# Patient Record
Sex: MALE | Race: WHITE | NOT HISPANIC OR LATINO | Employment: OTHER | URBAN - METROPOLITAN AREA
[De-identification: names, ages, dates, MRNs, and addresses within clinical notes are randomized per-mention and may not be internally consistent; named-entity substitution may affect disease eponyms.]

---

## 2021-02-20 ENCOUNTER — APPOINTMENT (OUTPATIENT)
Dept: CT IMAGING | Age: 86
DRG: 375 | End: 2021-02-20
Attending: STUDENT IN AN ORGANIZED HEALTH CARE EDUCATION/TRAINING PROGRAM

## 2021-02-20 ENCOUNTER — HOSPITAL ENCOUNTER (INPATIENT)
Age: 86
LOS: 5 days | Discharge: NURSING, REHAB OR LONG-TERM CARE | DRG: 375 | End: 2021-02-25
Attending: EMERGENCY MEDICINE | Admitting: INTERNAL MEDICINE

## 2021-02-20 DIAGNOSIS — K62.5 RECTAL BLEEDING: Primary | ICD-10-CM

## 2021-02-20 DIAGNOSIS — K62.89 RECTAL MASS: ICD-10-CM

## 2021-02-20 DIAGNOSIS — I48.0 PAROXYSMAL ATRIAL FIBRILLATION (CMD): ICD-10-CM

## 2021-02-20 DIAGNOSIS — I10 ESSENTIAL HYPERTENSION: ICD-10-CM

## 2021-02-20 DIAGNOSIS — J43.8 OTHER EMPHYSEMA (CMD): ICD-10-CM

## 2021-02-20 LAB
ABO + RH BLD: NORMAL
ALBUMIN SERPL-MCNC: 3.7 G/DL (ref 3.6–5.1)
ALBUMIN/GLOB SERPL: 1 {RATIO} (ref 1–2.4)
ALP SERPL-CCNC: 95 UNITS/L (ref 45–117)
ALT SERPL-CCNC: 21 UNITS/L
ANION GAP SERPL CALC-SCNC: 13 MMOL/L (ref 10–20)
APTT PPP: 26 SEC (ref 22–30)
AST SERPL-CCNC: 33 UNITS/L
BASOPHILS # BLD: 0.1 K/MCL (ref 0–0.3)
BASOPHILS NFR BLD: 1 %
BILIRUB SERPL-MCNC: 0.4 MG/DL (ref 0.2–1)
BLD GP AB SCN SERPL QL GEL: NEGATIVE
BUN SERPL-MCNC: 25 MG/DL (ref 6–20)
BUN/CREAT SERPL: 23 (ref 7–25)
CALCIUM SERPL-MCNC: 9.2 MG/DL (ref 8.4–10.2)
CHLORIDE SERPL-SCNC: 103 MMOL/L (ref 98–107)
CO2 SERPL-SCNC: 28 MMOL/L (ref 21–32)
CREAT SERPL-MCNC: 1.08 MG/DL (ref 0.67–1.17)
DEPRECATED RDW RBC: 45.4 FL (ref 39–50)
EOSINOPHIL # BLD: 0.3 K/MCL (ref 0–0.5)
EOSINOPHIL NFR BLD: 3 %
ERYTHROCYTE [DISTWIDTH] IN BLOOD: 12.9 % (ref 11–15)
FASTING DURATION TIME PATIENT: ABNORMAL H
GFR SERPLBLD BASED ON 1.73 SQ M-ARVRAT: 61 ML/MIN/1.73M2
GLOBULIN SER-MCNC: 3.6 G/DL (ref 2–4)
GLUCOSE SERPL-MCNC: 129 MG/DL (ref 65–99)
HCT VFR BLD CALC: 37.1 % (ref 39–51)
HCT VFR BLD CALC: 42.8 % (ref 39–51)
HGB BLD-MCNC: 12.4 G/DL (ref 13–17)
HGB BLD-MCNC: 14 G/DL (ref 13–17)
IMM GRANULOCYTES # BLD AUTO: 0 K/MCL (ref 0–0.2)
IMM GRANULOCYTES # BLD: 0 %
INR PPP: 1
LACTATE BLDV-SCNC: 0.8 MMOL/L (ref 0–2)
LIPASE SERPL-CCNC: 212 UNITS/L (ref 73–393)
LYMPHOCYTES # BLD: 1.1 K/MCL (ref 1–4)
LYMPHOCYTES NFR BLD: 12 %
MCH RBC QN AUTO: 31.4 PG (ref 26–34)
MCHC RBC AUTO-ENTMCNC: 32.7 G/DL (ref 32–36.5)
MCV RBC AUTO: 96 FL (ref 78–100)
MONOCYTES # BLD: 0.9 K/MCL (ref 0.3–0.9)
MONOCYTES NFR BLD: 10 %
NEUTROPHILS # BLD: 7 K/MCL (ref 1.8–7.7)
NEUTROPHILS NFR BLD: 74 %
NRBC BLD MANUAL-RTO: 0 /100 WBC
PLATELET # BLD AUTO: 250 K/MCL (ref 140–450)
POTASSIUM SERPL-SCNC: 5 MMOL/L (ref 3.4–5.1)
PROT SERPL-MCNC: 7.3 G/DL (ref 6.4–8.2)
PROTHROMBIN TIME: 10.5 SEC (ref 9.7–11.8)
RBC # BLD: 4.46 MIL/MCL (ref 4.5–5.9)
SARS-COV-2 RNA RESP QL NAA+PROBE: NOT DETECTED
SERVICE CMNT-IMP: NORMAL
SERVICE CMNT-IMP: NORMAL
SODIUM SERPL-SCNC: 139 MMOL/L (ref 135–145)
TROPONIN I SERPL HS-MCNC: <0.02 NG/ML
TYPE AND SCREEN EXPIRATION DATE: NORMAL
WBC # BLD: 9.4 K/MCL (ref 4.2–11)

## 2021-02-20 PROCEDURE — 10004651 HB RX, NO CHARGE ITEM: Performed by: STUDENT IN AN ORGANIZED HEALTH CARE EDUCATION/TRAINING PROGRAM

## 2021-02-20 PROCEDURE — 85014 HEMATOCRIT: CPT | Performed by: STUDENT IN AN ORGANIZED HEALTH CARE EDUCATION/TRAINING PROGRAM

## 2021-02-20 PROCEDURE — 85730 THROMBOPLASTIN TIME PARTIAL: CPT | Performed by: STUDENT IN AN ORGANIZED HEALTH CARE EDUCATION/TRAINING PROGRAM

## 2021-02-20 PROCEDURE — 10002805 HB CONTRAST AGENT: Performed by: STUDENT IN AN ORGANIZED HEALTH CARE EDUCATION/TRAINING PROGRAM

## 2021-02-20 PROCEDURE — 99285 EMERGENCY DEPT VISIT HI MDM: CPT | Performed by: EMERGENCY MEDICINE

## 2021-02-20 PROCEDURE — 93010 ELECTROCARDIOGRAM REPORT: CPT | Performed by: INTERNAL MEDICINE

## 2021-02-20 PROCEDURE — 85610 PROTHROMBIN TIME: CPT | Performed by: STUDENT IN AN ORGANIZED HEALTH CARE EDUCATION/TRAINING PROGRAM

## 2021-02-20 PROCEDURE — 74177 CT ABD & PELVIS W/CONTRAST: CPT

## 2021-02-20 PROCEDURE — U0003 INFECTIOUS AGENT DETECTION BY NUCLEIC ACID (DNA OR RNA); SEVERE ACUTE RESPIRATORY SYNDROME CORONAVIRUS 2 (SARS-COV-2) (CORONAVIRUS DISEASE [COVID-19]), AMPLIFIED PROBE TECHNIQUE, MAKING USE OF HIGH THROUGHPUT TECHNOLOGIES AS DESCRIBED BY CMS-2020-01-R: HCPCS | Performed by: STUDENT IN AN ORGANIZED HEALTH CARE EDUCATION/TRAINING PROGRAM

## 2021-02-20 PROCEDURE — 80053 COMPREHEN METABOLIC PANEL: CPT | Performed by: STUDENT IN AN ORGANIZED HEALTH CARE EDUCATION/TRAINING PROGRAM

## 2021-02-20 PROCEDURE — 99285 EMERGENCY DEPT VISIT HI MDM: CPT

## 2021-02-20 PROCEDURE — 10006031 HB ROOM CHARGE TELEMETRY

## 2021-02-20 PROCEDURE — C9803 HOPD COVID-19 SPEC COLLECT: HCPCS

## 2021-02-20 PROCEDURE — 99222 1ST HOSP IP/OBS MODERATE 55: CPT | Performed by: STUDENT IN AN ORGANIZED HEALTH CARE EDUCATION/TRAINING PROGRAM

## 2021-02-20 PROCEDURE — 82378 CARCINOEMBRYONIC ANTIGEN: CPT | Performed by: STUDENT IN AN ORGANIZED HEALTH CARE EDUCATION/TRAINING PROGRAM

## 2021-02-20 PROCEDURE — 93005 ELECTROCARDIOGRAM TRACING: CPT | Performed by: STUDENT IN AN ORGANIZED HEALTH CARE EDUCATION/TRAINING PROGRAM

## 2021-02-20 PROCEDURE — 85025 COMPLETE CBC W/AUTO DIFF WBC: CPT | Performed by: STUDENT IN AN ORGANIZED HEALTH CARE EDUCATION/TRAINING PROGRAM

## 2021-02-20 PROCEDURE — X1094 NO CHARGE VISIT: HCPCS | Performed by: EMERGENCY MEDICINE

## 2021-02-20 PROCEDURE — 83605 ASSAY OF LACTIC ACID: CPT | Performed by: STUDENT IN AN ORGANIZED HEALTH CARE EDUCATION/TRAINING PROGRAM

## 2021-02-20 PROCEDURE — 36415 COLL VENOUS BLD VENIPUNCTURE: CPT | Performed by: STUDENT IN AN ORGANIZED HEALTH CARE EDUCATION/TRAINING PROGRAM

## 2021-02-20 PROCEDURE — 83690 ASSAY OF LIPASE: CPT | Performed by: STUDENT IN AN ORGANIZED HEALTH CARE EDUCATION/TRAINING PROGRAM

## 2021-02-20 PROCEDURE — 86901 BLOOD TYPING SEROLOGIC RH(D): CPT | Performed by: STUDENT IN AN ORGANIZED HEALTH CARE EDUCATION/TRAINING PROGRAM

## 2021-02-20 PROCEDURE — 84484 ASSAY OF TROPONIN QUANT: CPT | Performed by: STUDENT IN AN ORGANIZED HEALTH CARE EDUCATION/TRAINING PROGRAM

## 2021-02-20 RX ORDER — ENOXAPARIN SODIUM 100 MG/ML
40 INJECTION SUBCUTANEOUS DAILY
Status: DISCONTINUED | OUTPATIENT
Start: 2021-02-20 | End: 2021-02-20

## 2021-02-20 RX ORDER — FLUTICASONE PROPIONATE 50 MCG
2 SPRAY, SUSPENSION (ML) NASAL DAILY
COMMUNITY

## 2021-02-20 RX ORDER — POTASSIUM CHLORIDE 1.5 G/1.58G
40 POWDER, FOR SOLUTION ORAL DAILY
COMMUNITY
End: 2023-04-11

## 2021-02-20 RX ORDER — SOTALOL HYDROCHLORIDE 80 MG/1
1 TABLET ORAL 2 TIMES DAILY
Status: ON HOLD | COMMUNITY
End: 2023-04-13 | Stop reason: HOSPADM

## 2021-02-20 RX ORDER — B-COMPLEX WITH VITAMIN C
1 TABLET ORAL EVERY OTHER DAY
COMMUNITY

## 2021-02-20 RX ORDER — 0.9 % SODIUM CHLORIDE 0.9 %
2 VIAL (ML) INJECTION EVERY 12 HOURS SCHEDULED
Status: DISCONTINUED | OUTPATIENT
Start: 2021-02-20 | End: 2021-02-25 | Stop reason: HOSPADM

## 2021-02-20 RX ORDER — DOCUSATE SODIUM 100 MG/1
100 CAPSULE, LIQUID FILLED ORAL DAILY
Status: ON HOLD | COMMUNITY
End: 2023-05-24 | Stop reason: HOSPADM

## 2021-02-20 RX ORDER — ACETAMINOPHEN 325 MG/1
650 TABLET ORAL EVERY 6 HOURS PRN
Status: ON HOLD | COMMUNITY
End: 2023-05-24 | Stop reason: HOSPADM

## 2021-02-20 RX ORDER — NIFEDIPINE 60 MG/1
60 TABLET, FILM COATED, EXTENDED RELEASE ORAL DAILY
COMMUNITY
End: 2021-04-14 | Stop reason: CLARIF

## 2021-02-20 RX ORDER — MAGNESIUM OXIDE 400 MG/1
400 TABLET ORAL DAILY
COMMUNITY

## 2021-02-20 RX ORDER — LOSARTAN POTASSIUM 100 MG/1
100 TABLET ORAL DAILY
Status: ON HOLD | COMMUNITY
End: 2023-05-24 | Stop reason: HOSPADM

## 2021-02-20 RX ORDER — LANOLIN ALCOHOL/MO/W.PET/CERES
3 CREAM (GRAM) TOPICAL AT BEDTIME
COMMUNITY

## 2021-02-20 RX ORDER — ACETAMINOPHEN 325 MG/1
325 TABLET ORAL EVERY 4 HOURS PRN
Status: DISCONTINUED | OUTPATIENT
Start: 2021-02-20 | End: 2021-02-25 | Stop reason: HOSPADM

## 2021-02-20 RX ADMIN — SODIUM CHLORIDE, PRESERVATIVE FREE 2 ML: 5 INJECTION INTRAVENOUS at 20:17

## 2021-02-20 RX ADMIN — IOHEXOL 80 ML: 350 INJECTION, SOLUTION INTRAVENOUS at 17:07

## 2021-02-20 ASSESSMENT — ACTIVITIES OF DAILY LIVING (ADL)
CHRONIC_PAIN_PRESENT: NO
DRESSING YOURSELF: INDEPENDENT
ADL_SCORE: 8
TOILETING: NEEDS ASSISTANCE
RECENT_DECLINE_ADL: NO
TRANSFERRING: NEEDS ASSISTANCE
ADL_SHORT_OF_BREATH: NO
MOBILITY_ASSIST_DEVICES: STANDARD WALKER
BATHING: NEEDS ASSISTANCE
CONTINENCE: NEEDS ASSISTANCE
ADL_BEFORE_ADMISSION: NEEDS/REQUIRES ASSISTANCE
FEEDING YOURSELF: INDEPENDENT

## 2021-02-20 ASSESSMENT — COGNITIVE AND FUNCTIONAL STATUS - GENERAL
DO YOU HAVE SERIOUS DIFFICULTY WALKING OR CLIMBING STAIRS: YES
ARE YOU DEAF OR DO YOU HAVE SERIOUS DIFFICULTY  HEARING: NO
DO YOU HAVE DIFFICULTY DRESSING OR BATHING: YES
ARE YOU BLIND OR DO YOU HAVE SERIOUS DIFFICULTY SEEING, EVEN WHEN WEARING GLASSES: NO
BECAUSE OF A PHYSICAL, MENTAL, OR EMOTIONAL CONDITION, DO YOU HAVE DIFFICULTY DOING ERRANDS ALONE: NO
BECAUSE OF A PHYSICAL, MENTAL, OR EMOTIONAL CONDITION, DO YOU HAVE SERIOUS DIFFICULTY CONCENTRATING, REMEMBERING OR MAKING DECISIONS: NO

## 2021-02-20 ASSESSMENT — ENCOUNTER SYMPTOMS
UNEXPECTED WEIGHT CHANGE: 0
EYE PAIN: 0
RHINORRHEA: 0
FEVER: 0
WOUND: 0
EYE DISCHARGE: 0
CHILLS: 0
BACK PAIN: 0
SORE THROAT: 0
DIARRHEA: 0
CHEST TIGHTNESS: 0
COLOR CHANGE: 0
NUMBNESS: 0
VOMITING: 0
SHORTNESS OF BREATH: 0
NAUSEA: 0
DIZZINESS: 0
HEADACHES: 0
ABDOMINAL PAIN: 0
BLOOD IN STOOL: 1
COUGH: 0

## 2021-02-20 ASSESSMENT — LIFESTYLE VARIABLES
HOW OFTEN DO YOU HAVE A DRINK CONTAINING ALCOHOL: NEVER
AUDIT-C TOTAL SCORE: 0
HOW OFTEN DO YOU HAVE 6 OR MORE DRINKS ON ONE OCCASION: NEVER
ALCOHOL_USE_STATUS: NO OR LOW RISK WITH VALIDATED TOOL
HOW MANY STANDARD DRINKS CONTAINING ALCOHOL DO YOU HAVE ON A TYPICAL DAY: 0,1 OR 2

## 2021-02-20 ASSESSMENT — PAIN SCALES - GENERAL
PAINLEVEL_OUTOF10: 0

## 2021-02-21 LAB
ANION GAP SERPL CALC-SCNC: 12 MMOL/L (ref 10–20)
BASOPHILS # BLD: 0.1 K/MCL (ref 0–0.3)
BASOPHILS NFR BLD: 1 %
BUN SERPL-MCNC: 21 MG/DL (ref 6–20)
BUN/CREAT SERPL: 26 (ref 7–25)
CALCIUM SERPL-MCNC: 8.4 MG/DL (ref 8.4–10.2)
CHLORIDE SERPL-SCNC: 106 MMOL/L (ref 98–107)
CO2 SERPL-SCNC: 25 MMOL/L (ref 21–32)
CREAT SERPL-MCNC: 0.81 MG/DL (ref 0.67–1.17)
DEPRECATED RDW RBC: 44.4 FL (ref 39–50)
EOSINOPHIL # BLD: 0.3 K/MCL (ref 0–0.5)
EOSINOPHIL NFR BLD: 3 %
ERYTHROCYTE [DISTWIDTH] IN BLOOD: 12.8 % (ref 11–15)
FASTING DURATION TIME PATIENT: ABNORMAL H
GFR SERPLBLD BASED ON 1.73 SQ M-ARVRAT: 80 ML/MIN/1.73M2
GLUCOSE SERPL-MCNC: 87 MG/DL (ref 65–99)
HCT VFR BLD CALC: 33.8 % (ref 39–51)
HGB BLD-MCNC: 11.2 G/DL (ref 13–17)
IMM GRANULOCYTES # BLD AUTO: 0 K/MCL (ref 0–0.2)
IMM GRANULOCYTES # BLD: 0 %
LYMPHOCYTES # BLD: 1.1 K/MCL (ref 1–4)
LYMPHOCYTES NFR BLD: 12 %
MCH RBC QN AUTO: 31.3 PG (ref 26–34)
MCHC RBC AUTO-ENTMCNC: 33.1 G/DL (ref 32–36.5)
MCV RBC AUTO: 94.4 FL (ref 78–100)
MONOCYTES # BLD: 0.8 K/MCL (ref 0.3–0.9)
MONOCYTES NFR BLD: 8 %
NEUTROPHILS # BLD: 7.6 K/MCL (ref 1.8–7.7)
NEUTROPHILS NFR BLD: 76 %
NRBC BLD MANUAL-RTO: 0 /100 WBC
PLATELET # BLD AUTO: 190 K/MCL (ref 140–450)
POTASSIUM SERPL-SCNC: 4.2 MMOL/L (ref 3.4–5.1)
RAINBOW EXTRA TUBES HOLD SPECIMEN: NORMAL
RAINBOW EXTRA TUBES HOLD SPECIMEN: NORMAL
RBC # BLD: 3.58 MIL/MCL (ref 4.5–5.9)
SODIUM SERPL-SCNC: 139 MMOL/L (ref 135–145)
WBC # BLD: 9.8 K/MCL (ref 4.2–11)

## 2021-02-21 PROCEDURE — 36415 COLL VENOUS BLD VENIPUNCTURE: CPT | Performed by: STUDENT IN AN ORGANIZED HEALTH CARE EDUCATION/TRAINING PROGRAM

## 2021-02-21 PROCEDURE — 97165 OT EVAL LOW COMPLEX 30 MIN: CPT

## 2021-02-21 PROCEDURE — 97161 PT EVAL LOW COMPLEX 20 MIN: CPT

## 2021-02-21 PROCEDURE — 80048 BASIC METABOLIC PNL TOTAL CA: CPT | Performed by: STUDENT IN AN ORGANIZED HEALTH CARE EDUCATION/TRAINING PROGRAM

## 2021-02-21 PROCEDURE — 99232 SBSQ HOSP IP/OBS MODERATE 35: CPT | Performed by: STUDENT IN AN ORGANIZED HEALTH CARE EDUCATION/TRAINING PROGRAM

## 2021-02-21 PROCEDURE — 10006031 HB ROOM CHARGE TELEMETRY

## 2021-02-21 PROCEDURE — 99222 1ST HOSP IP/OBS MODERATE 55: CPT | Performed by: INTERNAL MEDICINE

## 2021-02-21 PROCEDURE — 10002803 HB RX 637: Performed by: STUDENT IN AN ORGANIZED HEALTH CARE EDUCATION/TRAINING PROGRAM

## 2021-02-21 PROCEDURE — 10004651 HB RX, NO CHARGE ITEM: Performed by: STUDENT IN AN ORGANIZED HEALTH CARE EDUCATION/TRAINING PROGRAM

## 2021-02-21 PROCEDURE — 85025 COMPLETE CBC W/AUTO DIFF WBC: CPT | Performed by: STUDENT IN AN ORGANIZED HEALTH CARE EDUCATION/TRAINING PROGRAM

## 2021-02-21 RX ORDER — IPRATROPIUM BROMIDE AND ALBUTEROL SULFATE 2.5; .5 MG/3ML; MG/3ML
3 SOLUTION RESPIRATORY (INHALATION)
Status: DISCONTINUED | OUTPATIENT
Start: 2021-02-21 | End: 2021-02-25 | Stop reason: HOSPADM

## 2021-02-21 RX ADMIN — SODIUM CHLORIDE, PRESERVATIVE FREE 2 ML: 5 INJECTION INTRAVENOUS at 20:46

## 2021-02-21 RX ADMIN — POLYETHYLENE GLYCOL-3350 AND ELECTROLYTES 4000 ML: 236; 6.74; 5.86; 2.97; 22.74 POWDER, FOR SOLUTION ORAL at 18:07

## 2021-02-21 ASSESSMENT — COGNITIVE AND FUNCTIONAL STATUS - GENERAL
BASIC_MOBILITY_CONVERTED_SCORE: 50.88
DAILY_ACTIVITY_CONVERTED_SCORE: 57.54
BASIC_MOBILITY_RAW_SCORE: 23
DAILY_ACTIVITY_RAW_SCORE: 24

## 2021-02-21 ASSESSMENT — PAIN SCALES - GENERAL
PAINLEVEL_OUTOF10: 0

## 2021-02-21 ASSESSMENT — ACTIVITIES OF DAILY LIVING (ADL): PRIOR_ADL: MODIFIED INDEPENDENT

## 2021-02-21 ASSESSMENT — ENCOUNTER SYMPTOMS
CONSTITUTIONAL NEGATIVE: 1
RESPIRATORY NEGATIVE: 1
HEMATOLOGIC/LYMPHATIC NEGATIVE: 1
BLOOD IN STOOL: 1
NEUROLOGICAL NEGATIVE: 1
ALLERGIC/IMMUNOLOGIC NEGATIVE: 1
EYES NEGATIVE: 1
ENDOCRINE NEGATIVE: 1
PAIN SEVERITY NOW: 0

## 2021-02-22 ENCOUNTER — APPOINTMENT (OUTPATIENT)
Dept: GASTROENTEROLOGY | Age: 86
DRG: 375 | End: 2021-02-22
Attending: INTERNAL MEDICINE

## 2021-02-22 ENCOUNTER — APPOINTMENT (OUTPATIENT)
Dept: CT IMAGING | Age: 86
DRG: 375 | End: 2021-02-22
Attending: STUDENT IN AN ORGANIZED HEALTH CARE EDUCATION/TRAINING PROGRAM

## 2021-02-22 ENCOUNTER — ANESTHESIA (OUTPATIENT)
Dept: GASTROENTEROLOGY | Age: 86
DRG: 375 | End: 2021-02-22

## 2021-02-22 ENCOUNTER — ANESTHESIA EVENT (OUTPATIENT)
Dept: GASTROENTEROLOGY | Age: 86
DRG: 375 | End: 2021-02-22

## 2021-02-22 PROBLEM — J43.8 OTHER EMPHYSEMA (CMD): Status: ACTIVE | Noted: 2020-02-12

## 2021-02-22 PROBLEM — I10 ESSENTIAL HYPERTENSION: Status: ACTIVE | Noted: 2019-07-04

## 2021-02-22 PROBLEM — I48.0 PAROXYSMAL ATRIAL FIBRILLATION (CMD): Status: ACTIVE | Noted: 2019-07-04

## 2021-02-22 PROBLEM — I61.3 PONTINE HEMORRHAGE (CMD): Status: ACTIVE | Noted: 2020-07-10

## 2021-02-22 PROBLEM — K27.9 PEPTIC ULCER DISEASE: Status: ACTIVE | Noted: 2019-07-04

## 2021-02-22 LAB
ALBUMIN SERPL-MCNC: 3.1 G/DL (ref 3.6–5.1)
ALBUMIN/GLOB SERPL: 1.1 {RATIO} (ref 1–2.4)
ALP SERPL-CCNC: 62 UNITS/L (ref 45–117)
ALT SERPL-CCNC: 16 UNITS/L
ANION GAP SERPL CALC-SCNC: 11 MMOL/L (ref 10–20)
AST SERPL-CCNC: 27 UNITS/L
ATRIAL RATE (BPM): 65
BASOPHILS # BLD: 0.1 K/MCL (ref 0–0.3)
BASOPHILS NFR BLD: 1 %
BILIRUB SERPL-MCNC: 0.6 MG/DL (ref 0.2–1)
BUN SERPL-MCNC: 14 MG/DL (ref 6–20)
BUN/CREAT SERPL: 15 (ref 7–25)
CALCIUM SERPL-MCNC: 8.5 MG/DL (ref 8.4–10.2)
CEA SERPL-MCNC: 4.5 NG/ML (ref 0–5)
CEA SERPL-MCNC: 5.8 NG/ML (ref 0–5)
CHLORIDE SERPL-SCNC: 102 MMOL/L (ref 98–107)
CO2 SERPL-SCNC: 29 MMOL/L (ref 21–32)
CREAT SERPL-MCNC: 0.92 MG/DL (ref 0.67–1.17)
DEPRECATED RDW RBC: 44.1 FL (ref 39–50)
EOSINOPHIL # BLD: 0.3 K/MCL (ref 0–0.5)
EOSINOPHIL NFR BLD: 3 %
ERYTHROCYTE [DISTWIDTH] IN BLOOD: 12.8 % (ref 11–15)
FASTING DURATION TIME PATIENT: ABNORMAL H
GFR SERPLBLD BASED ON 1.73 SQ M-ARVRAT: 75 ML/MIN/1.73M2
GLOBULIN SER-MCNC: 2.8 G/DL (ref 2–4)
GLUCOSE SERPL-MCNC: 85 MG/DL (ref 65–99)
HCT VFR BLD CALC: 33.7 % (ref 39–51)
HGB BLD-MCNC: 11.2 G/DL (ref 13–17)
IMM GRANULOCYTES # BLD AUTO: 0 K/MCL (ref 0–0.2)
IMM GRANULOCYTES # BLD: 0 %
INR PPP: 1
LYMPHOCYTES # BLD: 1.4 K/MCL (ref 1–4)
LYMPHOCYTES NFR BLD: 17 %
MCH RBC QN AUTO: 31.3 PG (ref 26–34)
MCHC RBC AUTO-ENTMCNC: 33.2 G/DL (ref 32–36.5)
MCV RBC AUTO: 94.1 FL (ref 78–100)
MONOCYTES # BLD: 0.9 K/MCL (ref 0.3–0.9)
MONOCYTES NFR BLD: 11 %
NEUTROPHILS # BLD: 5.6 K/MCL (ref 1.8–7.7)
NEUTROPHILS NFR BLD: 68 %
NRBC BLD MANUAL-RTO: 0 /100 WBC
P AXIS (DEGREES): 73
PLATELET # BLD AUTO: 208 K/MCL (ref 140–450)
POTASSIUM SERPL-SCNC: 3.6 MMOL/L (ref 3.4–5.1)
PR-INTERVAL (MSEC): 242
PROT SERPL-MCNC: 5.9 G/DL (ref 6.4–8.2)
PROTHROMBIN TIME: 10.9 SEC (ref 9.7–11.8)
QRS-INTERVAL (MSEC): 110
QT-INTERVAL (MSEC): 454
QTC: 472
R AXIS (DEGREES): 46
RBC # BLD: 3.58 MIL/MCL (ref 4.5–5.9)
REPORT TEXT: NORMAL
SODIUM SERPL-SCNC: 138 MMOL/L (ref 135–145)
T AXIS (DEGREES): 67
VENTRICULAR RATE EKG/MIN (BPM): 65
WBC # BLD: 8.3 K/MCL (ref 4.2–11)

## 2021-02-22 PROCEDURE — 10002807 HB RX 258: Performed by: NURSE ANESTHETIST, CERTIFIED REGISTERED

## 2021-02-22 PROCEDURE — 36415 COLL VENOUS BLD VENIPUNCTURE: CPT | Performed by: STUDENT IN AN ORGANIZED HEALTH CARE EDUCATION/TRAINING PROGRAM

## 2021-02-22 PROCEDURE — 88341 IMHCHEM/IMCYTCHM EA ADD ANTB: CPT | Performed by: INTERNAL MEDICINE

## 2021-02-22 PROCEDURE — 10002805 HB CONTRAST AGENT: Performed by: STUDENT IN AN ORGANIZED HEALTH CARE EDUCATION/TRAINING PROGRAM

## 2021-02-22 PROCEDURE — 0DBH8ZZ EXCISION OF CECUM, VIA NATURAL OR ARTIFICIAL OPENING ENDOSCOPIC: ICD-10-PCS | Performed by: INTERNAL MEDICINE

## 2021-02-22 PROCEDURE — 82378 CARCINOEMBRYONIC ANTIGEN: CPT | Performed by: STUDENT IN AN ORGANIZED HEALTH CARE EDUCATION/TRAINING PROGRAM

## 2021-02-22 PROCEDURE — 85025 COMPLETE CBC W/AUTO DIFF WBC: CPT | Performed by: STUDENT IN AN ORGANIZED HEALTH CARE EDUCATION/TRAINING PROGRAM

## 2021-02-22 PROCEDURE — 13000025 HB GI COMPLEX CASE EACH ADD MINUTE

## 2021-02-22 PROCEDURE — 10004651 HB RX, NO CHARGE ITEM: Performed by: STUDENT IN AN ORGANIZED HEALTH CARE EDUCATION/TRAINING PROGRAM

## 2021-02-22 PROCEDURE — 70470 CT HEAD/BRAIN W/O & W/DYE: CPT

## 2021-02-22 PROCEDURE — 13000001 HB PHASE II RECOVERY EA 30 MINUTES

## 2021-02-22 PROCEDURE — 85610 PROTHROMBIN TIME: CPT | Performed by: STUDENT IN AN ORGANIZED HEALTH CARE EDUCATION/TRAINING PROGRAM

## 2021-02-22 PROCEDURE — 94640 AIRWAY INHALATION TREATMENT: CPT

## 2021-02-22 PROCEDURE — 99233 SBSQ HOSP IP/OBS HIGH 50: CPT | Performed by: INTERNAL MEDICINE

## 2021-02-22 PROCEDURE — 71260 CT THORAX DX C+: CPT

## 2021-02-22 PROCEDURE — 99223 1ST HOSP IP/OBS HIGH 75: CPT | Performed by: SURGERY

## 2021-02-22 PROCEDURE — 10000002 HB ROOM CHARGE MED SURG

## 2021-02-22 PROCEDURE — 10002800 HB RX 250 W HCPCS: Performed by: NURSE ANESTHETIST, CERTIFIED REGISTERED

## 2021-02-22 PROCEDURE — 10002801 HB RX 250 W/O HCPCS: Performed by: NURSE ANESTHETIST, CERTIFIED REGISTERED

## 2021-02-22 PROCEDURE — 80053 COMPREHEN METABOLIC PANEL: CPT | Performed by: STUDENT IN AN ORGANIZED HEALTH CARE EDUCATION/TRAINING PROGRAM

## 2021-02-22 PROCEDURE — 13000008 HB ANESTHESIA MAC OUTSIDE OR

## 2021-02-22 PROCEDURE — 10002803 HB RX 637: Performed by: STUDENT IN AN ORGANIZED HEALTH CARE EDUCATION/TRAINING PROGRAM

## 2021-02-22 PROCEDURE — 10002801 HB RX 250 W/O HCPCS: Performed by: STUDENT IN AN ORGANIZED HEALTH CARE EDUCATION/TRAINING PROGRAM

## 2021-02-22 PROCEDURE — 0DBP8ZX EXCISION OF RECTUM, VIA NATURAL OR ARTIFICIAL OPENING ENDOSCOPIC, DIAGNOSTIC: ICD-10-PCS | Performed by: INTERNAL MEDICINE

## 2021-02-22 PROCEDURE — 0DBN8ZX EXCISION OF SIGMOID COLON, VIA NATURAL OR ARTIFICIAL OPENING ENDOSCOPIC, DIAGNOSTIC: ICD-10-PCS | Performed by: INTERNAL MEDICINE

## 2021-02-22 PROCEDURE — 94664 DEMO&/EVAL PT USE INHALER: CPT

## 2021-02-22 PROCEDURE — 13000024 HB GI COMPLEX CASE S/U + 1ST 15 MIN

## 2021-02-22 RX ORDER — SODIUM CHLORIDE 9 MG/ML
INJECTION, SOLUTION INTRAVENOUS CONTINUOUS PRN
Status: DISCONTINUED | OUTPATIENT
Start: 2021-02-22 | End: 2021-02-22

## 2021-02-22 RX ORDER — PHENYLEPHRINE HYDROCHLORIDE 10 MG/ML
INJECTION, SOLUTION INTRAMUSCULAR; INTRAVENOUS; SUBCUTANEOUS PRN
Status: DISCONTINUED | OUTPATIENT
Start: 2021-02-22 | End: 2021-02-22

## 2021-02-22 RX ORDER — NIFEDIPINE 60 MG/1
60 TABLET, EXTENDED RELEASE ORAL DAILY
Status: DISCONTINUED | OUTPATIENT
Start: 2021-02-22 | End: 2021-02-25 | Stop reason: HOSPADM

## 2021-02-22 RX ORDER — LIDOCAINE HYDROCHLORIDE 20 MG/ML
INJECTION, SOLUTION INFILTRATION; PERINEURAL PRN
Status: DISCONTINUED | OUTPATIENT
Start: 2021-02-22 | End: 2021-02-22

## 2021-02-22 RX ORDER — PROPOFOL 10 MG/ML
INJECTION, EMULSION INTRAVENOUS PRN
Status: DISCONTINUED | OUTPATIENT
Start: 2021-02-22 | End: 2021-02-22

## 2021-02-22 RX ORDER — LOSARTAN POTASSIUM 25 MG/1
100 TABLET ORAL DAILY
Status: DISCONTINUED | OUTPATIENT
Start: 2021-02-22 | End: 2021-02-25 | Stop reason: HOSPADM

## 2021-02-22 RX ADMIN — SODIUM CHLORIDE, PRESERVATIVE FREE 2 ML: 5 INJECTION INTRAVENOUS at 20:16

## 2021-02-22 RX ADMIN — IPRATROPIUM BROMIDE AND ALBUTEROL SULFATE 3 ML: 2.5; .5 SOLUTION RESPIRATORY (INHALATION) at 07:52

## 2021-02-22 RX ADMIN — NIFEDIPINE 60 MG: 60 TABLET, FILM COATED, EXTENDED RELEASE ORAL at 11:17

## 2021-02-22 RX ADMIN — PHENYLEPHRINE HYDROCHLORIDE 100 MCG: 10 INJECTION INTRAVENOUS at 14:48

## 2021-02-22 RX ADMIN — FLUTICASONE FUROATE, UMECLIDINIUM BROMIDE AND VILANTEROL TRIFENATATE 1 PUFF: 100; 62.5; 25 POWDER RESPIRATORY (INHALATION) at 07:52

## 2021-02-22 RX ADMIN — LOSARTAN POTASSIUM 100 MG: 50 TABLET, FILM COATED ORAL at 08:06

## 2021-02-22 RX ADMIN — SODIUM CHLORIDE: 9 INJECTION, SOLUTION INTRAVENOUS at 14:22

## 2021-02-22 RX ADMIN — PHENYLEPHRINE HYDROCHLORIDE 100 MCG: 10 INJECTION INTRAVENOUS at 14:35

## 2021-02-22 RX ADMIN — PROPOFOL 50 MG: 10 INJECTION, EMULSION INTRAVENOUS at 14:31

## 2021-02-22 RX ADMIN — IPRATROPIUM BROMIDE AND ALBUTEROL SULFATE 3 ML: 2.5; .5 SOLUTION RESPIRATORY (INHALATION) at 20:37

## 2021-02-22 RX ADMIN — SODIUM CHLORIDE, PRESERVATIVE FREE 2 ML: 5 INJECTION INTRAVENOUS at 08:07

## 2021-02-22 RX ADMIN — IPRATROPIUM BROMIDE AND ALBUTEROL SULFATE 3 ML: 2.5; .5 SOLUTION RESPIRATORY (INHALATION) at 11:04

## 2021-02-22 RX ADMIN — PROPOFOL 100 MCG/KG/MIN: 10 INJECTION, EMULSION INTRAVENOUS at 14:31

## 2021-02-22 RX ADMIN — IOHEXOL 80 ML: 350 INJECTION, SOLUTION INTRAVENOUS at 10:15

## 2021-02-22 RX ADMIN — LIDOCAINE HYDROCHLORIDE 3 ML: 20 INJECTION, SOLUTION INFILTRATION; PERINEURAL at 14:31

## 2021-02-22 RX ADMIN — PHENYLEPHRINE HYDROCHLORIDE 200 MCG: 10 INJECTION INTRAVENOUS at 14:38

## 2021-02-22 ASSESSMENT — PAIN SCALES - GENERAL
PAINLEVEL_OUTOF10: 0
PAINLEVEL_OUTOF10: 0
PAINLEVEL_OUTOF10: 1
PAINLEVEL_OUTOF10: 0

## 2021-02-22 ASSESSMENT — ENCOUNTER SYMPTOMS
EXERCISE TOLERANCE: POOR (<4 METS)
FEVER: 0
ABDOMINAL PAIN: 0
SHORTNESS OF BREATH: 0
NAUSEA: 0
CONSTIPATION: 0
RECTAL PAIN: 0
CHEST TIGHTNESS: 0
ANAL BLEEDING: 0
BLOOD IN STOOL: 1
CHILLS: 0
WHEEZING: 0
STRIDOR: 0
DIARRHEA: 0
APPETITE CHANGE: 0

## 2021-02-22 ASSESSMENT — COPD QUESTIONNAIRES: COPD: 1

## 2021-02-23 ENCOUNTER — APPOINTMENT (OUTPATIENT)
Dept: MRI IMAGING | Age: 86
DRG: 375 | End: 2021-02-23
Attending: STUDENT IN AN ORGANIZED HEALTH CARE EDUCATION/TRAINING PROGRAM

## 2021-02-23 LAB
ALBUMIN SERPL-MCNC: 3.3 G/DL (ref 3.6–5.1)
ALBUMIN/GLOB SERPL: 1.1 {RATIO} (ref 1–2.4)
ALP SERPL-CCNC: 64 UNITS/L (ref 45–117)
ALT SERPL-CCNC: 17 UNITS/L
ANION GAP SERPL CALC-SCNC: 14 MMOL/L (ref 10–20)
AST SERPL-CCNC: 30 UNITS/L
BASOPHILS # BLD: 0.1 K/MCL (ref 0–0.3)
BASOPHILS NFR BLD: 1 %
BILIRUB SERPL-MCNC: 0.9 MG/DL (ref 0.2–1)
BUN SERPL-MCNC: 11 MG/DL (ref 6–20)
BUN/CREAT SERPL: 13 (ref 7–25)
CALCIUM SERPL-MCNC: 8.9 MG/DL (ref 8.4–10.2)
CHLORIDE SERPL-SCNC: 102 MMOL/L (ref 98–107)
CO2 SERPL-SCNC: 27 MMOL/L (ref 21–32)
CREAT SERPL-MCNC: 0.82 MG/DL (ref 0.67–1.17)
DEPRECATED RDW RBC: 43 FL (ref 39–50)
EOSINOPHIL # BLD: 0.2 K/MCL (ref 0–0.5)
EOSINOPHIL NFR BLD: 2 %
ERYTHROCYTE [DISTWIDTH] IN BLOOD: 12.6 % (ref 11–15)
FASTING DURATION TIME PATIENT: ABNORMAL H
GFR SERPLBLD BASED ON 1.73 SQ M-ARVRAT: 80 ML/MIN/1.73M2
GLOBULIN SER-MCNC: 2.9 G/DL (ref 2–4)
GLUCOSE SERPL-MCNC: 84 MG/DL (ref 65–99)
HCT VFR BLD CALC: 35.9 % (ref 39–51)
HGB BLD-MCNC: 12 G/DL (ref 13–17)
IMM GRANULOCYTES # BLD AUTO: 0 K/MCL (ref 0–0.2)
IMM GRANULOCYTES # BLD: 0 %
LYMPHOCYTES # BLD: 1.4 K/MCL (ref 1–4)
LYMPHOCYTES NFR BLD: 14 %
MAGNESIUM SERPL-MCNC: 1.8 MG/DL (ref 1.7–2.4)
MCH RBC QN AUTO: 31.1 PG (ref 26–34)
MCHC RBC AUTO-ENTMCNC: 33.4 G/DL (ref 32–36.5)
MCV RBC AUTO: 93 FL (ref 78–100)
MONOCYTES # BLD: 1 K/MCL (ref 0.3–0.9)
MONOCYTES NFR BLD: 10 %
NEUTROPHILS # BLD: 7.2 K/MCL (ref 1.8–7.7)
NEUTROPHILS NFR BLD: 73 %
NRBC BLD MANUAL-RTO: 0 /100 WBC
PHOSPHATE SERPL-MCNC: 3.3 MG/DL (ref 2.4–4.7)
PLATELET # BLD AUTO: 229 K/MCL (ref 140–450)
POTASSIUM SERPL-SCNC: 3.2 MMOL/L (ref 3.4–5.1)
PROT SERPL-MCNC: 6.2 G/DL (ref 6.4–8.2)
RBC # BLD: 3.86 MIL/MCL (ref 4.5–5.9)
SODIUM SERPL-SCNC: 140 MMOL/L (ref 135–145)
WBC # BLD: 9.9 K/MCL (ref 4.2–11)

## 2021-02-23 PROCEDURE — 10002805 HB CONTRAST AGENT: Performed by: STUDENT IN AN ORGANIZED HEALTH CARE EDUCATION/TRAINING PROGRAM

## 2021-02-23 PROCEDURE — 83735 ASSAY OF MAGNESIUM: CPT | Performed by: PHYSICIAN ASSISTANT

## 2021-02-23 PROCEDURE — A9585 GADOBUTROL INJECTION: HCPCS | Performed by: STUDENT IN AN ORGANIZED HEALTH CARE EDUCATION/TRAINING PROGRAM

## 2021-02-23 PROCEDURE — 10004651 HB RX, NO CHARGE ITEM: Performed by: STUDENT IN AN ORGANIZED HEALTH CARE EDUCATION/TRAINING PROGRAM

## 2021-02-23 PROCEDURE — 99222 1ST HOSP IP/OBS MODERATE 55: CPT | Performed by: INTERNAL MEDICINE

## 2021-02-23 PROCEDURE — 10002803 HB RX 637: Performed by: STUDENT IN AN ORGANIZED HEALTH CARE EDUCATION/TRAINING PROGRAM

## 2021-02-23 PROCEDURE — 99232 SBSQ HOSP IP/OBS MODERATE 35: CPT | Performed by: INTERNAL MEDICINE

## 2021-02-23 PROCEDURE — 80053 COMPREHEN METABOLIC PANEL: CPT | Performed by: STUDENT IN AN ORGANIZED HEALTH CARE EDUCATION/TRAINING PROGRAM

## 2021-02-23 PROCEDURE — 72197 MRI PELVIS W/O & W/DYE: CPT

## 2021-02-23 PROCEDURE — 85025 COMPLETE CBC W/AUTO DIFF WBC: CPT | Performed by: STUDENT IN AN ORGANIZED HEALTH CARE EDUCATION/TRAINING PROGRAM

## 2021-02-23 PROCEDURE — 10000002 HB ROOM CHARGE MED SURG

## 2021-02-23 PROCEDURE — 99232 SBSQ HOSP IP/OBS MODERATE 35: CPT | Performed by: SURGERY

## 2021-02-23 PROCEDURE — 36415 COLL VENOUS BLD VENIPUNCTURE: CPT | Performed by: STUDENT IN AN ORGANIZED HEALTH CARE EDUCATION/TRAINING PROGRAM

## 2021-02-23 PROCEDURE — 10002803 HB RX 637: Performed by: PHYSICIAN ASSISTANT

## 2021-02-23 PROCEDURE — 94640 AIRWAY INHALATION TREATMENT: CPT

## 2021-02-23 PROCEDURE — 84100 ASSAY OF PHOSPHORUS: CPT | Performed by: PHYSICIAN ASSISTANT

## 2021-02-23 RX ORDER — LANOLIN ALCOHOL/MO/W.PET/CERES
400 CREAM (GRAM) TOPICAL ONCE
Status: COMPLETED | OUTPATIENT
Start: 2021-02-23 | End: 2021-02-23

## 2021-02-23 RX ORDER — GADOBUTROL 604.72 MG/ML
7.5 INJECTION INTRAVENOUS ONCE
Status: COMPLETED | OUTPATIENT
Start: 2021-02-23 | End: 2021-02-23

## 2021-02-23 RX ORDER — POTASSIUM CHLORIDE 10 MEQ
40 TABLET, EXT RELEASE, PARTICLES/CRYSTALS ORAL
Status: DISCONTINUED | OUTPATIENT
Start: 2021-02-23 | End: 2021-02-25 | Stop reason: HOSPADM

## 2021-02-23 RX ORDER — ENOXAPARIN SODIUM 100 MG/ML
40 INJECTION SUBCUTANEOUS DAILY
Status: DISCONTINUED | OUTPATIENT
Start: 2021-02-23 | End: 2021-02-25 | Stop reason: HOSPADM

## 2021-02-23 RX ORDER — POTASSIUM CHLORIDE 14.9 MG/ML
40 INJECTION INTRAVENOUS ONCE
Status: DISCONTINUED | OUTPATIENT
Start: 2021-02-23 | End: 2021-02-23

## 2021-02-23 RX ADMIN — Medication 400 MG: at 09:29

## 2021-02-23 RX ADMIN — FLUTICASONE FUROATE, UMECLIDINIUM BROMIDE AND VILANTEROL TRIFENATATE 1 PUFF: 100; 62.5; 25 POWDER RESPIRATORY (INHALATION) at 07:40

## 2021-02-23 RX ADMIN — GADOBUTROL 5 ML: 604.72 INJECTION INTRAVENOUS at 20:50

## 2021-02-23 RX ADMIN — SODIUM CHLORIDE, PRESERVATIVE FREE 2 ML: 5 INJECTION INTRAVENOUS at 21:26

## 2021-02-23 RX ADMIN — POTASSIUM CHLORIDE 40 MEQ: 750 TABLET, FILM COATED, EXTENDED RELEASE ORAL at 08:04

## 2021-02-23 RX ADMIN — NIFEDIPINE 60 MG: 60 TABLET, FILM COATED, EXTENDED RELEASE ORAL at 08:04

## 2021-02-23 RX ADMIN — SODIUM CHLORIDE, PRESERVATIVE FREE 2 ML: 5 INJECTION INTRAVENOUS at 08:05

## 2021-02-23 ASSESSMENT — ENCOUNTER SYMPTOMS
SHORTNESS OF BREATH: 0
SORE THROAT: 0
DIARRHEA: 0
CONSTIPATION: 0
DIZZINESS: 0
FATIGUE: 0
ABDOMINAL PAIN: 0
VOMITING: 0
NAUSEA: 0
COUGH: 0
NUMBNESS: 0
WEAKNESS: 0
WOUND: 0
CHILLS: 0
BLOOD IN STOOL: 1
HEADACHES: 0
FEVER: 0

## 2021-02-23 ASSESSMENT — PAIN SCALES - GENERAL
PAINLEVEL_OUTOF10: 0

## 2021-02-24 LAB
ALBUMIN SERPL-MCNC: 3.1 G/DL (ref 3.6–5.1)
ALBUMIN/GLOB SERPL: 1 {RATIO} (ref 1–2.4)
ALP SERPL-CCNC: 60 UNITS/L (ref 45–117)
ALT SERPL-CCNC: 18 UNITS/L
ANION GAP SERPL CALC-SCNC: 15 MMOL/L (ref 10–20)
AST SERPL-CCNC: 27 UNITS/L
BASOPHILS # BLD: 0.1 K/MCL (ref 0–0.3)
BASOPHILS NFR BLD: 1 %
BILIRUB SERPL-MCNC: 0.6 MG/DL (ref 0.2–1)
BUN SERPL-MCNC: 14 MG/DL (ref 6–20)
BUN/CREAT SERPL: 14 (ref 7–25)
CALCIUM SERPL-MCNC: 8.3 MG/DL (ref 8.4–10.2)
CHLORIDE SERPL-SCNC: 105 MMOL/L (ref 98–107)
CO2 SERPL-SCNC: 26 MMOL/L (ref 21–32)
CREAT SERPL-MCNC: 1 MG/DL (ref 0.67–1.17)
DEPRECATED RDW RBC: 43.8 FL (ref 39–50)
EOSINOPHIL # BLD: 0.3 K/MCL (ref 0–0.5)
EOSINOPHIL NFR BLD: 3 %
ERYTHROCYTE [DISTWIDTH] IN BLOOD: 12.8 % (ref 11–15)
FASTING DURATION TIME PATIENT: ABNORMAL H
GFR SERPLBLD BASED ON 1.73 SQ M-ARVRAT: 67 ML/MIN/1.73M2
GLOBULIN SER-MCNC: 3 G/DL (ref 2–4)
GLUCOSE SERPL-MCNC: 95 MG/DL (ref 65–99)
HCT VFR BLD CALC: 35.1 % (ref 39–51)
HGB BLD-MCNC: 11.8 G/DL (ref 13–17)
IMM GRANULOCYTES # BLD AUTO: 0 K/MCL (ref 0–0.2)
IMM GRANULOCYTES # BLD: 0 %
LYMPHOCYTES # BLD: 1.6 K/MCL (ref 1–4)
LYMPHOCYTES NFR BLD: 15 %
MCH RBC QN AUTO: 31.6 PG (ref 26–34)
MCHC RBC AUTO-ENTMCNC: 33.6 G/DL (ref 32–36.5)
MCV RBC AUTO: 94.1 FL (ref 78–100)
MONOCYTES # BLD: 1.1 K/MCL (ref 0.3–0.9)
MONOCYTES NFR BLD: 10 %
NEUTROPHILS # BLD: 7.8 K/MCL (ref 1.8–7.7)
NEUTROPHILS NFR BLD: 71 %
NRBC BLD MANUAL-RTO: 0 /100 WBC
PLATELET # BLD AUTO: 247 K/MCL (ref 140–450)
POTASSIUM SERPL-SCNC: 3.9 MMOL/L (ref 3.4–5.1)
PROT SERPL-MCNC: 6.1 G/DL (ref 6.4–8.2)
RBC # BLD: 3.73 MIL/MCL (ref 4.5–5.9)
SODIUM SERPL-SCNC: 142 MMOL/L (ref 135–145)
WBC # BLD: 10.9 K/MCL (ref 4.2–11)

## 2021-02-24 PROCEDURE — 10002803 HB RX 637: Performed by: STUDENT IN AN ORGANIZED HEALTH CARE EDUCATION/TRAINING PROGRAM

## 2021-02-24 PROCEDURE — 10002800 HB RX 250 W HCPCS: Performed by: STUDENT IN AN ORGANIZED HEALTH CARE EDUCATION/TRAINING PROGRAM

## 2021-02-24 PROCEDURE — 99232 SBSQ HOSP IP/OBS MODERATE 35: CPT | Performed by: INTERNAL MEDICINE

## 2021-02-24 PROCEDURE — 10004651 HB RX, NO CHARGE ITEM: Performed by: STUDENT IN AN ORGANIZED HEALTH CARE EDUCATION/TRAINING PROGRAM

## 2021-02-24 PROCEDURE — 80053 COMPREHEN METABOLIC PANEL: CPT | Performed by: STUDENT IN AN ORGANIZED HEALTH CARE EDUCATION/TRAINING PROGRAM

## 2021-02-24 PROCEDURE — 36415 COLL VENOUS BLD VENIPUNCTURE: CPT | Performed by: STUDENT IN AN ORGANIZED HEALTH CARE EDUCATION/TRAINING PROGRAM

## 2021-02-24 PROCEDURE — 10000002 HB ROOM CHARGE MED SURG

## 2021-02-24 PROCEDURE — 85025 COMPLETE CBC W/AUTO DIFF WBC: CPT | Performed by: STUDENT IN AN ORGANIZED HEALTH CARE EDUCATION/TRAINING PROGRAM

## 2021-02-24 PROCEDURE — 99233 SBSQ HOSP IP/OBS HIGH 50: CPT | Performed by: SURGERY

## 2021-02-24 RX ORDER — DOCUSATE SODIUM 100 MG/1
100 CAPSULE, LIQUID FILLED ORAL 2 TIMES DAILY
Qty: 60 CAPSULE | Refills: 1 | Status: SHIPPED | OUTPATIENT
Start: 2021-02-24 | End: 2021-04-14 | Stop reason: CLARIF

## 2021-02-24 RX ADMIN — SODIUM CHLORIDE, PRESERVATIVE FREE 2 ML: 5 INJECTION INTRAVENOUS at 09:13

## 2021-02-24 RX ADMIN — POTASSIUM CHLORIDE 40 MEQ: 750 TABLET, FILM COATED, EXTENDED RELEASE ORAL at 09:11

## 2021-02-24 RX ADMIN — DOCUSATE SODIUM 100 MG: 50 LIQUID ORAL at 14:31

## 2021-02-24 RX ADMIN — SODIUM CHLORIDE, PRESERVATIVE FREE 2 ML: 5 INJECTION INTRAVENOUS at 20:19

## 2021-02-24 RX ADMIN — LOSARTAN POTASSIUM 100 MG: 50 TABLET, FILM COATED ORAL at 09:11

## 2021-02-24 RX ADMIN — NIFEDIPINE 60 MG: 60 TABLET, FILM COATED, EXTENDED RELEASE ORAL at 09:10

## 2021-02-24 ASSESSMENT — PAIN SCALES - GENERAL
PAINLEVEL_OUTOF10: 0
PAINLEVEL_OUTOF10: 0

## 2021-02-25 VITALS
SYSTOLIC BLOOD PRESSURE: 123 MMHG | DIASTOLIC BLOOD PRESSURE: 74 MMHG | RESPIRATION RATE: 17 BRPM | OXYGEN SATURATION: 95 % | HEART RATE: 94 BPM | HEIGHT: 65 IN | WEIGHT: 113.54 LBS | TEMPERATURE: 97.9 F | BODY MASS INDEX: 18.92 KG/M2

## 2021-02-25 PROCEDURE — 99239 HOSP IP/OBS DSCHRG MGMT >30: CPT | Performed by: INTERNAL MEDICINE

## 2021-02-25 PROCEDURE — 94640 AIRWAY INHALATION TREATMENT: CPT

## 2021-02-25 PROCEDURE — 10002803 HB RX 637: Performed by: STUDENT IN AN ORGANIZED HEALTH CARE EDUCATION/TRAINING PROGRAM

## 2021-02-25 PROCEDURE — 10004651 HB RX, NO CHARGE ITEM: Performed by: STUDENT IN AN ORGANIZED HEALTH CARE EDUCATION/TRAINING PROGRAM

## 2021-02-25 PROCEDURE — 10002801 HB RX 250 W/O HCPCS: Performed by: STUDENT IN AN ORGANIZED HEALTH CARE EDUCATION/TRAINING PROGRAM

## 2021-02-25 RX ORDER — DOCUSATE SODIUM 100 MG/1
100 CAPSULE, LIQUID FILLED ORAL DAILY
Status: DISCONTINUED | OUTPATIENT
Start: 2021-02-25 | End: 2021-02-25 | Stop reason: HOSPADM

## 2021-02-25 RX ADMIN — IPRATROPIUM BROMIDE AND ALBUTEROL SULFATE 3 ML: 2.5; .5 SOLUTION RESPIRATORY (INHALATION) at 12:15

## 2021-02-25 RX ADMIN — DOCUSATE SODIUM 100 MG: 100 CAPSULE ORAL at 09:38

## 2021-02-25 RX ADMIN — POTASSIUM CHLORIDE 40 MEQ: 750 TABLET, FILM COATED, EXTENDED RELEASE ORAL at 09:38

## 2021-02-25 RX ADMIN — FLUTICASONE FUROATE, UMECLIDINIUM BROMIDE AND VILANTEROL TRIFENATATE 1 PUFF: 100; 62.5; 25 POWDER RESPIRATORY (INHALATION) at 09:08

## 2021-02-25 RX ADMIN — NIFEDIPINE 60 MG: 60 TABLET, FILM COATED, EXTENDED RELEASE ORAL at 09:38

## 2021-02-25 RX ADMIN — IPRATROPIUM BROMIDE AND ALBUTEROL SULFATE 3 ML: 2.5; .5 SOLUTION RESPIRATORY (INHALATION) at 07:50

## 2021-02-25 RX ADMIN — LOSARTAN POTASSIUM 100 MG: 50 TABLET, FILM COATED ORAL at 09:38

## 2021-02-25 RX ADMIN — SODIUM CHLORIDE, PRESERVATIVE FREE 2 ML: 5 INJECTION INTRAVENOUS at 09:40

## 2021-02-25 ASSESSMENT — PAIN SCALES - GENERAL: PAINLEVEL_OUTOF10: 0

## 2021-02-28 ENCOUNTER — ADVANCED DIRECTIVES (OUTPATIENT)
Dept: HEALTH INFORMATION MANAGEMENT | Age: 86
End: 2021-02-28

## 2021-03-02 ENCOUNTER — OFFICE VISIT (OUTPATIENT)
Dept: SURGERY | Age: 86
End: 2021-03-02
Attending: SURGERY

## 2021-03-02 ENCOUNTER — TELEPHONE (OUTPATIENT)
Dept: SURGERY | Age: 86
End: 2021-03-02

## 2021-03-02 VITALS
HEART RATE: 63 BPM | RESPIRATION RATE: 17 BRPM | DIASTOLIC BLOOD PRESSURE: 76 MMHG | BODY MASS INDEX: 21.66 KG/M2 | WEIGHT: 130 LBS | OXYGEN SATURATION: 96 % | SYSTOLIC BLOOD PRESSURE: 136 MMHG | HEIGHT: 65 IN | TEMPERATURE: 98.2 F

## 2021-03-02 DIAGNOSIS — C18.7 CANCER OF SIGMOID COLON (CMD): Primary | ICD-10-CM

## 2021-03-02 LAB
ASR DISCLAIMER: NORMAL
CASE RPRT: NORMAL
CLINICAL INFO: NORMAL
PATH REPORT.ADDENDUM SPEC: NORMAL
PATH REPORT.FINAL DX SPEC: NORMAL
PATH REPORT.FINAL DX SPEC: NORMAL
PATH REPORT.GROSS SPEC: NORMAL

## 2021-03-02 PROCEDURE — 99215 OFFICE O/P EST HI 40 MIN: CPT | Performed by: SURGERY

## 2021-03-02 PROCEDURE — 99211 OFF/OP EST MAY X REQ PHY/QHP: CPT | Performed by: SURGERY

## 2021-03-03 ENCOUNTER — TELEPHONE (OUTPATIENT)
Dept: HEMATOLOGY/ONCOLOGY | Age: 86
End: 2021-03-03

## 2021-03-08 ENCOUNTER — TELEPHONE (OUTPATIENT)
Dept: INTERNAL MEDICINE | Age: 86
End: 2021-03-08

## 2021-03-10 ENCOUNTER — TELEPHONE (OUTPATIENT)
Dept: HEMATOLOGY/ONCOLOGY | Age: 86
End: 2021-03-10

## 2021-03-15 ENCOUNTER — HOSPITAL ENCOUNTER (OUTPATIENT)
Dept: PET IMAGING | Age: 86
Discharge: HOME | End: 2021-03-15
Attending: INTERNAL MEDICINE

## 2021-03-15 DIAGNOSIS — K62.89 RECTAL MASS: ICD-10-CM

## 2021-03-15 LAB — GLUCOSE BLDC GLUCOMTR-MCNC: 109 MG/DL (ref 70–99)

## 2021-03-15 PROCEDURE — 10006150 HB RX 343: Performed by: STUDENT IN AN ORGANIZED HEALTH CARE EDUCATION/TRAINING PROGRAM

## 2021-03-15 PROCEDURE — 78815 PET IMAGE W/CT SKULL-THIGH: CPT

## 2021-03-15 PROCEDURE — 82962 GLUCOSE BLOOD TEST: CPT

## 2021-03-15 PROCEDURE — A9552 F18 FDG: HCPCS | Performed by: STUDENT IN AN ORGANIZED HEALTH CARE EDUCATION/TRAINING PROGRAM

## 2021-03-15 RX ORDER — FLUDEOXYGLUCOSE F-18 300 MCI/ML
9.4 INJECTION INTRAVENOUS ONCE
Status: COMPLETED | OUTPATIENT
Start: 2021-03-15 | End: 2021-03-15

## 2021-03-15 RX ADMIN — FLUDEOXYGLUCOSE F-18 9.4 MILLICURIE: 300 INJECTION INTRAVENOUS at 08:30

## 2021-03-17 ENCOUNTER — OFFICE VISIT (OUTPATIENT)
Dept: HEMATOLOGY/ONCOLOGY | Age: 86
End: 2021-03-17
Attending: INTERNAL MEDICINE

## 2021-03-17 DIAGNOSIS — C18.7 MALIGNANT NEOPLASM OF SIGMOID COLON (CMD): Primary | ICD-10-CM

## 2021-03-17 PROCEDURE — 99202 OFFICE O/P NEW SF 15 MIN: CPT

## 2021-03-17 PROCEDURE — 99215 OFFICE O/P EST HI 40 MIN: CPT | Performed by: INTERNAL MEDICINE

## 2021-03-17 PROCEDURE — 99211 OFF/OP EST MAY X REQ PHY/QHP: CPT

## 2021-03-17 RX ORDER — NIFEDIPINE 60 MG/1
60 TABLET, EXTENDED RELEASE ORAL DAILY
COMMUNITY
Start: 2021-02-25 | End: 2023-04-11

## 2021-03-17 ASSESSMENT — ENCOUNTER SYMPTOMS
EYES NEGATIVE: 1
RESPIRATORY NEGATIVE: 1
PSYCHIATRIC NEGATIVE: 1
BLOOD IN STOOL: 1
HEMATOLOGIC/LYMPHATIC NEGATIVE: 1
ENDOCRINE NEGATIVE: 1
CONSTITUTIONAL NEGATIVE: 1
CONSTIPATION: 1
NEUROLOGICAL NEGATIVE: 1

## 2021-03-17 ASSESSMENT — PAIN SCALES - GENERAL: PAINLEVEL: 0

## 2021-03-19 ENCOUNTER — OFFICE VISIT (OUTPATIENT)
Dept: SURGERY | Age: 86
End: 2021-03-19
Attending: SURGERY

## 2021-03-19 VITALS
OXYGEN SATURATION: 97 % | HEIGHT: 62 IN | DIASTOLIC BLOOD PRESSURE: 85 MMHG | BODY MASS INDEX: 21.16 KG/M2 | SYSTOLIC BLOOD PRESSURE: 174 MMHG | RESPIRATION RATE: 17 BRPM | WEIGHT: 115 LBS | TEMPERATURE: 98.1 F | HEART RATE: 66 BPM

## 2021-03-19 DIAGNOSIS — C18.7 CANCER OF SIGMOID COLON (CMD): Primary | ICD-10-CM

## 2021-03-19 PROCEDURE — 99211 OFF/OP EST MAY X REQ PHY/QHP: CPT | Performed by: SURGERY

## 2021-03-19 PROCEDURE — 99215 OFFICE O/P EST HI 40 MIN: CPT | Performed by: SURGERY

## 2021-03-23 ENCOUNTER — TELEPHONE (OUTPATIENT)
Dept: SURGERY | Age: 86
End: 2021-03-23

## 2021-03-24 ENCOUNTER — HOSPITAL ENCOUNTER (OUTPATIENT)
Dept: GASTROENTEROLOGY | Age: 86
Discharge: HOME | End: 2021-03-24
Attending: SURGERY

## 2021-03-24 DIAGNOSIS — C18.7 CANCER OF SIGMOID COLON (CMD): ICD-10-CM

## 2021-04-12 ENCOUNTER — TELEPHONE (OUTPATIENT)
Dept: SURGERY | Age: 86
End: 2021-04-12

## 2021-04-13 ENCOUNTER — TELEPHONE (OUTPATIENT)
Dept: SURGERY | Age: 86
End: 2021-04-13

## 2021-04-14 ENCOUNTER — ANESTHESIA EVENT (OUTPATIENT)
Dept: SURGERY | Age: 86
DRG: 330 | End: 2021-04-14

## 2021-04-14 ASSESSMENT — ACTIVITIES OF DAILY LIVING (ADL)
MOBILITY_ASSIST_DEVICES: STANDARD WALKER
SENSORY_SUPPORT_DEVICES: EYEGLASSES
ADL_BEFORE_ADMISSION: INDEPENDENT
HISTORY OF FALLING IN THE LAST YEAR (PRIOR TO ADMISSION): NO
ADL_SCORE: 12

## 2021-04-15 ENCOUNTER — ANESTHESIA (OUTPATIENT)
Dept: SURGERY | Age: 86
DRG: 330 | End: 2021-04-15

## 2021-04-15 ENCOUNTER — HOSPITAL ENCOUNTER (INPATIENT)
Age: 86
LOS: 4 days | Discharge: NURSING, REHAB OR LONG-TERM CARE | DRG: 330 | End: 2021-04-19
Attending: SURGERY | Admitting: SURGERY

## 2021-04-15 DIAGNOSIS — C18.7 CANCER OF SIGMOID COLON (CMD): Primary | ICD-10-CM

## 2021-04-15 DIAGNOSIS — R33.9 URINARY RETENTION: ICD-10-CM

## 2021-04-15 LAB
ABO + RH BLD: NORMAL
BASE EXCESS BLDA CALC-SCNC: -2 MMOL/L (ref -2–3)
BASE EXCESS BLDA CALC-SCNC: -3 MMOL/L (ref -2–3)
BLD GP AB SCN SERPL QL GEL: NEGATIVE
CA-I BLD-SCNC: 1.14 MMOL/L (ref 1.15–1.29)
CA-I BLD-SCNC: 1.16 MMOL/L (ref 1.15–1.29)
GLUCOSE BLD-MCNC: 79 MG/DL (ref 70–99)
GLUCOSE BLD-MCNC: 99 MG/DL (ref 70–99)
GLUCOSE BLDC GLUCOMTR-MCNC: 88 MG/DL (ref 70–99)
GLUCOSE BLDC GLUCOMTR-MCNC: 90 MG/DL (ref 70–99)
HCO3 BLDA-SCNC: 23 MMOL/L (ref 22–28)
HCO3 BLDA-SCNC: 23 MMOL/L (ref 22–28)
HCT VFR BLD CALC: 34 % (ref 39–51)
HCT VFR BLD CALC: 37 % (ref 39–51)
HGB BLD CALC-MCNC: 11.2 G/DL (ref 13–17)
HGB BLD CALC-MCNC: 12.3 G/DL (ref 13–17)
LACTATE BLDA-SCNC: 1 MMOL/L
LACTATE BLDA-SCNC: 1.6 MMOL/L
METHGB MFR BLD: 1.1 %
METHGB MFR BLD: 1.1 %
PCO2 BLDA: 39 MM HG (ref 35–48)
PCO2 BLDA: 44 MM HG (ref 35–48)
PH BLDA: 7.32 UNITS (ref 7.35–7.45)
PH BLDA: 7.38 UNITS (ref 7.35–7.45)
PO2 BLDA: 213 MM HG (ref 83–108)
PO2 BLDA: 322 MM HG (ref 83–108)
POTASSIUM BLD-SCNC: 3.4 MMOL/L (ref 3.4–5.1)
POTASSIUM BLD-SCNC: 3.5 MMOL/L (ref 3.4–5.1)
SAO2 % BLDA: 100 % (ref 95–99)
SAO2 % BLDA: 100 % (ref 95–99)
SARS-COV-2 RNA RESP QL NAA+PROBE: NOT DETECTED
SERVICE CMNT-IMP: NORMAL
SERVICE CMNT-IMP: NORMAL
SODIUM BLD-SCNC: 136 MMOL/L (ref 135–145)
SODIUM BLD-SCNC: 139 MMOL/L (ref 135–145)
TYPE AND SCREEN EXPIRATION DATE: NORMAL

## 2021-04-15 PROCEDURE — 10004452 HB PACU ADDL 30 MINUTES: Performed by: SURGERY

## 2021-04-15 PROCEDURE — 82330 ASSAY OF CALCIUM: CPT

## 2021-04-15 PROCEDURE — 10000002 HB ROOM CHARGE MED SURG

## 2021-04-15 PROCEDURE — 0DTP4ZZ RESECTION OF RECTUM, PERCUTANEOUS ENDOSCOPIC APPROACH: ICD-10-PCS | Performed by: SURGERY

## 2021-04-15 PROCEDURE — 10004651 HB RX, NO CHARGE ITEM: Performed by: STUDENT IN AN ORGANIZED HEALTH CARE EDUCATION/TRAINING PROGRAM

## 2021-04-15 PROCEDURE — 8E0W4CZ ROBOTIC ASSISTED PROCEDURE OF TRUNK REGION, PERCUTANEOUS ENDOSCOPIC APPROACH: ICD-10-PCS | Performed by: SURGERY

## 2021-04-15 PROCEDURE — 10002803 HB RX 637: Performed by: SURGERY

## 2021-04-15 PROCEDURE — 13000002 HB ANESTHESIA  GENERAL  S/U + 1ST 15 MIN: Performed by: SURGERY

## 2021-04-15 PROCEDURE — 10002801 HB RX 250 W/O HCPCS: Performed by: STUDENT IN AN ORGANIZED HEALTH CARE EDUCATION/TRAINING PROGRAM

## 2021-04-15 PROCEDURE — 10002800 HB RX 250 W HCPCS: Performed by: STUDENT IN AN ORGANIZED HEALTH CARE EDUCATION/TRAINING PROGRAM

## 2021-04-15 PROCEDURE — 13000099 HB GENERAL ROBOTIC CASE EA ADD MINUTE: Performed by: SURGERY

## 2021-04-15 PROCEDURE — 44207 L COLECTOMY/COLOPROCTOSTOMY: CPT | Performed by: SURGERY

## 2021-04-15 PROCEDURE — 10002800 HB RX 250 W HCPCS: Performed by: SURGERY

## 2021-04-15 PROCEDURE — 88309 TISSUE EXAM BY PATHOLOGIST: CPT | Performed by: SURGERY

## 2021-04-15 PROCEDURE — 86901 BLOOD TYPING SEROLOGIC RH(D): CPT | Performed by: SURGERY

## 2021-04-15 PROCEDURE — 10002807 HB RX 258: Performed by: SURGERY

## 2021-04-15 PROCEDURE — 10004451 HB PACU RECOVERY 1ST 30 MINUTES: Performed by: SURGERY

## 2021-04-15 PROCEDURE — 13000003 HB ANESTHESIA  GENERAL EA ADD MINUTE: Performed by: SURGERY

## 2021-04-15 PROCEDURE — 10002807 HB RX 258: Performed by: STUDENT IN AN ORGANIZED HEALTH CARE EDUCATION/TRAINING PROGRAM

## 2021-04-15 PROCEDURE — 13000098 HB GENERAL ROBOTIC CASE S/U + 1ST 15 MIN: Performed by: SURGERY

## 2021-04-15 PROCEDURE — 15860 IV NJX TST VASC FLO FLAP/GRF: CPT | Performed by: SURGERY

## 2021-04-15 PROCEDURE — 10006027 HB SUPPLY 278: Performed by: SURGERY

## 2021-04-15 PROCEDURE — 10006023 HB SUPPLY 272: Performed by: SURGERY

## 2021-04-15 PROCEDURE — C9290 INJ, BUPIVACAINE LIPOSOME: HCPCS | Performed by: STUDENT IN AN ORGANIZED HEALTH CARE EDUCATION/TRAINING PROGRAM

## 2021-04-15 PROCEDURE — 0DTN4ZZ RESECTION OF SIGMOID COLON, PERCUTANEOUS ENDOSCOPIC APPROACH: ICD-10-PCS | Performed by: SURGERY

## 2021-04-15 PROCEDURE — 10004651 HB RX, NO CHARGE ITEM: Performed by: SURGERY

## 2021-04-15 PROCEDURE — 82962 GLUCOSE BLOOD TEST: CPT

## 2021-04-15 PROCEDURE — U0005 INFEC AGEN DETEC AMPLI PROBE: HCPCS | Performed by: SURGERY

## 2021-04-15 PROCEDURE — 10002803 HB RX 637: Performed by: STUDENT IN AN ORGANIZED HEALTH CARE EDUCATION/TRAINING PROGRAM

## 2021-04-15 PROCEDURE — 85018 HEMOGLOBIN: CPT

## 2021-04-15 DEVICE — STAPLER 60 RELOAD BLUE
Type: IMPLANTABLE DEVICE | Site: ABDOMEN | Status: FUNCTIONAL
Brand: SUREFORM

## 2021-04-15 RX ORDER — GABAPENTIN 300 MG/1
600 CAPSULE ORAL
Status: COMPLETED | OUTPATIENT
Start: 2021-04-15 | End: 2021-04-15

## 2021-04-15 RX ORDER — NIFEDIPINE 30 MG/1
30 TABLET, EXTENDED RELEASE ORAL DAILY
Status: DISCONTINUED | OUTPATIENT
Start: 2021-04-16 | End: 2021-04-19 | Stop reason: HOSPADM

## 2021-04-15 RX ORDER — ENOXAPARIN SODIUM 100 MG/ML
40 INJECTION SUBCUTANEOUS DAILY
Status: DISCONTINUED | OUTPATIENT
Start: 2021-04-15 | End: 2021-04-15

## 2021-04-15 RX ORDER — INDOCYANINE GREEN AND WATER 25 MG
KIT INJECTION PRN
Status: DISCONTINUED | OUTPATIENT
Start: 2021-04-15 | End: 2021-04-15

## 2021-04-15 RX ORDER — PROPOFOL 10 MG/ML
INJECTION, EMULSION INTRAVENOUS PRN
Status: DISCONTINUED | OUTPATIENT
Start: 2021-04-15 | End: 2021-04-15

## 2021-04-15 RX ORDER — SODIUM CHLORIDE, SODIUM LACTATE, POTASSIUM CHLORIDE, CALCIUM CHLORIDE 600; 310; 30; 20 MG/100ML; MG/100ML; MG/100ML; MG/100ML
INJECTION, SOLUTION INTRAVENOUS CONTINUOUS
Status: DISCONTINUED | OUTPATIENT
Start: 2021-04-15 | End: 2021-04-16

## 2021-04-15 RX ORDER — POTASSIUM CHLORIDE 1.5 G/1.58G
40 POWDER, FOR SOLUTION ORAL DAILY
Status: DISCONTINUED | OUTPATIENT
Start: 2021-04-15 | End: 2021-04-19 | Stop reason: HOSPADM

## 2021-04-15 RX ORDER — HYDRALAZINE HYDROCHLORIDE 20 MG/ML
5 INJECTION INTRAMUSCULAR; INTRAVENOUS EVERY 6 HOURS PRN
Status: DISCONTINUED | OUTPATIENT
Start: 2021-04-15 | End: 2021-04-19 | Stop reason: HOSPADM

## 2021-04-15 RX ORDER — 0.9 % SODIUM CHLORIDE 0.9 %
2 VIAL (ML) INJECTION EVERY 12 HOURS SCHEDULED
Status: DISCONTINUED | OUTPATIENT
Start: 2021-04-15 | End: 2021-04-19 | Stop reason: HOSPADM

## 2021-04-15 RX ORDER — ALVIMOPAN 12 MG/1
12 CAPSULE ORAL EVERY 12 HOURS SCHEDULED
Status: DISCONTINUED | OUTPATIENT
Start: 2021-04-16 | End: 2021-04-16

## 2021-04-15 RX ORDER — LOSARTAN POTASSIUM 50 MG/1
100 TABLET ORAL DAILY
Status: DISCONTINUED | OUTPATIENT
Start: 2021-04-15 | End: 2021-04-19 | Stop reason: HOSPADM

## 2021-04-15 RX ORDER — TRAMADOL HYDROCHLORIDE 50 MG/1
25 TABLET ORAL EVERY 4 HOURS PRN
Status: DISCONTINUED | OUTPATIENT
Start: 2021-04-15 | End: 2021-04-19 | Stop reason: HOSPADM

## 2021-04-15 RX ORDER — CELECOXIB 100 MG/1
100 CAPSULE ORAL EVERY 12 HOURS SCHEDULED
Status: DISCONTINUED | OUTPATIENT
Start: 2021-04-16 | End: 2021-04-16

## 2021-04-15 RX ORDER — SODIUM CHLORIDE, SODIUM LACTATE, POTASSIUM CHLORIDE, CALCIUM CHLORIDE 600; 310; 30; 20 MG/100ML; MG/100ML; MG/100ML; MG/100ML
INJECTION, SOLUTION INTRAVENOUS CONTINUOUS PRN
Status: DISCONTINUED | OUTPATIENT
Start: 2021-04-15 | End: 2021-04-15

## 2021-04-15 RX ORDER — DEXAMETHASONE SODIUM PHOSPHATE 4 MG/ML
INJECTION, SOLUTION INTRA-ARTICULAR; INTRALESIONAL; INTRAMUSCULAR; INTRAVENOUS; SOFT TISSUE PRN
Status: DISCONTINUED | OUTPATIENT
Start: 2021-04-15 | End: 2021-04-15

## 2021-04-15 RX ORDER — ROCURONIUM BROMIDE 10 MG/ML
INJECTION, SOLUTION INTRAVENOUS PRN
Status: DISCONTINUED | OUTPATIENT
Start: 2021-04-15 | End: 2021-04-15

## 2021-04-15 RX ORDER — PROCHLORPERAZINE EDISYLATE 5 MG/ML
5 INJECTION INTRAMUSCULAR; INTRAVENOUS EVERY 4 HOURS PRN
Status: DISCONTINUED | OUTPATIENT
Start: 2021-04-15 | End: 2021-04-15 | Stop reason: HOSPADM

## 2021-04-15 RX ORDER — GLYCOPYRROLATE 0.2 MG/ML
INJECTION, SOLUTION INTRAMUSCULAR; INTRAVENOUS PRN
Status: DISCONTINUED | OUTPATIENT
Start: 2021-04-15 | End: 2021-04-15

## 2021-04-15 RX ORDER — ENOXAPARIN SODIUM 100 MG/ML
30 INJECTION SUBCUTANEOUS DAILY
Status: DISCONTINUED | OUTPATIENT
Start: 2021-04-15 | End: 2021-04-15 | Stop reason: HOSPADM

## 2021-04-15 RX ORDER — EPHEDRINE SULFATE/0.9% NACL/PF 50 MG/10ML
SYRINGE (ML) INTRAVENOUS PRN
Status: DISCONTINUED | OUTPATIENT
Start: 2021-04-15 | End: 2021-04-15

## 2021-04-15 RX ORDER — ACETAMINOPHEN 325 MG/1
650 TABLET ORAL EVERY 4 HOURS PRN
Status: DISCONTINUED | OUTPATIENT
Start: 2021-04-15 | End: 2021-04-15 | Stop reason: HOSPADM

## 2021-04-15 RX ORDER — GABAPENTIN 300 MG/1
300 CAPSULE ORAL EVERY 8 HOURS SCHEDULED
Status: DISCONTINUED | OUTPATIENT
Start: 2021-04-15 | End: 2021-04-19 | Stop reason: HOSPADM

## 2021-04-15 RX ORDER — PHENYLEPHRINE HYDROCHLORIDE 10 MG/ML
INJECTION, SOLUTION INTRAMUSCULAR; INTRAVENOUS; SUBCUTANEOUS PRN
Status: DISCONTINUED | OUTPATIENT
Start: 2021-04-15 | End: 2021-04-15

## 2021-04-15 RX ORDER — LIDOCAINE HYDROCHLORIDE 20 MG/ML
INJECTION, SOLUTION INFILTRATION; PERINEURAL PRN
Status: DISCONTINUED | OUTPATIENT
Start: 2021-04-15 | End: 2021-04-15

## 2021-04-15 RX ORDER — ONDANSETRON 2 MG/ML
INJECTION INTRAMUSCULAR; INTRAVENOUS PRN
Status: DISCONTINUED | OUTPATIENT
Start: 2021-04-15 | End: 2021-04-15

## 2021-04-15 RX ORDER — BUPIVACAINE HYDROCHLORIDE 5 MG/ML
INJECTION, SOLUTION EPIDURAL; INTRACAUDAL PRN
Status: DISCONTINUED | OUTPATIENT
Start: 2021-04-15 | End: 2021-04-15

## 2021-04-15 RX ORDER — DOCUSATE SODIUM 100 MG/1
100 CAPSULE, LIQUID FILLED ORAL DAILY PRN
Status: DISCONTINUED | OUTPATIENT
Start: 2021-04-15 | End: 2021-04-19 | Stop reason: HOSPADM

## 2021-04-15 RX ORDER — MULTIVITAMIN WITH IRON
1 TABLET ORAL EVERY OTHER DAY
Status: DISCONTINUED | OUTPATIENT
Start: 2021-04-16 | End: 2021-04-19 | Stop reason: HOSPADM

## 2021-04-15 RX ORDER — LANOLIN ALCOHOL/MO/W.PET/CERES
3 CREAM (GRAM) TOPICAL AT BEDTIME
Status: DISCONTINUED | OUTPATIENT
Start: 2021-04-15 | End: 2021-04-19 | Stop reason: HOSPADM

## 2021-04-15 RX ORDER — CELECOXIB 200 MG/1
400 CAPSULE ORAL
Status: COMPLETED | OUTPATIENT
Start: 2021-04-15 | End: 2021-04-15

## 2021-04-15 RX ORDER — SODIUM CHLORIDE, SODIUM LACTATE, POTASSIUM CHLORIDE, CALCIUM CHLORIDE 600; 310; 30; 20 MG/100ML; MG/100ML; MG/100ML; MG/100ML
10 INJECTION, SOLUTION INTRAVENOUS CONTINUOUS
Status: DISCONTINUED | OUTPATIENT
Start: 2021-04-15 | End: 2021-04-15 | Stop reason: HOSPADM

## 2021-04-15 RX ORDER — LIDOCAINE HYDROCHLORIDE 20 MG/ML
INJECTION, SOLUTION INFILTRATION; PERINEURAL
Status: COMPLETED
Start: 2021-04-15 | End: 2021-04-15

## 2021-04-15 RX ORDER — HYDRALAZINE HYDROCHLORIDE 20 MG/ML
INJECTION INTRAMUSCULAR; INTRAVENOUS PRN
Status: DISCONTINUED | OUTPATIENT
Start: 2021-04-15 | End: 2021-04-15

## 2021-04-15 RX ORDER — HYDRALAZINE HYDROCHLORIDE 20 MG/ML
5 INJECTION INTRAMUSCULAR; INTRAVENOUS EVERY 4 HOURS PRN
Status: DISCONTINUED | OUTPATIENT
Start: 2021-04-15 | End: 2021-04-15 | Stop reason: HOSPADM

## 2021-04-15 RX ORDER — CHLORHEXIDINE GLUCONATE ORAL RINSE 1.2 MG/ML
15 SOLUTION DENTAL
Status: COMPLETED | OUTPATIENT
Start: 2021-04-15 | End: 2021-04-15

## 2021-04-15 RX ORDER — SOTALOL HYDROCHLORIDE 80 MG/1
80 TABLET ORAL 2 TIMES DAILY
Status: DISCONTINUED | OUTPATIENT
Start: 2021-04-15 | End: 2021-04-16

## 2021-04-15 RX ORDER — ACETAMINOPHEN 500 MG
1000 TABLET ORAL
Status: COMPLETED | OUTPATIENT
Start: 2021-04-15 | End: 2021-04-15

## 2021-04-15 RX ORDER — ACETAMINOPHEN 500 MG
1000 TABLET ORAL EVERY 8 HOURS
Status: DISCONTINUED | OUTPATIENT
Start: 2021-04-15 | End: 2021-04-19 | Stop reason: HOSPADM

## 2021-04-15 RX ORDER — SODIUM CHLORIDE 9 MG/ML
INJECTION, SOLUTION INTRAVENOUS CONTINUOUS PRN
Status: DISCONTINUED | OUTPATIENT
Start: 2021-04-15 | End: 2021-04-15

## 2021-04-15 RX ORDER — NEOSTIGMINE METHYLSULFATE 1 MG/ML
INJECTION, SOLUTION INTRAVENOUS PRN
Status: DISCONTINUED | OUTPATIENT
Start: 2021-04-15 | End: 2021-04-15

## 2021-04-15 RX ORDER — FLUTICASONE PROPIONATE 50 MCG
2 SPRAY, SUSPENSION (ML) NASAL DAILY
Status: DISCONTINUED | OUTPATIENT
Start: 2021-04-16 | End: 2021-04-19 | Stop reason: HOSPADM

## 2021-04-15 RX ORDER — METOCLOPRAMIDE HYDROCHLORIDE 5 MG/ML
5 INJECTION INTRAMUSCULAR; INTRAVENOUS EVERY 6 HOURS PRN
Status: DISCONTINUED | OUTPATIENT
Start: 2021-04-15 | End: 2021-04-15 | Stop reason: HOSPADM

## 2021-04-15 RX ORDER — ACETAMINOPHEN 650 MG/1
650 SUPPOSITORY RECTAL EVERY 4 HOURS PRN
Status: DISCONTINUED | OUTPATIENT
Start: 2021-04-15 | End: 2021-04-15 | Stop reason: HOSPADM

## 2021-04-15 RX ORDER — ENOXAPARIN SODIUM 100 MG/ML
30 INJECTION SUBCUTANEOUS NIGHTLY
Status: DISCONTINUED | OUTPATIENT
Start: 2021-04-15 | End: 2021-04-19 | Stop reason: HOSPADM

## 2021-04-15 RX ORDER — FOLIC ACID/VIT B COMPLEX AND C 0.8 MG
1 TABLET ORAL DAILY
Status: DISCONTINUED | OUTPATIENT
Start: 2021-04-15 | End: 2021-04-15

## 2021-04-15 RX ORDER — ALVIMOPAN 12 MG/1
12 CAPSULE ORAL
Status: COMPLETED | OUTPATIENT
Start: 2021-04-15 | End: 2021-04-15

## 2021-04-15 RX ADMIN — METRONIDAZOLE 500 MG: 500 INJECTION, SOLUTION INTRAVENOUS at 16:38

## 2021-04-15 RX ADMIN — FENTANYL CITRATE 100 MCG: 50 INJECTION, SOLUTION INTRAMUSCULAR; INTRAVENOUS at 09:24

## 2021-04-15 RX ADMIN — GLYCOPYRROLATE 0.4 MG: 0.2 INJECTION, SOLUTION INTRAMUSCULAR; INTRAVENOUS at 12:15

## 2021-04-15 RX ADMIN — GABAPENTIN 300 MG: 300 CAPSULE ORAL at 22:21

## 2021-04-15 RX ADMIN — SOTALOL HYDROCHLORIDE 80 MG: 80 TABLET ORAL at 22:20

## 2021-04-15 RX ADMIN — NEOSTIGMINE METHYLSULFATE 2.5 MG: 1 INJECTION INTRAVENOUS at 12:15

## 2021-04-15 RX ADMIN — ERTAPENEM SODIUM 1000 MG: 1 INJECTION, POWDER, LYOPHILIZED, FOR SOLUTION INTRAMUSCULAR; INTRAVENOUS at 09:45

## 2021-04-15 RX ADMIN — PROPOFOL 50 MG: 10 INJECTION, EMULSION INTRAVENOUS at 09:24

## 2021-04-15 RX ADMIN — Medication 5 MG: at 11:27

## 2021-04-15 RX ADMIN — LIDOCAINE HYDROCHLORIDE 60 MG: 20 INJECTION, SOLUTION INFILTRATION; PERINEURAL at 09:24

## 2021-04-15 RX ADMIN — DEXAMETHASONE SODIUM PHOSPHATE 4 MG: 4 INJECTION, SOLUTION INTRAMUSCULAR; INTRAVENOUS at 09:53

## 2021-04-15 RX ADMIN — SODIUM CHLORIDE, POTASSIUM CHLORIDE, SODIUM LACTATE AND CALCIUM CHLORIDE: 600; 310; 30; 20 INJECTION, SOLUTION INTRAVENOUS at 09:15

## 2021-04-15 RX ADMIN — PHENYLEPHRINE HYDROCHLORIDE 100 MCG: 10 INJECTION INTRAVENOUS at 09:31

## 2021-04-15 RX ADMIN — HYDRALAZINE HYDROCHLORIDE 5 MG: 20 INJECTION, SOLUTION INTRAMUSCULAR; INTRAVENOUS at 12:32

## 2021-04-15 RX ADMIN — INDOCYANINE GREEN AND WATER 7.5 MG: KIT at 11:10

## 2021-04-15 RX ADMIN — BUPIVACAINE HYDROCHLORIDE 20 ML: 5 INJECTION, SOLUTION EPIDURAL; INTRACAUDAL; PERINEURAL at 09:43

## 2021-04-15 RX ADMIN — PHENYLEPHRINE HYDROCHLORIDE 100 MCG: 10 INJECTION INTRAVENOUS at 10:06

## 2021-04-15 RX ADMIN — INDOCYANINE GREEN AND WATER 7.5 MG: KIT at 11:26

## 2021-04-15 RX ADMIN — ENOXAPARIN SODIUM 30 MG: 30 INJECTION SUBCUTANEOUS at 08:10

## 2021-04-15 RX ADMIN — CELECOXIB 400 MG: 200 CAPSULE ORAL at 07:57

## 2021-04-15 RX ADMIN — GABAPENTIN 600 MG: 300 CAPSULE ORAL at 07:57

## 2021-04-15 RX ADMIN — CEFAZOLIN SODIUM 2000 MG: 300 INJECTION, POWDER, LYOPHILIZED, FOR SOLUTION INTRAVENOUS at 16:32

## 2021-04-15 RX ADMIN — LOSARTAN POTASSIUM 100 MG: 50 TABLET, FILM COATED ORAL at 16:26

## 2021-04-15 RX ADMIN — SODIUM CHLORIDE: 9 INJECTION, SOLUTION INTRAVENOUS at 09:34

## 2021-04-15 RX ADMIN — POTASSIUM CHLORIDE 40 MEQ: 1.5 FOR SOLUTION ORAL at 16:41

## 2021-04-15 RX ADMIN — MELATONIN 3 MG: 3 TAB ORAL at 22:20

## 2021-04-15 RX ADMIN — ROCURONIUM BROMIDE 50 MG: 50 INJECTION, SOLUTION INTRAVENOUS at 09:24

## 2021-04-15 RX ADMIN — ALVIMOPAN 12 MG: 12 CAPSULE ORAL at 07:57

## 2021-04-15 RX ADMIN — ACETAMINOPHEN 1000 MG: 500 TABLET, FILM COATED ORAL at 07:57

## 2021-04-15 RX ADMIN — BUPIVACAINE 20 ML: 13.3 INJECTION, SUSPENSION, LIPOSOMAL INFILTRATION at 09:43

## 2021-04-15 RX ADMIN — ACETAMINOPHEN 1000 MG: 500 TABLET, FILM COATED ORAL at 16:26

## 2021-04-15 RX ADMIN — CHLORHEXIDINE GLUCONATE 15 ML: 1.2 RINSE ORAL at 07:57

## 2021-04-15 RX ADMIN — PHENYLEPHRINE HYDROCHLORIDE 100 MCG: 10 INJECTION INTRAVENOUS at 09:28

## 2021-04-15 RX ADMIN — KETOROLAC TROMETHAMINE 15 MG: 15 INJECTION, SOLUTION INTRAMUSCULAR; INTRAVENOUS at 18:03

## 2021-04-15 RX ADMIN — ONDANSETRON 4 MG: 2 INJECTION INTRAMUSCULAR; INTRAVENOUS at 12:04

## 2021-04-15 RX ADMIN — SODIUM CHLORIDE, POTASSIUM CHLORIDE, SODIUM LACTATE AND CALCIUM CHLORIDE: 600; 310; 30; 20 INJECTION, SOLUTION INTRAVENOUS at 09:52

## 2021-04-15 RX ADMIN — PHENYLEPHRINE HYDROCHLORIDE 200 MCG: 10 INJECTION INTRAVENOUS at 09:45

## 2021-04-15 ASSESSMENT — ACTIVITIES OF DAILY LIVING (ADL)
MOBILITY_ASSIST_DEVICES: WHEELCHAIR;GAIT BELT;FOUR WHEEL WALKER
ADL_SCORE: 6
FEEDING YOURSELF: NEEDS ASSISTANCE
RECENT_DECLINE_ADL: NO
CONTINENCE: NEEDS ASSISTANCE
TRANSFERRING: NEEDS ASSISTANCE
BATHING: NEEDS ASSISTANCE
ADL_SHORT_OF_BREATH: NO
ADL_BEFORE_ADMISSION: NEEDS/REQUIRES ASSISTANCE
DRESSING YOURSELF: NEEDS ASSISTANCE
TOILETING: NEEDS ASSISTANCE
CHRONIC_PAIN_PRESENT: NO

## 2021-04-15 ASSESSMENT — PAIN SCALES - GENERAL
PAINLEVEL_OUTOF10: 0

## 2021-04-15 ASSESSMENT — COGNITIVE AND FUNCTIONAL STATUS - GENERAL
ARE YOU BLIND OR DO YOU HAVE SERIOUS DIFFICULTY SEEING, EVEN WHEN WEARING GLASSES: NO
ARE YOU DEAF OR DO YOU HAVE SERIOUS DIFFICULTY  HEARING: YES
DO YOU HAVE DIFFICULTY DRESSING OR BATHING: YES
BECAUSE OF A PHYSICAL, MENTAL, OR EMOTIONAL CONDITION, DO YOU HAVE DIFFICULTY DOING ERRANDS ALONE: YES
DO YOU HAVE SERIOUS DIFFICULTY WALKING OR CLIMBING STAIRS: YES
BECAUSE OF A PHYSICAL, MENTAL, OR EMOTIONAL CONDITION, DO YOU HAVE SERIOUS DIFFICULTY CONCENTRATING, REMEMBERING OR MAKING DECISIONS: YES

## 2021-04-15 ASSESSMENT — COPD QUESTIONNAIRES: COPD: 1

## 2021-04-15 ASSESSMENT — PAIN SCALES - PAIN ASSESSMENT IN ADVANCED DEMENTIA (PAINAD)
FACIALEXPRESSION: SMILING OR INEXPRESSIVE
TOTALSCORE: 1
BREATHING: NORMAL
BODYLANGUAGE: TENSE, DISTRESSED, FIDGETING
CONSOLABILITY: NO NEED TO CONSOLE

## 2021-04-15 ASSESSMENT — ENCOUNTER SYMPTOMS: EXERCISE TOLERANCE: POOR (<4 METS)

## 2021-04-15 ASSESSMENT — PAIN SCALES - WONG BAKER
WONGBAKER_NUMERICALRESPONSE: 0
WONGBAKER_NUMERICALRESPONSE: 0

## 2021-04-16 LAB
ANION GAP SERPL CALC-SCNC: 13 MMOL/L (ref 10–20)
BASOPHILS # BLD: 0 K/MCL (ref 0–0.3)
BASOPHILS NFR BLD: 0 %
BUN SERPL-MCNC: 26 MG/DL (ref 6–20)
BUN/CREAT SERPL: 23 (ref 7–25)
CALCIUM SERPL-MCNC: 8.4 MG/DL (ref 8.4–10.2)
CHLORIDE SERPL-SCNC: 107 MMOL/L (ref 98–107)
CO2 SERPL-SCNC: 26 MMOL/L (ref 21–32)
CREAT SERPL-MCNC: 1.13 MG/DL (ref 0.67–1.17)
DEPRECATED RDW RBC: 45.9 FL (ref 39–50)
EOSINOPHIL # BLD: 0 K/MCL (ref 0–0.5)
EOSINOPHIL NFR BLD: 0 %
ERYTHROCYTE [DISTWIDTH] IN BLOOD: 13.2 % (ref 11–15)
FASTING DURATION TIME PATIENT: ABNORMAL H
GFR SERPLBLD BASED ON 1.73 SQ M-ARVRAT: 58 ML/MIN/1.73M2
GLUCOSE SERPL-MCNC: 109 MG/DL (ref 65–99)
HCT VFR BLD CALC: 28.2 % (ref 39–51)
HCT VFR BLD CALC: 30.2 % (ref 39–51)
HGB BLD-MCNC: 9 G/DL (ref 13–17)
HGB BLD-MCNC: 9.7 G/DL (ref 13–17)
IMM GRANULOCYTES # BLD AUTO: 0.1 K/MCL (ref 0–0.2)
IMM GRANULOCYTES # BLD: 0 %
LYMPHOCYTES # BLD: 0.8 K/MCL (ref 1–4)
LYMPHOCYTES NFR BLD: 6 %
MAGNESIUM SERPL-MCNC: 1.7 MG/DL (ref 1.7–2.4)
MCH RBC QN AUTO: 30.6 PG (ref 26–34)
MCHC RBC AUTO-ENTMCNC: 32.1 G/DL (ref 32–36.5)
MCV RBC AUTO: 95.3 FL (ref 78–100)
MONOCYTES # BLD: 1.2 K/MCL (ref 0.3–0.9)
MONOCYTES NFR BLD: 9 %
NEUTROPHILS # BLD: 11.5 K/MCL (ref 1.8–7.7)
NEUTROPHILS NFR BLD: 85 %
NRBC BLD MANUAL-RTO: 0 /100 WBC
PHOSPHATE SERPL-MCNC: 5.2 MG/DL (ref 2.4–4.7)
PLATELET # BLD AUTO: 206 K/MCL (ref 140–450)
POTASSIUM SERPL-SCNC: 4 MMOL/L (ref 3.4–5.1)
RBC # BLD: 3.17 MIL/MCL (ref 4.5–5.9)
SODIUM SERPL-SCNC: 142 MMOL/L (ref 135–145)
WBC # BLD: 13.5 K/MCL (ref 4.2–11)

## 2021-04-16 PROCEDURE — 97161 PT EVAL LOW COMPLEX 20 MIN: CPT

## 2021-04-16 PROCEDURE — 97165 OT EVAL LOW COMPLEX 30 MIN: CPT

## 2021-04-16 PROCEDURE — 10002807 HB RX 258: Performed by: PHYSICIAN ASSISTANT

## 2021-04-16 PROCEDURE — 10002803 HB RX 637: Performed by: STUDENT IN AN ORGANIZED HEALTH CARE EDUCATION/TRAINING PROGRAM

## 2021-04-16 PROCEDURE — 36415 COLL VENOUS BLD VENIPUNCTURE: CPT | Performed by: STUDENT IN AN ORGANIZED HEALTH CARE EDUCATION/TRAINING PROGRAM

## 2021-04-16 PROCEDURE — 10000002 HB ROOM CHARGE MED SURG

## 2021-04-16 PROCEDURE — 10002803 HB RX 637: Performed by: PHYSICIAN ASSISTANT

## 2021-04-16 PROCEDURE — 10002807 HB RX 258: Performed by: STUDENT IN AN ORGANIZED HEALTH CARE EDUCATION/TRAINING PROGRAM

## 2021-04-16 PROCEDURE — 80048 BASIC METABOLIC PNL TOTAL CA: CPT | Performed by: STUDENT IN AN ORGANIZED HEALTH CARE EDUCATION/TRAINING PROGRAM

## 2021-04-16 PROCEDURE — 85025 COMPLETE CBC W/AUTO DIFF WBC: CPT | Performed by: STUDENT IN AN ORGANIZED HEALTH CARE EDUCATION/TRAINING PROGRAM

## 2021-04-16 PROCEDURE — 84100 ASSAY OF PHOSPHORUS: CPT | Performed by: STUDENT IN AN ORGANIZED HEALTH CARE EDUCATION/TRAINING PROGRAM

## 2021-04-16 PROCEDURE — 10002800 HB RX 250 W HCPCS: Performed by: STUDENT IN AN ORGANIZED HEALTH CARE EDUCATION/TRAINING PROGRAM

## 2021-04-16 PROCEDURE — 83735 ASSAY OF MAGNESIUM: CPT | Performed by: STUDENT IN AN ORGANIZED HEALTH CARE EDUCATION/TRAINING PROGRAM

## 2021-04-16 PROCEDURE — 85018 HEMOGLOBIN: CPT | Performed by: PHYSICIAN ASSISTANT

## 2021-04-16 PROCEDURE — 97535 SELF CARE MNGMENT TRAINING: CPT

## 2021-04-16 PROCEDURE — 10004651 HB RX, NO CHARGE ITEM: Performed by: STUDENT IN AN ORGANIZED HEALTH CARE EDUCATION/TRAINING PROGRAM

## 2021-04-16 PROCEDURE — 97530 THERAPEUTIC ACTIVITIES: CPT

## 2021-04-16 PROCEDURE — 10002801 HB RX 250 W/O HCPCS: Performed by: STUDENT IN AN ORGANIZED HEALTH CARE EDUCATION/TRAINING PROGRAM

## 2021-04-16 RX ORDER — SODIUM CHLORIDE, SODIUM LACTATE, POTASSIUM CHLORIDE, CALCIUM CHLORIDE 600; 310; 30; 20 MG/100ML; MG/100ML; MG/100ML; MG/100ML
INJECTION, SOLUTION INTRAVENOUS CONTINUOUS
Status: DISCONTINUED | OUTPATIENT
Start: 2021-04-16 | End: 2021-04-19

## 2021-04-16 RX ORDER — POLYETHYLENE GLYCOL 3350 17 G/17G
17 POWDER, FOR SOLUTION ORAL DAILY PRN
Status: ON HOLD | COMMUNITY
End: 2023-05-24 | Stop reason: HOSPADM

## 2021-04-16 RX ORDER — TAMSULOSIN HYDROCHLORIDE 0.4 MG/1
0.4 CAPSULE ORAL
Status: DISCONTINUED | OUTPATIENT
Start: 2021-04-16 | End: 2021-04-19 | Stop reason: HOSPADM

## 2021-04-16 RX ADMIN — ENOXAPARIN SODIUM 30 MG: 30 INJECTION SUBCUTANEOUS at 21:32

## 2021-04-16 RX ADMIN — ACETAMINOPHEN 1000 MG: 500 TABLET, FILM COATED ORAL at 17:35

## 2021-04-16 RX ADMIN — CEFAZOLIN SODIUM 2000 MG: 300 INJECTION, POWDER, LYOPHILIZED, FOR SOLUTION INTRAVENOUS at 00:36

## 2021-04-16 RX ADMIN — NIFEDIPINE 30 MG: 30 TABLET, FILM COATED, EXTENDED RELEASE ORAL at 09:35

## 2021-04-16 RX ADMIN — METRONIDAZOLE 500 MG: 500 INJECTION, SOLUTION INTRAVENOUS at 00:41

## 2021-04-16 RX ADMIN — SODIUM CHLORIDE, SODIUM LACTATE, POTASSIUM CHLORIDE, AND CALCIUM CHLORIDE: .6; .31; .03; .02 INJECTION, SOLUTION INTRAVENOUS at 03:01

## 2021-04-16 RX ADMIN — Medication 1 TABLET: at 09:35

## 2021-04-16 RX ADMIN — POTASSIUM CHLORIDE 40 MEQ: 1.5 FOR SOLUTION ORAL at 17:36

## 2021-04-16 RX ADMIN — SODIUM CHLORIDE, PRESERVATIVE FREE 2 ML: 5 INJECTION INTRAVENOUS at 09:37

## 2021-04-16 RX ADMIN — SODIUM CHLORIDE 500 ML: 0.9 INJECTION, SOLUTION INTRAVENOUS at 22:45

## 2021-04-16 RX ADMIN — ACETAMINOPHEN 1000 MG: 500 TABLET, FILM COATED ORAL at 00:32

## 2021-04-16 RX ADMIN — TAMSULOSIN HYDROCHLORIDE 0.4 MG: 0.4 CAPSULE ORAL at 17:41

## 2021-04-16 RX ADMIN — SODIUM CHLORIDE, POTASSIUM CHLORIDE, SODIUM LACTATE AND CALCIUM CHLORIDE: 600; 310; 30; 20 INJECTION, SOLUTION INTRAVENOUS at 15:30

## 2021-04-16 RX ADMIN — ACETAMINOPHEN 1000 MG: 500 TABLET, FILM COATED ORAL at 09:35

## 2021-04-16 RX ADMIN — KETOROLAC TROMETHAMINE 15 MG: 15 INJECTION, SOLUTION INTRAMUSCULAR; INTRAVENOUS at 00:34

## 2021-04-16 RX ADMIN — GABAPENTIN 300 MG: 300 CAPSULE ORAL at 14:30

## 2021-04-16 ASSESSMENT — COGNITIVE AND FUNCTIONAL STATUS - GENERAL
REMEMBERING 5 ERRANDS WITH NO LIST: A LOT
HELP NEEDED FOR TOILETING: A LITTLE
BASIC_MOBILITY_RAW_SCORE: 15
TAKING CARE OF COMPLICATED TASKS: A LOT
UNDERSTANDING 10 TO 15 MIN SPEECH: A LOT
HELP NEEDED DRESSING REGULAR LOWER BODY CLOTHING: A LOT
HELP NEEDED FOR PERSONAL GROOMING: A LITTLE
FOLLOWS FAMILIAR CONVERSATION: A LITTLE
APPLIED_COGNITIVE_RAW_SCORE: 13
REMEMBERING WHERE THINGS ARE: A LOT
APPLIED_COGNITIVE_CONVERTED_SCORE: 30.46
BASIC_MOBILITY_CONVERTED_SCORE: 36.97
HELP NEEDED DRESSING REGULAR UPPER BODY CLOTHING: A LOT
REMEMBERING TO TAKE MEDICATION: A LOT

## 2021-04-16 ASSESSMENT — PAIN SCALES - PAIN ASSESSMENT IN ADVANCED DEMENTIA (PAINAD)
CONSOLABILITY: NO NEED TO CONSOLE
BODYLANGUAGE: RELAXED
TOTALSCORE: 0
BREATHING: NORMAL
FACIALEXPRESSION: SMILING OR INEXPRESSIVE

## 2021-04-16 ASSESSMENT — PAIN SCALES - GENERAL
PAINLEVEL_OUTOF10: 0
PAINLEVEL_OUTOF10: 0

## 2021-04-16 ASSESSMENT — ACTIVITIES OF DAILY LIVING (ADL)
HOME_MANAGEMENT_TIME_ENTRY: 13
PRIOR_ADL_TOILETING: MODIFIED INDEPENDENT
PRIOR_ADL_BATHING: MAXIMAL ASSIST (MAX)
PRIOR_ADL: MODIFIED INDEPENDENT
PRIOR_ADL: MINIMAL ASSIST (MIN)

## 2021-04-16 ASSESSMENT — ENCOUNTER SYMPTOMS: PAIN SEVERITY NOW: 0

## 2021-04-17 LAB
ANION GAP SERPL CALC-SCNC: 9 MMOL/L (ref 10–20)
BASOPHILS # BLD: 0 K/MCL (ref 0–0.3)
BASOPHILS NFR BLD: 0 %
BUN SERPL-MCNC: 28 MG/DL (ref 6–20)
BUN/CREAT SERPL: 26 (ref 7–25)
CALCIUM SERPL-MCNC: 8 MG/DL (ref 8.4–10.2)
CHLORIDE SERPL-SCNC: 107 MMOL/L (ref 98–107)
CO2 SERPL-SCNC: 27 MMOL/L (ref 21–32)
CREAT SERPL-MCNC: 1.06 MG/DL (ref 0.67–1.17)
DEPRECATED RDW RBC: 46.1 FL (ref 39–50)
EOSINOPHIL # BLD: 0.1 K/MCL (ref 0–0.5)
EOSINOPHIL NFR BLD: 1 %
ERYTHROCYTE [DISTWIDTH] IN BLOOD: 13.2 % (ref 11–15)
FASTING DURATION TIME PATIENT: ABNORMAL H
GFR SERPLBLD BASED ON 1.73 SQ M-ARVRAT: 63 ML/MIN/1.73M2
GLUCOSE SERPL-MCNC: 109 MG/DL (ref 65–99)
HCT VFR BLD CALC: 24.3 % (ref 39–51)
HGB BLD-MCNC: 7.7 G/DL (ref 13–17)
IMM GRANULOCYTES # BLD AUTO: 0 K/MCL (ref 0–0.2)
IMM GRANULOCYTES # BLD: 0 %
LYMPHOCYTES # BLD: 0.8 K/MCL (ref 1–4)
LYMPHOCYTES NFR BLD: 9 %
MAGNESIUM SERPL-MCNC: 1.6 MG/DL (ref 1.7–2.4)
MCH RBC QN AUTO: 30.2 PG (ref 26–34)
MCHC RBC AUTO-ENTMCNC: 31.7 G/DL (ref 32–36.5)
MCV RBC AUTO: 95.3 FL (ref 78–100)
MONOCYTES # BLD: 0.8 K/MCL (ref 0.3–0.9)
MONOCYTES NFR BLD: 9 %
NEUTROPHILS # BLD: 7.1 K/MCL (ref 1.8–7.7)
NEUTROPHILS NFR BLD: 81 %
NRBC BLD MANUAL-RTO: 0 /100 WBC
PHOSPHATE SERPL-MCNC: 2.3 MG/DL (ref 2.4–4.7)
PLATELET # BLD AUTO: 158 K/MCL (ref 140–450)
POTASSIUM SERPL-SCNC: 3.8 MMOL/L (ref 3.4–5.1)
RBC # BLD: 2.55 MIL/MCL (ref 4.5–5.9)
SODIUM SERPL-SCNC: 139 MMOL/L (ref 135–145)
WBC # BLD: 8.9 K/MCL (ref 4.2–11)

## 2021-04-17 PROCEDURE — 85025 COMPLETE CBC W/AUTO DIFF WBC: CPT | Performed by: STUDENT IN AN ORGANIZED HEALTH CARE EDUCATION/TRAINING PROGRAM

## 2021-04-17 PROCEDURE — 13001086 HB INCENTIVE SPIROMETER W INSTRUCT

## 2021-04-17 PROCEDURE — 36415 COLL VENOUS BLD VENIPUNCTURE: CPT | Performed by: STUDENT IN AN ORGANIZED HEALTH CARE EDUCATION/TRAINING PROGRAM

## 2021-04-17 PROCEDURE — 10002807 HB RX 258: Performed by: PHYSICIAN ASSISTANT

## 2021-04-17 PROCEDURE — 10002800 HB RX 250 W HCPCS: Performed by: STUDENT IN AN ORGANIZED HEALTH CARE EDUCATION/TRAINING PROGRAM

## 2021-04-17 PROCEDURE — 10002803 HB RX 637: Performed by: STUDENT IN AN ORGANIZED HEALTH CARE EDUCATION/TRAINING PROGRAM

## 2021-04-17 PROCEDURE — 10000002 HB ROOM CHARGE MED SURG

## 2021-04-17 PROCEDURE — 83735 ASSAY OF MAGNESIUM: CPT | Performed by: STUDENT IN AN ORGANIZED HEALTH CARE EDUCATION/TRAINING PROGRAM

## 2021-04-17 PROCEDURE — 10002016 HB COUNTER INCENTIVE SPIROMETRY

## 2021-04-17 PROCEDURE — 10002803 HB RX 637: Performed by: PHYSICIAN ASSISTANT

## 2021-04-17 PROCEDURE — 84100 ASSAY OF PHOSPHORUS: CPT | Performed by: STUDENT IN AN ORGANIZED HEALTH CARE EDUCATION/TRAINING PROGRAM

## 2021-04-17 PROCEDURE — 10002807 HB RX 258: Performed by: STUDENT IN AN ORGANIZED HEALTH CARE EDUCATION/TRAINING PROGRAM

## 2021-04-17 PROCEDURE — 80048 BASIC METABOLIC PNL TOTAL CA: CPT | Performed by: STUDENT IN AN ORGANIZED HEALTH CARE EDUCATION/TRAINING PROGRAM

## 2021-04-17 PROCEDURE — 10002801 HB RX 250 W/O HCPCS: Performed by: STUDENT IN AN ORGANIZED HEALTH CARE EDUCATION/TRAINING PROGRAM

## 2021-04-17 RX ORDER — DOCUSATE SODIUM 100 MG/1
100 CAPSULE, LIQUID FILLED ORAL 2 TIMES DAILY
Qty: 20 CAPSULE | Refills: 0 | Status: SHIPPED | OUTPATIENT
Start: 2021-04-17 | End: 2023-04-11

## 2021-04-17 RX ORDER — HYDROCODONE BITARTRATE AND ACETAMINOPHEN 5; 325 MG/1; MG/1
1 TABLET ORAL EVERY 6 HOURS PRN
Qty: 15 TABLET | Refills: 0 | Status: SHIPPED | OUTPATIENT
Start: 2021-04-17 | End: 2021-04-27

## 2021-04-17 RX ADMIN — LOSARTAN POTASSIUM 100 MG: 50 TABLET, FILM COATED ORAL at 10:07

## 2021-04-17 RX ADMIN — POTASSIUM CHLORIDE 40 MEQ: 1.5 FOR SOLUTION ORAL at 10:08

## 2021-04-17 RX ADMIN — POTASSIUM PHOSPHATE, MONOBASIC AND POTASSIUM PHOSPHATE, DIBASIC 20 MMOL: 224; 236 INJECTION, SOLUTION, CONCENTRATE INTRAVENOUS at 13:35

## 2021-04-17 RX ADMIN — MELATONIN 3 MG: 3 TAB ORAL at 21:42

## 2021-04-17 RX ADMIN — TAMSULOSIN HYDROCHLORIDE 0.4 MG: 0.4 CAPSULE ORAL at 10:08

## 2021-04-17 RX ADMIN — NIFEDIPINE 30 MG: 30 TABLET, FILM COATED, EXTENDED RELEASE ORAL at 10:07

## 2021-04-17 RX ADMIN — ENOXAPARIN SODIUM 30 MG: 30 INJECTION SUBCUTANEOUS at 21:42

## 2021-04-17 RX ADMIN — SODIUM CHLORIDE, POTASSIUM CHLORIDE, SODIUM LACTATE AND CALCIUM CHLORIDE: 600; 310; 30; 20 INJECTION, SOLUTION INTRAVENOUS at 13:32

## 2021-04-17 RX ADMIN — MAGNESIUM SULFATE HEPTAHYDRATE 2 G: 40 INJECTION, SOLUTION INTRAVENOUS at 10:13

## 2021-04-17 ASSESSMENT — PAIN SCALES - GENERAL
PAINLEVEL_OUTOF10: 0

## 2021-04-18 LAB
ANION GAP SERPL CALC-SCNC: 9 MMOL/L (ref 10–20)
BASOPHILS # BLD: 0 K/MCL (ref 0–0.3)
BASOPHILS NFR BLD: 0 %
BUN SERPL-MCNC: 18 MG/DL (ref 6–20)
BUN/CREAT SERPL: 24 (ref 7–25)
CALCIUM SERPL-MCNC: 8 MG/DL (ref 8.4–10.2)
CHLORIDE SERPL-SCNC: 106 MMOL/L (ref 98–107)
CO2 SERPL-SCNC: 28 MMOL/L (ref 21–32)
CREAT SERPL-MCNC: 0.76 MG/DL (ref 0.67–1.17)
DEPRECATED RDW RBC: 44.7 FL (ref 39–50)
EOSINOPHIL # BLD: 0.2 K/MCL (ref 0–0.5)
EOSINOPHIL NFR BLD: 2 %
ERYTHROCYTE [DISTWIDTH] IN BLOOD: 12.9 % (ref 11–15)
FASTING DURATION TIME PATIENT: ABNORMAL H
GFR SERPLBLD BASED ON 1.73 SQ M-ARVRAT: 82 ML/MIN/1.73M2
GLUCOSE SERPL-MCNC: 104 MG/DL (ref 65–99)
HCT VFR BLD CALC: 26 % (ref 39–51)
HGB BLD-MCNC: 8.4 G/DL (ref 13–17)
IMM GRANULOCYTES # BLD AUTO: 0 K/MCL (ref 0–0.2)
IMM GRANULOCYTES # BLD: 0 %
LYMPHOCYTES # BLD: 0.8 K/MCL (ref 1–4)
LYMPHOCYTES NFR BLD: 8 %
MAGNESIUM SERPL-MCNC: 1.8 MG/DL (ref 1.7–2.4)
MCH RBC QN AUTO: 30.2 PG (ref 26–34)
MCHC RBC AUTO-ENTMCNC: 32.3 G/DL (ref 32–36.5)
MCV RBC AUTO: 93.5 FL (ref 78–100)
MONOCYTES # BLD: 0.8 K/MCL (ref 0.3–0.9)
MONOCYTES NFR BLD: 8 %
NEUTROPHILS # BLD: 8.1 K/MCL (ref 1.8–7.7)
NEUTROPHILS NFR BLD: 82 %
NRBC BLD MANUAL-RTO: 0 /100 WBC
PHOSPHATE SERPL-MCNC: 2.3 MG/DL (ref 2.4–4.7)
PLATELET # BLD AUTO: 182 K/MCL (ref 140–450)
POTASSIUM SERPL-SCNC: 4.2 MMOL/L (ref 3.4–5.1)
RBC # BLD: 2.78 MIL/MCL (ref 4.5–5.9)
SODIUM SERPL-SCNC: 139 MMOL/L (ref 135–145)
WBC # BLD: 10 K/MCL (ref 4.2–11)

## 2021-04-18 PROCEDURE — 36415 COLL VENOUS BLD VENIPUNCTURE: CPT | Performed by: STUDENT IN AN ORGANIZED HEALTH CARE EDUCATION/TRAINING PROGRAM

## 2021-04-18 PROCEDURE — 10002803 HB RX 637: Performed by: PHYSICIAN ASSISTANT

## 2021-04-18 PROCEDURE — 10002807 HB RX 258: Performed by: PHYSICIAN ASSISTANT

## 2021-04-18 PROCEDURE — 10004651 HB RX, NO CHARGE ITEM: Performed by: STUDENT IN AN ORGANIZED HEALTH CARE EDUCATION/TRAINING PROGRAM

## 2021-04-18 PROCEDURE — 85025 COMPLETE CBC W/AUTO DIFF WBC: CPT | Performed by: STUDENT IN AN ORGANIZED HEALTH CARE EDUCATION/TRAINING PROGRAM

## 2021-04-18 PROCEDURE — 83735 ASSAY OF MAGNESIUM: CPT | Performed by: STUDENT IN AN ORGANIZED HEALTH CARE EDUCATION/TRAINING PROGRAM

## 2021-04-18 PROCEDURE — 84100 ASSAY OF PHOSPHORUS: CPT | Performed by: STUDENT IN AN ORGANIZED HEALTH CARE EDUCATION/TRAINING PROGRAM

## 2021-04-18 PROCEDURE — 10000002 HB ROOM CHARGE MED SURG

## 2021-04-18 PROCEDURE — 10002800 HB RX 250 W HCPCS: Performed by: STUDENT IN AN ORGANIZED HEALTH CARE EDUCATION/TRAINING PROGRAM

## 2021-04-18 PROCEDURE — 10002803 HB RX 637: Performed by: STUDENT IN AN ORGANIZED HEALTH CARE EDUCATION/TRAINING PROGRAM

## 2021-04-18 PROCEDURE — 80048 BASIC METABOLIC PNL TOTAL CA: CPT | Performed by: STUDENT IN AN ORGANIZED HEALTH CARE EDUCATION/TRAINING PROGRAM

## 2021-04-18 RX ORDER — TAMSULOSIN HYDROCHLORIDE 0.4 MG/1
0.4 CAPSULE ORAL DAILY
Qty: 30 CAPSULE | Refills: 0 | Status: SHIPPED | OUTPATIENT
Start: 2021-04-18 | End: 2023-08-11

## 2021-04-18 RX ADMIN — Medication 1 TABLET: at 09:26

## 2021-04-18 RX ADMIN — POTASSIUM CHLORIDE 40 MEQ: 1.5 FOR SOLUTION ORAL at 09:25

## 2021-04-18 RX ADMIN — ENOXAPARIN SODIUM 30 MG: 30 INJECTION SUBCUTANEOUS at 22:44

## 2021-04-18 RX ADMIN — TAMSULOSIN HYDROCHLORIDE 0.4 MG: 0.4 CAPSULE ORAL at 09:25

## 2021-04-18 RX ADMIN — SODIUM CHLORIDE, PRESERVATIVE FREE 2 ML: 5 INJECTION INTRAVENOUS at 22:34

## 2021-04-18 RX ADMIN — GABAPENTIN 300 MG: 300 CAPSULE ORAL at 22:41

## 2021-04-18 RX ADMIN — LOSARTAN POTASSIUM 100 MG: 50 TABLET, FILM COATED ORAL at 09:25

## 2021-04-18 RX ADMIN — SODIUM CHLORIDE, POTASSIUM CHLORIDE, SODIUM LACTATE AND CALCIUM CHLORIDE: 600; 310; 30; 20 INJECTION, SOLUTION INTRAVENOUS at 17:35

## 2021-04-18 RX ADMIN — MELATONIN 3 MG: 3 TAB ORAL at 22:42

## 2021-04-18 RX ADMIN — NIFEDIPINE 30 MG: 30 TABLET, FILM COATED, EXTENDED RELEASE ORAL at 09:25

## 2021-04-18 ASSESSMENT — PAIN SCALES - GENERAL
PAINLEVEL_OUTOF10: 0
PAINLEVEL_OUTOF10: 0

## 2021-04-19 VITALS
WEIGHT: 108.03 LBS | DIASTOLIC BLOOD PRESSURE: 79 MMHG | SYSTOLIC BLOOD PRESSURE: 139 MMHG | OXYGEN SATURATION: 95 % | RESPIRATION RATE: 16 BRPM | TEMPERATURE: 97.9 F | HEIGHT: 62 IN | HEART RATE: 98 BPM | BODY MASS INDEX: 19.88 KG/M2

## 2021-04-19 LAB
ANION GAP SERPL CALC-SCNC: 9 MMOL/L (ref 10–20)
BASOPHILS # BLD: 0 K/MCL (ref 0–0.3)
BASOPHILS NFR BLD: 0 %
BUN SERPL-MCNC: 8 MG/DL (ref 6–20)
BUN/CREAT SERPL: 12 (ref 7–25)
CALCIUM SERPL-MCNC: 8.2 MG/DL (ref 8.4–10.2)
CHLORIDE SERPL-SCNC: 106 MMOL/L (ref 98–107)
CO2 SERPL-SCNC: 28 MMOL/L (ref 21–32)
CREAT SERPL-MCNC: 0.67 MG/DL (ref 0.67–1.17)
DEPRECATED RDW RBC: 44.3 FL (ref 39–50)
EOSINOPHIL # BLD: 0.4 K/MCL (ref 0–0.5)
EOSINOPHIL NFR BLD: 4 %
ERYTHROCYTE [DISTWIDTH] IN BLOOD: 13 % (ref 11–15)
FASTING DURATION TIME PATIENT: ABNORMAL H
GFR SERPLBLD BASED ON 1.73 SQ M-ARVRAT: 86 ML/MIN/1.73M2
GLUCOSE SERPL-MCNC: 91 MG/DL (ref 65–99)
HCT VFR BLD CALC: 24.4 % (ref 39–51)
HGB BLD-MCNC: 8 G/DL (ref 13–17)
IMM GRANULOCYTES # BLD AUTO: 0 K/MCL (ref 0–0.2)
IMM GRANULOCYTES # BLD: 0 %
LYMPHOCYTES # BLD: 0.7 K/MCL (ref 1–4)
LYMPHOCYTES NFR BLD: 8 %
MAGNESIUM SERPL-MCNC: 1.6 MG/DL (ref 1.7–2.4)
MCH RBC QN AUTO: 30.5 PG (ref 26–34)
MCHC RBC AUTO-ENTMCNC: 32.8 G/DL (ref 32–36.5)
MCV RBC AUTO: 93.1 FL (ref 78–100)
MONOCYTES # BLD: 0.8 K/MCL (ref 0.3–0.9)
MONOCYTES NFR BLD: 9 %
NEUTROPHILS # BLD: 7.3 K/MCL (ref 1.8–7.7)
NEUTROPHILS NFR BLD: 79 %
NRBC BLD MANUAL-RTO: 0 /100 WBC
PHOSPHATE SERPL-MCNC: 2.3 MG/DL (ref 2.4–4.7)
PLATELET # BLD AUTO: 184 K/MCL (ref 140–450)
POTASSIUM SERPL-SCNC: 3.9 MMOL/L (ref 3.4–5.1)
RBC # BLD: 2.62 MIL/MCL (ref 4.5–5.9)
SODIUM SERPL-SCNC: 139 MMOL/L (ref 135–145)
WBC # BLD: 9.4 K/MCL (ref 4.2–11)

## 2021-04-19 PROCEDURE — 10002803 HB RX 637: Performed by: PHYSICIAN ASSISTANT

## 2021-04-19 PROCEDURE — 83735 ASSAY OF MAGNESIUM: CPT | Performed by: STUDENT IN AN ORGANIZED HEALTH CARE EDUCATION/TRAINING PROGRAM

## 2021-04-19 PROCEDURE — 85025 COMPLETE CBC W/AUTO DIFF WBC: CPT | Performed by: STUDENT IN AN ORGANIZED HEALTH CARE EDUCATION/TRAINING PROGRAM

## 2021-04-19 PROCEDURE — 84100 ASSAY OF PHOSPHORUS: CPT | Performed by: STUDENT IN AN ORGANIZED HEALTH CARE EDUCATION/TRAINING PROGRAM

## 2021-04-19 PROCEDURE — 10002807 HB RX 258: Performed by: PHYSICIAN ASSISTANT

## 2021-04-19 PROCEDURE — 36415 COLL VENOUS BLD VENIPUNCTURE: CPT | Performed by: STUDENT IN AN ORGANIZED HEALTH CARE EDUCATION/TRAINING PROGRAM

## 2021-04-19 PROCEDURE — 80048 BASIC METABOLIC PNL TOTAL CA: CPT | Performed by: STUDENT IN AN ORGANIZED HEALTH CARE EDUCATION/TRAINING PROGRAM

## 2021-04-19 PROCEDURE — 10002800 HB RX 250 W HCPCS: Performed by: STUDENT IN AN ORGANIZED HEALTH CARE EDUCATION/TRAINING PROGRAM

## 2021-04-19 PROCEDURE — 10002803 HB RX 637: Performed by: STUDENT IN AN ORGANIZED HEALTH CARE EDUCATION/TRAINING PROGRAM

## 2021-04-19 PROCEDURE — 10004651 HB RX, NO CHARGE ITEM: Performed by: STUDENT IN AN ORGANIZED HEALTH CARE EDUCATION/TRAINING PROGRAM

## 2021-04-19 RX ADMIN — GABAPENTIN 300 MG: 300 CAPSULE ORAL at 05:57

## 2021-04-19 RX ADMIN — GABAPENTIN 300 MG: 300 CAPSULE ORAL at 13:04

## 2021-04-19 RX ADMIN — MAGNESIUM SULFATE HEPTAHYDRATE 2 G: 40 INJECTION, SOLUTION INTRAVENOUS at 10:59

## 2021-04-19 RX ADMIN — MAGNESIUM SULFATE HEPTAHYDRATE 2 G: 40 INJECTION, SOLUTION INTRAVENOUS at 13:06

## 2021-04-19 RX ADMIN — ACETAMINOPHEN 1000 MG: 500 TABLET, FILM COATED ORAL at 00:18

## 2021-04-19 RX ADMIN — LOSARTAN POTASSIUM 100 MG: 50 TABLET, FILM COATED ORAL at 09:13

## 2021-04-19 RX ADMIN — POTASSIUM CHLORIDE 40 MEQ: 1.5 FOR SOLUTION ORAL at 09:12

## 2021-04-19 RX ADMIN — TAMSULOSIN HYDROCHLORIDE 0.4 MG: 0.4 CAPSULE ORAL at 09:13

## 2021-04-19 RX ADMIN — SODIUM CHLORIDE, POTASSIUM CHLORIDE, SODIUM LACTATE AND CALCIUM CHLORIDE: 600; 310; 30; 20 INJECTION, SOLUTION INTRAVENOUS at 05:56

## 2021-04-19 RX ADMIN — NIFEDIPINE 30 MG: 30 TABLET, FILM COATED, EXTENDED RELEASE ORAL at 09:12

## 2021-04-19 ASSESSMENT — PAIN SCALES - GENERAL
PAINLEVEL_OUTOF10: 1
PAINLEVEL_OUTOF10: 0

## 2021-04-23 LAB
ASR DISCLAIMER: NORMAL
CASE RPRT: NORMAL
CLINICAL INFO: NORMAL
PATH REPORT ADDENDUM.SYNOPTIC DOC: NORMAL
PATH REPORT.FINAL DX SPEC: NORMAL
PATH REPORT.FINAL DX SPEC: NORMAL
PATH REPORT.GROSS SPEC: NORMAL

## 2021-05-07 ENCOUNTER — OFFICE VISIT (OUTPATIENT)
Dept: SURGERY | Age: 86
End: 2021-05-07
Attending: SURGERY

## 2021-05-07 VITALS
RESPIRATION RATE: 16 BRPM | OXYGEN SATURATION: 94 % | HEART RATE: 65 BPM | SYSTOLIC BLOOD PRESSURE: 147 MMHG | HEIGHT: 62 IN | DIASTOLIC BLOOD PRESSURE: 75 MMHG | TEMPERATURE: 97.7 F | BODY MASS INDEX: 27.25 KG/M2

## 2021-05-07 DIAGNOSIS — C18.7 CANCER OF SIGMOID COLON (CMD): Primary | ICD-10-CM

## 2021-05-07 PROCEDURE — 99024 POSTOP FOLLOW-UP VISIT: CPT | Performed by: CLINICAL NURSE SPECIALIST

## 2021-05-07 PROCEDURE — 99211 OFF/OP EST MAY X REQ PHY/QHP: CPT | Performed by: CLINICAL NURSE SPECIALIST

## 2021-05-08 ASSESSMENT — ENCOUNTER SYMPTOMS
WEAKNESS: 0
ACTIVITY CHANGE: 0
APPETITE CHANGE: 0
NAUSEA: 0
DIARRHEA: 0
CONSTIPATION: 1
FEVER: 0
CHILLS: 0
FATIGUE: 0
RECTAL PAIN: 0
ANAL BLEEDING: 0
VOMITING: 0
ABDOMINAL PAIN: 0
DIZZINESS: 0

## 2021-05-26 VITALS
DIASTOLIC BLOOD PRESSURE: 69 MMHG | HEIGHT: 62 IN | TEMPERATURE: 97.7 F | WEIGHT: 115.3 LBS | OXYGEN SATURATION: 93 % | SYSTOLIC BLOOD PRESSURE: 114 MMHG | BODY MASS INDEX: 21.22 KG/M2 | HEART RATE: 67 BPM | RESPIRATION RATE: 18 BRPM

## 2021-06-08 ENCOUNTER — OFFICE VISIT (OUTPATIENT)
Dept: SURGERY | Age: 86
End: 2021-06-08
Attending: SURGERY

## 2021-06-08 VITALS
OXYGEN SATURATION: 96 % | RESPIRATION RATE: 17 BRPM | WEIGHT: 115 LBS | BODY MASS INDEX: 21.16 KG/M2 | SYSTOLIC BLOOD PRESSURE: 153 MMHG | DIASTOLIC BLOOD PRESSURE: 75 MMHG | HEIGHT: 62 IN | TEMPERATURE: 97.9 F | HEART RATE: 64 BPM

## 2021-06-08 DIAGNOSIS — Z09 POSTOP CHECK: Primary | ICD-10-CM

## 2021-06-08 PROCEDURE — 99024 POSTOP FOLLOW-UP VISIT: CPT | Performed by: SURGERY

## 2021-06-08 PROCEDURE — 99211 OFF/OP EST MAY X REQ PHY/QHP: CPT | Performed by: SURGERY

## 2021-06-17 ENCOUNTER — APPOINTMENT (OUTPATIENT)
Dept: HEMATOLOGY/ONCOLOGY | Age: 86
End: 2021-06-17
Attending: INTERNAL MEDICINE

## 2021-06-24 ENCOUNTER — OFFICE VISIT (OUTPATIENT)
Dept: HEMATOLOGY/ONCOLOGY | Age: 86
End: 2021-06-24
Attending: INTERNAL MEDICINE

## 2021-06-24 VITALS
WEIGHT: 120.04 LBS | RESPIRATION RATE: 14 BRPM | OXYGEN SATURATION: 94 % | SYSTOLIC BLOOD PRESSURE: 142 MMHG | TEMPERATURE: 98.1 F | HEART RATE: 60 BPM | DIASTOLIC BLOOD PRESSURE: 82 MMHG | BODY MASS INDEX: 21.81 KG/M2

## 2021-06-24 DIAGNOSIS — C18.7 MALIGNANT NEOPLASM OF SIGMOID COLON (CMD): Primary | ICD-10-CM

## 2021-06-24 PROCEDURE — 99213 OFFICE O/P EST LOW 20 MIN: CPT | Performed by: INTERNAL MEDICINE

## 2021-06-24 PROCEDURE — 99211 OFF/OP EST MAY X REQ PHY/QHP: CPT

## 2021-06-24 ASSESSMENT — PATIENT HEALTH QUESTIONNAIRE - PHQ9
SUM OF ALL RESPONSES TO PHQ9 QUESTIONS 1 AND 2: 0
CLINICAL INTERPRETATION OF PHQ2 SCORE: NO FURTHER SCREENING NEEDED
SUM OF ALL RESPONSES TO PHQ9 QUESTIONS 1 AND 2: 0
2. FEELING DOWN, DEPRESSED OR HOPELESS: NOT AT ALL
1. LITTLE INTEREST OR PLEASURE IN DOING THINGS: NOT AT ALL
CLINICAL INTERPRETATION OF PHQ9 SCORE: NO FURTHER SCREENING NEEDED

## 2021-06-24 ASSESSMENT — PAIN SCALES - GENERAL: PAINLEVEL: 0

## 2022-11-07 ENCOUNTER — TELEPHONE (OUTPATIENT)
Dept: HEMATOLOGY/ONCOLOGY | Age: 87
End: 2022-11-07

## 2022-11-21 ENCOUNTER — OFFICE VISIT (OUTPATIENT)
Dept: HEMATOLOGY/ONCOLOGY | Age: 87
End: 2022-11-21
Attending: INTERNAL MEDICINE

## 2022-11-21 VITALS
BODY MASS INDEX: 22.25 KG/M2 | SYSTOLIC BLOOD PRESSURE: 124 MMHG | HEART RATE: 59 BPM | DIASTOLIC BLOOD PRESSURE: 71 MMHG | TEMPERATURE: 97.7 F | HEIGHT: 62 IN | WEIGHT: 120.92 LBS | RESPIRATION RATE: 12 BRPM | OXYGEN SATURATION: 93 %

## 2022-11-21 DIAGNOSIS — C18.7 MALIGNANT NEOPLASM OF SIGMOID COLON (CMD): Primary | ICD-10-CM

## 2022-11-21 PROCEDURE — 82378 CARCINOEMBRYONIC ANTIGEN: CPT | Performed by: INTERNAL MEDICINE

## 2022-11-21 PROCEDURE — 36415 COLL VENOUS BLD VENIPUNCTURE: CPT

## 2022-11-21 PROCEDURE — 99211 OFF/OP EST MAY X REQ PHY/QHP: CPT

## 2022-11-21 PROCEDURE — 99213 OFFICE O/P EST LOW 20 MIN: CPT | Performed by: INTERNAL MEDICINE

## 2022-11-21 ASSESSMENT — PAIN SCALES - GENERAL: PAINLEVEL: 0

## 2022-11-21 ASSESSMENT — PATIENT HEALTH QUESTIONNAIRE - PHQ9
SUM OF ALL RESPONSES TO PHQ9 QUESTIONS 1 AND 2: 0
CLINICAL INTERPRETATION OF PHQ2 SCORE: NO FURTHER SCREENING NEEDED
2. FEELING DOWN, DEPRESSED OR HOPELESS: NOT AT ALL
SUM OF ALL RESPONSES TO PHQ9 QUESTIONS 1 AND 2: 0
1. LITTLE INTEREST OR PLEASURE IN DOING THINGS: NOT AT ALL

## 2022-11-22 LAB — CEA SERPL-MCNC: 4.4 NG/ML (ref 0–5)

## 2023-04-11 ENCOUNTER — APPOINTMENT (OUTPATIENT)
Dept: GENERAL RADIOLOGY | Age: 88
End: 2023-04-11
Attending: STUDENT IN AN ORGANIZED HEALTH CARE EDUCATION/TRAINING PROGRAM

## 2023-04-11 ENCOUNTER — HOSPITAL ENCOUNTER (OUTPATIENT)
Age: 88
Setting detail: OBSERVATION
Discharge: NURSING, REHAB OR LONG-TERM CARE | End: 2023-04-13
Attending: STUDENT IN AN ORGANIZED HEALTH CARE EDUCATION/TRAINING PROGRAM | Admitting: FAMILY MEDICINE

## 2023-04-11 DIAGNOSIS — I10 ESSENTIAL HYPERTENSION: ICD-10-CM

## 2023-04-11 DIAGNOSIS — R07.0 THROAT PAIN: Primary | ICD-10-CM

## 2023-04-11 DIAGNOSIS — I48.0 PAROXYSMAL ATRIAL FIBRILLATION (CMD): ICD-10-CM

## 2023-04-11 DIAGNOSIS — Z86.73 HISTORY OF CVA (CEREBROVASCULAR ACCIDENT): ICD-10-CM

## 2023-04-11 DIAGNOSIS — J43.8 OTHER EMPHYSEMA (CMD): ICD-10-CM

## 2023-04-11 DIAGNOSIS — E87.1 HYPONATREMIA: ICD-10-CM

## 2023-04-11 DIAGNOSIS — K27.9 PEPTIC ULCER DISEASE: ICD-10-CM

## 2023-04-11 LAB
ALBUMIN SERPL-MCNC: 3.9 G/DL (ref 3.6–5.1)
ALBUMIN/GLOB SERPL: 1.2 {RATIO} (ref 1–2.4)
ALP SERPL-CCNC: 101 UNITS/L (ref 45–117)
ALT SERPL-CCNC: 22 UNITS/L
ANION GAP SERPL CALC-SCNC: 12 MMOL/L (ref 7–19)
AST SERPL-CCNC: 34 UNITS/L
BASOPHILS # BLD: 0 K/MCL (ref 0–0.3)
BASOPHILS NFR BLD: 0 %
BILIRUB SERPL-MCNC: 0.5 MG/DL (ref 0.2–1)
BUN SERPL-MCNC: 22 MG/DL (ref 6–20)
BUN/CREAT SERPL: 24 (ref 7–25)
CALCIUM SERPL-MCNC: 9.1 MG/DL (ref 8.4–10.2)
CHLORIDE SERPL-SCNC: 98 MMOL/L (ref 97–110)
CO2 SERPL-SCNC: 28 MMOL/L (ref 21–32)
CREAT SERPL-MCNC: 0.93 MG/DL (ref 0.67–1.17)
DEPRECATED RDW RBC: 42.1 FL (ref 39–50)
EOSINOPHIL # BLD: 0.2 K/MCL (ref 0–0.5)
EOSINOPHIL NFR BLD: 2 %
ERYTHROCYTE [DISTWIDTH] IN BLOOD: 12.4 % (ref 11–15)
FASTING DURATION TIME PATIENT: ABNORMAL H
GFR SERPLBLD BASED ON 1.73 SQ M-ARVRAT: 78 ML/MIN
GLOBULIN SER-MCNC: 3.3 G/DL (ref 2–4)
GLUCOSE SERPL-MCNC: 121 MG/DL (ref 70–99)
HCT VFR BLD CALC: 42 % (ref 39–51)
HGB BLD-MCNC: 14.5 G/DL (ref 13–17)
IMM GRANULOCYTES # BLD AUTO: 0 K/MCL (ref 0–0.2)
IMM GRANULOCYTES # BLD: 0 %
LIPASE SERPL-CCNC: 149 UNITS/L (ref 73–393)
LYMPHOCYTES # BLD: 0.8 K/MCL (ref 1–4)
LYMPHOCYTES NFR BLD: 8 %
MCH RBC QN AUTO: 31.9 PG (ref 26–34)
MCHC RBC AUTO-ENTMCNC: 34.5 G/DL (ref 32–36.5)
MCV RBC AUTO: 92.5 FL (ref 78–100)
MONOCYTES # BLD: 0.6 K/MCL (ref 0.3–0.9)
MONOCYTES NFR BLD: 7 %
NEUTROPHILS # BLD: 7.7 K/MCL (ref 1.8–7.7)
NEUTROPHILS NFR BLD: 83 %
NRBC BLD MANUAL-RTO: 0 /100 WBC
PLATELET # BLD AUTO: 232 K/MCL (ref 140–450)
POTASSIUM SERPL-SCNC: 4.6 MMOL/L (ref 3.4–5.1)
PROT SERPL-MCNC: 7.2 G/DL (ref 6.4–8.2)
RBC # BLD: 4.54 MIL/MCL (ref 4.5–5.9)
SODIUM SERPL-SCNC: 133 MMOL/L (ref 135–145)
TROPONIN I SERPL DL<=0.01 NG/ML-MCNC: 16 NG/L
WBC # BLD: 9.3 K/MCL (ref 4.2–11)

## 2023-04-11 PROCEDURE — C9113 INJ PANTOPRAZOLE SODIUM, VIA: HCPCS | Performed by: STUDENT IN AN ORGANIZED HEALTH CARE EDUCATION/TRAINING PROGRAM

## 2023-04-11 PROCEDURE — 10002800 HB RX 250 W HCPCS: Performed by: STUDENT IN AN ORGANIZED HEALTH CARE EDUCATION/TRAINING PROGRAM

## 2023-04-11 PROCEDURE — 10002801 HB RX 250 W/O HCPCS: Performed by: STUDENT IN AN ORGANIZED HEALTH CARE EDUCATION/TRAINING PROGRAM

## 2023-04-11 PROCEDURE — 83690 ASSAY OF LIPASE: CPT | Performed by: STUDENT IN AN ORGANIZED HEALTH CARE EDUCATION/TRAINING PROGRAM

## 2023-04-11 PROCEDURE — 96375 TX/PRO/DX INJ NEW DRUG ADDON: CPT

## 2023-04-11 PROCEDURE — 10004651 HB RX, NO CHARGE ITEM: Performed by: STUDENT IN AN ORGANIZED HEALTH CARE EDUCATION/TRAINING PROGRAM

## 2023-04-11 PROCEDURE — 99284 EMERGENCY DEPT VISIT MOD MDM: CPT

## 2023-04-11 PROCEDURE — 85025 COMPLETE CBC W/AUTO DIFF WBC: CPT | Performed by: STUDENT IN AN ORGANIZED HEALTH CARE EDUCATION/TRAINING PROGRAM

## 2023-04-11 PROCEDURE — 84484 ASSAY OF TROPONIN QUANT: CPT | Performed by: STUDENT IN AN ORGANIZED HEALTH CARE EDUCATION/TRAINING PROGRAM

## 2023-04-11 PROCEDURE — 96361 HYDRATE IV INFUSION ADD-ON: CPT

## 2023-04-11 PROCEDURE — 10002807 HB RX 258: Performed by: STUDENT IN AN ORGANIZED HEALTH CARE EDUCATION/TRAINING PROGRAM

## 2023-04-11 PROCEDURE — 71045 X-RAY EXAM CHEST 1 VIEW: CPT

## 2023-04-11 PROCEDURE — G0378 HOSPITAL OBSERVATION PER HR: HCPCS

## 2023-04-11 PROCEDURE — 99285 EMERGENCY DEPT VISIT HI MDM: CPT | Performed by: STUDENT IN AN ORGANIZED HEALTH CARE EDUCATION/TRAINING PROGRAM

## 2023-04-11 PROCEDURE — 80053 COMPREHEN METABOLIC PANEL: CPT | Performed by: STUDENT IN AN ORGANIZED HEALTH CARE EDUCATION/TRAINING PROGRAM

## 2023-04-11 PROCEDURE — 96374 THER/PROPH/DIAG INJ IV PUSH: CPT

## 2023-04-11 RX ORDER — AMLODIPINE BESYLATE 10 MG/1
10 TABLET ORAL DAILY
Status: ON HOLD | COMMUNITY
Start: 2023-03-01 | End: 2023-04-13 | Stop reason: SDUPTHER

## 2023-04-11 RX ORDER — SODIUM CHLORIDE, SODIUM LACTATE, POTASSIUM CHLORIDE, CALCIUM CHLORIDE 600; 310; 30; 20 MG/100ML; MG/100ML; MG/100ML; MG/100ML
INJECTION, SOLUTION INTRAVENOUS CONTINUOUS
Status: DISCONTINUED | OUTPATIENT
Start: 2023-04-11 | End: 2023-04-12

## 2023-04-11 RX ORDER — FAMOTIDINE 10 MG/ML
20 INJECTION, SOLUTION INTRAVENOUS ONCE
Status: COMPLETED | OUTPATIENT
Start: 2023-04-11 | End: 2023-04-11

## 2023-04-11 RX ORDER — KETOROLAC TROMETHAMINE 15 MG/ML
15 INJECTION, SOLUTION INTRAMUSCULAR; INTRAVENOUS EVERY 6 HOURS PRN
Status: DISCONTINUED | OUTPATIENT
Start: 2023-04-11 | End: 2023-04-13 | Stop reason: HOSPADM

## 2023-04-11 RX ORDER — 0.9 % SODIUM CHLORIDE 0.9 %
2 VIAL (ML) INJECTION EVERY 12 HOURS SCHEDULED
Status: DISCONTINUED | OUTPATIENT
Start: 2023-04-11 | End: 2023-04-13 | Stop reason: HOSPADM

## 2023-04-11 RX ORDER — PANTOPRAZOLE SODIUM 40 MG/10ML
40 INJECTION, POWDER, LYOPHILIZED, FOR SOLUTION INTRAVENOUS NIGHTLY
Status: DISCONTINUED | OUTPATIENT
Start: 2023-04-11 | End: 2023-04-13 | Stop reason: HOSPADM

## 2023-04-11 RX ORDER — ALBUTEROL SULFATE 90 UG/1
2 AEROSOL, METERED RESPIRATORY (INHALATION)
Status: DISCONTINUED | OUTPATIENT
Start: 2023-04-11 | End: 2023-04-13 | Stop reason: HOSPADM

## 2023-04-11 RX ADMIN — Medication 15 ML: at 16:02

## 2023-04-11 RX ADMIN — SODIUM CHLORIDE, POTASSIUM CHLORIDE, SODIUM LACTATE AND CALCIUM CHLORIDE: 600; 310; 30; 20 INJECTION, SOLUTION INTRAVENOUS at 23:17

## 2023-04-11 RX ADMIN — PANTOPRAZOLE SODIUM 40 MG: 40 INJECTION, POWDER, FOR SOLUTION INTRAVENOUS at 23:14

## 2023-04-11 RX ADMIN — FAMOTIDINE 20 MG: 10 INJECTION INTRAVENOUS at 16:02

## 2023-04-11 RX ADMIN — SODIUM CHLORIDE, PRESERVATIVE FREE 2 ML: 5 INJECTION INTRAVENOUS at 22:44

## 2023-04-11 SDOH — HEALTH STABILITY: GENERAL
BECAUSE OF A PHYSICAL, MENTAL, OR EMOTIONAL CONDITION, DO YOU HAVE SERIOUS DIFFICULTY CONCENTRATING, REMEMBERING OR MAKING DECISIONS?: NO

## 2023-04-11 SDOH — HEALTH STABILITY: PHYSICAL HEALTH: DO YOU HAVE SERIOUS DIFFICULTY WALKING OR CLIMBING STAIRS?: YES

## 2023-04-11 SDOH — HEALTH STABILITY: GENERAL: BECAUSE OF A PHYSICAL, MENTAL, OR EMOTIONAL CONDITION, DO YOU HAVE DIFFICULTY DOING ERRANDS ALONE?: YES

## 2023-04-11 SDOH — SOCIAL STABILITY: SOCIAL NETWORK: SUPPORT SYSTEMS: FAMILY MEMBERS

## 2023-04-11 SDOH — ECONOMIC STABILITY: HOUSING INSECURITY: WHAT IS YOUR LIVING SITUATION TODAY?: SENIOR APARTMENT

## 2023-04-11 SDOH — ECONOMIC STABILITY: GENERAL

## 2023-04-11 SDOH — SOCIAL STABILITY: SOCIAL NETWORK
HOW OFTEN DO YOU SEE OR TALK TO PEOPLE THAT YOU CARE ABOUT AND FEEL CLOSE TO? (FOR EXAMPLE: TALKING TO FRIENDS ON THE PHONE, VISITING FRIENDS OR FAMILY, GOING TO CHURCH OR CLUB MEETINGS): 3 TO 5 TIMES A WEEK

## 2023-04-11 SDOH — ECONOMIC STABILITY: HOUSING INSECURITY: ARE YOU WORRIED ABOUT LOSING YOUR HOUSING?: NO

## 2023-04-11 SDOH — ECONOMIC STABILITY: TRANSPORTATION INSECURITY
IN THE PAST 12 MONTHS, HAS THE LACK OF TRANSPORTATION KEPT YOU FROM MEDICAL APPOINTMENTS OR FROM GETTING MEDICATIONS?: NO

## 2023-04-11 SDOH — ECONOMIC STABILITY: FOOD INSECURITY: HOW OFTEN IN THE PAST 12 MONTHS WERE YOU WORRIED OR STRESSED ABOUT HAVING ENOUGH MONEY TO BUY NUTRITIOUS MEALS?: NEVER

## 2023-04-11 SDOH — ECONOMIC STABILITY: HOUSING INSECURITY: WHAT IS YOUR LIVING SITUATION TODAY?: ALONE

## 2023-04-11 SDOH — ECONOMIC STABILITY: TRANSPORTATION INSECURITY
IN THE PAST 12 MONTHS, HAS LACK OF TRANSPORTATION KEPT YOU FROM MEETINGS, WORK, OR FROM GETTING THINGS NEEDED FOR DAILY LIVING?: NO

## 2023-04-11 SDOH — HEALTH STABILITY: PHYSICAL HEALTH: DO YOU HAVE DIFFICULTY DRESSING OR BATHING?: YES

## 2023-04-11 SDOH — ECONOMIC STABILITY: HOUSING INSECURITY: WHAT IS YOUR LIVING SITUATION TODAY?: SUPERVISED LIVING;OTHER FACILITY RESIDENTS

## 2023-04-11 ASSESSMENT — LIFESTYLE VARIABLES
ALCOHOL_USE_STATUS: NO OR LOW RISK WITH VALIDATED TOOL
AUDIT-C TOTAL SCORE: 0
HOW OFTEN DO YOU HAVE 6 OR MORE DRINKS ON ONE OCCASION: NEVER
AUDIT-C TOTAL SCORE: 0
HOW OFTEN DO YOU HAVE A DRINK CONTAINING ALCOHOL: NEVER
HOW MANY STANDARD DRINKS CONTAINING ALCOHOL DO YOU HAVE ON A TYPICAL DAY: 0,1 OR 2
AUDIT-C TOTAL SCORE: 0
HOW MANY STANDARD DRINKS CONTAINING ALCOHOL DO YOU HAVE ON A TYPICAL DAY: 0,1 OR 2
HOW OFTEN DO YOU HAVE A DRINK CONTAINING ALCOHOL: NEVER
HOW OFTEN DO YOU HAVE 6 OR MORE DRINKS ON ONE OCCASION: NEVER
HOW OFTEN DO YOU HAVE 6 OR MORE DRINKS ON ONE OCCASION: NEVER
HOW MANY STANDARD DRINKS CONTAINING ALCOHOL DO YOU HAVE ON A TYPICAL DAY: 0,1 OR 2
HOW OFTEN DO YOU HAVE A DRINK CONTAINING ALCOHOL: NEVER
ALCOHOL_USE_STATUS: NO OR LOW RISK WITH VALIDATED TOOL

## 2023-04-11 ASSESSMENT — ENCOUNTER SYMPTOMS
CHILLS: 0
ABDOMINAL PAIN: 0
SHORTNESS OF BREATH: 0
BACK PAIN: 0
HEADACHES: 0
VOMITING: 0
NAUSEA: 0
FEVER: 0
COUGH: 0

## 2023-04-11 ASSESSMENT — COLUMBIA-SUICIDE SEVERITY RATING SCALE - C-SSRS
6. HAVE YOU EVER DONE ANYTHING, STARTED TO DO ANYTHING, OR PREPARED TO DO ANYTHING TO END YOUR LIFE?: NO
1. WITHIN THE PAST MONTH, HAVE YOU WISHED YOU WERE DEAD OR WISHED YOU COULD GO TO SLEEP AND NOT WAKE UP?: NO
2. HAVE YOU ACTUALLY HAD ANY THOUGHTS OF KILLING YOURSELF?: NO
IS THE PATIENT ABLE TO COMPLETE C-SSRS: YES

## 2023-04-11 ASSESSMENT — ACTIVITIES OF DAILY LIVING (ADL)
ADL_SCORE: 7
FEEDING: INDEPENDENT
DRESSING: NEEDS ASSISTANCE
BATHING: NEEDS ASSISTANCE
TOILETING: NEEDS ASSISTANCE
RECENT_DECLINE_ADL: NO
DRESSING: NEEDS ASSISTANCE
ADL_BEFORE_ADMISSION: NEEDS/REQUIRES ASSISTANCE
ADL_BEFORE_ADMISSION: NEEDS/REQUIRES ASSISTANCE
ADL_SHORT_OF_BREATH: NO
DRESSING: NEEDS ASSISTANCE
FEEDING: INDEPENDENT
ADL_BEFORE_ADMISSION: NEEDS/REQUIRES ASSISTANCE
ADL_SCORE: 7
BATHING: NEEDS ASSISTANCE
TOILETING: NEEDS ASSISTANCE
ADL_SCORE: 6
FEEDING: INDEPENDENT
BATHING: NEEDS ASSISTANCE

## 2023-04-11 ASSESSMENT — PATIENT HEALTH QUESTIONNAIRE - PHQ9
2. FEELING DOWN, DEPRESSED OR HOPELESS: NOT AT ALL
1. LITTLE INTEREST OR PLEASURE IN DOING THINGS: NOT AT ALL
IS PATIENT ABLE TO COMPLETE PHQ2 OR PHQ9: YES
SUM OF ALL RESPONSES TO PHQ9 QUESTIONS 1 AND 2: 0
CLINICAL INTERPRETATION OF PHQ2 SCORE: NO FURTHER SCREENING NEEDED
IS PATIENT ABLE TO COMPLETE PHQ2 OR PHQ9: YES
SUM OF ALL RESPONSES TO PHQ9 QUESTIONS 1 AND 2: 0

## 2023-04-11 ASSESSMENT — PAIN SCALES - GENERAL
PAINLEVEL_OUTOF10: 4
PAINLEVEL_OUTOF10: 3
PAINLEVEL_OUTOF10: 0

## 2023-04-12 LAB
ALBUMIN SERPL-MCNC: 3.5 G/DL (ref 3.6–5.1)
ALBUMIN/GLOB SERPL: 1.3 {RATIO} (ref 1–2.4)
ALP SERPL-CCNC: 88 UNITS/L (ref 45–117)
ALT SERPL-CCNC: 22 UNITS/L
ANION GAP SERPL CALC-SCNC: 11 MMOL/L (ref 7–19)
AST SERPL-CCNC: 34 UNITS/L
ATRIAL RATE (BPM): 65
BASOPHILS # BLD: 0.1 K/MCL (ref 0–0.3)
BASOPHILS NFR BLD: 1 %
BILIRUB SERPL-MCNC: 0.9 MG/DL (ref 0.2–1)
BUN SERPL-MCNC: 16 MG/DL (ref 6–20)
BUN/CREAT SERPL: 18 (ref 7–25)
CALCIUM SERPL-MCNC: 8.8 MG/DL (ref 8.4–10.2)
CHLORIDE SERPL-SCNC: 101 MMOL/L (ref 97–110)
CO2 SERPL-SCNC: 27 MMOL/L (ref 21–32)
CREAT SERPL-MCNC: 0.89 MG/DL (ref 0.67–1.17)
DEPRECATED RDW RBC: 40.9 FL (ref 39–50)
EOSINOPHIL # BLD: 0.1 K/MCL (ref 0–0.5)
EOSINOPHIL NFR BLD: 2 %
ERYTHROCYTE [DISTWIDTH] IN BLOOD: 12.3 % (ref 11–15)
FASTING DURATION TIME PATIENT: ABNORMAL H
GFR SERPLBLD BASED ON 1.73 SQ M-ARVRAT: 82 ML/MIN
GLOBULIN SER-MCNC: 2.8 G/DL (ref 2–4)
GLUCOSE BLDC GLUCOMTR-MCNC: 129 MG/DL (ref 70–99)
GLUCOSE SERPL-MCNC: 87 MG/DL (ref 70–99)
HCT VFR BLD CALC: 38.5 % (ref 39–51)
HGB BLD-MCNC: 13.3 G/DL (ref 13–17)
IMM GRANULOCYTES # BLD AUTO: 0 K/MCL (ref 0–0.2)
IMM GRANULOCYTES # BLD: 0 %
LYMPHOCYTES # BLD: 0.7 K/MCL (ref 1–4)
LYMPHOCYTES NFR BLD: 9 %
MCH RBC QN AUTO: 31.4 PG (ref 26–34)
MCHC RBC AUTO-ENTMCNC: 34.5 G/DL (ref 32–36.5)
MCV RBC AUTO: 91 FL (ref 78–100)
MONOCYTES # BLD: 0.8 K/MCL (ref 0.3–0.9)
MONOCYTES NFR BLD: 9 %
NEUTROPHILS # BLD: 7 K/MCL (ref 1.8–7.7)
NEUTROPHILS NFR BLD: 79 %
NRBC BLD MANUAL-RTO: 0 /100 WBC
PLATELET # BLD AUTO: 210 K/MCL (ref 140–450)
POTASSIUM SERPL-SCNC: 3.8 MMOL/L (ref 3.4–5.1)
PROT SERPL-MCNC: 6.3 G/DL (ref 6.4–8.2)
QRS-INTERVAL (MSEC): 120
QT-INTERVAL (MSEC): 486
QTC: 456
R AXIS (DEGREES): -62
RBC # BLD: 4.23 MIL/MCL (ref 4.5–5.9)
REPORT TEXT: NORMAL
SODIUM SERPL-SCNC: 135 MMOL/L (ref 135–145)
T AXIS (DEGREES): 66
VENTRICULAR RATE EKG/MIN (BPM): 53
WBC # BLD: 8.7 K/MCL (ref 4.2–11)

## 2023-04-12 PROCEDURE — C9113 INJ PANTOPRAZOLE SODIUM, VIA: HCPCS | Performed by: STUDENT IN AN ORGANIZED HEALTH CARE EDUCATION/TRAINING PROGRAM

## 2023-04-12 PROCEDURE — 96361 HYDRATE IV INFUSION ADD-ON: CPT

## 2023-04-12 PROCEDURE — G0378 HOSPITAL OBSERVATION PER HR: HCPCS

## 2023-04-12 PROCEDURE — 94664 DEMO&/EVAL PT USE INHALER: CPT

## 2023-04-12 PROCEDURE — 36415 COLL VENOUS BLD VENIPUNCTURE: CPT | Performed by: STUDENT IN AN ORGANIZED HEALTH CARE EDUCATION/TRAINING PROGRAM

## 2023-04-12 PROCEDURE — 10002800 HB RX 250 W HCPCS: Performed by: STUDENT IN AN ORGANIZED HEALTH CARE EDUCATION/TRAINING PROGRAM

## 2023-04-12 PROCEDURE — 93005 ELECTROCARDIOGRAM TRACING: CPT

## 2023-04-12 PROCEDURE — 99223 1ST HOSP IP/OBS HIGH 75: CPT

## 2023-04-12 PROCEDURE — 94640 AIRWAY INHALATION TREATMENT: CPT

## 2023-04-12 PROCEDURE — 93010 ELECTROCARDIOGRAM REPORT: CPT | Performed by: INTERNAL MEDICINE

## 2023-04-12 PROCEDURE — 92610 EVALUATE SWALLOWING FUNCTION: CPT

## 2023-04-12 PROCEDURE — 85025 COMPLETE CBC W/AUTO DIFF WBC: CPT | Performed by: STUDENT IN AN ORGANIZED HEALTH CARE EDUCATION/TRAINING PROGRAM

## 2023-04-12 PROCEDURE — 10004651 HB RX, NO CHARGE ITEM: Performed by: STUDENT IN AN ORGANIZED HEALTH CARE EDUCATION/TRAINING PROGRAM

## 2023-04-12 PROCEDURE — 10002803 HB RX 637: Performed by: STUDENT IN AN ORGANIZED HEALTH CARE EDUCATION/TRAINING PROGRAM

## 2023-04-12 PROCEDURE — 82962 GLUCOSE BLOOD TEST: CPT

## 2023-04-12 PROCEDURE — 80053 COMPREHEN METABOLIC PANEL: CPT | Performed by: STUDENT IN AN ORGANIZED HEALTH CARE EDUCATION/TRAINING PROGRAM

## 2023-04-12 PROCEDURE — 96376 TX/PRO/DX INJ SAME DRUG ADON: CPT

## 2023-04-12 RX ADMIN — SODIUM CHLORIDE, PRESERVATIVE FREE 2 ML: 5 INJECTION INTRAVENOUS at 20:35

## 2023-04-12 RX ADMIN — FLUTICASONE FUROATE, UMECLIDINIUM BROMIDE AND VILANTEROL TRIFENATATE 1 PUFF: 100; 62.5; 25 POWDER RESPIRATORY (INHALATION) at 08:22

## 2023-04-12 RX ADMIN — PANTOPRAZOLE SODIUM 40 MG: 40 INJECTION, POWDER, FOR SOLUTION INTRAVENOUS at 20:33

## 2023-04-12 ASSESSMENT — PAIN SCALES - GENERAL
PAINLEVEL_OUTOF10: 0

## 2023-04-12 ASSESSMENT — COGNITIVE AND FUNCTIONAL STATUS - GENERAL
REMEMBERING TO TAKE MEDICATION: A LITTLE
REMEMBERING WHERE THINGS ARE: A LITTLE
REMEMBERING 5 ERRANDS WITH NO LIST: A LOT
TAKING CARE OF COMPLICATED TASKS: A LOT
UNDERSTANDING 10 TO 15 MIN SPEECH: A LITTLE
APPLIED_COGNITIVE_RAW_SCORE: 17
APPLIED_COGNITIVE_CONVERTED_SCORE: 36.52

## 2023-04-13 ENCOUNTER — APPOINTMENT (OUTPATIENT)
Dept: CARDIOLOGY | Age: 88
End: 2023-04-13

## 2023-04-13 VITALS
WEIGHT: 114.2 LBS | OXYGEN SATURATION: 94 % | HEART RATE: 39 BPM | RESPIRATION RATE: 18 BRPM | HEIGHT: 65 IN | BODY MASS INDEX: 19.03 KG/M2 | DIASTOLIC BLOOD PRESSURE: 69 MMHG | SYSTOLIC BLOOD PRESSURE: 118 MMHG | TEMPERATURE: 98.6 F

## 2023-04-13 LAB
ALBUMIN SERPL-MCNC: 3.7 G/DL (ref 3.6–5.1)
ALBUMIN/GLOB SERPL: 1.2 {RATIO} (ref 1–2.4)
ALP SERPL-CCNC: 91 UNITS/L (ref 45–117)
ALT SERPL-CCNC: 23 UNITS/L
ANION GAP SERPL CALC-SCNC: 13 MMOL/L (ref 7–19)
AST SERPL-CCNC: 37 UNITS/L
BASOPHILS # BLD: 0.1 K/MCL (ref 0–0.3)
BASOPHILS NFR BLD: 1 %
BILIRUB SERPL-MCNC: 0.8 MG/DL (ref 0.2–1)
BUN SERPL-MCNC: 14 MG/DL (ref 6–20)
BUN/CREAT SERPL: 16 (ref 7–25)
CALCIUM SERPL-MCNC: 9.1 MG/DL (ref 8.4–10.2)
CHLORIDE SERPL-SCNC: 101 MMOL/L (ref 97–110)
CO2 SERPL-SCNC: 25 MMOL/L (ref 21–32)
CREAT SERPL-MCNC: 0.9 MG/DL (ref 0.67–1.17)
DEPRECATED RDW RBC: 41 FL (ref 39–50)
EOSINOPHIL # BLD: 0.2 K/MCL (ref 0–0.5)
EOSINOPHIL NFR BLD: 2 %
ERYTHROCYTE [DISTWIDTH] IN BLOOD: 12.3 % (ref 11–15)
FASTING DURATION TIME PATIENT: NORMAL H
GFR SERPLBLD BASED ON 1.73 SQ M-ARVRAT: 82 ML/MIN
GLOBULIN SER-MCNC: 3.1 G/DL (ref 2–4)
GLUCOSE SERPL-MCNC: 88 MG/DL (ref 70–99)
HCT VFR BLD CALC: 42.8 % (ref 39–51)
HGB BLD-MCNC: 14.8 G/DL (ref 13–17)
IMM GRANULOCYTES # BLD AUTO: 0 K/MCL (ref 0–0.2)
IMM GRANULOCYTES # BLD: 0 %
LYMPHOCYTES # BLD: 0.9 K/MCL (ref 1–4)
LYMPHOCYTES NFR BLD: 10 %
MAGNESIUM SERPL-MCNC: 1.7 MG/DL (ref 1.7–2.4)
MCH RBC QN AUTO: 31.5 PG (ref 26–34)
MCHC RBC AUTO-ENTMCNC: 34.6 G/DL (ref 32–36.5)
MCV RBC AUTO: 91.1 FL (ref 78–100)
MONOCYTES # BLD: 1.1 K/MCL (ref 0.3–0.9)
MONOCYTES NFR BLD: 12 %
NEUTROPHILS # BLD: 6.9 K/MCL (ref 1.8–7.7)
NEUTROPHILS NFR BLD: 75 %
NRBC BLD MANUAL-RTO: 0 /100 WBC
PHOSPHATE SERPL-MCNC: 3.1 MG/DL (ref 2.4–4.7)
PLATELET # BLD AUTO: 211 K/MCL (ref 140–450)
POTASSIUM SERPL-SCNC: 3.7 MMOL/L (ref 3.4–5.1)
PROT SERPL-MCNC: 6.8 G/DL (ref 6.4–8.2)
RBC # BLD: 4.7 MIL/MCL (ref 4.5–5.9)
SODIUM SERPL-SCNC: 135 MMOL/L (ref 135–145)
WBC # BLD: 9.1 K/MCL (ref 4.2–11)

## 2023-04-13 PROCEDURE — 83735 ASSAY OF MAGNESIUM: CPT

## 2023-04-13 PROCEDURE — 36415 COLL VENOUS BLD VENIPUNCTURE: CPT | Performed by: STUDENT IN AN ORGANIZED HEALTH CARE EDUCATION/TRAINING PROGRAM

## 2023-04-13 PROCEDURE — 10004651 HB RX, NO CHARGE ITEM: Performed by: STUDENT IN AN ORGANIZED HEALTH CARE EDUCATION/TRAINING PROGRAM

## 2023-04-13 PROCEDURE — 99239 HOSP IP/OBS DSCHRG MGMT >30: CPT

## 2023-04-13 PROCEDURE — G0378 HOSPITAL OBSERVATION PER HR: HCPCS

## 2023-04-13 PROCEDURE — 80053 COMPREHEN METABOLIC PANEL: CPT | Performed by: STUDENT IN AN ORGANIZED HEALTH CARE EDUCATION/TRAINING PROGRAM

## 2023-04-13 PROCEDURE — 94640 AIRWAY INHALATION TREATMENT: CPT

## 2023-04-13 PROCEDURE — 84100 ASSAY OF PHOSPHORUS: CPT

## 2023-04-13 PROCEDURE — 85025 COMPLETE CBC W/AUTO DIFF WBC: CPT | Performed by: STUDENT IN AN ORGANIZED HEALTH CARE EDUCATION/TRAINING PROGRAM

## 2023-04-13 RX ORDER — AMLODIPINE BESYLATE 10 MG/1
5 TABLET ORAL DAILY
Qty: 30 TABLET | Refills: 1 | Status: ON HOLD | OUTPATIENT
Start: 2023-04-13 | End: 2023-05-24 | Stop reason: HOSPADM

## 2023-04-13 RX ADMIN — SODIUM CHLORIDE, PRESERVATIVE FREE 2 ML: 5 INJECTION INTRAVENOUS at 08:56

## 2023-04-13 RX ADMIN — FLUTICASONE FUROATE, UMECLIDINIUM BROMIDE AND VILANTEROL TRIFENATATE 1 PUFF: 100; 62.5; 25 POWDER RESPIRATORY (INHALATION) at 08:23

## 2023-04-13 ASSESSMENT — PAIN SCALES - GENERAL: PAINLEVEL_OUTOF10: 0

## 2023-05-11 ENCOUNTER — TELEPHONE (OUTPATIENT)
Dept: HEMATOLOGY/ONCOLOGY | Age: 88
End: 2023-05-11

## 2023-05-16 ENCOUNTER — APPOINTMENT (OUTPATIENT)
Dept: HEMATOLOGY/ONCOLOGY | Age: 88
End: 2023-05-16
Attending: INTERNAL MEDICINE

## 2023-05-20 ENCOUNTER — APPOINTMENT (OUTPATIENT)
Dept: GENERAL RADIOLOGY | Age: 88
DRG: 872 | End: 2023-05-20
Attending: EMERGENCY MEDICINE

## 2023-05-20 ENCOUNTER — HOSPITAL ENCOUNTER (INPATIENT)
Age: 88
LOS: 4 days | Discharge: NURSING, REHAB OR LONG-TERM CARE | DRG: 872 | End: 2023-05-24
Attending: EMERGENCY MEDICINE | Admitting: INTERNAL MEDICINE

## 2023-05-20 ENCOUNTER — APPOINTMENT (OUTPATIENT)
Dept: CT IMAGING | Age: 88
DRG: 872 | End: 2023-05-20
Attending: EMERGENCY MEDICINE

## 2023-05-20 DIAGNOSIS — I10 ESSENTIAL HYPERTENSION: ICD-10-CM

## 2023-05-20 DIAGNOSIS — A41.9 SEPSIS, DUE TO UNSPECIFIED ORGANISM, UNSPECIFIED WHETHER ACUTE ORGAN DYSFUNCTION PRESENT (CMD): Primary | ICD-10-CM

## 2023-05-20 DIAGNOSIS — D62 ACUTE BLOOD LOSS ANEMIA: ICD-10-CM

## 2023-05-20 DIAGNOSIS — I48.0 PAROXYSMAL ATRIAL FIBRILLATION (CMD): ICD-10-CM

## 2023-05-20 DIAGNOSIS — C18.7 MALIGNANT NEOPLASM OF SIGMOID COLON (CMD): ICD-10-CM

## 2023-05-20 DIAGNOSIS — A04.72 C. DIFFICILE COLITIS: ICD-10-CM

## 2023-05-20 PROBLEM — E87.6 HYPOKALEMIA: Status: ACTIVE | Noted: 2023-05-20

## 2023-05-20 PROBLEM — N17.9 AKI (ACUTE KIDNEY INJURY) (CMD): Status: ACTIVE | Noted: 2023-05-20

## 2023-05-20 PROBLEM — E87.20 LACTIC ACIDOSIS: Status: ACTIVE | Noted: 2023-05-20

## 2023-05-20 PROBLEM — E87.0 HYPERNATREMIA: Status: ACTIVE | Noted: 2023-05-20

## 2023-05-20 LAB
ADV 40+41 FIB PROT STL QL NAA+PROBE: NOT DETECTED
ALBUMIN SERPL-MCNC: 3.9 G/DL (ref 3.6–5.1)
ALBUMIN/GLOB SERPL: 1.1 {RATIO} (ref 1–2.4)
ALP SERPL-CCNC: 78 UNITS/L (ref 45–117)
ALT SERPL-CCNC: 24 UNITS/L
ANION GAP SERPL CALC-SCNC: 18 MMOL/L (ref 7–19)
APPEARANCE UR: CLEAR
AST SERPL-CCNC: 36 UNITS/L
ASTRO TYP 1-8 RNA STL QL NAA+NON-PROBE: NOT DETECTED
BASOPHILS # BLD: 0 K/MCL (ref 0–0.3)
BASOPHILS NFR BLD: 0 %
BILIRUB SERPL-MCNC: 0.5 MG/DL (ref 0.2–1)
BILIRUB UR QL STRIP: NEGATIVE
BUN SERPL-MCNC: 66 MG/DL (ref 6–20)
BUN/CREAT SERPL: 41 (ref 7–25)
C CAYETANENSIS DNA STL QL NAA+NON-PROBE: NOT DETECTED
C COLI+JEJ+LAR 16S RRNA STL QL NAA+PROBE: NOT DETECTED
C DIFF TOX A+B STL QL IA: NOT DETECTED
C DIFF TOX B TCDB STL QL NAA+PROBE: DETECTED
C PARVUM+HOMINIS COWP STL QL NAA+PROBE: NOT DETECTED
CALCIUM SERPL-MCNC: 9.3 MG/DL (ref 8.4–10.2)
CHLORIDE SERPL-SCNC: 102 MMOL/L (ref 97–110)
CO2 SERPL-SCNC: 29 MMOL/L (ref 21–32)
COLOR UR: YELLOW
CREAT SERPL-MCNC: 1.62 MG/DL (ref 0.67–1.17)
CREAT UR-MCNC: 65.8 MG/DL
DEPRECATED RDW RBC: 46.5 FL (ref 39–50)
E HISTOLYTICA 18S RRNA STL QL NAA+PROBE: NOT DETECTED
EAEC PAA PLAS AGGR+AATA ST NAA+NON-PRB: NOT DETECTED
EC STX1+STX2 GENES STL QL NAA+NON-PROBE: NOT DETECTED
EOSINOPHIL # BLD: 0 K/MCL (ref 0–0.5)
EOSINOPHIL NFR BLD: 0 %
EPEC EAE GENE STL QL NAA+NON-PROBE: NOT DETECTED
ERYTHROCYTE [DISTWIDTH] IN BLOOD: 13.2 % (ref 11–15)
ETEC ELTA+ESTB GENES STL QL NAA+PROBE: NOT DETECTED
FASTING DURATION TIME PATIENT: ABNORMAL H
G LAMBLIA 18S RRNA STL QL NAA+PROBE: NOT DETECTED
GFR SERPLBLD BASED ON 1.73 SQ M-ARVRAT: 40 ML/MIN
GLOBULIN SER-MCNC: 3.4 G/DL (ref 2–4)
GLUCOSE BLDC GLUCOMTR-MCNC: 112 MG/DL (ref 70–99)
GLUCOSE BLDC GLUCOMTR-MCNC: 121 MG/DL (ref 70–99)
GLUCOSE BLDC GLUCOMTR-MCNC: 122 MG/DL (ref 70–99)
GLUCOSE SERPL-MCNC: 200 MG/DL (ref 70–99)
GLUCOSE UR STRIP-MCNC: NEGATIVE MG/DL
HCT VFR BLD CALC: 39.9 % (ref 39–51)
HGB BLD-MCNC: 13.1 G/DL (ref 13–17)
HGB UR QL STRIP: NEGATIVE
IMM GRANULOCYTES # BLD AUTO: 0.1 K/MCL (ref 0–0.2)
IMM GRANULOCYTES # BLD: 1 %
KETONES UR STRIP-MCNC: NEGATIVE MG/DL
LACTATE BLDV-SCNC: 1.1 MMOL/L (ref 0–2)
LACTATE BLDV-SCNC: 3.2 MMOL/L (ref 0–2)
LACTATE BLDV-SCNC: 4.6 MMOL/L (ref 0–2)
LACTATE BLDV-SCNC: 5.2 MMOL/L (ref 0–2)
LEUKOCYTE ESTERASE UR QL STRIP: NEGATIVE
LYMPHOCYTES # BLD: 0.9 K/MCL (ref 1–4)
LYMPHOCYTES NFR BLD: 5 %
MAGNESIUM SERPL-MCNC: 2.4 MG/DL (ref 1.7–2.4)
MCH RBC QN AUTO: 31.3 PG (ref 26–34)
MCHC RBC AUTO-ENTMCNC: 32.8 G/DL (ref 32–36.5)
MCV RBC AUTO: 95.2 FL (ref 78–100)
MONOCYTES # BLD: 0.9 K/MCL (ref 0.3–0.9)
MONOCYTES NFR BLD: 5 %
NEUTROPHILS # BLD: 15.8 K/MCL (ref 1.8–7.7)
NEUTROPHILS NFR BLD: 89 %
NITRITE UR QL STRIP: NEGATIVE
NOROVIRUS GI+II RNA STL QL NAA+NON-PROBE: DETECTED
NRBC BLD MANUAL-RTO: 0 /100 WBC
P SHIGELLOIDES DNA STL QL NAA+NON-PROBE: NOT DETECTED
PH UR STRIP: 5.5 [PH] (ref 5–7)
PLATELET # BLD AUTO: 246 K/MCL (ref 140–450)
POTASSIUM SERPL-SCNC: 3.3 MMOL/L (ref 3.4–5.1)
PROT SERPL-MCNC: 7.3 G/DL (ref 6.4–8.2)
PROT UR STRIP-MCNC: ABNORMAL MG/DL
RBC # BLD: 4.19 MIL/MCL (ref 4.5–5.9)
RVA VP6 STL QL NAA+PROBE: NOT DETECTED
SALMONELLA SP INVA+FLIC STL QL NAA+PROBE: NOT DETECTED
SAPO I+II+IV+V RNA STL QL NAA+NON-PROBE: NOT DETECTED
SHIGELLA SP+EIEC IPAH ST NAA+NON-PROBE: NOT DETECTED
SODIUM SERPL-SCNC: 146 MMOL/L (ref 135–145)
SODIUM UR-SCNC: 30 MMOL/L
SP GR UR STRIP: 1.02 (ref 1–1.03)
TROPONIN I SERPL DL<=0.01 NG/ML-MCNC: 105 NG/L
TROPONIN I SERPL DL<=0.01 NG/ML-MCNC: 65 NG/L
UROBILINOGEN UR STRIP-MCNC: 0.2 MG/DL
V CHOL+PARA+VUL DNA STL QL NAA+NON-PROBE: NOT DETECTED
VIBRIO CHOL TOXIN CTXA STL QL NAA+PROBE: NOT DETECTED
WBC # BLD: 17.6 K/MCL (ref 4.2–11)
WBC STL QL MICRO: POSITIVE
Y ENTEROCOL DNA STL QL NAA+NON-PROBE: NOT DETECTED

## 2023-05-20 PROCEDURE — 74176 CT ABD & PELVIS W/O CONTRAST: CPT

## 2023-05-20 PROCEDURE — 80053 COMPREHEN METABOLIC PANEL: CPT | Performed by: EMERGENCY MEDICINE

## 2023-05-20 PROCEDURE — 96360 HYDRATION IV INFUSION INIT: CPT

## 2023-05-20 PROCEDURE — 36415 COLL VENOUS BLD VENIPUNCTURE: CPT

## 2023-05-20 PROCEDURE — 10002807 HB RX 258: Performed by: NURSE PRACTITIONER

## 2023-05-20 PROCEDURE — 83605 ASSAY OF LACTIC ACID: CPT | Performed by: EMERGENCY MEDICINE

## 2023-05-20 PROCEDURE — 83605 ASSAY OF LACTIC ACID: CPT

## 2023-05-20 PROCEDURE — 83735 ASSAY OF MAGNESIUM: CPT

## 2023-05-20 PROCEDURE — 93005 ELECTROCARDIOGRAM TRACING: CPT | Performed by: EMERGENCY MEDICINE

## 2023-05-20 PROCEDURE — 87040 BLOOD CULTURE FOR BACTERIA: CPT | Performed by: EMERGENCY MEDICINE

## 2023-05-20 PROCEDURE — 84484 ASSAY OF TROPONIN QUANT: CPT

## 2023-05-20 PROCEDURE — 81003 URINALYSIS AUTO W/O SCOPE: CPT | Performed by: EMERGENCY MEDICINE

## 2023-05-20 PROCEDURE — 85025 COMPLETE CBC W/AUTO DIFF WBC: CPT | Performed by: EMERGENCY MEDICINE

## 2023-05-20 PROCEDURE — 83630 LACTOFERRIN FECAL (QUAL): CPT

## 2023-05-20 PROCEDURE — 96361 HYDRATE IV INFUSION ADD-ON: CPT

## 2023-05-20 PROCEDURE — 99291 CRITICAL CARE FIRST HOUR: CPT | Performed by: EMERGENCY MEDICINE

## 2023-05-20 PROCEDURE — 10004651 HB RX, NO CHARGE ITEM: Performed by: NURSE PRACTITIONER

## 2023-05-20 PROCEDURE — 10002807 HB RX 258

## 2023-05-20 PROCEDURE — 10002800 HB RX 250 W HCPCS

## 2023-05-20 PROCEDURE — 10002803 HB RX 637

## 2023-05-20 PROCEDURE — 94664 DEMO&/EVAL PT USE INHALER: CPT

## 2023-05-20 PROCEDURE — 87507 IADNA-DNA/RNA PROBE TQ 12-25: CPT

## 2023-05-20 PROCEDURE — 82570 ASSAY OF URINE CREATININE: CPT | Performed by: INTERNAL MEDICINE

## 2023-05-20 PROCEDURE — 99285 EMERGENCY DEPT VISIT HI MDM: CPT

## 2023-05-20 PROCEDURE — 84484 ASSAY OF TROPONIN QUANT: CPT | Performed by: EMERGENCY MEDICINE

## 2023-05-20 PROCEDURE — 94640 AIRWAY INHALATION TREATMENT: CPT

## 2023-05-20 PROCEDURE — 10006031 HB ROOM CHARGE TELEMETRY

## 2023-05-20 PROCEDURE — 84300 ASSAY OF URINE SODIUM: CPT | Performed by: INTERNAL MEDICINE

## 2023-05-20 PROCEDURE — 87493 C DIFF AMPLIFIED PROBE: CPT | Performed by: EMERGENCY MEDICINE

## 2023-05-20 PROCEDURE — 71045 X-RAY EXAM CHEST 1 VIEW: CPT

## 2023-05-20 PROCEDURE — 10002803 HB RX 637: Performed by: NURSE PRACTITIONER

## 2023-05-20 PROCEDURE — 10002801 HB RX 250 W/O HCPCS

## 2023-05-20 PROCEDURE — 93010 ELECTROCARDIOGRAM REPORT: CPT | Performed by: INTERNAL MEDICINE

## 2023-05-20 PROCEDURE — 10002807 HB RX 258: Performed by: EMERGENCY MEDICINE

## 2023-05-20 PROCEDURE — 96372 THER/PROPH/DIAG INJ SC/IM: CPT | Performed by: NURSE PRACTITIONER

## 2023-05-20 PROCEDURE — 10002800 HB RX 250 W HCPCS: Performed by: NURSE PRACTITIONER

## 2023-05-20 PROCEDURE — 99223 1ST HOSP IP/OBS HIGH 75: CPT | Performed by: INTERNAL MEDICINE

## 2023-05-20 PROCEDURE — 10002800 HB RX 250 W HCPCS: Performed by: EMERGENCY MEDICINE

## 2023-05-20 RX ORDER — CEFAZOLIN SODIUM/WATER 2 G/20 ML
2000 SYRINGE (ML) INTRAVENOUS ONCE
Status: COMPLETED | OUTPATIENT
Start: 2023-05-20 | End: 2023-05-20

## 2023-05-20 RX ORDER — ONDANSETRON 2 MG/ML
4 INJECTION INTRAMUSCULAR; INTRAVENOUS 2 TIMES DAILY PRN
Status: CANCELLED | OUTPATIENT
Start: 2023-05-20

## 2023-05-20 RX ORDER — FLUTICASONE PROPIONATE 50 MCG
2 SPRAY, SUSPENSION (ML) NASAL DAILY
Status: DISCONTINUED | OUTPATIENT
Start: 2023-05-20 | End: 2023-05-24 | Stop reason: HOSPADM

## 2023-05-20 RX ORDER — HEPARIN SODIUM 5000 [USP'U]/ML
5000 INJECTION, SOLUTION INTRAVENOUS; SUBCUTANEOUS EVERY 8 HOURS SCHEDULED
Status: DISCONTINUED | OUTPATIENT
Start: 2023-05-20 | End: 2023-05-24 | Stop reason: HOSPADM

## 2023-05-20 RX ORDER — SODIUM CHLORIDE 9 MG/ML
INJECTION, SOLUTION INTRAVENOUS CONTINUOUS
Status: DISCONTINUED | OUTPATIENT
Start: 2023-05-20 | End: 2023-05-21

## 2023-05-20 RX ORDER — ACETAMINOPHEN 325 MG/1
650 TABLET ORAL EVERY 6 HOURS PRN
Status: DISCONTINUED | OUTPATIENT
Start: 2023-05-20 | End: 2023-05-24 | Stop reason: HOSPADM

## 2023-05-20 RX ORDER — TAMSULOSIN HYDROCHLORIDE 0.4 MG/1
0.4 CAPSULE ORAL DAILY
Status: DISCONTINUED | OUTPATIENT
Start: 2023-05-20 | End: 2023-05-24 | Stop reason: HOSPADM

## 2023-05-20 RX ORDER — POTASSIUM CHLORIDE 14.9 MG/ML
20 INJECTION INTRAVENOUS ONCE
Status: COMPLETED | OUTPATIENT
Start: 2023-05-20 | End: 2023-05-20

## 2023-05-20 RX ORDER — AMLODIPINE BESYLATE 5 MG/1
5 TABLET ORAL DAILY
Status: DISCONTINUED | OUTPATIENT
Start: 2023-05-21 | End: 2023-05-24 | Stop reason: HOSPADM

## 2023-05-20 RX ORDER — CIPROFLOXACIN 2 MG/ML
400 INJECTION, SOLUTION INTRAVENOUS EVERY 12 HOURS SCHEDULED
Status: DISCONTINUED | OUTPATIENT
Start: 2023-05-20 | End: 2023-05-20

## 2023-05-20 RX ORDER — LANOLIN ALCOHOL/MO/W.PET/CERES
3 CREAM (GRAM) TOPICAL AT BEDTIME
Status: DISCONTINUED | OUTPATIENT
Start: 2023-05-20 | End: 2023-05-24 | Stop reason: HOSPADM

## 2023-05-20 RX ORDER — VANCOMYCIN HYDROCHLORIDE 250 MG/5ML
125 POWDER, FOR SOLUTION ORAL 4 TIMES DAILY
Status: DISCONTINUED | OUTPATIENT
Start: 2023-05-20 | End: 2023-05-24 | Stop reason: HOSPADM

## 2023-05-20 RX ORDER — CEFAZOLIN SODIUM/WATER 2 G/20 ML
2000 SYRINGE (ML) INTRAVENOUS DAILY
Status: DISCONTINUED | OUTPATIENT
Start: 2023-05-21 | End: 2023-05-21

## 2023-05-20 RX ORDER — LOSARTAN POTASSIUM 50 MG/1
100 TABLET ORAL DAILY
Status: DISCONTINUED | OUTPATIENT
Start: 2023-05-21 | End: 2023-05-24 | Stop reason: HOSPADM

## 2023-05-20 RX ORDER — 0.9 % SODIUM CHLORIDE 0.9 %
2 VIAL (ML) INJECTION EVERY 12 HOURS SCHEDULED
Status: DISCONTINUED | OUTPATIENT
Start: 2023-05-20 | End: 2023-05-24 | Stop reason: HOSPADM

## 2023-05-20 RX ADMIN — FLUTICASONE PROPIONATE 2 SPRAY: 50 SPRAY, METERED NASAL at 15:33

## 2023-05-20 RX ADMIN — POTASSIUM CHLORIDE 20 MEQ: 14.9 INJECTION, SOLUTION INTRAVENOUS at 14:14

## 2023-05-20 RX ADMIN — SODIUM CHLORIDE 1000 ML: 9 INJECTION, SOLUTION INTRAVENOUS at 07:03

## 2023-05-20 RX ADMIN — PIPERACILLIN AND TAZOBACTAM 3.38 G: 3; .375 INJECTION, POWDER, FOR SOLUTION INTRAVENOUS at 09:10

## 2023-05-20 RX ADMIN — SODIUM CHLORIDE, PRESERVATIVE FREE 2 ML: 5 INJECTION INTRAVENOUS at 14:16

## 2023-05-20 RX ADMIN — VANCOMYCIN HYDROCHLORIDE 125 MG: KIT at 22:12

## 2023-05-20 RX ADMIN — SODIUM CHLORIDE, POTASSIUM CHLORIDE, SODIUM LACTATE AND CALCIUM CHLORIDE 1000 ML: 600; 310; 30; 20 INJECTION, SOLUTION INTRAVENOUS at 09:01

## 2023-05-20 RX ADMIN — METRONIDAZOLE 500 MG: 500 INJECTION, SOLUTION INTRAVENOUS at 15:35

## 2023-05-20 RX ADMIN — CEFTRIAXONE SODIUM 2000 MG: 10 INJECTION, POWDER, FOR SOLUTION INTRAVENOUS at 15:32

## 2023-05-20 RX ADMIN — POTASSIUM CHLORIDE 20 MEQ: 14.9 INJECTION, SOLUTION INTRAVENOUS at 09:15

## 2023-05-20 RX ADMIN — VANCOMYCIN HYDROCHLORIDE 1250 MG: 10 INJECTION, POWDER, LYOPHILIZED, FOR SOLUTION INTRAVENOUS at 10:00

## 2023-05-20 RX ADMIN — IPRATROPIUM BROMIDE 0.5 MG: 0.5 SOLUTION RESPIRATORY (INHALATION) at 17:09

## 2023-05-20 RX ADMIN — HEPARIN SODIUM 5000 UNITS: 5000 INJECTION INTRAVENOUS; SUBCUTANEOUS at 14:16

## 2023-05-20 RX ADMIN — SODIUM CHLORIDE, POTASSIUM CHLORIDE, SODIUM LACTATE AND CALCIUM CHLORIDE 1000 ML: 600; 310; 30; 20 INJECTION, SOLUTION INTRAVENOUS at 14:11

## 2023-05-20 RX ADMIN — Medication 3 MG: at 21:33

## 2023-05-20 RX ADMIN — SODIUM CHLORIDE: 9 INJECTION, SOLUTION INTRAVENOUS at 15:32

## 2023-05-20 RX ADMIN — SODIUM CHLORIDE, PRESERVATIVE FREE 2 ML: 5 INJECTION INTRAVENOUS at 21:33

## 2023-05-20 RX ADMIN — HEPARIN SODIUM 5000 UNITS: 5000 INJECTION INTRAVENOUS; SUBCUTANEOUS at 21:31

## 2023-05-20 SDOH — SOCIAL STABILITY: SOCIAL NETWORK
HOW OFTEN DO YOU SEE OR TALK TO PEOPLE THAT YOU CARE ABOUT AND FEEL CLOSE TO? (FOR EXAMPLE: TALKING TO FRIENDS ON THE PHONE, VISITING FRIENDS OR FAMILY, GOING TO CHURCH OR CLUB MEETINGS): LESS THAN ONCE A WEEK

## 2023-05-20 SDOH — ECONOMIC STABILITY: HOUSING INSECURITY: ARE YOU WORRIED ABOUT LOSING YOUR HOUSING?: NO

## 2023-05-20 SDOH — ECONOMIC STABILITY: FOOD INSECURITY: HOW OFTEN IN THE PAST 12 MONTHS WERE YOU WORRIED OR STRESSED ABOUT HAVING ENOUGH MONEY TO BUY NUTRITIOUS MEALS?: NEVER

## 2023-05-20 SDOH — HEALTH STABILITY: GENERAL: BECAUSE OF A PHYSICAL, MENTAL, OR EMOTIONAL CONDITION, DO YOU HAVE DIFFICULTY DOING ERRANDS ALONE?: YES

## 2023-05-20 SDOH — HEALTH STABILITY: PHYSICAL HEALTH: DO YOU HAVE SERIOUS DIFFICULTY WALKING OR CLIMBING STAIRS?: YES

## 2023-05-20 SDOH — ECONOMIC STABILITY: GENERAL

## 2023-05-20 SDOH — ECONOMIC STABILITY: HOUSING INSECURITY: WHAT IS YOUR LIVING SITUATION TODAY?: FRIENDS;OTHER FACILITY RESIDENTS

## 2023-05-20 SDOH — HEALTH STABILITY: PHYSICAL HEALTH: DO YOU HAVE DIFFICULTY DRESSING OR BATHING?: NO

## 2023-05-20 SDOH — SOCIAL STABILITY: SOCIAL NETWORK: SUPPORT SYSTEMS: FRIENDS

## 2023-05-20 SDOH — ECONOMIC STABILITY: HOUSING INSECURITY: WHAT IS YOUR LIVING SITUATION TODAY?: ASSISTED LIVING;SENIOR APARTMENT

## 2023-05-20 ASSESSMENT — LIFESTYLE VARIABLES
HOW OFTEN DO YOU HAVE A DRINK CONTAINING ALCOHOL: MONTHLY OR LESS
HOW MANY STANDARD DRINKS CONTAINING ALCOHOL DO YOU HAVE ON A TYPICAL DAY: 0,1 OR 2
HOW OFTEN DO YOU HAVE 6 OR MORE DRINKS ON ONE OCCASION: NEVER
AUDIT-C TOTAL SCORE: 1
ALCOHOL_USE_STATUS: NO OR LOW RISK WITH VALIDATED TOOL

## 2023-05-20 ASSESSMENT — ACTIVITIES OF DAILY LIVING (ADL)
ADL_BEFORE_ADMISSION: NEEDS/REQUIRES ASSISTANCE
FEEDING: INDEPENDENT
TOILETING: INDEPENDENT
DRESSING: INDEPENDENT
BATHING: INDEPENDENT
ADL_SCORE: 12
RECENT_DECLINE_ADL: YES, ACUTE ILLNESS WITHOUT THERAPY NEEDS
ADL_SHORT_OF_BREATH: NO

## 2023-05-20 ASSESSMENT — PATIENT HEALTH QUESTIONNAIRE - PHQ9
SUM OF ALL RESPONSES TO PHQ9 QUESTIONS 1 AND 2: 0
CLINICAL INTERPRETATION OF PHQ2 SCORE: NO FURTHER SCREENING NEEDED
2. FEELING DOWN, DEPRESSED OR HOPELESS: NOT AT ALL
IS PATIENT ABLE TO COMPLETE PHQ2 OR PHQ9: YES
SUM OF ALL RESPONSES TO PHQ9 QUESTIONS 1 AND 2: 0
1. LITTLE INTEREST OR PLEASURE IN DOING THINGS: NOT AT ALL

## 2023-05-20 ASSESSMENT — ENCOUNTER SYMPTOMS
CHOKING: 0
DIARRHEA: 1
HEADACHES: 0
FATIGUE: 0
CONSTIPATION: 0
STRIDOR: 0
CHEST TIGHTNESS: 0
DIZZINESS: 0
NAUSEA: 0
COUGH: 0
BLOOD IN STOOL: 1
SHORTNESS OF BREATH: 0
APPETITE CHANGE: 0
VOMITING: 0
ABDOMINAL PAIN: 0

## 2023-05-20 ASSESSMENT — COGNITIVE AND FUNCTIONAL STATUS - GENERAL
DO YOU HAVE SERIOUS DIFFICULTY WALKING OR CLIMBING STAIRS: YES
BECAUSE OF A PHYSICAL, MENTAL, OR EMOTIONAL CONDITION, DO YOU HAVE DIFFICULTY DOING ERRANDS ALONE: YES
DO YOU HAVE DIFFICULTY DRESSING OR BATHING: YES
BECAUSE OF A PHYSICAL, MENTAL, OR EMOTIONAL CONDITION, DO YOU HAVE SERIOUS DIFFICULTY CONCENTRATING, REMEMBERING OR MAKING DECISIONS: NO

## 2023-05-20 ASSESSMENT — COLUMBIA-SUICIDE SEVERITY RATING SCALE - C-SSRS
IS THE PATIENT ABLE TO COMPLETE C-SSRS: YES
6. HAVE YOU EVER DONE ANYTHING, STARTED TO DO ANYTHING, OR PREPARED TO DO ANYTHING TO END YOUR LIFE?: NO
1. WITHIN THE PAST MONTH, HAVE YOU WISHED YOU WERE DEAD OR WISHED YOU COULD GO TO SLEEP AND NOT WAKE UP?: NO
2. HAVE YOU ACTUALLY HAD ANY THOUGHTS OF KILLING YOURSELF?: NO

## 2023-05-20 ASSESSMENT — PAIN SCALES - GENERAL
PAINLEVEL_OUTOF10: 0

## 2023-05-21 ENCOUNTER — APPOINTMENT (OUTPATIENT)
Dept: GENERAL RADIOLOGY | Age: 88
DRG: 872 | End: 2023-05-21
Attending: STUDENT IN AN ORGANIZED HEALTH CARE EDUCATION/TRAINING PROGRAM

## 2023-05-21 LAB
ABO + RH BLD: NORMAL
ALBUMIN SERPL-MCNC: 2.1 G/DL (ref 3.6–5.1)
ALBUMIN/GLOB SERPL: 1.2 {RATIO} (ref 1–2.4)
ALP SERPL-CCNC: 36 UNITS/L (ref 45–117)
ALT SERPL-CCNC: 18 UNITS/L
ANION GAP SERPL CALC-SCNC: 13 MMOL/L (ref 7–19)
ANION GAP SERPL CALC-SCNC: 13 MMOL/L (ref 7–19)
APTT PPP: 31 SEC (ref 22–30)
AST SERPL-CCNC: 26 UNITS/L
BASOPHILS # BLD: 0 K/MCL (ref 0–0.3)
BASOPHILS NFR BLD: 0 %
BILIRUB SERPL-MCNC: 0.1 MG/DL (ref 0.2–1)
BLD GP AB SCN SERPL QL GEL: NEGATIVE
BLOOD EXPIRATION DATE: NORMAL
BLOOD EXPIRATION DATE: NORMAL
BUN SERPL-MCNC: 64 MG/DL (ref 6–20)
BUN SERPL-MCNC: 69 MG/DL (ref 6–20)
BUN/CREAT SERPL: 62 (ref 7–25)
BUN/CREAT SERPL: 70 (ref 7–25)
CALCIUM SERPL-MCNC: 7.1 MG/DL (ref 8.4–10.2)
CALCIUM SERPL-MCNC: 8.1 MG/DL (ref 8.4–10.2)
CEA SERPL-MCNC: 3 NG/ML (ref 0–5)
CHLORIDE SERPL-SCNC: 120 MMOL/L (ref 97–110)
CHLORIDE SERPL-SCNC: 122 MMOL/L (ref 97–110)
CO2 SERPL-SCNC: 21 MMOL/L (ref 21–32)
CO2 SERPL-SCNC: 23 MMOL/L (ref 21–32)
CREAT SERPL-MCNC: 0.99 MG/DL (ref 0.67–1.17)
CREAT SERPL-MCNC: 1.03 MG/DL (ref 0.67–1.17)
CROSSMATCH RESULT: NORMAL
CROSSMATCH RESULT: NORMAL
DEPRECATED RDW RBC: 48.3 FL (ref 39–50)
DEPRECATED RDW RBC: 49.2 FL (ref 39–50)
DISPENSE STATUS: NORMAL
DISPENSE STATUS: NORMAL
EOSINOPHIL # BLD: 0 K/MCL (ref 0–0.5)
EOSINOPHIL NFR BLD: 0 %
ERYTHROCYTE [DISTWIDTH] IN BLOOD: 13.6 % (ref 11–15)
ERYTHROCYTE [DISTWIDTH] IN BLOOD: 13.7 % (ref 11–15)
FASTING DURATION TIME PATIENT: ABNORMAL H
FASTING DURATION TIME PATIENT: ABNORMAL H
GFR SERPLBLD BASED ON 1.73 SQ M-ARVRAT: 69 ML/MIN
GFR SERPLBLD BASED ON 1.73 SQ M-ARVRAT: 73 ML/MIN
GLOBULIN SER-MCNC: 1.8 G/DL (ref 2–4)
GLUCOSE BLDC GLUCOMTR-MCNC: 107 MG/DL (ref 70–99)
GLUCOSE BLDC GLUCOMTR-MCNC: 107 MG/DL (ref 70–99)
GLUCOSE BLDC GLUCOMTR-MCNC: 115 MG/DL (ref 70–99)
GLUCOSE BLDC GLUCOMTR-MCNC: 131 MG/DL (ref 70–99)
GLUCOSE SERPL-MCNC: 122 MG/DL (ref 70–99)
GLUCOSE SERPL-MCNC: 128 MG/DL (ref 70–99)
HCT VFR BLD CALC: 22.4 % (ref 39–51)
HCT VFR BLD CALC: 24.4 % (ref 39–51)
HCT VFR BLD CALC: 26.1 % (ref 39–51)
HCT VFR BLD CALC: 28.3 % (ref 39–51)
HGB BLD-MCNC: 7.1 G/DL (ref 13–17)
HGB BLD-MCNC: 7.8 G/DL (ref 13–17)
HGB BLD-MCNC: 8.4 G/DL (ref 13–17)
HGB BLD-MCNC: 9.1 G/DL (ref 13–17)
IMM GRANULOCYTES # BLD AUTO: 0.1 K/MCL (ref 0–0.2)
IMM GRANULOCYTES # BLD: 1 %
INR PPP: 1.1
ISBT BLOOD TYPE: 5100
ISBT BLOOD TYPE: 5100
ISSUE DATE/TIME: NORMAL
ISSUE DATE/TIME: NORMAL
LYMPHOCYTES # BLD: 0.5 K/MCL (ref 1–4)
LYMPHOCYTES NFR BLD: 3 %
MAGNESIUM SERPL-MCNC: 2 MG/DL (ref 1.7–2.4)
MCH RBC QN AUTO: 31.4 PG (ref 26–34)
MCH RBC QN AUTO: 31.6 PG (ref 26–34)
MCHC RBC AUTO-ENTMCNC: 32.2 G/DL (ref 32–36.5)
MCHC RBC AUTO-ENTMCNC: 32.2 G/DL (ref 32–36.5)
MCV RBC AUTO: 97.6 FL (ref 78–100)
MCV RBC AUTO: 98.1 FL (ref 78–100)
MONOCYTES # BLD: 1.3 K/MCL (ref 0.3–0.9)
MONOCYTES NFR BLD: 8 %
NEUTROPHILS # BLD: 14.6 K/MCL (ref 1.8–7.7)
NEUTROPHILS NFR BLD: 88 %
NRBC BLD MANUAL-RTO: 0 /100 WBC
NRBC BLD MANUAL-RTO: 0 /100 WBC
PLATELET # BLD AUTO: 195 K/MCL (ref 140–450)
PLATELET # BLD AUTO: 216 K/MCL (ref 140–450)
POTASSIUM SERPL-SCNC: 3 MMOL/L (ref 3.4–5.1)
POTASSIUM SERPL-SCNC: 4.7 MMOL/L (ref 3.4–5.1)
PRODUCT CODE: NORMAL
PRODUCT CODE: NORMAL
PRODUCT DESCRIPTION: NORMAL
PRODUCT DESCRIPTION: NORMAL
PRODUCT ID: NORMAL
PRODUCT ID: NORMAL
PROT SERPL-MCNC: 3.9 G/DL (ref 6.4–8.2)
PROTHROMBIN TIME: 12.1 SEC (ref 9.7–11.8)
RBC # BLD: 2.66 MIL/MCL (ref 4.5–5.9)
RBC # BLD: 2.9 MIL/MCL (ref 4.5–5.9)
SERVICE CMNT-IMP: 185 MG/DL (ref 190–425)
SODIUM SERPL-SCNC: 149 MMOL/L (ref 135–145)
SODIUM SERPL-SCNC: 155 MMOL/L (ref 135–145)
TROPONIN I SERPL DL<=0.01 NG/ML-MCNC: 111 NG/L
TROPONIN I SERPL DL<=0.01 NG/ML-MCNC: 132 NG/L
TYPE AND SCREEN EXPIRATION DATE: NORMAL
UNIT BLOOD TYPE: NORMAL
UNIT BLOOD TYPE: NORMAL
UNIT NUMBER: NORMAL
UNIT NUMBER: NORMAL
WBC # BLD: 16.6 K/MCL (ref 4.2–11)
WBC # BLD: 17.6 K/MCL (ref 4.2–11)

## 2023-05-21 PROCEDURE — 85384 FIBRINOGEN ACTIVITY: CPT

## 2023-05-21 PROCEDURE — 10002800 HB RX 250 W HCPCS: Performed by: NURSE PRACTITIONER

## 2023-05-21 PROCEDURE — 10006031 HB ROOM CHARGE TELEMETRY

## 2023-05-21 PROCEDURE — 99223 1ST HOSP IP/OBS HIGH 75: CPT | Performed by: SURGERY

## 2023-05-21 PROCEDURE — 99233 SBSQ HOSP IP/OBS HIGH 50: CPT | Performed by: INTERNAL MEDICINE

## 2023-05-21 PROCEDURE — 10004180 HB COUNTER-TRANSPORT

## 2023-05-21 PROCEDURE — P9016 RBC LEUKOCYTES REDUCED: HCPCS

## 2023-05-21 PROCEDURE — 93005 ELECTROCARDIOGRAM TRACING: CPT

## 2023-05-21 PROCEDURE — 94640 AIRWAY INHALATION TREATMENT: CPT

## 2023-05-21 PROCEDURE — 85014 HEMATOCRIT: CPT

## 2023-05-21 PROCEDURE — 36415 COLL VENOUS BLD VENIPUNCTURE: CPT | Performed by: NURSE PRACTITIONER

## 2023-05-21 PROCEDURE — 84481 FREE ASSAY (FT-3): CPT

## 2023-05-21 PROCEDURE — 84443 ASSAY THYROID STIM HORMONE: CPT

## 2023-05-21 PROCEDURE — 96372 THER/PROPH/DIAG INJ SC/IM: CPT | Performed by: NURSE PRACTITIONER

## 2023-05-21 PROCEDURE — 10002803 HB RX 637

## 2023-05-21 PROCEDURE — 10002800 HB RX 250 W HCPCS

## 2023-05-21 PROCEDURE — 86923 COMPATIBILITY TEST ELECTRIC: CPT

## 2023-05-21 PROCEDURE — 85027 COMPLETE CBC AUTOMATED: CPT | Performed by: NURSE PRACTITIONER

## 2023-05-21 PROCEDURE — 82378 CARCINOEMBRYONIC ANTIGEN: CPT

## 2023-05-21 PROCEDURE — 74018 RADEX ABDOMEN 1 VIEW: CPT

## 2023-05-21 PROCEDURE — 84484 ASSAY OF TROPONIN QUANT: CPT

## 2023-05-21 PROCEDURE — C9113 INJ PANTOPRAZOLE SODIUM, VIA: HCPCS

## 2023-05-21 PROCEDURE — 83735 ASSAY OF MAGNESIUM: CPT | Performed by: NURSE PRACTITIONER

## 2023-05-21 PROCEDURE — 10002807 HB RX 258: Performed by: NURSE PRACTITIONER

## 2023-05-21 PROCEDURE — 10002801 HB RX 250 W/O HCPCS

## 2023-05-21 PROCEDURE — 80048 BASIC METABOLIC PNL TOTAL CA: CPT | Performed by: NURSE PRACTITIONER

## 2023-05-21 PROCEDURE — 10002803 HB RX 637: Performed by: NURSE PRACTITIONER

## 2023-05-21 PROCEDURE — 85610 PROTHROMBIN TIME: CPT

## 2023-05-21 PROCEDURE — 10004651 HB RX, NO CHARGE ITEM: Performed by: NURSE PRACTITIONER

## 2023-05-21 PROCEDURE — 99221 1ST HOSP IP/OBS SF/LOW 40: CPT | Performed by: INTERNAL MEDICINE

## 2023-05-21 PROCEDURE — 85730 THROMBOPLASTIN TIME PARTIAL: CPT

## 2023-05-21 PROCEDURE — 80053 COMPREHEN METABOLIC PANEL: CPT

## 2023-05-21 PROCEDURE — 84439 ASSAY OF FREE THYROXINE: CPT

## 2023-05-21 PROCEDURE — 10002801 HB RX 250 W/O HCPCS: Performed by: INTERNAL MEDICINE

## 2023-05-21 PROCEDURE — 10002807 HB RX 258

## 2023-05-21 PROCEDURE — 93010 ELECTROCARDIOGRAM REPORT: CPT | Performed by: INTERNAL MEDICINE

## 2023-05-21 PROCEDURE — 10002800 HB RX 250 W HCPCS: Performed by: INTERNAL MEDICINE

## 2023-05-21 PROCEDURE — 85025 COMPLETE CBC W/AUTO DIFF WBC: CPT

## 2023-05-21 PROCEDURE — 86901 BLOOD TYPING SEROLOGIC RH(D): CPT

## 2023-05-21 RX ORDER — SODIUM CHLORIDE 9 MG/ML
INJECTION, SOLUTION INTRAVENOUS CONTINUOUS PRN
Status: DISCONTINUED | OUTPATIENT
Start: 2023-05-21 | End: 2023-05-24 | Stop reason: HOSPADM

## 2023-05-21 RX ORDER — SODIUM CHLORIDE 450 MG/100ML
INJECTION, SOLUTION INTRAVENOUS CONTINUOUS
Status: DISCONTINUED | OUTPATIENT
Start: 2023-05-21 | End: 2023-05-23

## 2023-05-21 RX ORDER — METOPROLOL TARTRATE 1 MG/ML
INJECTION, SOLUTION INTRAVENOUS
Status: COMPLETED
Start: 2023-05-21 | End: 2023-05-21

## 2023-05-21 RX ORDER — POTASSIUM CHLORIDE 14.9 MG/ML
20 INJECTION INTRAVENOUS ONCE
Status: COMPLETED | OUTPATIENT
Start: 2023-05-21 | End: 2023-05-21

## 2023-05-21 RX ORDER — METOPROLOL SUCCINATE 25 MG/1
25 TABLET, EXTENDED RELEASE ORAL DAILY
Status: DISCONTINUED | OUTPATIENT
Start: 2023-05-21 | End: 2023-05-21

## 2023-05-21 RX ORDER — METOPROLOL TARTRATE 1 MG/ML
2.5 INJECTION, SOLUTION INTRAVENOUS ONCE
Status: COMPLETED | OUTPATIENT
Start: 2023-05-21 | End: 2023-05-21

## 2023-05-21 RX ORDER — PANTOPRAZOLE SODIUM 40 MG/10ML
40 INJECTION, POWDER, LYOPHILIZED, FOR SOLUTION INTRAVENOUS EVERY 12 HOURS SCHEDULED
Status: DISCONTINUED | OUTPATIENT
Start: 2023-05-21 | End: 2023-05-24 | Stop reason: HOSPADM

## 2023-05-21 RX ADMIN — SODIUM CHLORIDE, PRESERVATIVE FREE 2 ML: 5 INJECTION INTRAVENOUS at 20:22

## 2023-05-21 RX ADMIN — IPRATROPIUM BROMIDE 0.5 MG: 0.5 SOLUTION RESPIRATORY (INHALATION) at 12:25

## 2023-05-21 RX ADMIN — HEPARIN SODIUM 5000 UNITS: 5000 INJECTION INTRAVENOUS; SUBCUTANEOUS at 05:14

## 2023-05-21 RX ADMIN — METOPROLOL TARTRATE 2.5 MG: 1 INJECTION, SOLUTION INTRAVENOUS at 16:11

## 2023-05-21 RX ADMIN — FLUTICASONE FUROATE, UMECLIDINIUM BROMIDE AND VILANTEROL TRIFENATATE 1 PUFF: 200; 62.5; 25 POWDER RESPIRATORY (INHALATION) at 12:30

## 2023-05-21 RX ADMIN — SODIUM CHLORIDE 500 ML: 9 INJECTION, SOLUTION INTRAVENOUS at 11:20

## 2023-05-21 RX ADMIN — VANCOMYCIN HYDROCHLORIDE 125 MG: KIT at 21:34

## 2023-05-21 RX ADMIN — SODIUM CHLORIDE, PRESERVATIVE FREE 2 ML: 5 INJECTION INTRAVENOUS at 09:12

## 2023-05-21 RX ADMIN — VANCOMYCIN HYDROCHLORIDE 125 MG: KIT at 18:13

## 2023-05-21 RX ADMIN — POTASSIUM CHLORIDE 20 MEQ: 14.9 INJECTION, SOLUTION INTRAVENOUS at 14:53

## 2023-05-21 RX ADMIN — SODIUM CHLORIDE: 4.5 INJECTION, SOLUTION INTRAVENOUS at 20:22

## 2023-05-21 RX ADMIN — VANCOMYCIN HYDROCHLORIDE 125 MG: KIT at 09:12

## 2023-05-21 RX ADMIN — IPRATROPIUM BROMIDE 0.5 MG: 0.5 SOLUTION RESPIRATORY (INHALATION) at 16:20

## 2023-05-21 RX ADMIN — VANCOMYCIN HYDROCHLORIDE 125 MG: KIT at 12:01

## 2023-05-21 RX ADMIN — IPRATROPIUM BROMIDE 0.5 MG: 0.5 SOLUTION RESPIRATORY (INHALATION) at 20:03

## 2023-05-21 RX ADMIN — SODIUM CHLORIDE: 9 INJECTION, SOLUTION INTRAVENOUS at 02:02

## 2023-05-21 RX ADMIN — FLUTICASONE PROPIONATE 2 SPRAY: 50 SPRAY, METERED NASAL at 09:13

## 2023-05-21 RX ADMIN — POTASSIUM CHLORIDE 20 MEQ: 14.9 INJECTION, SOLUTION INTRAVENOUS at 12:08

## 2023-05-21 RX ADMIN — IPRATROPIUM BROMIDE 0.5 MG: 0.5 SOLUTION RESPIRATORY (INHALATION) at 07:40

## 2023-05-21 RX ADMIN — PANTOPRAZOLE SODIUM 40 MG: 40 INJECTION, POWDER, FOR SOLUTION INTRAVENOUS at 20:38

## 2023-05-21 RX ADMIN — PANTOPRAZOLE SODIUM 40 MG: 40 INJECTION, POWDER, FOR SOLUTION INTRAVENOUS at 14:53

## 2023-05-21 RX ADMIN — Medication 3 MG: at 20:39

## 2023-05-21 RX ADMIN — SODIUM CHLORIDE: 4.5 INJECTION, SOLUTION INTRAVENOUS at 09:14

## 2023-05-21 RX ADMIN — SODIUM CHLORIDE 500 ML: 9 INJECTION, SOLUTION INTRAVENOUS at 15:00

## 2023-05-21 ASSESSMENT — PAIN SCALES - GENERAL
PAINLEVEL_OUTOF10: 0

## 2023-05-21 ASSESSMENT — ENCOUNTER SYMPTOMS
NEUROLOGICAL NEGATIVE: 1
PSYCHIATRIC NEGATIVE: 1
ALLERGIC/IMMUNOLOGIC NEGATIVE: 1
HEMATOLOGIC/LYMPHATIC NEGATIVE: 1
CONSTITUTIONAL NEGATIVE: 1
EYES NEGATIVE: 1
GASTROINTESTINAL NEGATIVE: 1
ENDOCRINE NEGATIVE: 1
RESPIRATORY NEGATIVE: 1

## 2023-05-22 ENCOUNTER — APPOINTMENT (OUTPATIENT)
Dept: CARDIOLOGY | Age: 88
DRG: 872 | End: 2023-05-22
Attending: EMERGENCY MEDICINE

## 2023-05-22 LAB
ALBUMIN SERPL-MCNC: 2.4 G/DL (ref 3.6–5.1)
ALBUMIN/GLOB SERPL: 1.1 {RATIO} (ref 1–2.4)
ALP SERPL-CCNC: 43 UNITS/L (ref 45–117)
ALT SERPL-CCNC: 25 UNITS/L
ANION GAP SERPL CALC-SCNC: 12 MMOL/L (ref 7–19)
ANION GAP SERPL CALC-SCNC: 8 MMOL/L (ref 7–19)
ANION GAP SERPL CALC-SCNC: 9 MMOL/L (ref 7–19)
AORTIC VALVE AREA (AVA): 1.15
ASCENDING AORTA (AAD): 3
AST SERPL-CCNC: 36 UNITS/L
ATRIAL RATE (BPM): 47
ATRIAL RATE (BPM): 70
ATRIAL RATE (BPM): 94
AV PEAK GRADIENT (AVPG): 10
AV PEAK VELOCITY (AVPV): 1.61
AV STENOSIS SEVERITY TEXT: NORMAL
AVI LVOT PEAK GRADIENT (LVOTMG): 0.7
BASOPHILS # BLD: 0.1 K/MCL (ref 0–0.3)
BASOPHILS NFR BLD: 0 %
BILIRUB SERPL-MCNC: 0.6 MG/DL (ref 0.2–1)
BUN SERPL-MCNC: 35 MG/DL (ref 6–20)
BUN SERPL-MCNC: 50 MG/DL (ref 6–20)
BUN SERPL-MCNC: 60 MG/DL (ref 6–20)
BUN/CREAT SERPL: 43 (ref 7–25)
BUN/CREAT SERPL: 49 (ref 7–25)
BUN/CREAT SERPL: 68 (ref 7–25)
CALCIUM SERPL-MCNC: 7.3 MG/DL (ref 8.4–10.2)
CALCIUM SERPL-MCNC: 8.1 MG/DL (ref 8.4–10.2)
CALCIUM SERPL-MCNC: 8.1 MG/DL (ref 8.4–10.2)
CHLORIDE SERPL-SCNC: 118 MMOL/L (ref 97–110)
CHLORIDE SERPL-SCNC: 119 MMOL/L (ref 97–110)
CHLORIDE SERPL-SCNC: 122 MMOL/L (ref 97–110)
CO2 SERPL-SCNC: 20 MMOL/L (ref 21–32)
CO2 SERPL-SCNC: 24 MMOL/L (ref 21–32)
CO2 SERPL-SCNC: 25 MMOL/L (ref 21–32)
CREAT SERPL-MCNC: 0.82 MG/DL (ref 0.67–1.17)
CREAT SERPL-MCNC: 0.88 MG/DL (ref 0.67–1.17)
CREAT SERPL-MCNC: 1.02 MG/DL (ref 0.67–1.17)
DEPRECATED RDW RBC: 56.7 FL (ref 39–50)
E WAVE DECELARATION TIME (MDT): 20.72
EOSINOPHIL # BLD: 0 K/MCL (ref 0–0.5)
EOSINOPHIL NFR BLD: 0 %
ERYTHROCYTE [DISTWIDTH] IN BLOOD: 16.9 % (ref 11–15)
FASTING DURATION TIME PATIENT: ABNORMAL H
GFR SERPLBLD BASED ON 1.73 SQ M-ARVRAT: 70 ML/MIN
GFR SERPLBLD BASED ON 1.73 SQ M-ARVRAT: 82 ML/MIN
GFR SERPLBLD BASED ON 1.73 SQ M-ARVRAT: 84 ML/MIN
GLOBULIN SER-MCNC: 2.2 G/DL (ref 2–4)
GLUCOSE BLDC GLUCOMTR-MCNC: 80 MG/DL (ref 70–99)
GLUCOSE BLDC GLUCOMTR-MCNC: 83 MG/DL (ref 70–99)
GLUCOSE BLDC GLUCOMTR-MCNC: 89 MG/DL (ref 70–99)
GLUCOSE BLDC GLUCOMTR-MCNC: 90 MG/DL (ref 70–99)
GLUCOSE SERPL-MCNC: 102 MG/DL (ref 70–99)
GLUCOSE SERPL-MCNC: 86 MG/DL (ref 70–99)
GLUCOSE SERPL-MCNC: 90 MG/DL (ref 70–99)
HCT VFR BLD CALC: 28.3 % (ref 39–51)
HCT VFR BLD CALC: 30.1 % (ref 39–51)
HCT VFR BLD CALC: 30.6 % (ref 39–51)
HGB BLD-MCNC: 10 G/DL (ref 13–17)
HGB BLD-MCNC: 10.3 G/DL (ref 13–17)
HGB BLD-MCNC: 9.3 G/DL (ref 13–17)
IMM GRANULOCYTES # BLD AUTO: 0.1 K/MCL (ref 0–0.2)
IMM GRANULOCYTES # BLD: 1 %
INTERVENTRICULAR SEPTUM IN END DIASTOLE (IVSD): 2.3
LEFT INTERNAL DIMENSION IN SYSTOLE (LVSD): 0.8
LEFT VENTRICULAR INTERNAL DIMENSION IN DIASTOLE (LVDD): 2.9
LEFT VENTRICULAR POSTERIOR WALL IN END DIASTOLE (LVPW): 4.4
LV EF: NORMAL %
LVOT 2D (LVOTD): 15.9
LVOT VTI (LVOTVTI): 0.86
LYMPHOCYTES # BLD: 0.8 K/MCL (ref 1–4)
LYMPHOCYTES NFR BLD: 5 %
MCH RBC QN AUTO: 30.5 PG (ref 26–34)
MCHC RBC AUTO-ENTMCNC: 33.2 G/DL (ref 32–36.5)
MCV RBC AUTO: 91.8 FL (ref 78–100)
MONOCYTES # BLD: 1.7 K/MCL (ref 0.3–0.9)
MONOCYTES NFR BLD: 10 %
MV E TISSUE VEL LAT (MELV): 1.1
MV E WAVE VEL/E TISSUE VEL MED(MSR): 5.55
MV PEAK A VELOCITY (MVPAV): 169
NEUTROPHILS # BLD: 14.6 K/MCL (ref 1.8–7.7)
NEUTROPHILS NFR BLD: 84 %
NRBC BLD MANUAL-RTO: 0 /100 WBC
P AXIS (DEGREES): 81
PLATELET # BLD AUTO: 174 K/MCL (ref 140–450)
POTASSIUM SERPL-SCNC: 2.7 MMOL/L (ref 3.4–5.1)
POTASSIUM SERPL-SCNC: 3.3 MMOL/L (ref 3.4–5.1)
PR-INTERVAL (MSEC): 240
PROT SERPL-MCNC: 4.6 G/DL (ref 6.4–8.2)
QRS-INTERVAL (MSEC): 102
QRS-INTERVAL (MSEC): 108
QRS-INTERVAL (MSEC): 120
QT-INTERVAL (MSEC): 332
QT-INTERVAL (MSEC): 348
QT-INTERVAL (MSEC): 386
QTC: 461
QTC: 483
QTC: 502
R AXIS (DEGREES): -34
R AXIS (DEGREES): -35
R AXIS (DEGREES): 40
RAINBOW EXTRA TUBES HOLD SPECIMEN: NORMAL
RBC # BLD: 3.28 MIL/MCL (ref 4.5–5.9)
REPORT TEXT: NORMAL
RV END SYSTOLIC LONGITUDINAL STRAIN FREE WALL (RVGS): 1.8
SODIUM SERPL-SCNC: 147 MMOL/L (ref 135–145)
SODIUM SERPL-SCNC: 149 MMOL/L (ref 135–145)
SODIUM SERPL-SCNC: 152 MMOL/L (ref 135–145)
T AXIS (DEGREES): 68
T AXIS (DEGREES): 69
T AXIS (DEGREES): 83
T3FREE SERPL-MCNC: 1.3 PG/ML (ref 2.2–4)
T4 FREE SERPL-MCNC: 0.8 NG/DL (ref 0.8–1.5)
TRICUSPID VALVE PEAK REGURGITATION VELOCITY (TRPV): 3.7
TROPONIN I SERPL DL<=0.01 NG/ML-MCNC: 201 NG/L
TROPONIN I SERPL DL<=0.01 NG/ML-MCNC: 215 NG/L
TROPONIN I SERPL DL<=0.01 NG/ML-MCNC: 218 NG/L
TROPONIN I SERPL DL<=0.01 NG/ML-MCNC: 239 NG/L
TSH SERPL-ACNC: 0.19 MCUNITS/ML (ref 0.35–5)
TV ESTIMATED RIGHT ARTERIAL PRESSURE (RAP): 5.98
VENTRICULAR RATE EKG/MIN (BPM): 116
VENTRICULAR RATE EKG/MIN (BPM): 125
VENTRICULAR RATE EKG/MIN (BPM): 94
WBC # BLD: 17.2 K/MCL (ref 4.2–11)

## 2023-05-22 PROCEDURE — C9113 INJ PANTOPRAZOLE SODIUM, VIA: HCPCS

## 2023-05-22 PROCEDURE — 99233 SBSQ HOSP IP/OBS HIGH 50: CPT | Performed by: SURGERY

## 2023-05-22 PROCEDURE — 10002803 HB RX 637

## 2023-05-22 PROCEDURE — 94640 AIRWAY INHALATION TREATMENT: CPT

## 2023-05-22 PROCEDURE — 10002801 HB RX 250 W/O HCPCS: Performed by: INTERNAL MEDICINE

## 2023-05-22 PROCEDURE — 99233 SBSQ HOSP IP/OBS HIGH 50: CPT | Performed by: INTERNAL MEDICINE

## 2023-05-22 PROCEDURE — 36415 COLL VENOUS BLD VENIPUNCTURE: CPT | Performed by: STUDENT IN AN ORGANIZED HEALTH CARE EDUCATION/TRAINING PROGRAM

## 2023-05-22 PROCEDURE — 84132 ASSAY OF SERUM POTASSIUM: CPT | Performed by: INTERNAL MEDICINE

## 2023-05-22 PROCEDURE — 93306 TTE W/DOPPLER COMPLETE: CPT | Performed by: INTERNAL MEDICINE

## 2023-05-22 PROCEDURE — 13003289 HB OXYGEN THERAPY DAILY

## 2023-05-22 PROCEDURE — 10004651 HB RX, NO CHARGE ITEM: Performed by: NURSE PRACTITIONER

## 2023-05-22 PROCEDURE — 80048 BASIC METABOLIC PNL TOTAL CA: CPT | Performed by: INTERNAL MEDICINE

## 2023-05-22 PROCEDURE — 10002801 HB RX 250 W/O HCPCS

## 2023-05-22 PROCEDURE — 10002800 HB RX 250 W HCPCS: Performed by: INTERNAL MEDICINE

## 2023-05-22 PROCEDURE — 10006031 HB ROOM CHARGE TELEMETRY

## 2023-05-22 PROCEDURE — 10002800 HB RX 250 W HCPCS

## 2023-05-22 PROCEDURE — 85014 HEMATOCRIT: CPT | Performed by: STUDENT IN AN ORGANIZED HEALTH CARE EDUCATION/TRAINING PROGRAM

## 2023-05-22 PROCEDURE — 84484 ASSAY OF TROPONIN QUANT: CPT | Performed by: STUDENT IN AN ORGANIZED HEALTH CARE EDUCATION/TRAINING PROGRAM

## 2023-05-22 PROCEDURE — 93306 TTE W/DOPPLER COMPLETE: CPT

## 2023-05-22 PROCEDURE — 85025 COMPLETE CBC W/AUTO DIFF WBC: CPT | Performed by: STUDENT IN AN ORGANIZED HEALTH CARE EDUCATION/TRAINING PROGRAM

## 2023-05-22 PROCEDURE — 99223 1ST HOSP IP/OBS HIGH 75: CPT

## 2023-05-22 PROCEDURE — 10002800 HB RX 250 W HCPCS: Performed by: STUDENT IN AN ORGANIZED HEALTH CARE EDUCATION/TRAINING PROGRAM

## 2023-05-22 PROCEDURE — 80048 BASIC METABOLIC PNL TOTAL CA: CPT | Performed by: STUDENT IN AN ORGANIZED HEALTH CARE EDUCATION/TRAINING PROGRAM

## 2023-05-22 PROCEDURE — 85018 HEMOGLOBIN: CPT | Performed by: STUDENT IN AN ORGANIZED HEALTH CARE EDUCATION/TRAINING PROGRAM

## 2023-05-22 PROCEDURE — 80053 COMPREHEN METABOLIC PANEL: CPT | Performed by: STUDENT IN AN ORGANIZED HEALTH CARE EDUCATION/TRAINING PROGRAM

## 2023-05-22 RX ORDER — POTASSIUM CHLORIDE 14.9 MG/ML
20 INJECTION INTRAVENOUS ONCE
Status: COMPLETED | OUTPATIENT
Start: 2023-05-22 | End: 2023-05-22

## 2023-05-22 RX ORDER — POTASSIUM CHLORIDE 14.9 MG/ML
20 INJECTION INTRAVENOUS ONCE
Status: COMPLETED | OUTPATIENT
Start: 2023-05-22 | End: 2023-05-23

## 2023-05-22 RX ADMIN — POTASSIUM CHLORIDE 20 MEQ: 14.9 INJECTION, SOLUTION INTRAVENOUS at 15:14

## 2023-05-22 RX ADMIN — IPRATROPIUM BROMIDE 0.5 MG: 0.5 SOLUTION RESPIRATORY (INHALATION) at 07:31

## 2023-05-22 RX ADMIN — VANCOMYCIN HYDROCHLORIDE 125 MG: KIT at 17:23

## 2023-05-22 RX ADMIN — FLUTICASONE FUROATE, UMECLIDINIUM BROMIDE AND VILANTEROL TRIFENATATE 1 PUFF: 200; 62.5; 25 POWDER RESPIRATORY (INHALATION) at 07:33

## 2023-05-22 RX ADMIN — IPRATROPIUM BROMIDE 0.5 MG: 0.5 SOLUTION RESPIRATORY (INHALATION) at 11:29

## 2023-05-22 RX ADMIN — POTASSIUM CHLORIDE 20 MEQ: 14.9 INJECTION, SOLUTION INTRAVENOUS at 12:24

## 2023-05-22 RX ADMIN — IPRATROPIUM BROMIDE 0.5 MG: 0.5 SOLUTION RESPIRATORY (INHALATION) at 19:59

## 2023-05-22 RX ADMIN — POTASSIUM CHLORIDE 20 MEQ: 14.9 INJECTION, SOLUTION INTRAVENOUS at 02:49

## 2023-05-22 RX ADMIN — PANTOPRAZOLE SODIUM 40 MG: 40 INJECTION, POWDER, FOR SOLUTION INTRAVENOUS at 20:17

## 2023-05-22 RX ADMIN — SODIUM CHLORIDE, PRESERVATIVE FREE 2 ML: 5 INJECTION INTRAVENOUS at 09:43

## 2023-05-22 RX ADMIN — POTASSIUM CHLORIDE 20 MEQ: 14.9 INJECTION, SOLUTION INTRAVENOUS at 04:52

## 2023-05-22 RX ADMIN — POTASSIUM CHLORIDE 20 MEQ: 14.9 INJECTION, SOLUTION INTRAVENOUS at 23:24

## 2023-05-22 RX ADMIN — FLUTICASONE PROPIONATE 2 SPRAY: 50 SPRAY, METERED NASAL at 09:43

## 2023-05-22 RX ADMIN — VANCOMYCIN HYDROCHLORIDE 125 MG: KIT at 09:47

## 2023-05-22 RX ADMIN — SODIUM CHLORIDE: 4.5 INJECTION, SOLUTION INTRAVENOUS at 06:23

## 2023-05-22 RX ADMIN — VANCOMYCIN HYDROCHLORIDE 125 MG: KIT at 20:17

## 2023-05-22 RX ADMIN — POTASSIUM CHLORIDE 20 MEQ: 14.9 INJECTION, SOLUTION INTRAVENOUS at 06:56

## 2023-05-22 RX ADMIN — PANTOPRAZOLE SODIUM 40 MG: 40 INJECTION, POWDER, FOR SOLUTION INTRAVENOUS at 09:41

## 2023-05-22 RX ADMIN — SODIUM CHLORIDE, PRESERVATIVE FREE 2 ML: 5 INJECTION INTRAVENOUS at 20:17

## 2023-05-22 RX ADMIN — POTASSIUM CHLORIDE 20 MEQ: 14.9 INJECTION, SOLUTION INTRAVENOUS at 20:24

## 2023-05-22 RX ADMIN — VANCOMYCIN HYDROCHLORIDE 125 MG: KIT at 12:24

## 2023-05-22 RX ADMIN — IPRATROPIUM BROMIDE 0.5 MG: 0.5 SOLUTION RESPIRATORY (INHALATION) at 15:28

## 2023-05-22 ASSESSMENT — PAIN SCALES - GENERAL
PAINLEVEL_OUTOF10: 0

## 2023-05-23 LAB
ANION GAP SERPL CALC-SCNC: 10 MMOL/L (ref 7–19)
ANION GAP SERPL CALC-SCNC: 13 MMOL/L (ref 7–19)
ATRIAL RATE (BPM): 104
BASOPHILS # BLD: 0.1 K/MCL (ref 0–0.3)
BASOPHILS NFR BLD: 0 %
BUN SERPL-MCNC: 30 MG/DL (ref 6–20)
BUN SERPL-MCNC: 30 MG/DL (ref 6–20)
BUN/CREAT SERPL: 31 (ref 7–25)
BUN/CREAT SERPL: 32 (ref 7–25)
CALCIUM SERPL-MCNC: 8.1 MG/DL (ref 8.4–10.2)
CALCIUM SERPL-MCNC: 8.3 MG/DL (ref 8.4–10.2)
CHLORIDE SERPL-SCNC: 116 MMOL/L (ref 97–110)
CHLORIDE SERPL-SCNC: 118 MMOL/L (ref 97–110)
CO2 SERPL-SCNC: 22 MMOL/L (ref 21–32)
CO2 SERPL-SCNC: 25 MMOL/L (ref 21–32)
CREAT SERPL-MCNC: 0.94 MG/DL (ref 0.67–1.17)
CREAT SERPL-MCNC: 0.96 MG/DL (ref 0.67–1.17)
DEPRECATED RDW RBC: 56.6 FL (ref 39–50)
EOSINOPHIL # BLD: 0.2 K/MCL (ref 0–0.5)
EOSINOPHIL NFR BLD: 2 %
ERYTHROCYTE [DISTWIDTH] IN BLOOD: 16.4 % (ref 11–15)
FASTING DURATION TIME PATIENT: ABNORMAL H
FASTING DURATION TIME PATIENT: ABNORMAL H
GFR SERPLBLD BASED ON 1.73 SQ M-ARVRAT: 76 ML/MIN
GFR SERPLBLD BASED ON 1.73 SQ M-ARVRAT: 77 ML/MIN
GLUCOSE BLDC GLUCOMTR-MCNC: 129 MG/DL (ref 70–99)
GLUCOSE BLDC GLUCOMTR-MCNC: 136 MG/DL (ref 70–99)
GLUCOSE BLDC GLUCOMTR-MCNC: 85 MG/DL (ref 70–99)
GLUCOSE SERPL-MCNC: 166 MG/DL (ref 70–99)
GLUCOSE SERPL-MCNC: 86 MG/DL (ref 70–99)
HCT VFR BLD CALC: 27.6 % (ref 39–51)
HCT VFR BLD CALC: 29.1 % (ref 39–51)
HCT VFR BLD CALC: 29.7 % (ref 39–51)
HGB BLD-MCNC: 9.5 G/DL (ref 13–17)
HGB BLD-MCNC: 9.7 G/DL (ref 13–17)
HGB BLD-MCNC: 9.8 G/DL (ref 13–17)
IMM GRANULOCYTES # BLD AUTO: 0.1 K/MCL (ref 0–0.2)
IMM GRANULOCYTES # BLD: 1 %
LYMPHOCYTES # BLD: 0.8 K/MCL (ref 1–4)
LYMPHOCYTES NFR BLD: 7 %
MAGNESIUM SERPL-MCNC: 1.7 MG/DL (ref 1.7–2.4)
MCH RBC QN AUTO: 30.9 PG (ref 26–34)
MCHC RBC AUTO-ENTMCNC: 33 G/DL (ref 32–36.5)
MCV RBC AUTO: 93.7 FL (ref 78–100)
MONOCYTES # BLD: 1 K/MCL (ref 0.3–0.9)
MONOCYTES NFR BLD: 8 %
NEUTROPHILS # BLD: 9.8 K/MCL (ref 1.8–7.7)
NEUTROPHILS NFR BLD: 82 %
NRBC BLD MANUAL-RTO: 0 /100 WBC
PLATELET # BLD AUTO: 181 K/MCL (ref 140–450)
POTASSIUM SERPL-SCNC: 3.3 MMOL/L (ref 3.4–5.1)
POTASSIUM SERPL-SCNC: 3.6 MMOL/L (ref 3.4–5.1)
POTASSIUM SERPL-SCNC: 3.7 MMOL/L (ref 3.4–5.1)
POTASSIUM SERPL-SCNC: 3.8 MMOL/L (ref 3.4–5.1)
QRS-INTERVAL (MSEC): 110
QT-INTERVAL (MSEC): 352
QTC: 493
R AXIS (DEGREES): 21
RAINBOW EXTRA TUBES HOLD SPECIMEN: NORMAL
RBC # BLD: 3.17 MIL/MCL (ref 4.5–5.9)
REPORT TEXT: NORMAL
SODIUM SERPL-SCNC: 147 MMOL/L (ref 135–145)
SODIUM SERPL-SCNC: 150 MMOL/L (ref 135–145)
T AXIS (DEGREES): 87
VENTRICULAR RATE EKG/MIN (BPM): 118
WBC # BLD: 12 K/MCL (ref 4.2–11)

## 2023-05-23 PROCEDURE — 10002801 HB RX 250 W/O HCPCS

## 2023-05-23 PROCEDURE — 97530 THERAPEUTIC ACTIVITIES: CPT

## 2023-05-23 PROCEDURE — 94640 AIRWAY INHALATION TREATMENT: CPT

## 2023-05-23 PROCEDURE — 85014 HEMATOCRIT: CPT | Performed by: STUDENT IN AN ORGANIZED HEALTH CARE EDUCATION/TRAINING PROGRAM

## 2023-05-23 PROCEDURE — 93005 ELECTROCARDIOGRAM TRACING: CPT

## 2023-05-23 PROCEDURE — 10006031 HB ROOM CHARGE TELEMETRY

## 2023-05-23 PROCEDURE — 85018 HEMOGLOBIN: CPT | Performed by: STUDENT IN AN ORGANIZED HEALTH CARE EDUCATION/TRAINING PROGRAM

## 2023-05-23 PROCEDURE — 13003289 HB OXYGEN THERAPY DAILY

## 2023-05-23 PROCEDURE — C9113 INJ PANTOPRAZOLE SODIUM, VIA: HCPCS

## 2023-05-23 PROCEDURE — 10002807 HB RX 258: Performed by: STUDENT IN AN ORGANIZED HEALTH CARE EDUCATION/TRAINING PROGRAM

## 2023-05-23 PROCEDURE — 97165 OT EVAL LOW COMPLEX 30 MIN: CPT

## 2023-05-23 PROCEDURE — 99233 SBSQ HOSP IP/OBS HIGH 50: CPT | Performed by: INTERNAL MEDICINE

## 2023-05-23 PROCEDURE — 85025 COMPLETE CBC W/AUTO DIFF WBC: CPT | Performed by: STUDENT IN AN ORGANIZED HEALTH CARE EDUCATION/TRAINING PROGRAM

## 2023-05-23 PROCEDURE — 84132 ASSAY OF SERUM POTASSIUM: CPT | Performed by: INTERNAL MEDICINE

## 2023-05-23 PROCEDURE — 36415 COLL VENOUS BLD VENIPUNCTURE: CPT | Performed by: STUDENT IN AN ORGANIZED HEALTH CARE EDUCATION/TRAINING PROGRAM

## 2023-05-23 PROCEDURE — 83735 ASSAY OF MAGNESIUM: CPT

## 2023-05-23 PROCEDURE — 10002803 HB RX 637

## 2023-05-23 PROCEDURE — 10002800 HB RX 250 W HCPCS: Performed by: INTERNAL MEDICINE

## 2023-05-23 PROCEDURE — G0316 PROLONGED IP OR OBS CARE EM SERVICE BEYOND PRIM SERVICE EA ADD 15 MIN: HCPCS | Performed by: NURSE PRACTITIONER

## 2023-05-23 PROCEDURE — 93010 ELECTROCARDIOGRAM REPORT: CPT | Performed by: INTERNAL MEDICINE

## 2023-05-23 PROCEDURE — 99223 1ST HOSP IP/OBS HIGH 75: CPT | Performed by: NURSE PRACTITIONER

## 2023-05-23 PROCEDURE — 10004651 HB RX, NO CHARGE ITEM: Performed by: NURSE PRACTITIONER

## 2023-05-23 PROCEDURE — 99497 ADVNCD CARE PLAN 30 MIN: CPT | Performed by: NURSE PRACTITIONER

## 2023-05-23 PROCEDURE — 80048 BASIC METABOLIC PNL TOTAL CA: CPT | Performed by: STUDENT IN AN ORGANIZED HEALTH CARE EDUCATION/TRAINING PROGRAM

## 2023-05-23 PROCEDURE — 80048 BASIC METABOLIC PNL TOTAL CA: CPT

## 2023-05-23 PROCEDURE — 99232 SBSQ HOSP IP/OBS MODERATE 35: CPT | Performed by: NURSE PRACTITIONER

## 2023-05-23 PROCEDURE — 10002800 HB RX 250 W HCPCS

## 2023-05-23 RX ORDER — POTASSIUM CHLORIDE 14.9 MG/ML
20 INJECTION INTRAVENOUS ONCE
Status: DISCONTINUED | OUTPATIENT
Start: 2023-05-23 | End: 2023-05-24 | Stop reason: HOSPADM

## 2023-05-23 RX ORDER — DEXTROSE MONOHYDRATE 50 MG/ML
INJECTION, SOLUTION INTRAVENOUS CONTINUOUS
Status: DISCONTINUED | OUTPATIENT
Start: 2023-05-23 | End: 2023-05-24

## 2023-05-23 RX ORDER — METOPROLOL TARTRATE 1 MG/ML
2.5 INJECTION, SOLUTION INTRAVENOUS EVERY 6 HOURS PRN
Status: DISCONTINUED | OUTPATIENT
Start: 2023-05-23 | End: 2023-05-24 | Stop reason: HOSPADM

## 2023-05-23 RX ORDER — METOPROLOL TARTRATE 1 MG/ML
2.5 INJECTION, SOLUTION INTRAVENOUS ONCE
Status: COMPLETED | OUTPATIENT
Start: 2023-05-23 | End: 2023-05-23

## 2023-05-23 RX ORDER — POTASSIUM CHLORIDE 14.9 MG/ML
20 INJECTION INTRAVENOUS ONCE
Status: COMPLETED | OUTPATIENT
Start: 2023-05-23 | End: 2023-05-23

## 2023-05-23 RX ADMIN — FLUTICASONE FUROATE, UMECLIDINIUM BROMIDE AND VILANTEROL TRIFENATATE 1 PUFF: 200; 62.5; 25 POWDER RESPIRATORY (INHALATION) at 07:34

## 2023-05-23 RX ADMIN — VANCOMYCIN HYDROCHLORIDE 125 MG: KIT at 08:41

## 2023-05-23 RX ADMIN — IPRATROPIUM BROMIDE 0.5 MG: 0.5 SOLUTION RESPIRATORY (INHALATION) at 15:27

## 2023-05-23 RX ADMIN — DEXTROSE MONOHYDRATE: 50 INJECTION, SOLUTION INTRAVENOUS at 09:39

## 2023-05-23 RX ADMIN — IPRATROPIUM BROMIDE 0.5 MG: 0.5 SOLUTION RESPIRATORY (INHALATION) at 11:33

## 2023-05-23 RX ADMIN — POTASSIUM CHLORIDE 20 MEQ: 14.9 INJECTION, SOLUTION INTRAVENOUS at 13:46

## 2023-05-23 RX ADMIN — SODIUM CHLORIDE, PRESERVATIVE FREE 2 ML: 5 INJECTION INTRAVENOUS at 21:38

## 2023-05-23 RX ADMIN — SODIUM CHLORIDE, PRESERVATIVE FREE 2 ML: 5 INJECTION INTRAVENOUS at 08:42

## 2023-05-23 RX ADMIN — FLUTICASONE PROPIONATE 2 SPRAY: 50 SPRAY, METERED NASAL at 08:46

## 2023-05-23 RX ADMIN — IPRATROPIUM BROMIDE 0.5 MG: 0.5 SOLUTION RESPIRATORY (INHALATION) at 07:24

## 2023-05-23 RX ADMIN — IPRATROPIUM BROMIDE 0.5 MG: 0.5 SOLUTION RESPIRATORY (INHALATION) at 19:48

## 2023-05-23 RX ADMIN — VANCOMYCIN HYDROCHLORIDE 125 MG: KIT at 21:36

## 2023-05-23 RX ADMIN — VANCOMYCIN HYDROCHLORIDE 125 MG: KIT at 16:24

## 2023-05-23 RX ADMIN — VANCOMYCIN HYDROCHLORIDE 125 MG: KIT at 13:44

## 2023-05-23 RX ADMIN — METOPROLOL TARTRATE 2.5 MG: 1 INJECTION, SOLUTION INTRAVENOUS at 15:37

## 2023-05-23 RX ADMIN — MAGNESIUM SULFATE HEPTAHYDRATE 2 G: 40 INJECTION, SOLUTION INTRAVENOUS at 16:31

## 2023-05-23 RX ADMIN — DEXTROSE MONOHYDRATE: 50 INJECTION, SOLUTION INTRAVENOUS at 21:42

## 2023-05-23 RX ADMIN — PANTOPRAZOLE SODIUM 40 MG: 40 INJECTION, POWDER, FOR SOLUTION INTRAVENOUS at 21:36

## 2023-05-23 RX ADMIN — PANTOPRAZOLE SODIUM 40 MG: 40 INJECTION, POWDER, FOR SOLUTION INTRAVENOUS at 08:42

## 2023-05-23 ASSESSMENT — COGNITIVE AND FUNCTIONAL STATUS - GENERAL
HELP NEEDED FOR PERSONAL GROOMING: A LITTLE
DAILY_ACTIVITY_CONVERTED_SCORE: 40.22
HELP NEEDED FOR TOILETING: A LOT
HELP NEEDED DRESSING REGULAR LOWER BODY CLOTHING: A LITTLE
HELP NEEDED FOR BATHING: A LITTLE
DAILY_ACTIVITY_RAW_SCORE: 19

## 2023-05-23 ASSESSMENT — ENCOUNTER SYMPTOMS
DIARRHEA: 1
EYES NEGATIVE: 1
PSYCHIATRIC NEGATIVE: 1
BLOOD IN STOOL: 1
CONSTITUTIONAL NEGATIVE: 1
RESPIRATORY NEGATIVE: 1

## 2023-05-23 ASSESSMENT — PAIN SCALES - GENERAL
PAINLEVEL_OUTOF10: 0
PAINLEVEL_OUTOF10: 0

## 2023-05-23 ASSESSMENT — ACTIVITIES OF DAILY LIVING (ADL): PRIOR_ADL: INDEPENDENT

## 2023-05-24 VITALS
WEIGHT: 107.81 LBS | TEMPERATURE: 98.1 F | BODY MASS INDEX: 17.96 KG/M2 | SYSTOLIC BLOOD PRESSURE: 118 MMHG | OXYGEN SATURATION: 96 % | DIASTOLIC BLOOD PRESSURE: 92 MMHG | HEART RATE: 86 BPM | RESPIRATION RATE: 20 BRPM | HEIGHT: 65 IN

## 2023-05-24 LAB
ANION GAP SERPL CALC-SCNC: 12 MMOL/L (ref 7–19)
BASOPHILS # BLD: 0 K/MCL (ref 0–0.3)
BASOPHILS NFR BLD: 0 %
BUN SERPL-MCNC: 18 MG/DL (ref 6–20)
BUN/CREAT SERPL: 20 (ref 7–25)
CALCIUM SERPL-MCNC: 7.8 MG/DL (ref 8.4–10.2)
CHLORIDE SERPL-SCNC: 109 MMOL/L (ref 97–110)
CO2 SERPL-SCNC: 25 MMOL/L (ref 21–32)
CREAT SERPL-MCNC: 0.91 MG/DL (ref 0.67–1.17)
DEPRECATED RDW RBC: 51.4 FL (ref 39–50)
EOSINOPHIL # BLD: 0.6 K/MCL (ref 0–0.5)
EOSINOPHIL NFR BLD: 5 %
ERYTHROCYTE [DISTWIDTH] IN BLOOD: 15.5 % (ref 11–15)
FASTING DURATION TIME PATIENT: ABNORMAL H
GFR SERPLBLD BASED ON 1.73 SQ M-ARVRAT: 81 ML/MIN
GLUCOSE SERPL-MCNC: 126 MG/DL (ref 70–99)
HCT VFR BLD CALC: 25.8 % (ref 39–51)
HCT VFR BLD CALC: 26.9 % (ref 39–51)
HCT VFR BLD CALC: 27.7 % (ref 39–51)
HCT VFR BLD CALC: 28.1 % (ref 39–51)
HGB BLD-MCNC: 9 G/DL (ref 13–17)
HGB BLD-MCNC: 9.1 G/DL (ref 13–17)
HGB BLD-MCNC: 9.4 G/DL (ref 13–17)
HGB BLD-MCNC: 9.5 G/DL (ref 13–17)
IMM GRANULOCYTES # BLD AUTO: 0.1 K/MCL (ref 0–0.2)
IMM GRANULOCYTES # BLD: 1 %
LYMPHOCYTES # BLD: 1 K/MCL (ref 1–4)
LYMPHOCYTES NFR BLD: 9 %
MAGNESIUM SERPL-MCNC: 1.9 MG/DL (ref 1.7–2.4)
MCH RBC QN AUTO: 31.1 PG (ref 26–34)
MCHC RBC AUTO-ENTMCNC: 33.8 G/DL (ref 32–36.5)
MCV RBC AUTO: 91.8 FL (ref 78–100)
MONOCYTES # BLD: 1.1 K/MCL (ref 0.3–0.9)
MONOCYTES NFR BLD: 10 %
NEUTROPHILS # BLD: 8.4 K/MCL (ref 1.8–7.7)
NEUTROPHILS NFR BLD: 75 %
NRBC BLD MANUAL-RTO: 0 /100 WBC
PLATELET # BLD AUTO: 192 K/MCL (ref 140–450)
POTASSIUM SERPL-SCNC: 3.5 MMOL/L (ref 3.4–5.1)
RAINBOW EXTRA TUBES HOLD SPECIMEN: NORMAL
RBC # BLD: 2.93 MIL/MCL (ref 4.5–5.9)
SODIUM SERPL-SCNC: 142 MMOL/L (ref 135–145)
WBC # BLD: 11.1 K/MCL (ref 4.2–11)

## 2023-05-24 PROCEDURE — 10004651 HB RX, NO CHARGE ITEM: Performed by: NURSE PRACTITIONER

## 2023-05-24 PROCEDURE — 99232 SBSQ HOSP IP/OBS MODERATE 35: CPT | Performed by: NURSE PRACTITIONER

## 2023-05-24 PROCEDURE — 85014 HEMATOCRIT: CPT | Performed by: STUDENT IN AN ORGANIZED HEALTH CARE EDUCATION/TRAINING PROGRAM

## 2023-05-24 PROCEDURE — 36415 COLL VENOUS BLD VENIPUNCTURE: CPT | Performed by: INTERNAL MEDICINE

## 2023-05-24 PROCEDURE — 80048 BASIC METABOLIC PNL TOTAL CA: CPT | Performed by: STUDENT IN AN ORGANIZED HEALTH CARE EDUCATION/TRAINING PROGRAM

## 2023-05-24 PROCEDURE — 13003289 HB OXYGEN THERAPY DAILY

## 2023-05-24 PROCEDURE — 10002800 HB RX 250 W HCPCS

## 2023-05-24 PROCEDURE — 94640 AIRWAY INHALATION TREATMENT: CPT

## 2023-05-24 PROCEDURE — 10002801 HB RX 250 W/O HCPCS

## 2023-05-24 PROCEDURE — 85018 HEMOGLOBIN: CPT | Performed by: STUDENT IN AN ORGANIZED HEALTH CARE EDUCATION/TRAINING PROGRAM

## 2023-05-24 PROCEDURE — C9113 INJ PANTOPRAZOLE SODIUM, VIA: HCPCS

## 2023-05-24 PROCEDURE — 85025 COMPLETE CBC W/AUTO DIFF WBC: CPT | Performed by: STUDENT IN AN ORGANIZED HEALTH CARE EDUCATION/TRAINING PROGRAM

## 2023-05-24 PROCEDURE — 83735 ASSAY OF MAGNESIUM: CPT | Performed by: INTERNAL MEDICINE

## 2023-05-24 PROCEDURE — 10002803 HB RX 637

## 2023-05-24 PROCEDURE — 99239 HOSP IP/OBS DSCHRG MGMT >30: CPT | Performed by: INTERNAL MEDICINE

## 2023-05-24 RX ORDER — PANTOPRAZOLE SODIUM 40 MG/1
40 TABLET, DELAYED RELEASE ORAL DAILY
Qty: 30 TABLET | Refills: 1 | Status: SHIPPED | OUTPATIENT
Start: 2023-05-24

## 2023-05-24 RX ORDER — ACETAMINOPHEN 325 MG/1
650 TABLET ORAL EVERY 6 HOURS PRN
Status: SHIPPED | COMMUNITY
Start: 2023-05-24

## 2023-05-24 RX ORDER — VANCOMYCIN HYDROCHLORIDE 250 MG/5ML
125 POWDER, FOR SOLUTION ORAL 4 TIMES DAILY
Qty: 70 ML | Refills: 0 | Status: SHIPPED | OUTPATIENT
Start: 2023-05-24 | End: 2023-05-31

## 2023-05-24 RX ADMIN — VANCOMYCIN HYDROCHLORIDE 125 MG: KIT at 17:30

## 2023-05-24 RX ADMIN — FLUTICASONE FUROATE, UMECLIDINIUM BROMIDE AND VILANTEROL TRIFENATATE 1 PUFF: 200; 62.5; 25 POWDER RESPIRATORY (INHALATION) at 08:52

## 2023-05-24 RX ADMIN — PANTOPRAZOLE SODIUM 40 MG: 40 INJECTION, POWDER, FOR SOLUTION INTRAVENOUS at 08:19

## 2023-05-24 RX ADMIN — IPRATROPIUM BROMIDE 0.5 MG: 0.5 SOLUTION RESPIRATORY (INHALATION) at 12:25

## 2023-05-24 RX ADMIN — IPRATROPIUM BROMIDE 0.5 MG: 0.5 SOLUTION RESPIRATORY (INHALATION) at 08:42

## 2023-05-24 RX ADMIN — VANCOMYCIN HYDROCHLORIDE 125 MG: KIT at 12:58

## 2023-05-24 RX ADMIN — FLUTICASONE PROPIONATE 2 SPRAY: 50 SPRAY, METERED NASAL at 08:21

## 2023-05-24 RX ADMIN — VANCOMYCIN HYDROCHLORIDE 125 MG: KIT at 08:19

## 2023-05-24 RX ADMIN — SODIUM CHLORIDE, PRESERVATIVE FREE 2 ML: 5 INJECTION INTRAVENOUS at 08:22

## 2023-05-24 RX ADMIN — IPRATROPIUM BROMIDE 0.5 MG: 0.5 SOLUTION RESPIRATORY (INHALATION) at 16:18

## 2023-05-24 ASSESSMENT — ENCOUNTER SYMPTOMS
PSYCHIATRIC NEGATIVE: 1
EYES NEGATIVE: 1
FATIGUE: 1
RESPIRATORY NEGATIVE: 1
NEUROLOGICAL NEGATIVE: 1
GASTROINTESTINAL NEGATIVE: 1

## 2023-05-24 ASSESSMENT — PAIN SCALES - GENERAL
PAINLEVEL_OUTOF10: 0
PAINLEVEL_OUTOF10: 0

## 2023-05-25 LAB
BACTERIA BLD CULT: NORMAL
BACTERIA BLD CULT: NORMAL

## 2023-08-11 ENCOUNTER — OFFICE VISIT (OUTPATIENT)
Dept: CARDIOLOGY | Age: 88
End: 2023-08-11

## 2023-08-11 VITALS
RESPIRATION RATE: 20 BRPM | HEIGHT: 62 IN | OXYGEN SATURATION: 96 % | DIASTOLIC BLOOD PRESSURE: 76 MMHG | SYSTOLIC BLOOD PRESSURE: 150 MMHG | WEIGHT: 100.4 LBS | HEART RATE: 76 BPM | BODY MASS INDEX: 18.48 KG/M2

## 2023-08-11 DIAGNOSIS — I48.0 PAROXYSMAL ATRIAL FIBRILLATION (CMD): ICD-10-CM

## 2023-08-11 DIAGNOSIS — E87.6 HYPOKALEMIA: ICD-10-CM

## 2023-08-11 DIAGNOSIS — I25.84 CORONARY ARTERY CALCIFICATION: ICD-10-CM

## 2023-08-11 DIAGNOSIS — C18.7 MALIGNANT NEOPLASM OF SIGMOID COLON (CMD): ICD-10-CM

## 2023-08-11 DIAGNOSIS — I61.3 PONTINE HEMORRHAGE (CMD): ICD-10-CM

## 2023-08-11 DIAGNOSIS — N17.9 AKI (ACUTE KIDNEY INJURY) (CMD): ICD-10-CM

## 2023-08-11 DIAGNOSIS — J43.8 OTHER EMPHYSEMA (CMD): ICD-10-CM

## 2023-08-11 DIAGNOSIS — I35.0 AORTIC VALVE STENOSIS, ETIOLOGY OF CARDIAC VALVE DISEASE UNSPECIFIED: ICD-10-CM

## 2023-08-11 DIAGNOSIS — R94.31 ABNORMAL ELECTROCARDIOGRAM (ECG) (EKG): ICD-10-CM

## 2023-08-11 DIAGNOSIS — R00.0 SINUS TACHYCARDIA: ICD-10-CM

## 2023-08-11 DIAGNOSIS — I25.10 CORONARY ARTERY DISEASE INVOLVING NATIVE CORONARY ARTERY OF NATIVE HEART, UNSPECIFIED WHETHER ANGINA PRESENT: ICD-10-CM

## 2023-08-11 DIAGNOSIS — I45.10 INCOMPLETE RIGHT BUNDLE BRANCH BLOCK: ICD-10-CM

## 2023-08-11 DIAGNOSIS — K27.9 PEPTIC ULCER DISEASE: ICD-10-CM

## 2023-08-11 DIAGNOSIS — I25.10 CORONARY ARTERY CALCIFICATION: ICD-10-CM

## 2023-08-11 DIAGNOSIS — R07.0 THROAT PAIN: ICD-10-CM

## 2023-08-11 DIAGNOSIS — E87.0 HYPERNATREMIA: ICD-10-CM

## 2023-08-11 DIAGNOSIS — A41.9 SEPSIS, DUE TO UNSPECIFIED ORGANISM, UNSPECIFIED WHETHER ACUTE ORGAN DYSFUNCTION PRESENT (CMD): ICD-10-CM

## 2023-08-11 DIAGNOSIS — I44.0 FIRST DEGREE AV BLOCK: ICD-10-CM

## 2023-08-11 DIAGNOSIS — I05.9 MITRAL VALVE DISEASE: ICD-10-CM

## 2023-08-11 DIAGNOSIS — I49.1 APC (ATRIAL PREMATURE CONTRACTIONS): ICD-10-CM

## 2023-08-11 DIAGNOSIS — I71.21 ANEURYSM OF ASCENDING AORTA WITHOUT RUPTURE (CMD): ICD-10-CM

## 2023-08-11 DIAGNOSIS — E87.20 LACTIC ACIDOSIS: ICD-10-CM

## 2023-08-11 DIAGNOSIS — I10 ESSENTIAL HYPERTENSION: Primary | ICD-10-CM

## 2023-08-11 PROCEDURE — 99214 OFFICE O/P EST MOD 30 MIN: CPT | Performed by: INTERNAL MEDICINE

## 2023-08-11 ASSESSMENT — PATIENT HEALTH QUESTIONNAIRE - PHQ9
SUM OF ALL RESPONSES TO PHQ9 QUESTIONS 1 AND 2: 0
CLINICAL INTERPRETATION OF PHQ2 SCORE: NO FURTHER SCREENING NEEDED
SUM OF ALL RESPONSES TO PHQ9 QUESTIONS 1 AND 2: 0
2. FEELING DOWN, DEPRESSED OR HOPELESS: NOT AT ALL
1. LITTLE INTEREST OR PLEASURE IN DOING THINGS: NOT AT ALL

## 2023-08-11 ASSESSMENT — ENCOUNTER SYMPTOMS
ENDOCRINE NEGATIVE: 1
ALLERGIC/IMMUNOLOGIC NEGATIVE: 1
HEMATOLOGIC/LYMPHATIC NEGATIVE: 1
EYES NEGATIVE: 1
PSYCHIATRIC NEGATIVE: 1
RESPIRATORY NEGATIVE: 1
GASTROINTESTINAL NEGATIVE: 1
NEUROLOGICAL NEGATIVE: 1
CONSTITUTIONAL NEGATIVE: 1

## 2023-08-14 DIAGNOSIS — R94.31 ABNORMAL ELECTROCARDIOGRAM (ECG) (EKG): ICD-10-CM

## 2023-08-17 ENCOUNTER — HOSPITAL ENCOUNTER (EMERGENCY)
Age: 88
Discharge: NURSING, REHAB OR LONG-TERM CARE | End: 2023-08-17
Attending: EMERGENCY MEDICINE

## 2023-08-17 VITALS
RESPIRATION RATE: 16 BRPM | HEART RATE: 74 BPM | SYSTOLIC BLOOD PRESSURE: 124 MMHG | TEMPERATURE: 97.9 F | OXYGEN SATURATION: 95 % | DIASTOLIC BLOOD PRESSURE: 70 MMHG

## 2023-08-17 DIAGNOSIS — R41.0 DISORIENTATION: Primary | ICD-10-CM

## 2023-08-17 LAB
APPEARANCE UR: CLEAR
ATRIAL RATE (BPM): 84
BILIRUB UR QL STRIP: NEGATIVE
COLOR UR: YELLOW
GLUCOSE UR STRIP-MCNC: NEGATIVE MG/DL
HGB UR QL STRIP: NEGATIVE
KETONES UR STRIP-MCNC: ABNORMAL MG/DL
LEUKOCYTE ESTERASE UR QL STRIP: NEGATIVE
NITRITE UR QL STRIP: NEGATIVE
PH UR STRIP: 6.5 [PH] (ref 5–7)
PROT UR STRIP-MCNC: ABNORMAL MG/DL
QRS-INTERVAL (MSEC): 122
QT-INTERVAL (MSEC): 424
QTC: 501
R AXIS (DEGREES): 37
REPORT TEXT: NORMAL
SP GR UR STRIP: 1.02 (ref 1–1.03)
T AXIS (DEGREES): 83
UROBILINOGEN UR STRIP-MCNC: 0.2 MG/DL
VENTRICULAR RATE EKG/MIN (BPM): 84

## 2023-08-17 PROCEDURE — 81003 URINALYSIS AUTO W/O SCOPE: CPT | Performed by: EMERGENCY MEDICINE

## 2023-08-17 PROCEDURE — 99284 EMERGENCY DEPT VISIT MOD MDM: CPT | Performed by: EMERGENCY MEDICINE

## 2023-08-17 PROCEDURE — 93010 ELECTROCARDIOGRAM REPORT: CPT | Performed by: INTERNAL MEDICINE

## 2023-08-17 PROCEDURE — 99285 EMERGENCY DEPT VISIT HI MDM: CPT

## 2023-08-17 PROCEDURE — 93005 ELECTROCARDIOGRAM TRACING: CPT | Performed by: EMERGENCY MEDICINE

## 2023-08-17 ASSESSMENT — PAIN SCALES - GENERAL: PAINLEVEL_OUTOF10: 0

## 2024-01-24 ENCOUNTER — APPOINTMENT (OUTPATIENT)
Dept: CT IMAGING | Facility: HOSPITAL | Age: 89
End: 2024-01-24
Attending: EMERGENCY MEDICINE
Payer: MEDICARE

## 2024-01-24 ENCOUNTER — APPOINTMENT (OUTPATIENT)
Dept: GENERAL RADIOLOGY | Facility: HOSPITAL | Age: 89
End: 2024-01-24
Attending: EMERGENCY MEDICINE
Payer: MEDICARE

## 2024-01-24 ENCOUNTER — HOSPITAL ENCOUNTER (INPATIENT)
Facility: HOSPITAL | Age: 89
LOS: 6 days | Discharge: NURSING, REHAB OR LONG-TERM CARE | End: 2024-01-31
Attending: EMERGENCY MEDICINE | Admitting: INTERNAL MEDICINE
Payer: MEDICARE

## 2024-01-24 DIAGNOSIS — S72.002A CLOSED FRACTURE OF LEFT HIP, INITIAL ENCOUNTER (HCC): Primary | ICD-10-CM

## 2024-01-24 DIAGNOSIS — S72.009A HIP FRACTURE (HCC): ICD-10-CM

## 2024-01-24 LAB
ANION GAP SERPL CALC-SCNC: 8 MMOL/L (ref 0–18)
ANTIBODY SCREEN: NEGATIVE
BASOPHILS # BLD AUTO: 0.03 X10(3) UL (ref 0–0.2)
BASOPHILS NFR BLD AUTO: 0.2 %
BUN BLD-MCNC: 18 MG/DL (ref 9–23)
BUN/CREAT SERPL: 19.4 (ref 10–20)
CALCIUM BLD-MCNC: 9.3 MG/DL (ref 8.7–10.4)
CHLORIDE SERPL-SCNC: 101 MMOL/L (ref 98–112)
CO2 SERPL-SCNC: 27 MMOL/L (ref 21–32)
CREAT BLD-MCNC: 0.93 MG/DL
DEPRECATED RDW RBC AUTO: 42.9 FL (ref 35.1–46.3)
EGFRCR SERPLBLD CKD-EPI 2021: 78 ML/MIN/1.73M2 (ref 60–?)
EOSINOPHIL # BLD AUTO: 0.05 X10(3) UL (ref 0–0.7)
EOSINOPHIL NFR BLD AUTO: 0.3 %
ERYTHROCYTE [DISTWIDTH] IN BLOOD BY AUTOMATED COUNT: 13.2 % (ref 11–15)
GLUCOSE BLD-MCNC: 112 MG/DL (ref 70–99)
HCT VFR BLD AUTO: 40.4 %
HGB BLD-MCNC: 14 G/DL
IMM GRANULOCYTES # BLD AUTO: 0.04 X10(3) UL (ref 0–1)
IMM GRANULOCYTES NFR BLD: 0.3 %
LYMPHOCYTES # BLD AUTO: 0.71 X10(3) UL (ref 1–4)
LYMPHOCYTES NFR BLD AUTO: 4.8 %
MCH RBC QN AUTO: 30.5 PG (ref 26–34)
MCHC RBC AUTO-ENTMCNC: 34.7 G/DL (ref 31–37)
MCV RBC AUTO: 88 FL
MONOCYTES # BLD AUTO: 0.84 X10(3) UL (ref 0.1–1)
MONOCYTES NFR BLD AUTO: 5.7 %
NEUTROPHILS # BLD AUTO: 13.06 X10 (3) UL (ref 1.5–7.7)
NEUTROPHILS # BLD AUTO: 13.06 X10(3) UL (ref 1.5–7.7)
NEUTROPHILS NFR BLD AUTO: 88.7 %
OSMOLALITY SERPL CALC.SUM OF ELEC: 285 MOSM/KG (ref 275–295)
PLATELET # BLD AUTO: 219 10(3)UL (ref 150–450)
POTASSIUM SERPL-SCNC: 3.8 MMOL/L (ref 3.5–5.1)
RBC # BLD AUTO: 4.59 X10(6)UL
RH BLOOD TYPE: POSITIVE
SODIUM SERPL-SCNC: 136 MMOL/L (ref 136–145)
WBC # BLD AUTO: 14.7 X10(3) UL (ref 4–11)

## 2024-01-24 PROCEDURE — 86901 BLOOD TYPING SEROLOGIC RH(D): CPT | Performed by: EMERGENCY MEDICINE

## 2024-01-24 PROCEDURE — 85025 COMPLETE CBC W/AUTO DIFF WBC: CPT | Performed by: EMERGENCY MEDICINE

## 2024-01-24 PROCEDURE — 70450 CT HEAD/BRAIN W/O DYE: CPT | Performed by: EMERGENCY MEDICINE

## 2024-01-24 PROCEDURE — 96374 THER/PROPH/DIAG INJ IV PUSH: CPT

## 2024-01-24 PROCEDURE — 86850 RBC ANTIBODY SCREEN: CPT | Performed by: EMERGENCY MEDICINE

## 2024-01-24 PROCEDURE — 93005 ELECTROCARDIOGRAM TRACING: CPT

## 2024-01-24 PROCEDURE — 99285 EMERGENCY DEPT VISIT HI MDM: CPT

## 2024-01-24 PROCEDURE — 80048 BASIC METABOLIC PNL TOTAL CA: CPT | Performed by: EMERGENCY MEDICINE

## 2024-01-24 PROCEDURE — 93010 ELECTROCARDIOGRAM REPORT: CPT

## 2024-01-24 PROCEDURE — 71045 X-RAY EXAM CHEST 1 VIEW: CPT | Performed by: EMERGENCY MEDICINE

## 2024-01-24 PROCEDURE — 73502 X-RAY EXAM HIP UNI 2-3 VIEWS: CPT | Performed by: EMERGENCY MEDICINE

## 2024-01-24 PROCEDURE — 86900 BLOOD TYPING SEROLOGIC ABO: CPT | Performed by: EMERGENCY MEDICINE

## 2024-01-24 RX ORDER — MORPHINE SULFATE 4 MG/ML
4 INJECTION, SOLUTION INTRAMUSCULAR; INTRAVENOUS ONCE
Status: COMPLETED | OUTPATIENT
Start: 2024-01-24 | End: 2024-01-24

## 2024-01-25 ENCOUNTER — APPOINTMENT (OUTPATIENT)
Dept: CT IMAGING | Facility: HOSPITAL | Age: 89
End: 2024-01-25
Attending: ORTHOPAEDIC SURGERY
Payer: MEDICARE

## 2024-01-25 ENCOUNTER — APPOINTMENT (OUTPATIENT)
Dept: CT IMAGING | Facility: HOSPITAL | Age: 89
End: 2024-01-25
Attending: EMERGENCY MEDICINE
Payer: MEDICARE

## 2024-01-25 ENCOUNTER — APPOINTMENT (OUTPATIENT)
Dept: GENERAL RADIOLOGY | Facility: HOSPITAL | Age: 89
End: 2024-01-25
Attending: STUDENT IN AN ORGANIZED HEALTH CARE EDUCATION/TRAINING PROGRAM
Payer: MEDICARE

## 2024-01-25 ENCOUNTER — ANESTHESIA EVENT (OUTPATIENT)
Dept: SURGERY | Facility: HOSPITAL | Age: 89
DRG: 522 | End: 2024-01-25
Payer: MEDICARE

## 2024-01-25 ENCOUNTER — ANESTHESIA EVENT (OUTPATIENT)
Dept: SURGERY | Facility: HOSPITAL | Age: 89
End: 2024-01-25
Payer: MEDICARE

## 2024-01-25 ENCOUNTER — ANESTHESIA (OUTPATIENT)
Dept: SURGERY | Facility: HOSPITAL | Age: 89
End: 2024-01-25
Payer: MEDICARE

## 2024-01-25 ENCOUNTER — ANESTHESIA (OUTPATIENT)
Dept: SURGERY | Facility: HOSPITAL | Age: 89
DRG: 522 | End: 2024-01-25
Payer: MEDICARE

## 2024-01-25 DIAGNOSIS — S72.009A HIP FRACTURE (HCC): Primary | ICD-10-CM

## 2024-01-25 LAB
ATRIAL RATE: 101 BPM
MRSA DNA SPEC QL NAA+PROBE: NEGATIVE
P AXIS: 52 DEGREES
P-R INTERVAL: 180 MS
Q-T INTERVAL: 372 MS
QRS DURATION: 114 MS
QTC CALCULATION (BEZET): 482 MS
R AXIS: -68 DEGREES
RH BLOOD TYPE: POSITIVE
T AXIS: 69 DEGREES
VENTRICULAR RATE: 101 BPM

## 2024-01-25 PROCEDURE — 88305 TISSUE EXAM BY PATHOLOGIST: CPT | Performed by: STUDENT IN AN ORGANIZED HEALTH CARE EDUCATION/TRAINING PROGRAM

## 2024-01-25 PROCEDURE — 88311 DECALCIFY TISSUE: CPT | Performed by: STUDENT IN AN ORGANIZED HEALTH CARE EDUCATION/TRAINING PROGRAM

## 2024-01-25 PROCEDURE — 0SRS0J9 REPLACEMENT OF LEFT HIP JOINT, FEMORAL SURFACE WITH SYNTHETIC SUBSTITUTE, CEMENTED, OPEN APPROACH: ICD-10-PCS | Performed by: STUDENT IN AN ORGANIZED HEALTH CARE EDUCATION/TRAINING PROGRAM

## 2024-01-25 PROCEDURE — 88341 IMHCHEM/IMCYTCHM EA ADD ANTB: CPT | Performed by: STUDENT IN AN ORGANIZED HEALTH CARE EDUCATION/TRAINING PROGRAM

## 2024-01-25 PROCEDURE — 73501 X-RAY EXAM HIP UNI 1 VIEW: CPT | Performed by: STUDENT IN AN ORGANIZED HEALTH CARE EDUCATION/TRAINING PROGRAM

## 2024-01-25 PROCEDURE — 87641 MR-STAPH DNA AMP PROBE: CPT | Performed by: STUDENT IN AN ORGANIZED HEALTH CARE EDUCATION/TRAINING PROGRAM

## 2024-01-25 PROCEDURE — 73700 CT LOWER EXTREMITY W/O DYE: CPT | Performed by: ORTHOPAEDIC SURGERY

## 2024-01-25 PROCEDURE — 88342 IMHCHEM/IMCYTCHM 1ST ANTB: CPT | Performed by: STUDENT IN AN ORGANIZED HEALTH CARE EDUCATION/TRAINING PROGRAM

## 2024-01-25 PROCEDURE — 72170 X-RAY EXAM OF PELVIS: CPT | Performed by: STUDENT IN AN ORGANIZED HEALTH CARE EDUCATION/TRAINING PROGRAM

## 2024-01-25 PROCEDURE — 72125 CT NECK SPINE W/O DYE: CPT | Performed by: EMERGENCY MEDICINE

## 2024-01-25 DEVICE — BIOLOX DELTA CERAMIC FEMORAL HEAD 28MM DIA +1.5 12/14 TAPER
Type: IMPLANTABLE DEVICE | Site: KNEE | Status: FUNCTIONAL
Brand: BIOLOX DELTA

## 2024-01-25 DEVICE — CEMENTRALIZER STEM CENTRALIZER 12.0MM CEMENTED
Type: IMPLANTABLE DEVICE | Site: KNEE | Status: FUNCTIONAL
Brand: CEMENTRALIZER

## 2024-01-25 DEVICE — CEMENT BNE FULL DOSE PMMA M VISC RADPQ MNL: Type: IMPLANTABLE DEVICE | Site: HIP | Status: FUNCTIONAL

## 2024-01-25 DEVICE — KIT BNE CEM PREP FEM QUIK-USE 10 PER CA: Type: IMPLANTABLE DEVICE | Site: KNEE | Status: FUNCTIONAL

## 2024-01-25 DEVICE — SELF CENTERING BI-POLAR HEAD 28MM ID 48MM OD
Type: IMPLANTABLE DEVICE | Site: KNEE | Status: FUNCTIONAL
Brand: SELF CENTERING

## 2024-01-25 DEVICE — SUMMIT FEMORAL STEM 12/14 TAPER CEMENTED SIZE 6 STD 127MM
Type: IMPLANTABLE DEVICE | Site: KNEE | Status: FUNCTIONAL
Brand: SUMMIT

## 2024-01-25 RX ORDER — HYDROMORPHONE HYDROCHLORIDE 1 MG/ML
0.4 INJECTION, SOLUTION INTRAMUSCULAR; INTRAVENOUS; SUBCUTANEOUS EVERY 5 MIN PRN
OUTPATIENT
Start: 2024-01-25

## 2024-01-25 RX ORDER — HYDROMORPHONE HYDROCHLORIDE 1 MG/ML
0.2 INJECTION, SOLUTION INTRAMUSCULAR; INTRAVENOUS; SUBCUTANEOUS EVERY 5 MIN PRN
OUTPATIENT
Start: 2024-01-25

## 2024-01-25 RX ORDER — POTASSIUM CHLORIDE 14.9 MG/ML
20 INJECTION INTRAVENOUS ONCE
Status: COMPLETED | OUTPATIENT
Start: 2024-01-25 | End: 2024-01-25

## 2024-01-25 RX ORDER — ASPIRIN 81 MG/1
81 TABLET ORAL 2 TIMES DAILY
Status: DISCONTINUED | OUTPATIENT
Start: 2024-01-26 | End: 2024-01-31

## 2024-01-25 RX ORDER — MORPHINE SULFATE 4 MG/ML
4 INJECTION, SOLUTION INTRAMUSCULAR; INTRAVENOUS EVERY 10 MIN PRN
Status: DISCONTINUED | OUTPATIENT
Start: 2024-01-25 | End: 2024-01-25 | Stop reason: HOSPADM

## 2024-01-25 RX ORDER — ACETAMINOPHEN 325 MG/1
325 TABLET ORAL EVERY 6 HOURS PRN
COMMUNITY

## 2024-01-25 RX ORDER — MELATONIN
3 NIGHTLY PRN
Status: DISCONTINUED | OUTPATIENT
Start: 2024-01-25 | End: 2024-01-31

## 2024-01-25 RX ORDER — QUETIAPINE FUMARATE 25 MG/1
50 TABLET, FILM COATED ORAL NIGHTLY
Status: DISCONTINUED | OUTPATIENT
Start: 2024-01-25 | End: 2024-01-31

## 2024-01-25 RX ORDER — HYDRALAZINE HYDROCHLORIDE 20 MG/ML
10 INJECTION INTRAMUSCULAR; INTRAVENOUS EVERY 4 HOURS PRN
Status: DISCONTINUED | OUTPATIENT
Start: 2024-01-25 | End: 2024-01-31

## 2024-01-25 RX ORDER — PANTOPRAZOLE SODIUM 40 MG/1
40 TABLET, DELAYED RELEASE ORAL
COMMUNITY

## 2024-01-25 RX ORDER — MORPHINE SULFATE 2 MG/ML
2 INJECTION, SOLUTION INTRAMUSCULAR; INTRAVENOUS EVERY 4 HOURS PRN
Status: DISCONTINUED | OUTPATIENT
Start: 2024-01-25 | End: 2024-01-31

## 2024-01-25 RX ORDER — HYDROMORPHONE HYDROCHLORIDE 1 MG/ML
0.4 INJECTION, SOLUTION INTRAMUSCULAR; INTRAVENOUS; SUBCUTANEOUS EVERY 5 MIN PRN
Status: DISCONTINUED | OUTPATIENT
Start: 2024-01-25 | End: 2024-01-25 | Stop reason: HOSPADM

## 2024-01-25 RX ORDER — ACETAMINOPHEN 500 MG
500 TABLET ORAL EVERY 4 HOURS PRN
Status: DISCONTINUED | OUTPATIENT
Start: 2024-01-25 | End: 2024-01-31

## 2024-01-25 RX ORDER — CEFAZOLIN SODIUM/WATER 2 G/20 ML
2 SYRINGE (ML) INTRAVENOUS ONCE
Status: COMPLETED | OUTPATIENT
Start: 2024-01-25 | End: 2024-01-25

## 2024-01-25 RX ORDER — EPHEDRINE SULFATE 50 MG/ML
INJECTION, SOLUTION INTRAVENOUS AS NEEDED
Status: DISCONTINUED | OUTPATIENT
Start: 2024-01-25 | End: 2024-03-22 | Stop reason: SURG

## 2024-01-25 RX ORDER — MORPHINE SULFATE 4 MG/ML
2 INJECTION, SOLUTION INTRAMUSCULAR; INTRAVENOUS EVERY 10 MIN PRN
OUTPATIENT
Start: 2024-01-25

## 2024-01-25 RX ORDER — TAMSULOSIN HYDROCHLORIDE 0.4 MG/1
0.4 CAPSULE ORAL DAILY
Status: DISCONTINUED | OUTPATIENT
Start: 2024-01-25 | End: 2024-01-31

## 2024-01-25 RX ORDER — TRANEXAMIC ACID 10 MG/ML
INJECTION, SOLUTION INTRAVENOUS AS NEEDED
Status: DISCONTINUED | OUTPATIENT
Start: 2024-01-25 | End: 2024-03-22 | Stop reason: SURG

## 2024-01-25 RX ORDER — METOCLOPRAMIDE HYDROCHLORIDE 5 MG/ML
5 INJECTION INTRAMUSCULAR; INTRAVENOUS EVERY 8 HOURS PRN
Status: DISCONTINUED | OUTPATIENT
Start: 2024-01-25 | End: 2024-01-31

## 2024-01-25 RX ORDER — POLYETHYLENE GLYCOL 3350 17 G/17G
17 POWDER, FOR SOLUTION ORAL AS NEEDED
COMMUNITY

## 2024-01-25 RX ORDER — ONDANSETRON 2 MG/ML
4 INJECTION INTRAMUSCULAR; INTRAVENOUS EVERY 6 HOURS PRN
OUTPATIENT
Start: 2024-01-25

## 2024-01-25 RX ORDER — MORPHINE SULFATE 10 MG/ML
6 INJECTION, SOLUTION INTRAMUSCULAR; INTRAVENOUS EVERY 10 MIN PRN
OUTPATIENT
Start: 2024-01-25

## 2024-01-25 RX ORDER — NALOXONE HYDROCHLORIDE 0.4 MG/ML
80 INJECTION, SOLUTION INTRAMUSCULAR; INTRAVENOUS; SUBCUTANEOUS AS NEEDED
OUTPATIENT
Start: 2024-01-25 | End: 2024-01-26

## 2024-01-25 RX ORDER — SODIUM CHLORIDE, SODIUM LACTATE, POTASSIUM CHLORIDE, CALCIUM CHLORIDE 600; 310; 30; 20 MG/100ML; MG/100ML; MG/100ML; MG/100ML
INJECTION, SOLUTION INTRAVENOUS CONTINUOUS PRN
Status: DISCONTINUED | OUTPATIENT
Start: 2024-01-25 | End: 2024-03-22 | Stop reason: SURG

## 2024-01-25 RX ORDER — VANCOMYCIN HYDROCHLORIDE 1 G/20ML
INJECTION, POWDER, LYOPHILIZED, FOR SOLUTION INTRAVENOUS AS NEEDED
Status: DISCONTINUED | OUTPATIENT
Start: 2024-01-25 | End: 2024-01-25 | Stop reason: HOSPADM

## 2024-01-25 RX ORDER — TAMSULOSIN HYDROCHLORIDE 0.4 MG/1
0.4 CAPSULE ORAL DAILY
COMMUNITY

## 2024-01-25 RX ORDER — ACETAMINOPHEN 325 MG/1
650 TABLET ORAL EVERY 4 HOURS PRN
Status: DISCONTINUED | OUTPATIENT
Start: 2024-01-25 | End: 2024-01-31

## 2024-01-25 RX ORDER — SODIUM CHLORIDE, SODIUM LACTATE, POTASSIUM CHLORIDE, CALCIUM CHLORIDE 600; 310; 30; 20 MG/100ML; MG/100ML; MG/100ML; MG/100ML
INJECTION, SOLUTION INTRAVENOUS CONTINUOUS
Status: DISCONTINUED | OUTPATIENT
Start: 2024-01-25 | End: 2024-01-25 | Stop reason: HOSPADM

## 2024-01-25 RX ORDER — ONDANSETRON 2 MG/ML
INJECTION INTRAMUSCULAR; INTRAVENOUS AS NEEDED
Status: DISCONTINUED | OUTPATIENT
Start: 2024-01-25 | End: 2024-03-22 | Stop reason: SURG

## 2024-01-25 RX ORDER — MAGNESIUM OXIDE 400 MG/1
400 TABLET ORAL DAILY
Status: DISCONTINUED | OUTPATIENT
Start: 2024-01-25 | End: 2024-01-31

## 2024-01-25 RX ORDER — HYDROMORPHONE HYDROCHLORIDE 1 MG/ML
0.6 INJECTION, SOLUTION INTRAMUSCULAR; INTRAVENOUS; SUBCUTANEOUS EVERY 5 MIN PRN
OUTPATIENT
Start: 2024-01-25

## 2024-01-25 RX ORDER — HYDROCODONE BITARTRATE AND ACETAMINOPHEN 5; 325 MG/1; MG/1
1 TABLET ORAL EVERY 4 HOURS PRN
Status: DISCONTINUED | OUTPATIENT
Start: 2024-01-25 | End: 2024-01-31

## 2024-01-25 RX ORDER — LABETALOL HYDROCHLORIDE 5 MG/ML
5 INJECTION, SOLUTION INTRAVENOUS EVERY 10 MIN PRN
Status: DISCONTINUED | OUTPATIENT
Start: 2024-01-25 | End: 2024-01-25 | Stop reason: HOSPADM

## 2024-01-25 RX ORDER — SODIUM CHLORIDE 9 MG/ML
INJECTION, SOLUTION INTRAVENOUS CONTINUOUS
Status: DISCONTINUED | OUTPATIENT
Start: 2024-01-25 | End: 2024-01-30

## 2024-01-25 RX ORDER — METOCLOPRAMIDE HYDROCHLORIDE 5 MG/ML
5 INJECTION INTRAMUSCULAR; INTRAVENOUS EVERY 8 HOURS PRN
OUTPATIENT
Start: 2024-01-25

## 2024-01-25 RX ORDER — HYDROCODONE BITARTRATE AND ACETAMINOPHEN 5; 325 MG/1; MG/1
2 TABLET ORAL EVERY 4 HOURS PRN
Status: DISCONTINUED | OUTPATIENT
Start: 2024-01-25 | End: 2024-01-31

## 2024-01-25 RX ORDER — CEFAZOLIN SODIUM/WATER 2 G/20 ML
2 SYRINGE (ML) INTRAVENOUS EVERY 8 HOURS
Qty: 60 ML | Refills: 0 | Status: COMPLETED | OUTPATIENT
Start: 2024-01-25 | End: 2024-01-26

## 2024-01-25 RX ORDER — MORPHINE SULFATE 4 MG/ML
2 INJECTION, SOLUTION INTRAMUSCULAR; INTRAVENOUS EVERY 10 MIN PRN
Status: DISCONTINUED | OUTPATIENT
Start: 2024-01-25 | End: 2024-01-25 | Stop reason: HOSPADM

## 2024-01-25 RX ORDER — SODIUM CHLORIDE, SODIUM LACTATE, POTASSIUM CHLORIDE, CALCIUM CHLORIDE 600; 310; 30; 20 MG/100ML; MG/100ML; MG/100ML; MG/100ML
INJECTION, SOLUTION INTRAVENOUS CONTINUOUS
OUTPATIENT
Start: 2024-01-25

## 2024-01-25 RX ORDER — MORPHINE SULFATE 2 MG/ML
1 INJECTION, SOLUTION INTRAMUSCULAR; INTRAVENOUS EVERY 2 HOUR PRN
Status: DISCONTINUED | OUTPATIENT
Start: 2024-01-25 | End: 2024-01-31

## 2024-01-25 RX ORDER — MAGNESIUM OXIDE 400 MG/1
400 TABLET ORAL DAILY
COMMUNITY

## 2024-01-25 RX ORDER — PHENYLEPHRINE HCL 10 MG/ML
VIAL (ML) INJECTION AS NEEDED
Status: DISCONTINUED | OUTPATIENT
Start: 2024-01-25 | End: 2024-03-22 | Stop reason: SURG

## 2024-01-25 RX ORDER — FLUTICASONE FUROATE, UMECLIDINIUM BROMIDE AND VILANTEROL TRIFENATATE 100; 62.5; 25 UG/1; UG/1; UG/1
1 POWDER RESPIRATORY (INHALATION) DAILY
COMMUNITY

## 2024-01-25 RX ORDER — MORPHINE SULFATE 4 MG/ML
4 INJECTION, SOLUTION INTRAMUSCULAR; INTRAVENOUS EVERY 2 HOUR PRN
Status: DISCONTINUED | OUTPATIENT
Start: 2024-01-25 | End: 2024-01-31

## 2024-01-25 RX ORDER — GLYCOPYRROLATE 0.2 MG/ML
INJECTION, SOLUTION INTRAMUSCULAR; INTRAVENOUS AS NEEDED
Status: DISCONTINUED | OUTPATIENT
Start: 2024-01-25 | End: 2024-03-22 | Stop reason: SURG

## 2024-01-25 RX ORDER — MORPHINE SULFATE 4 MG/ML
4 INJECTION, SOLUTION INTRAMUSCULAR; INTRAVENOUS EVERY 10 MIN PRN
OUTPATIENT
Start: 2024-01-25

## 2024-01-25 RX ORDER — MORPHINE SULFATE 10 MG/ML
6 INJECTION, SOLUTION INTRAMUSCULAR; INTRAVENOUS EVERY 10 MIN PRN
Status: DISCONTINUED | OUTPATIENT
Start: 2024-01-25 | End: 2024-01-25 | Stop reason: HOSPADM

## 2024-01-25 RX ORDER — HYDROMORPHONE HYDROCHLORIDE 1 MG/ML
0.2 INJECTION, SOLUTION INTRAMUSCULAR; INTRAVENOUS; SUBCUTANEOUS EVERY 5 MIN PRN
Status: DISCONTINUED | OUTPATIENT
Start: 2024-01-25 | End: 2024-01-25 | Stop reason: HOSPADM

## 2024-01-25 RX ORDER — ROCURONIUM BROMIDE 10 MG/ML
INJECTION, SOLUTION INTRAVENOUS AS NEEDED
Status: DISCONTINUED | OUTPATIENT
Start: 2024-01-25 | End: 2024-03-22 | Stop reason: SURG

## 2024-01-25 RX ORDER — NALOXONE HYDROCHLORIDE 0.4 MG/ML
80 INJECTION, SOLUTION INTRAMUSCULAR; INTRAVENOUS; SUBCUTANEOUS AS NEEDED
Status: DISCONTINUED | OUTPATIENT
Start: 2024-01-25 | End: 2024-01-25 | Stop reason: HOSPADM

## 2024-01-25 RX ORDER — NEOSTIGMINE METHYLSULFATE 1 MG/ML
INJECTION, SOLUTION INTRAVENOUS AS NEEDED
Status: DISCONTINUED | OUTPATIENT
Start: 2024-01-25 | End: 2024-03-22 | Stop reason: SURG

## 2024-01-25 RX ORDER — PANTOPRAZOLE SODIUM 40 MG/1
40 TABLET, DELAYED RELEASE ORAL
Status: DISCONTINUED | OUTPATIENT
Start: 2024-01-25 | End: 2024-01-31

## 2024-01-25 RX ORDER — TRAZODONE HYDROCHLORIDE 50 MG/1
50 TABLET ORAL NIGHTLY
COMMUNITY

## 2024-01-25 RX ORDER — FLUTICASONE PROPIONATE 50 MCG
2 SPRAY, SUSPENSION (ML) NASAL DAILY
Status: DISCONTINUED | OUTPATIENT
Start: 2024-01-25 | End: 2024-01-31

## 2024-01-25 RX ORDER — MORPHINE SULFATE 2 MG/ML
2 INJECTION, SOLUTION INTRAMUSCULAR; INTRAVENOUS EVERY 2 HOUR PRN
Status: DISCONTINUED | OUTPATIENT
Start: 2024-01-25 | End: 2024-01-31

## 2024-01-25 RX ORDER — HYDROMORPHONE HYDROCHLORIDE 1 MG/ML
0.6 INJECTION, SOLUTION INTRAMUSCULAR; INTRAVENOUS; SUBCUTANEOUS EVERY 5 MIN PRN
Status: DISCONTINUED | OUTPATIENT
Start: 2024-01-25 | End: 2024-01-25 | Stop reason: HOSPADM

## 2024-01-25 RX ORDER — TRAZODONE HYDROCHLORIDE 50 MG/1
50 TABLET ORAL NIGHTLY
Status: DISCONTINUED | OUTPATIENT
Start: 2024-01-25 | End: 2024-01-31

## 2024-01-25 RX ORDER — ONDANSETRON 2 MG/ML
4 INJECTION INTRAMUSCULAR; INTRAVENOUS EVERY 6 HOURS PRN
Status: DISCONTINUED | OUTPATIENT
Start: 2024-01-25 | End: 2024-01-25 | Stop reason: HOSPADM

## 2024-01-25 RX ORDER — LIDOCAINE HYDROCHLORIDE 10 MG/ML
INJECTION, SOLUTION EPIDURAL; INFILTRATION; INTRACAUDAL; PERINEURAL AS NEEDED
Status: DISCONTINUED | OUTPATIENT
Start: 2024-01-25 | End: 2024-03-22 | Stop reason: SURG

## 2024-01-25 RX ORDER — ONDANSETRON 2 MG/ML
4 INJECTION INTRAMUSCULAR; INTRAVENOUS EVERY 6 HOURS PRN
Status: DISCONTINUED | OUTPATIENT
Start: 2024-01-25 | End: 2024-01-31

## 2024-01-25 RX ORDER — QUETIAPINE FUMARATE 50 MG/1
50 TABLET, FILM COATED ORAL NIGHTLY
COMMUNITY

## 2024-01-25 RX ORDER — FLUTICASONE PROPIONATE 50 MCG
2 SPRAY, SUSPENSION (ML) NASAL DAILY
COMMUNITY

## 2024-01-25 RX ORDER — HEPARIN SODIUM 5000 [USP'U]/ML
5000 INJECTION, SOLUTION INTRAVENOUS; SUBCUTANEOUS EVERY 8 HOURS SCHEDULED
Status: DISCONTINUED | OUTPATIENT
Start: 2024-01-25 | End: 2024-01-31

## 2024-01-25 RX ADMIN — PHENYLEPHRINE HCL 100 MCG: 10 MG/ML VIAL (ML) INJECTION at 14:23:00

## 2024-01-25 RX ADMIN — NEOSTIGMINE METHYLSULFATE 2 MG: 1 INJECTION, SOLUTION INTRAVENOUS at 16:06:00

## 2024-01-25 RX ADMIN — CEFAZOLIN SODIUM/WATER 2 G: 2 G/20 ML SYRINGE (ML) INTRAVENOUS at 14:08:00

## 2024-01-25 RX ADMIN — EPHEDRINE SULFATE 5 MG: 50 INJECTION, SOLUTION INTRAVENOUS at 15:29:00

## 2024-01-25 RX ADMIN — TRANEXAMIC ACID 1000 MG: 10 INJECTION, SOLUTION INTRAVENOUS at 14:10:00

## 2024-01-25 RX ADMIN — GLYCOPYRROLATE 0.4 MG: 0.2 INJECTION, SOLUTION INTRAMUSCULAR; INTRAVENOUS at 16:06:00

## 2024-01-25 RX ADMIN — ONDANSETRON 4 MG: 2 INJECTION INTRAMUSCULAR; INTRAVENOUS at 16:07:00

## 2024-01-25 RX ADMIN — SODIUM CHLORIDE, SODIUM LACTATE, POTASSIUM CHLORIDE, CALCIUM CHLORIDE: 600; 310; 30; 20 INJECTION, SOLUTION INTRAVENOUS at 16:06:00

## 2024-01-25 RX ADMIN — PHENYLEPHRINE HCL 100 MCG: 10 MG/ML VIAL (ML) INJECTION at 15:29:00

## 2024-01-25 RX ADMIN — SODIUM CHLORIDE, SODIUM LACTATE, POTASSIUM CHLORIDE, CALCIUM CHLORIDE: 600; 310; 30; 20 INJECTION, SOLUTION INTRAVENOUS at 13:49:00

## 2024-01-25 RX ADMIN — ROCURONIUM BROMIDE 30 MG: 10 INJECTION, SOLUTION INTRAVENOUS at 13:55:00

## 2024-01-25 RX ADMIN — LIDOCAINE HYDROCHLORIDE 20 MG: 10 INJECTION, SOLUTION EPIDURAL; INFILTRATION; INTRACAUDAL; PERINEURAL at 13:55:00

## 2024-01-25 RX ADMIN — SODIUM CHLORIDE, SODIUM LACTATE, POTASSIUM CHLORIDE, CALCIUM CHLORIDE: 600; 310; 30; 20 INJECTION, SOLUTION INTRAVENOUS at 15:39:00

## 2024-01-25 RX ADMIN — PHENYLEPHRINE HCL 100 MCG: 10 MG/ML VIAL (ML) INJECTION at 14:28:00

## 2024-01-25 NOTE — ED INITIAL ASSESSMENT (HPI)
Pt brought in by [facility] d/t to unwitnessed fall at [facility]. Pt not on thinners. Pt c/o of right hip pain. Pt has shortening to right leg. Pt a&ox2 at bl. Pt walks with walker. Pt denies hitting head.

## 2024-01-25 NOTE — ED PROVIDER NOTES
Patient Seen in: [facility] Emergency Department    History     Chief Complaint   Patient presents with    Fall       HPI    History is provided by patient/independent historian: patient, EMS  89-year-old male here after an unwitnessed fall.  Patient is unsure of how he fell, but he is complaining of left-sided hip pain.  Patient reports decreased range of motion secondary to pain.  He can normally ambulate with a walker.  He is unsure if he hit his head. EMS reports accucheck was normal.    History reviewed. History reviewed. No pertinent past medical history.      History reviewed. History reviewed. No pertinent surgical history.      Home Medications reviewed :  (Not in a hospital admission)        History reviewed.   Social History     Socioeconomic History    Marital status:    Tobacco Use    Smoking status: Never    Smokeless tobacco: Never         ROS  Review of Systems   Respiratory:  Negative for shortness of breath.    Cardiovascular:  Negative for chest pain.   Musculoskeletal:  Positive for arthralgias and gait problem.   All other systems reviewed and are negative.     All other pertinent organ systems are reviewed and are negative.      Physical Exam     ED Triage Vitals [01/24/24 2117]   BP (!) 195/115   Pulse 114   Resp 20   Temp 98.2 °F (36.8 °C)   Temp src Temporal   SpO2 91 %   O2 Device None (Room air)     Vital signs reviewed.      Physical Exam  Vitals and nursing note reviewed.   Cardiovascular:      Pulses: Normal pulses.   Pulmonary:      Effort: No respiratory distress.   Abdominal:      General: There is no distension.   Musculoskeletal:      Comments: L hip externally rotated and knee in flexion - limited passive ROM 2/2 pain, pelvis stable   Neurological:      Mental Status: He is alert.         ED Course       Labs:     Labs Reviewed   BASIC METABOLIC PANEL (8) - Abnormal; Notable for the following components:       Result Value    Glucose 112 (*)     All other  components within normal limits   CBC W/ DIFFERENTIAL - Abnormal; Notable for the following components:    WBC 14.7 (*)     Neutrophil Absolute Prelim 13.06 (*)     Neutrophil Absolute 13.06 (*)     Lymphocyte Absolute 0.71 (*)     All other components within normal limits   CBC WITH DIFFERENTIAL WITH PLATELET    Narrative:     The following orders were created for panel order CBC With Differential With Platelet.                  Procedure                               Abnormality         Status                                     ---------                               -----------         ------                                     CBC W/ DIFFERENTIAL[[id number]]          Abnormal            Final result                                                 Please view results for these tests on the individual orders.   TYPE AND SCREEN    Narrative:     The following orders were created for panel order Type and screen.                  Procedure                               Abnormality         Status                                     ---------                               -----------         ------                                     ABORH (Blood Type)[[id number]]                               In process                                 Antibody Screen[[id number]]                                  In process                                                   Please view results for these tests on the individual orders.   ABORH (BLOOD TYPE)   ANTIBODY SCREEN         My EKG Interpretation: EKG    Rate, intervals and axes as noted on EKG Report.  Rate: 101  Rhythm: Sinus Rhythm with PVC  Reading: abnormal for rate, abnormal for rhythm, no acute ST changes           As reviewed and Interpreted by me      Imaging Results Available and Reviewed while in ED:   No results found.    X-RAY CHEST VIEW 1 2237 HRS.  X-RAY PELVIS AND LEFT HIP 3 VIEWS 2237 HRS.      IMPRESSION:  X-RAY CHEST:  -No evidence of acute cardiopulmonary disease  or acute traumatic injury.      X-RAY PELVIS AND LEFT HIP:  -Acute mildly displaced and moderately angulated left femoral intratrochanteric fracture.      The results were faxed/finalized only at 0007 hrs ET. If you would like to discuss this case directly please call [phone] (extension [phone]).  If you can't reach me at this number, do not leave a voicemail.  Please call [phone] ext [phone] and ask for the next available Radiologist.    Jah Olsen M.D.  This report has been electronically signed and verified by the Radiologist whose name is printed above.    DD:  01/24/2024/DT:  01/25/2024  My review and independent interpretation of  XR images: L intertrochanteric fracture. Radiology report corroborates this in addition to other details as reported by them.      Decision rules/scores evaluated: none      Diagnostic labs/tests considered but not ordered: none    ED Medications Administered:   Medications   morphINE PF 4 MG/ML injection 4 mg (4 mg Intravenous Given 1/24/24 2231)            - pt endorsed to [facility] for continuation of care - f/u CT and admit    MDM       Medical Decision Making      Differential Diagnosis: After obtaining the patient's history, performing the physical exam and reviewing the diagnostics, multiple initial diagnoses were considered based on the presenting problem including fall, fracture, dislocation, contusion, ICH    External document review: I personally reviewed available external medical records for any recent pertinent discharge summaries, testing, and procedures - the findings are as follows: none available    Complicating Factors: The patient already  has no past medical history on file. to contribute to the complexity of this ED evaluation.    Procedures performed: none    Discussed management with physician/appropriate source: Dr. Acevedo, Dr. Reese    Considered admission/deescalation of care for: fall    Social determinants of health affecting patient care:  none    Prescription medications considered: discussed continuing current medication regimen    The patient requires continuous monitoring for: fall, hip pain    Shared decision making: discussed possible admission          Disposition and Plan     Clinical Impression:  1. Closed fracture of left hip, initial encounter ([facility])        Disposition:  There is no disposition on file for this visit.    Follow-up:  No follow-up provider specified.    Medications Prescribed:  There are no discharge medications for this patient.

## 2024-01-25 NOTE — PLAN OF CARE
Alert and oriented x2. Unwitnessed fall. Left hip fracture. Purewick in place. Voiding. BP elevated. MD notified. Plan for ortho consult  Problem: Patient Centered Care  Goal: Patient preferences are identified and integrated in the patient's plan of care  Description: Interventions:  - What would you like us to know as we care for you? From [facility]  - Provide timely, complete, and accurate information to patient/family  - Incorporate patient and family knowledge, values, beliefs, and cultural backgrounds into the planning and delivery of care  - Encourage patient/family to participate in care and decision-making at the level they choose  - Honor patient and family perspectives and choices  1/25/2024 0351 by Zhou Julian RN  Outcome: Progressing  1/25/2024 0351 by Zhou Julian RN  Outcome: Progressing     Problem: Patient/Family Goals  Goal: Patient/Family Long Term Goal  Description: Patient's Long Term Goal: pain management    Interventions:  - pain administration  - See additional Care Plan goals for specific interventions  1/25/2024 0351 by Zhou Julian RN  Outcome: Progressing  1/25/2024 0351 by Zhou Julian RN  Outcome: Progressing  Goal: Patient/Family Short Term Goal  Description: Patient's Short Term Goal: calling for help out of bed    Interventions:   - safe ambulation  - See additional Care Plan goals for specific interventions  1/25/2024 0351 by Zhou Julian RN  Outcome: Progressing  1/25/2024 0351 by Zhou Julian RN  Outcome: Progressing     Problem: PAIN - ADULT  Goal: Verbalizes/displays adequate comfort level or patient's stated pain goal  Description: INTERVENTIONS:  - Encourage pt to monitor pain and request assistance  - Assess pain using appropriate pain scale  - Administer analgesics based on type and severity of pain and evaluate response  - Implement non-pharmacological measures as appropriate and evaluate response  - Consider cultural and social  influences on pain and pain management  - Manage/alleviate anxiety  - Utilize distraction and/or relaxation techniques  - Monitor for opioid side effects  - Notify MD/LIP if interventions unsuccessful or patient reports new pain  - Anticipate increased pain with activity and pre-medicate as appropriate  Outcome: Progressing     Problem: RISK FOR INFECTION - ADULT  Goal: Absence of fever/infection during anticipated neutropenic period  Description: INTERVENTIONS  - Monitor WBC  - Administer growth factors as ordered  - Implement neutropenic guidelines  Outcome: Progressing     Problem: SAFETY ADULT - FALL  Goal: Free from fall injury  Description: INTERVENTIONS:  - Assess pt frequently for physical needs  - Identify cognitive and physical deficits and behaviors that affect risk of falls.  - Moro fall precautions as indicated by assessment.  - Educate pt/family on patient safety including physical limitations  - Instruct pt to call for assistance with activity based on assessment  - Modify environment to reduce risk of injury  - Provide assistive devices as appropriate  - Consider OT/PT consult to assist with strengthening/mobility  - Encourage toileting schedule  Outcome: Progressing     Problem: DISCHARGE PLANNING  Goal: Discharge to home or other facility with appropriate resources  Description: INTERVENTIONS:  - Identify barriers to discharge w/pt and caregiver  - Include patient/family/discharge partner in discharge planning  - Arrange for needed discharge resources and transportation as appropriate  - Identify discharge learning needs (meds, wound care, etc)  - Arrange for interpreters to assist at discharge as needed  - Consider post-discharge preferences of patient/family/discharge partner  - Complete POLST form as appropriate  - Assess patient's ability to be responsible for managing their own health  - Refer to Case Management Department for coordinating discharge planning if the patient needs  post-hospital services based on physician/LIP order or complex needs related to functional status, cognitive ability or social support system  Outcome: Progressing

## 2024-01-25 NOTE — ANESTHESIA PROCEDURE NOTES
Airway  Date/Time: 1/25/2024 1:58 PM  Urgency: Elective    Airway not difficult    General Information and Staff    Patient location during procedure: OR  Anesthesiologist: Tate Goss MD  Resident/CRNA: Macy Toure CRNA  Performed: CRNA   Performed by: Macy Toure CRNA  Authorized by: Tate Goss MD      Indications and Patient Condition  Indications for airway management: anesthesia  Sedation level: deep  Preoxygenated: yes  Patient position: sniffing  Mask difficulty assessment: 1 - vent by mask    Final Airway Details  Final airway type: endotracheal airway      Successful airway: ETT  Cuffed: yes   Successful intubation technique: direct laryngoscopy  Facilitating devices/methods: intubating stylet and cricoid pressure  Endotracheal tube insertion site: oral  Blade: Joao  Blade size: #3  ETT size (mm): 7.0    Cormack-Lehane Classification: grade I - full view of glottis  Placement verified by: capnometry   Measured from: lips  ETT to lips (cm): 21  Number of attempts at approach: 1  Number of other approaches attempted: 0    Additional Comments  Preoxygenated. Atraumatic intubation on first attempt, dentition and soft tissues in preop condition.

## 2024-01-25 NOTE — OPERATIVE REPORT
Pre-Operative Diagnosis: Left femoral neck fracture     Post-Operative Diagnosis: Left femoral neck fracture     Procedure Performed:   LEFT HIP HEMIARTHROPLASTY     Surgeon(s) and Role:     * Kelvin Farrell MD - Primary     Assistant(s):  Surgical Assistant.: Damián Reina was critical in safe retraction, visualization, protection of the critical structures and all parts of the case  Surgical Findings: Comminuted left femoral neck fracture, good restoration of length and alignment and stability with cemented hemiarthroplasty     Specimen: femoral head     Estimated Blood Loss: Blood Output: 200 mL (1/25/2024  4:06 PM)     Implants  Depuy cemented summit stem size 6 Standard with centralizer  Bipolar 48OD 28ID head +1.5     Post Op Plan  WBAT  Posterior hip precautions x 12 weeks  Dont bend you hip past 90 degrees  Dont cross your legs  Dont twist your hip inwards-keep knees and toes pointed outwards  Abduction pillow while in bed  Ancef x 23hrs, then cefadroxil 500mg BID for 7 days  PT  ASA 81mg BID for dvt ppx starting POD 1    Outpatient follow up in 2 weeks with Dr. Kelvin Farrell [phone]     Indications  89yo M, previously household ambulator with a walker, baseline dementia presents with a left femoral neck fracture after a fall at his nursing home. I had a long discussion with the patient and his POA Silvestre. I discussed with them that the patient has a left femoral neck fracture. He is a household ambulator with a walker at his nursing home. I discussed with the patient and Silvestre both operative and nonoperative options. Operative intervention given the location of the fracture and his baseline dementia and household ambulation status with a walker would consist of a left hip hemiarthroplasty. I discussed with them both that the risks of this surgery include pain, infection, bleeding, scarring, damage to nearby structures, nonunion, instability, fracture, dislocation, leg length discrepancy,  blood clots, pulmonary embolism, death and heart attack. I discussed the benefits of the surgery including pain control and mobilization. I also discussed nonoperative care which would consist of prolonged immobilization. I discussed the risks of prolonged immobilization including muscle atrophy and pneumonias. I also discussed the overall mortality and morbidity rates with geriatric hip fractures. The POA Silvestre and the patient both agreed to proceed with the surgery. The patient desired pain comfort and restoration of the ability to ambulate sooner. Silvestre and the patient both understood the risks of the surgery and wished to proceed. Silvestre as the POA consented over the phone. All questions were answered. No guarantees were given.     OPERATIVE PROCEDURE:       Pt was marked and in the preoperative holding area. Consent was obtained from the POA by telephone.     The patient was brought to the operating room in supine position.  A time out was performed confirming the patient, surgery, availability of implants. The timeout was agreed upon by the entire team. Anesthesia gave antibiotics and TXA and induced general anestheisa. The patient was placed in a well-padded lateral decubitus position with axillary roll in place.  The operative hip and leg was then prepped and draped in the usual manner.     A standard posterolateral hip incision was made over the posterior third of the femur and greater trochanter. Dissection was carried down to the fasical layer. The fascia was sharply incised and the charnley retractor was placed. The bursal tissue was cleared with bovie. The piriformis was identified and it and the external rotators and capsule were taken down in one layer and tagged with 2 #2 Ethibond sutures. This brought us into the joint. The femoral neck fracture was visualized and a clean cut was made using a saw. The femoral head was removed with a cork screw. The acetabulum was cleared of any tissue that  would cause nonconcentric reduction and trial heads were placed. The measured femoral head was 47mm. A 48mm trial head had great suction and fit. This was the head size determined.  Then the femur was addressed. We used a box osteotome to open the proximal femur and then used a canal finder. We then reamed per the standard protocol for the summit stem until good cortical chatter was felt. This was at the 6-7 reamer. Then we sequentially broached until good fit was felt. Initially, there was good fit with a 4 stem. We trialed at this stage and obtained an intraoperative xray that showed slight varus alignment and undersizing of the stem. As such, we decided to dislocate and correct for the varus alignment. We removed some lateral bone using the box osteotome and broached up to good fit. This was at a 6 stem. We trialed with a 6 stem and a standard neck and had excellent stability in extension and flexion past 90 degrees and flexion, adduction and internal rotation at 90 degrees. We then obtained an intraoperative AP pelvis xray to visualize offset, length and alignment. We were happy with our intraoperative xrays.    We the dislocated the femur and removed the trial components. The canal was suctioned. We mixed cement and a cement restrictor was placed. Loose cancellous bone was debrided with a brush and we then irrigated and dried the femoral canal. Cement was injected and the final femoral component was placed with a centralizer and sat at the same level as the trial broach with the collar sitting at the calcar. The cement was allowed to cure. The final head was impacted onto a clean dry taper. The hip was relocated and the wound was copiously irrigated. The capsule was repaired through drill holes. 1g of vancomycin was placed in the wound.     Closure was performed with 0-stratafix for the fascia, 2-0 vicryl for deep dermal and 3-0 monocrylfor skin. Dermabond was applied. An aquacell dressing was applied. Final  counts were correct.      The patient was awakened from anesthesia with no apparent complications and taken to the PACU in stable condition.

## 2024-01-25 NOTE — BRIEF OP NOTE
Pre-Operative Diagnosis: Left femoral neck fracture     Post-Operative Diagnosis: Left femoral neck fracture     Procedure Performed:   LEFT HIP HEMIARTHROPLASTY    Surgeon(s) and Role:     * Kelvin Farrell MD - Primary    Assistant(s):  Surgical Assistant.: Damián Reina was critical in safe retraction, visualization, protection of the critical structures and all parts of the case  Surgical Findings: Comminuted left femoral neck fracture, good restoration of length and alignment and stability with cemented hemiarthroplasty     Specimen: femoral head     Estimated Blood Loss: Blood Output: 200 mL (1/25/2024  4:06 PM)    Implants  Depuy cemented summit stem size 6 Standard with centralizer  Bipolar 48OD 28ID head +1.5    Post Op Plan  WBAT  Posterior hip precautions x 12 weeks  Abduction pillow while in bed  Ancef x 23hrs  PT  ASA 81mg BID for dvt ppx starting POD 1  Outpatient follow up in 2 weeks with Dr. Kelvin Farrell [phone]      Kelvin Farrell MD  1/25/2024  4:14 PM

## 2024-01-25 NOTE — PROGRESS NOTES
Brief Ortho Progress Note    S: Patient seen in PACU. Resting in bed. Does not follow commands but verbalizes occasionally    O  L hip  Leg lengths equal  Abduction pillow in place  Toes oriented in appropriate direction  Wiggles toes  2+ DP pulse]  Sensory and motor exam limited due to patient baseline state  Dressings CDI    Xray pelvis shows reduced hip, appropriate lengths without evidence of fracture    A/P   89yo M now POD 0 s/p L hip hemiarthroplasty doing appropriate in PACU    WBAT  Posterior hip precautions x 12 weeks  Dont bend you hip past 90 degrees  Dont cross your legs  Dont twist your hip inwards-keep knees and toes pointed outwards    Abduction pillow while in bed  Ancef x 23hrs, then cefadroxil 500mg BID for 7 days  PT  ASA 81mg BID for dvt ppx starting POD 1  CBC POD 1     SW and CM for discharge planning and coordination    Outpatient follow up in 2 weeks with Dr. Kelvin Farrell [phone]

## 2024-01-25 NOTE — CONSULTS
[facility]    Report of Consultation    Nacho Mari Patient Status:  Inpatient    10/14/1933 MRN [id number]   Location [facility] PRE OP RECOVERY Attending Thuy Acevedo MD   Hosp Day # 0 PCP Oscar Rios MD ([facility])     Date of Admission:  2024  Date of Consult:  24   Reason for Consultation:   Left hip fracture    History of Present Illness:   Patient is a 89 year old male who was admitted to the hospital after a fall at a nursing home. He is AO x1 (only to person, not place or time) so history is obtained partially from his POA Silvestre ([phone]) and from the chart. The patient fell on his left side at his nursing home. It is uncertain how and when he fell but normally he does ambulate with a walker at his nursing home. The patient is cachetic and underweight but the POA does not know of any underlying medical issues. No medical conditions are listed in the chart either. On exam, the patient intermittently follows commands but states that only his left hip hurts. He does not endorse any pain in the other extremities. He also does not describe any paresthesias.     Past Medical History  History reviewed. No pertinent past medical history.    Past Surgical History  History reviewed. No pertinent surgical history.    Family History  No family history on file.    Social History  Social History     Socioeconomic History    Marital status:    Tobacco Use    Smoking status: Never    Smokeless tobacco: Never     Social Determinants of Health     Food Insecurity: No Food Insecurity (2024)    Food Insecurity     Food Insecurity: Never true   Transportation Needs: No Transportation Needs (2024)    Transportation Needs     Lack of Transportation: No   Housing Stability: Low Risk  (2024)    Housing Stability     Housing Instability: No           Current Medications:  Current Facility-Administered Medications   Medication Dose Route Frequency    [MAR Hold]  morphINE PF 2 MG/ML injection 2 mg  2 mg Intravenous Q4H PRN    sodium chloride 0.9% infusion   Intravenous Continuous    [MAR Hold] heparin (Porcine) 5000 UNIT/ML injection 5,000 Units  5,000 Units Subcutaneous Q8H ALLEN    [MAR Hold] acetaminophen (Tylenol Extra Strength) tab 500 mg  500 mg Oral Q4H PRN    [MAR Hold] acetaminophen (Tylenol) tab 650 mg  650 mg Oral Q4H PRN    Or    [MAR Hold] HYDROcodone-acetaminophen (Norco) 5-325 MG per tab 1 tablet  1 tablet Oral Q4H PRN    Or    [MAR Hold] HYDROcodone-acetaminophen (Norco) 5-325 MG per tab 2 tablet  2 tablet Oral Q4H PRN    [MAR Hold] morphINE PF 2 MG/ML injection 1 mg  1 mg Intravenous Q2H PRN    Or    [MAR Hold] morphINE PF 2 MG/ML injection 2 mg  2 mg Intravenous Q2H PRN    Or    [MAR Hold] morphINE PF 4 MG/ML injection 4 mg  4 mg Intravenous Q2H PRN    [MAR Hold] melatonin tab 3 mg  3 mg Oral Nightly PRN    [MAR Hold] ondansetron (Zofran) 4 MG/2ML injection 4 mg  4 mg Intravenous Q6H PRN    [MAR Hold] metoclopramide (Reglan) 5 mg/mL injection 5 mg  5 mg Intravenous Q8H PRN    [MAR Hold] fluticasone propionate (Flonase) 50 MCG/ACT nasal suspension 2 spray  2 spray Each Nare Daily    [MAR Hold] fluticasone-umeclidin-vilant (Trelegy Ellipta) 100-62.5-25 MCG/ACT inhaler 1 puff  1 puff Inhalation Daily    [MAR Hold] magnesium oxide (Mag-Ox) tab 400 mg  400 mg Oral Daily    [MAR Hold] pantoprazole (Protonix) DR tab 40 mg  40 mg Oral QAM AC    [MAR Hold] QUEtiapine (SEROquel) tab 50 mg  50 mg Oral Nightly    [MAR Hold] tamsulosin (Flomax) cap 0.4 mg  0.4 mg Oral Daily    [MAR Hold] traZODone (Desyrel) tab 50 mg  50 mg Oral Nightly    [MAR Hold] mupirocin (Bactroban) 2% nasal ointment 1 Application  1 Application Each Nare BID    hydrALAzine (Apresoline) 20 mg/mL injection 10 mg  10 mg Intravenous Q4H PRN    ceFAZolin (Ancef) 2 g in 20mL IV syringe premix  2 g Intravenous Once     Medications Prior to Admission   Medication Sig    acetaminophen 325 MG Oral Tab Take 1  tablet (325 mg total) by mouth every 6 (six) hours as needed for Pain.    magnesium oxide 400 MG Oral Tab Take 1 tablet (400 mg total) by mouth daily.    Melatonin 3 MG Oral Cap Take 1 capsule (3 mg total) by mouth daily.    pantoprazole 40 MG Oral Tab EC Take 1 tablet (40 mg total) by mouth every morning before breakfast.    fluticasone propionate 50 MCG/ACT Nasal Suspension 2 sprays by Each Nare route daily.    Polyethylene Glycol 3350 17 g Oral Powd Pack Take 17 g by mouth as needed.    QUEtiapine 50 MG Oral Tab Take 1 tablet (50 mg total) by mouth nightly.    tamsulosin 0.4 MG Oral Cap Take 1 capsule (0.4 mg total) by mouth daily.    traZODone 50 MG Oral Tab Take 1 tablet (50 mg total) by mouth nightly.    fluticasone-umeclidin-vilant (TRELEGY ELLIPTA) 100-62.5-25 MCG/ACT Inhalation Aerosol Powder, Breath Activated Inhale 1 puff into the lungs daily.       Allergies  No Known Allergies    Review of Systems:   Pertinent items are noted in HPI.    Physical Exam:   Vital Signs:  Blood pressure (!) 178/80, pulse 88, temperature 99 °F (37.2 °C), temperature source Axillary, resp. rate 20, height 5' 2\" (1.575 m), weight 93 lb 4.8 oz (42.3 kg), SpO2 92%.     General: No acute distress. Alert and oriented x 1, only to person but also situation (knows his hip hurts).  Lungs: Nonlabored breathing    Musculoskeletal:   Left leg  Left leg is shortened and externally rotated  States pain with attempted log roll  Endorse SILT s/s/sp/dp/t  EHL/FHL intact, large bunion deformity at baseline on the left first toe  GS/TA unexaminable as does not follow commands at times  2+DP pulse  Thigh is soft and compressible  No wounds about the thigh or groin  NTTP in the knee and tibia    Right leg  No pain with log roll  NTTP femur, tibia, knee, ankle  Moves his right leg freely  Bunion deformity present on the first toe    Results:     Laboratory Data:  Lab Results   Component Value Date    WBC 14.7 (H) 01/24/2024    HGB 14.0 01/24/2024     HCT 40.4 01/24/2024    .0 01/24/2024    CREATSERUM 0.93 01/24/2024    BUN 18 01/24/2024     01/24/2024    K 3.8 01/24/2024     01/24/2024    CO2 27.0 01/24/2024     (H) 01/24/2024    CA 9.3 01/24/2024         Imaging:  Xray pelvis with left femoral neck fracture with shortening and comminution, no other fractures are noted  CT left hip with comminuted femoral neck fracture with varus angulation and shortening      XR CHEST AP PORTABLE  (CPT=71045)    Result Date: 1/25/2024  CONCLUSION:   Linear atelectasis or scarring in the left midlung.  No other focal airspace disease or significant pleural effusion.  Hyperinflation/emphysema.   A preliminary report was issued by the [facility] teleradiology service. There are no major discrepancies.  elm-remote  Dictated by (CST): Brent Cho MD on 1/25/2024 at 8:18 AM     Finalized by (CST): Brent Cho MD on 1/25/2024 at 8:19 AM          CT HIP(BONE) LEFT (CPT=73700)    Result Date: 1/25/2024  CONCLUSION:  1. Acute displaced, angulated, and impacted left femoral neck fracture. 2. Small to moderate left hip joint effusion. 3. Chronic-appearing left sacroiliitis. 4. Partially imaged infrarenal abdominal aortic aneurysm, which measures at least 3.6 cm. 5. Colonic diverticulosis.  Suspected colonic anastomosis at the rectosigmoid colon. 6. Prostatomegaly. 7. Lesser incidental findings as above.   A preliminary report was issued by the [facility] teleradiology service. There are no major discrepancies.  elm-remote  Dictated by (CST): Brent Cho MD on 1/25/2024 at 7:47 AM     Finalized by (CST): Brent Cho MD on 1/25/2024 at 7:51 AM          XR HIP W OR WO PELVIS 2 OR 3 VIEWS, LEFT (CPT=73502)    Result Date: 1/25/2024  CONCLUSION:   Acute displaced, angulated, and impacted left femoral neck fracture.    A preliminary report was issued by the [facility] teleradiology service. There are no major discrepancies.   elm-remote  Dictated by (CST): Brent Cho MD on 1/25/2024 at 7:45 AM     Finalized by (CST): Brent Cho MD on 1/25/2024 at 7:47 AM          CT SPINE CERVICAL (CPT=72125)    Result Date: 1/25/2024  CONCLUSION:  1. No acute fracture or traumatic subluxation of the cervical spine. 2. Advanced multilevel cervical spine degenerative changes. 3. Mild levoscoliosis of the cervical spine. 4. Bilateral carotid bifurcation atherosclerosis.   A preliminary report was issued by the "[facility] [facility] teleradiology service. There are no major discrepancies.  elm-remote  Dictated by (CST): Brent Cho MD on 1/25/2024 at 6:28 AM     Finalized by (CST): Brent Cho MD on 1/25/2024 at 6:31 AM          CT BRAIN OR HEAD (27716)    Result Date: 1/25/2024  CONCLUSION:  1. No acute intracranial process by noncontrast CT technique. 2. Encephalomalacia at the left occipital lobe with associated ex vacuo dilation of the left lateral ventricular occipital horn. 3. Nonspecific white matter changes involving both cerebral hemispheres that most likely reflect sequelae of chronic microangiopathy. 4. Intracranial atherosclerosis.   A preliminary report was issued by the [facility] teleradiology service. There are no major discrepancies.  elm-remote  Dictated by (CST): Brent Cho MD on 1/25/2024 at 6:25 AM     Finalized by (CST): Brent Cho MD on 1/25/2024 at 6:28 AM              Impression:     89yo M, household ambulator with walker, Aox1 with baseline dementia presents with a left femoral neck fracture after an unwitnessed fall at his nursing home    -Plan for left hip hemiarthroplasty  -Keep NPO  -NWB LLE    -Consent obtained from TYESHA Hammond ([phone])    PARHEBER  I had a long discussion with the patient and his POA Silvestre. I discussed with them that the patient has a left femoral neck fracture. He is a household ambulator with a walker at his nursing home. I discussed with the patient and Silvestre  both operative and nonoperative options. Operative intervention given the location of the fracture and his baseline dementia and household ambulation status with a walker would consist of a left hip hemiarthroplasty. I discussed with them both that the risks of this surgery include pain, infection, bleeding, scarring, damage to nearby structures, nonunion, instability, fracture, dislocation, leg length discrepancy, blood clots, pulmonary embolism, death and heart attack. I discussed the benefits of the surgery including pain control and mobilization. I also discussed nonoperative care which would consist of prolonged immobilization. I discussed the risks of prolonged immobilization including muscle atrophy and pneumonias. I also discussed the overall mortality and morbidity rates with geriatric hip fractures. The POA Silvestre and the patient both agreed to proceed with the surgery. The patient desired pain comfort and restoration of the ability to ambulate sooner. Silvestre and the patient both understood the risks of the surgery and wished to proceed. Silvestre as the POA consented over the phone. All questions were answered. No guarantees were given.              Kelvin Farrell MD  1/25/2024

## 2024-01-25 NOTE — ED QUICK NOTES
Orders for admission, patient is aware of plan and ready to go upstairs. Any questions, please call ED RN Lala at extension [phone].     Patient Covid vaccination status: Unvaccinated     COVID Test Ordered in ED: None    COVID Suspicion at Admission: N/A    Running Infusions:  None    Mental Status/LOC at time of transport: AOx2    Other pertinent information: Fall risk, left hip pain with any movement.   CIWA score: N/A   NIH score:  N/A

## 2024-01-25 NOTE — H&P
[facility]    History & Physical    Nacho Mari Patient Status:  Inpatient    10/14/1933 MRN [id number]   Location [facility] 4W/SW/SE Attending Thuy Acevedo MD   Hosp Day # 0 PCP Oscar Rios MD ([facility])     Date:  2024  Date of Admission:  2024    History of Present Illness:  Nacho Mari is a(n) 89 year old male sent to ER from memory care unit to status post unwitnessed fall.  Patient complaining of left-sided hip pain.  Patient is unsure if he hit his head.  No reported chest pain, shortness of breath, nausea, vomiting      History:  History reviewed. No pertinent past medical history.  History reviewed. No pertinent surgical history.  No family history on file.   reports that he has never smoked. He has never used smokeless tobacco.    Allergies:  No Known Allergies    Home Medications:  Medications Prior to Admission   Medication Sig Dispense Refill Last Dose    acetaminophen 325 MG Oral Tab Take 1 tablet (325 mg total) by mouth every 6 (six) hours as needed for Pain.       magnesium oxide 400 MG Oral Tab Take 1 tablet (400 mg total) by mouth daily.       Melatonin 3 MG Oral Cap Take 1 capsule (3 mg total) by mouth daily.       pantoprazole 40 MG Oral Tab EC Take 1 tablet (40 mg total) by mouth every morning before breakfast.       fluticasone propionate 50 MCG/ACT Nasal Suspension 2 sprays by Each Nare route daily.       Polyethylene Glycol 3350 17 g Oral Powd Pack Take 17 g by mouth as needed.       QUEtiapine 50 MG Oral Tab Take 1 tablet (50 mg total) by mouth nightly.       tamsulosin 0.4 MG Oral Cap Take 1 capsule (0.4 mg total) by mouth daily.       traZODone 50 MG Oral Tab Take 1 tablet (50 mg total) by mouth nightly.       fluticasone-umeclidin-vilant (TRELEGY ELLIPTA) 100-62.5-25 MCG/ACT Inhalation Aerosol Powder, Breath Activated Inhale 1 puff into the lungs daily.          Review of Systems:  Constitutional: negative for fatigue and  fevers  Eyes: negative  Ears, nose, mouth, throat, and face: negative  Respiratory: negative for cough, dyspnea on exertion, hemoptysis, sputum and wheezing  Cardiovascular: negative for chest pain, dyspnea, orthopnea, palpitations and syncope  Gastrointestinal: negative for abdominal pain, change in bowel habits, constipation, diarrhea and vomiting.  Musculoskeletal: negative for back pain, neck pain and stiff joints  Neurological: negative for dizziness, headaches, tremors and weakness  Endocrine: negative  Allergic/Immunologic: negative    Physical Exam:  BP (!) 152/98 (BP Location: Right arm)   Pulse 90   Temp 97.8 °F (36.6 °C) (Oral)   Resp 20   Ht 5' 2\" (1.575 m)   Wt 93 lb 4.8 oz (42.3 kg)   SpO2 100%   BMI 17.06 kg/m²     General Appearance:  Alert, Awake, not in acute distress.  HEENT: PERRLA  Neck: Supple, no carotid bruit or JVD  Lungs: Clear to auscultation bilaterally, No wheezes, crackles  Heart: Regular rate and rhythm, S1 and S2 normal, no murmur, rub or gallop  Abdomen: Soft, non-tender, non distended, Bs+, no organomegaly   Extremities: No cyanosis, edema   Pulses: 2+ and symmetric all extremities  Neurologic: Intact motor and sensory. AAOX3   Back: Symmetric, Normal ROM, no CVA tenderness    Laboratory Data:   Lab Results   Component Value Date    WBC 14.7 01/24/2024    HGB 14.0 01/24/2024    HCT 40.4 01/24/2024    .0 01/24/2024    CREATSERUM 0.93 01/24/2024    BUN 18 01/24/2024     01/24/2024    K 3.8 01/24/2024     01/24/2024    CO2 27.0 01/24/2024     01/24/2024    CA 9.3 01/24/2024       Imaging:  Reviewed     Assessment:  Patient Active Problem List   Diagnosis    Hip fracture (HCC)    Closed fracture of left hip, initial encounter ([facility])     1.  Acute displaced angulated and impacted left femoral neck fracture  2.  Unwitnessed fall, CT head unremarkable for acute changes  3.  History of dementia  4.  BPH      Plan:  Patient admitted to medical floor  Pain  control  Ortho consulted, plan for left hip hemiarthroplasty  N.p.o.  Nonweightbearing left lower extremity  DVT prophylaxis  Resume other home medication  CODE STATUS: Full code  Discussed with nursing  Med rec done      **Certification      PHYSICIAN Certification of Need for Inpatient Hospitalization - Initial Certification    Patient will require inpatient services that will reasonably be expected to span two midnight's based on the clinical documentation in H+P.   Based on patients current state of illness, I anticipate that, after discharge, patient will require TBD.      Thuy Acevedo MD  1/25/2024  6:55 AM

## 2024-01-25 NOTE — ANESTHESIA PREPROCEDURE EVALUATION
Anesthesia PreOp Note    HPI:     Nacho Mari is a 89 year old male who presents for preoperative consultation requested by: Kelvin Farrell MD    Date of Surgery: 1/25/2024    Procedure(s):  LEFT HIP HEMIARTHROPLASTY  Indication: Hip fracture (HCC) [S72.009A]    Relevant Problems   No relevant active problems       NPO:  Last Liquid Consumption Date: 01/24/24  Last Liquid Consumption Time: 1900  Last Solid Consumption Date: 01/24/24  Last Solid Consumption Time: 1900  Last Liquid Consumption Date: 01/24/24          History Review:  Patient Active Problem List    Diagnosis Date Noted    Hip fracture ([facility]) 01/24/2024    Closed fracture of left hip, initial encounter ([facility]) 01/24/2024       History reviewed. No pertinent past medical history.    History reviewed. No pertinent surgical history.    Medications Prior to Admission   Medication Sig Dispense Refill Last Dose    acetaminophen 325 MG Oral Tab Take 1 tablet (325 mg total) by mouth every 6 (six) hours as needed for Pain.       magnesium oxide 400 MG Oral Tab Take 1 tablet (400 mg total) by mouth daily.       Melatonin 3 MG Oral Cap Take 1 capsule (3 mg total) by mouth daily.       pantoprazole 40 MG Oral Tab EC Take 1 tablet (40 mg total) by mouth every morning before breakfast.       fluticasone propionate 50 MCG/ACT Nasal Suspension 2 sprays by Each Nare route daily.       Polyethylene Glycol 3350 17 g Oral Powd Pack Take 17 g by mouth as needed.       QUEtiapine 50 MG Oral Tab Take 1 tablet (50 mg total) by mouth nightly.       tamsulosin 0.4 MG Oral Cap Take 1 capsule (0.4 mg total) by mouth daily.       traZODone 50 MG Oral Tab Take 1 tablet (50 mg total) by mouth nightly.       fluticasone-umeclidin-vilant (TRELEGY ELLIPTA) 100-62.5-25 MCG/ACT Inhalation Aerosol Powder, Breath Activated Inhale 1 puff into the lungs daily.        Current Facility-Administered Medications Ordered in Epic   Medication Dose Route Frequency Provider Last Rate Last Admin     morphINE PF 2 MG/ML injection 2 mg  2 mg Intravenous Q4H PRN Thuy Acevedo MD        sodium chloride 0.9% infusion   Intravenous Continuous Thuy Acevedo  mL/hr at 01/25/24 0843 New Bag at 01/25/24 0843    heparin (Porcine) 5000 UNIT/ML injection 5,000 Units  5,000 Units Subcutaneous Q8H ALLEN Thuy Acevedo MD        acetaminophen (Tylenol Extra Strength) tab 500 mg  500 mg Oral Q4H PRN Thuy Acevedo MD        acetaminophen (Tylenol) tab 650 mg  650 mg Oral Q4H PRN Thuy Acevedo MD        Or    HYDROcodone-acetaminophen (Norco) 5-325 MG per tab 1 tablet  1 tablet Oral Q4H PRN Thuy Acevedo MD   1 tablet at 01/25/24 0846    Or    HYDROcodone-acetaminophen (Norco) 5-325 MG per tab 2 tablet  2 tablet Oral Q4H PRN Thuy Acevedo MD        morphINE PF 2 MG/ML injection 1 mg  1 mg Intravenous Q2H PRN Thuy Acevedo MD        Or    morphINE PF 2 MG/ML injection 2 mg  2 mg Intravenous Q2H PRN Thuy Acevedo MD   2 mg at 01/25/24 0949    Or    morphINE PF 4 MG/ML injection 4 mg  4 mg Intravenous Q2H PRN Thuy Acevedo MD        melatonin tab 3 mg  3 mg Oral Nightly PRN Thuy Acevedo MD        ondansetron (Zofran) 4 MG/2ML injection 4 mg  4 mg Intravenous Q6H PRN Thuy Acevedo MD        metoclopramide (Reglan) 5 mg/mL injection 5 mg  5 mg Intravenous Q8H PRN Thuy Acevedo MD        fluticasone propionate (Flonase) 50 MCG/ACT nasal suspension 2 spray  2 spray Each Nare Daily Thuy Acevedo MD        fluticasone-umeclidin-vilant (Trelegy Ellipta) 100-62.5-25 MCG/ACT inhaler 1 puff  1 puff Inhalation Daily Thuy Acevedo MD        magnesium oxide (Mag-Ox) tab 400 mg  400 mg Oral Daily Thuy Acevedo MD        pantoprazole (Protonix) DR tab 40 mg  40 mg Oral QAM AC Thuy Acevedo MD   40 mg at 01/25/24 0952    QUEtiapine (SEROquel) tab 50 mg  50 mg Oral Nightly Thuy Acevedo MD        tamsulosin (Flomax) cap 0.4 mg  0.4 mg Oral Daily Thuy Acevedo MD        traZODone (Desyrel) tab 50 mg  50 mg Oral Nightly  Thuy Acevedo MD        mupirocin (Bactroban) 2% nasal ointment 1 Application  1 Application Each Nare BID Kelvin Farrell MD   1 Application at 01/25/24 0952     No current Epic-ordered outpatient medications on file.       No Known Allergies    No family history on file.  Social History     Socioeconomic History    Marital status:    Tobacco Use    Smoking status: Never    Smokeless tobacco: Never       Available pre-op labs reviewed.  Lab Results   Component Value Date    WBC 14.7 (H) 01/24/2024    RBC 4.59 01/24/2024    HGB 14.0 01/24/2024    HCT 40.4 01/24/2024    MCV 88.0 01/24/2024    MCH 30.5 01/24/2024    MCHC 34.7 01/24/2024    RDW 13.2 01/24/2024    .0 01/24/2024     Lab Results   Component Value Date     01/24/2024    K 3.8 01/24/2024     01/24/2024    CO2 27.0 01/24/2024    BUN 18 01/24/2024    CREATSERUM 0.93 01/24/2024     (H) 01/24/2024    CA 9.3 01/24/2024          Vital Signs:  Body mass index is 17.06 kg/m².   height is 1.575 m (5' 2\") and weight is 42.3 kg (93 lb 4.8 oz). His oral temperature is 97.8 °F (36.6 °C). His blood pressure is 152/98 (abnormal) and his pulse is 90. His respiration is 20 and oxygen saturation is 100%.   Vitals:    01/25/24 0250 01/25/24 0255 01/25/24 0323 01/25/24 0435   BP: (!) 197/113  (!) 171/94 (!) 152/98   Pulse: 119   90   Resp: 20   20   Temp: 97.7 °F (36.5 °C)   97.8 °F (36.6 °C)   TempSrc: Tympanic   Oral   SpO2: 100%      Weight:  42.3 kg (93 lb 4.8 oz)     Height:  1.575 m (5' 2\")          Anesthesia Evaluation     Patient summary reviewed and Nursing notes reviewed    Airway   Mallampati: III  TM distance: >3 FB  Neck ROM: limited  Dental    (+) lower dentures and upper dentures        Pulmonary - negative ROS and normal exam    breath sounds clear to auscultation  Cardiovascular - normal exam  (+) hypertension    ECG reviewed  Rhythm: regular  Rate: normal  ROS comment: Sinus tachycardia with occasional Premature ventricular  complexes   Right bundle branch block   Left anterior fascicular block       Neuro/Psych - negative ROS     GI/Hepatic/Renal - negative ROS     Endo/Other - negative ROS   Abdominal                  Anesthesia Plan:   ASA:  3  Plan:   General  Airway:  ETT  Post-op Pain Management: IV analgesics  Informed Consent Plan and Risks Discussed With:  Healthcare power of  and legal guardian  Discussed plan with:  CRNA      I have informed Nacho YOHAN Anunciacion and/or legal guardian or family member of the nature of the anesthetic plan, benefits, risks including possible dental damage if relevant, major complications, and any alternative forms of anesthetic management.   All of the patient's questions were answered to the best of my ability. The patient desires the anesthetic management as planned.  DEXTER MAX MD  1/25/2024 11:06 AM  Present on Admission:  **None**

## 2024-01-25 NOTE — ED QUICK NOTES
This writer called [facility], spoke with nurse Zana whom was made aware of patient admission to inpatient unit.     This writer also contacted Silvestre Hammond listed as nephew and POA. No answer at [phone], voicemail left.

## 2024-01-25 NOTE — ED QUICK NOTES
Pt to ct with this RN and tech. Patient returned to room. Reconnected to monitor, purwick and o2.

## 2024-01-26 LAB
ANION GAP SERPL CALC-SCNC: 8 MMOL/L (ref 0–18)
BASOPHILS # BLD AUTO: 0.04 X10(3) UL (ref 0–0.2)
BASOPHILS NFR BLD AUTO: 0.3 %
BUN BLD-MCNC: 17 MG/DL (ref 9–23)
BUN/CREAT SERPL: 17 (ref 10–20)
CALCIUM BLD-MCNC: 8.4 MG/DL (ref 8.7–10.4)
CHLORIDE SERPL-SCNC: 106 MMOL/L (ref 98–112)
CO2 SERPL-SCNC: 23 MMOL/L (ref 21–32)
CREAT BLD-MCNC: 1 MG/DL
DEPRECATED RDW RBC AUTO: 45.4 FL (ref 35.1–46.3)
EGFRCR SERPLBLD CKD-EPI 2021: 71 ML/MIN/1.73M2 (ref 60–?)
EOSINOPHIL # BLD AUTO: 0.15 X10(3) UL (ref 0–0.7)
EOSINOPHIL NFR BLD AUTO: 1.3 %
ERYTHROCYTE [DISTWIDTH] IN BLOOD BY AUTOMATED COUNT: 13.3 % (ref 11–15)
GLUCOSE BLD-MCNC: 87 MG/DL (ref 70–99)
HCT VFR BLD AUTO: 33.8 %
HGB BLD-MCNC: 11.1 G/DL
IMM GRANULOCYTES # BLD AUTO: 0.04 X10(3) UL (ref 0–1)
IMM GRANULOCYTES NFR BLD: 0.3 %
LYMPHOCYTES # BLD AUTO: 0.5 X10(3) UL (ref 1–4)
LYMPHOCYTES NFR BLD AUTO: 4.3 %
MCH RBC QN AUTO: 30.5 PG (ref 26–34)
MCHC RBC AUTO-ENTMCNC: 32.8 G/DL (ref 31–37)
MCV RBC AUTO: 92.9 FL
MONOCYTES # BLD AUTO: 1.01 X10(3) UL (ref 0.1–1)
MONOCYTES NFR BLD AUTO: 8.8 %
NEUTROPHILS # BLD AUTO: 9.78 X10 (3) UL (ref 1.5–7.7)
NEUTROPHILS # BLD AUTO: 9.78 X10(3) UL (ref 1.5–7.7)
NEUTROPHILS NFR BLD AUTO: 85 %
OSMOLALITY SERPL CALC.SUM OF ELEC: 285 MOSM/KG (ref 275–295)
PLATELET # BLD AUTO: 153 10(3)UL (ref 150–450)
POTASSIUM SERPL-SCNC: 4.6 MMOL/L (ref 3.5–5.1)
POTASSIUM SERPL-SCNC: 4.6 MMOL/L (ref 3.5–5.1)
RBC # BLD AUTO: 3.64 X10(6)UL
SODIUM SERPL-SCNC: 137 MMOL/L (ref 136–145)
WBC # BLD AUTO: 11.5 X10(3) UL (ref 4–11)

## 2024-01-26 PROCEDURE — 80048 BASIC METABOLIC PNL TOTAL CA: CPT | Performed by: INTERNAL MEDICINE

## 2024-01-26 PROCEDURE — 97165 OT EVAL LOW COMPLEX 30 MIN: CPT

## 2024-01-26 PROCEDURE — 97161 PT EVAL LOW COMPLEX 20 MIN: CPT

## 2024-01-26 PROCEDURE — 85025 COMPLETE CBC W/AUTO DIFF WBC: CPT | Performed by: INTERNAL MEDICINE

## 2024-01-26 PROCEDURE — 97535 SELF CARE MNGMENT TRAINING: CPT

## 2024-01-26 PROCEDURE — 84132 ASSAY OF SERUM POTASSIUM: CPT | Performed by: STUDENT IN AN ORGANIZED HEALTH CARE EDUCATION/TRAINING PROGRAM

## 2024-01-26 PROCEDURE — 92610 EVALUATE SWALLOWING FUNCTION: CPT

## 2024-01-26 PROCEDURE — 97530 THERAPEUTIC ACTIVITIES: CPT

## 2024-01-26 NOTE — DIETARY NOTE
ADULT NUTRITION INITIAL ASSESSMENT    Pt is at moderate nutrition risk.  Pt meets moderate malnutrition criteria.      CRITERIA FOR MALNUTRITION DIAGNOSIS:  Criteria for non-severe malnutrition diagnosis: chronic illness related to body fat mild depletion and muscle mass mild depletion.    RECOMMENDATIONS TO MD: See Nutrition Intervention    ADMITTING DIAGNOSIS:  Closed fracture of left hip, initial encounter ([facility]) [S72.002A]    PERTINENT PAST MEDICAL HISTORY:  has no past medical history on file.     PATIENT STATUS: Initial 01/26/24: seeing pt due to low BMI. Pt is POD #1 L hip hemiarthroplasty. Is from a memory care unit.  Pt is having \"trust\" issues today per RN. When I entered the room and introduced myself he said he doesn't trust me. I said you dont know me. He said that's why I dont trust you. He answered a few of my questions, but they did not seem reliable.  [facility]ed [facility] and staff there told me that he is a good eater. [facility]--regular liquids. The last 2 months wts have been 111#--question accuracy of our wt of 93.3#--request reweigh. Pt looks thin with at least mild muscle and fat deficits--unable to do NFPE was worried of startling pt due to his trust issues. Later pt allowed SLP to see and rec mech soft bite sized with thin liquids--awaiting order.        FOOD/NUTRITION RELATED HISTORY:  Appetite: NPO  Intake: NPO  Intake Meeting Needs: NPO  Percent Meals Eaten (last 6 days)       None           Food Allergies: No Known Food Allergies (NKFA)  Cultural/Ethnic/Nondenominational Preferences: Not Obtained    GASTROINTESTINAL:  NPO at this time--SLP to see for safest diet--completed.     MEDICATIONS: reviewed   heparin  5,000 Units Subcutaneous Q8H ALLEN    fluticasone propionate  2 spray Each Nare Daily    fluticasone-umeclidin-vilant  1 puff Inhalation Daily    magnesium oxide  400 mg Oral Daily    pantoprazole  40 mg Oral QAM AC    QUEtiapine  50 mg Oral Nightly    tamsulosin  0.4 mg  Oral Daily    traZODone  50 mg Oral Nightly    ceFAZolin  2 g Intravenous Q8H    aspirin  81 mg Oral BID       LABS: reviewed  Recent Labs     01/24/24  2229 01/26/24  0435   * 87   BUN 18 17   CREATSERUM 0.93 1.00   CA 9.3 8.4*    137   K 3.8 4.6  4.6    106   CO2 27.0 23.0   OSMOCALC 285 285       NUTRITION RELATED PHYSICAL FINDINGS:  - Nutrition Focused Physical Exam (NFPE):  unable to complete, but visually at least mild muscle and fat deficits--complete NFPE at follow up visit if pt still here and willing to participate.      - Fluid Accumulation: none  see RN documentation for details  - Skin Integrity: at risk and surgical wound(s) see RN documentation for details    ANTHROPOMETRICS:  HT: 157.5 cm (5' 2\")  WT: 42.3 kg (93 lb 4.8 oz) --question accuracy, was 111# at the beginning of the month at [facility] and they report he eats well.  Use 110# as actual until current wt obtained.    BMI: Body mass index is 20.12 kg/m².  BMI CLASSIFICATION: <22 considered underweight for advanced age  IBW: 118 lbs        93% IBW  Usual Body Wt: seen by an RD at another facility in 5/2023 and 114# UBW obtained by them      96% UBW    WEIGHT HISTORY:  Patient Weight(s) for the past 336 hrs:   Weight   01/25/24 0255 42.3 kg (93 lb 4.8 oz)     Wt Readings from Last 10 Encounters:   01/25/24 42.3 kg (93 lb 4.8 oz)   12/1/23 111#  1/1/24 111#  8/11/23 100.4#  8/6/23 98.5#     NUTRITION DIAGNOSIS/PROBLEM:    Moderate Malnutrition related to Chronic - Physiological causes increasing nutrient needs due to illness or condition chronic illness as evidenced by BMI less than 22 in elderly adult, no po intake since admission post op and at least mild muscle and fat deficits.      NUTRITION INTERVENTION:     NUTRITION PRESCRIPTION:   Estimated Nutrition needs: --dosing wt of 50kg - recent wt at [facility]  Calories: 7394-0012 calories/day (30-35 calories per kg  recent wt at [facility] )  Protein: 65-75 g protein/day  (1.3-1.5g protein/kg recent wt at [facility])    - Diet:       Procedures    NPO      - RD Malnutrition Care Plan:  monitor diet initiation and provide ONS as appropriate to meet nutritional needs.    - Meals and snacks: NPO  - Medical Food Supplements-RD added Ensure Compact (220 calories/ 9 g protein each) Vanilla Daily and Chocolate Daily--will start once diet initiated.  Rational/use of oral supplements discussed.  - Vitamin and mineral supplements: NPO  - Feeding assistance: NPO  - Nutrition education: not appropriate   - Coordination of nutrition care: collaboration with other providers  - Discharge and transfer of nutrition care to new setting or provider: to be determined    MONITOR AND EVALUATE/NUTRITION GOALS:  - Food and Nutrient Intake:      Monitor: for PO initiation and for PO diet advancement  - Food and Nutrient Administration:      Monitor: N/A  - Anthropometric Measurement:    Monitor weight  - Nutrition Goals:      gradual wt gain as able, promote healing, and diet initiation ASAP and initiate ONS.      DIETITIAN FOLLOW UP: RD to follow and monitor nutrition status      Lorraine Marvin RD, LDN   Clinical Dietitian [phone]

## 2024-01-26 NOTE — SLP NOTE
ADULT SWALLOWING EVALUATION    ASSESSMENT    ASSESSMENT/OVERALL IMPRESSION:  PPE REQUIRED. THIS THERAPIST WORE GLOVES AND MASK FOR DURATION OF EVALUATION. HANDS WASHED UPON ENTRANCE/EXIT.    Per MD H&P, \"Nacho Mari is a(n) 89 year old male sent to ER from memory care unit to status post unwitnessed fall.  Patient complaining of left-sided hip pain. Patient is unsure if he hit his head. No reported chest pain, shortness of breath, nausea, vomiting\"    SLP BSSE orders received and acknowledged. A swallow evaluation warranted per RN dysphagia screen. Pt afebrile with clear vocal quality, on room air, with oxygen saturation at 95%. Pt with no hx of dysphagia at [facility]. No facility paperwork to confirm current diet. Per nutrition note, pt on mechanical soft/thin liquids.   Pt positioned 90 degrees in bedside chair, alert/cooperative. Pt with no complaints of pain. Oral motor skills within functional limits. Pt presented with trials of hard solids and thin liquids via straw. Pt with adequate oral acceptance and bilabial seal across all trials. Pt with intact bite, reduced/prolonged mastication of hard solids, and delayed A-P transit. Pt's swallow response appears delayed with reduced hyolaryngeal elevation/excursion. No clinical signs of aspiration (e.g., immediate/delayed throat clear, immediate/delayed cough, wet vocal quality, increased O2 effort) observed across all trials. 1/24 CXR indicates \"Linear atelectasis or scarring in the left midlung.  No other focal airspace disease or significant pleural effusion. Hyperinflation/emphysema\". Oxygen status remained stable t/o the entire evaluation.     At this time, pt presents with mild oral dysphagia and probable pharyngeal dysfunction. Recommend a soft bite sized diet and thin liquids with strict adherence to safe swallowing compensatory strategies. Results and recommendations reviewed with RN, pt. Pt unable to v/u to all results/recommendations, no family present.  Recommendations remain written on whiteboard. SLP collaborated with RN for MD diet orders.     PLAN: SLP to f/u x1-2 meal assessments, monitor imaging, and VFSS if clinically indicated         RECOMMENDATIONS   Diet Recommendations - Solids: Mechanical soft chopped/ Soft & Bite Sized  Diet Recommendations - Liquids: Thin Liquids                        Compensatory Strategies Recommended: Slow rate;Small bites and sips  Aspiration Precautions: Upright position;Slow rate  Medication Administration Recommendations:  (as tolerated)  Treatment Plan/Recommendations: Aspiration precautions  Discharge Recommendations/Plan: Undetermined    HISTORY   MEDICAL HISTORY  Reason for Referral: RN dysphagia screen    Problem List  Principal Problem:    Closed fracture of left hip, initial encounter ([facility])  Active Problems:    Hip fracture ([facility])      Past Medical History  History reviewed. No pertinent past medical history.    Prior Living Situation: Skilled nursing facility (memory care)  Diet Prior to Admission: Mechanical soft chopped/ Soft & Bite Sized;Mechanical soft ground/ Minced & Moist;Thin liquids  Precautions: Aspiration    Patient/Family Goals: did not state    SWALLOWING HISTORY  Current Diet Consistency: NPO  Dysphagia History: 4/12/23 [facility], [facility] with recommendations for regular/thin liquids  Imaging Results: 1/24 CT BRAIN  CONCLUSION:   1. No acute intracranial process by noncontrast CT technique.   2. Encephalomalacia at the left occipital lobe with associated ex vacuo dilation of the left lateral ventricular occipital horn.   3. Nonspecific white matter changes involving both cerebral hemispheres that most likely reflect sequelae of chronic microangiopathy.   4. Intracranial atherosclerosis.         SUBJECTIVE       OBJECTIVE   ORAL MOTOR EXAMINATION  Dentition: Natural (sparse)  Symmetry: Within Functional Limits  Strength: Within Functional Limits     Range of Motion: Within Functional Limits  Rate of  Motion: Unable to assess    Voice Quality: Clear  Respiratory Status: Unlabored  Consistencies Trialed: Thin liquids;Hard solid  Method of Presentation: Self presentation;Staff/Clinician assistance;Straw  Patient Positioning: Upright;Midline    Oral Phase of Swallow: Impaired  Bolus Retrieval: Intact  Bilabial Seal: Intact  Bolus Formation: Impaired  Bolus Propulsion: Impaired  Mastication: Impaired  Retention: Intact    Pharyngeal Phase of Swallow: Impaired  Laryngeal Elevation Timing: Appears impaired  Laryngeal Elevation Strength: Appears impaired  Laryngeal Elevation Coordination: Appears intact  (Please note: Silent aspiration cannot be evaluated clinically. Videofluoroscopic Swallow Study is required to rule-out silent aspiration.)    Esophageal Phase of Swallow: No complaints consistent with possible esophageal involvement  Comments: NA              GOALS  Goal #1 The patient will tolerate soft bite sized consistency and thin liquids without overt signs or symptoms of aspiration with 100 % accuracy over 1-2 session(s).  In Progress   Goal #2 The patient/family/caregiver will demonstrate understanding and implementation of aspiration precautions and swallow strategies independently over 1-2 session(s).    In Progress   Goal #3 The patient will utilize compensatory strategies as outlined by  BSSE (clinical evaluation) including Slow rate, Small bites, Small sips, Upright 90 degrees, Eliminate distractions with PRN assistance 100 % of the time across 1-2 sessions.  In Progress     FOLLOW UP  Treatment Plan/Recommendations: Aspiration precautions  Number of Visits to Meet Established Goals:  (1-2)  Follow Up Needed (Documentation Required): Yes  SLP Follow-up Date: 01/27/24    Thank you for your referral.   If you have any questions, please contact ИВАН Szymanski M.S. CCC-SLP  Speech Language Pathologist  Phone Number [phone]

## 2024-01-26 NOTE — PAYOR COMM NOTE
--------------  ADMISSION REVIEW     Payor: [employer]  Subscriber #:  [id number]  Authorization Number: [id number]    Admit date: 1/25/24  Admit time:  2:45 AM       REVIEW DOCUMENTATION:     ED Provider Notes        ED Provider Notes signed by Freedom Blackman MD at 1/24/2024 11:14 PM       Author: Freedom Blackman MD Service: -- Author Type: Physician    Filed: 1/24/2024 11:14 PM Date of Service: 1/24/2024 10:01 PM Status: Signed    : Freedom Blackman MD (Physician)           Patient Seen in: [facility] Emergency Department    History     Chief Complaint   Patient presents with    Fall       HPI    History is provided by patient/independent historian: patient, EMS  89-year-old male here after an unwitnessed fall.  Patient is unsure of how he fell, but he is complaining of left-sided hip pain.  Patient reports decreased range of motion secondary to pain.  He can normally ambulate with a walker.  He is unsure if he hit his head. EMS reports accucheck was normal.      Physical Exam     ED Triage Vitals [01/24/24 2117]   BP (!) 195/115   Pulse 114   Resp 20   Temp 98.2 °F (36.8 °C)   Temp src Temporal   SpO2 91 %   O2 Device None (Room air)     Vital signs reviewed.      Physical Exam  Vitals and nursing note reviewed.   Cardiovascular:      Pulses: Normal pulses.   Pulmonary:      Effort: No respiratory distress.   Abdominal:      General: There is no distension.   Musculoskeletal:      Comments: L hip externally rotated and knee in flexion - limited passive ROM 2/2 pain, pelvis stable   Neurological:      Mental Status: He is alert.       Labs Reviewed   BASIC METABOLIC PANEL (8) - Abnormal; Notable for the following components:       Result Value    Glucose 112 (*)     All other components within normal limits   CBC W/ DIFFERENTIAL - Abnormal; Notable for the following components:    WBC 14.7 (*)     Neutrophil Absolute Prelim 13.06 (*)     Neutrophil Absolute 13.06 (*)     Lymphocyte  Absolute 0.71 (*)     All other components within normal limits       My EKG Interpretation: EKG    Rate, intervals and axes as noted on EKG Report.  Rate: 101  Rhythm: Sinus Rhythm with PVC  Reading: abnormal for rate, abnormal for rhythm, no acute ST changes    X-RAY CHEST VIEW 1 2237 HRS.  X-RAY PELVIS AND LEFT HIP 3 VIEWS 2237 HRS.      IMPRESSION:  X-RAY CHEST:  -No evidence of acute cardiopulmonary disease or acute traumatic injury.      X-RAY PELVIS AND LEFT HIP:  -Acute mildly displaced and moderately angulated left femoral intratrochanteric fracture.    ED Medications Administered:   Medications   morphINE PF 4 MG/ML injection 4 mg (4 mg Intravenous Given 24)             Disposition and Plan     Clinical Impression:  1. Closed fracture of left hip, initial encounter ([facility])          Signed by Freedom Blackman MD on 2024 11:14 PM            H&P - H&P Note        H&P signed by Thuy Acevedo MD at 2024  5:05 PM       Author: Thuy Acevedo MD Service: Internal Medicine Author Type: Physician    Filed: 2024  5:05 PM Date of Service: 2024  6:55 AM Status: Signed    : Thuy Acevedo MD (Physician)         [facility]    History & Physical    Nacho Mari Patient Status:  Inpatient    10/14/1933 MRN [id number]   Location [facility] 4W//SE Attending Thuy Acevedo MD   Hosp Day # 0 PCP Oscar Rios MD ([facility])     Date:  2024  Date of Admission:  2024    History of Present Illness:  Nacho Mari is a(n) 89 year old male sent to ER from memory care unit to status post unwitnessed fall.  Patient complaining of left-sided hip pain.  Patient is unsure if he hit his head.  No reported chest pain, shortness of breath, nausea, vomiting      Assessment:  Patient Active Problem List   Diagnosis    Hip fracture (HCC)    Closed fracture of left hip, initial encounter ([facility])     1.  Acute displaced angulated and impacted left femoral neck  fracture  2.  Unwitnessed fall, CT head unremarkable for acute changes  3.  History of dementia  4.  BPH      Plan:  Patient admitted to medical floor  Pain control  Ortho consulted, plan for left hip hemiarthroplasty  N.p.o.  Nonweightbearing left lower extremity  DVT prophylaxis  Resume other home medication  CODE STATUS: Full code  Discussed with nursing  Med rec done      Thuy Acevedo MD  1/25/2024  6:55 AM      Electronically signed by Thuy Acevedo MD on 1/25/2024  5:05 PM         ORTHO CONSULT      Date of Admission:  1/24/2024  Date of Consult:  1/25/24             Reason for Consultation:   Left hip fracture     History of Present Illness:   Patient is a 89 year old male who was admitted to the hospital after a fall at a nursing home. He is AO x1 (only to person, not place or time) so history is obtained partially from his POA Silvestre ([phone]) and from the chart. The patient fell on his left side at his nursing home. It is uncertain how and when he fell but normally he does ambulate with a walker at his nursing home. The patient is cachetic and underweight but the POA does not know of any underlying medical issues. No medical conditions are listed in the chart either. On exam, the patient intermittently follows commands but states that only his left hip hurts. He does not endorse any pain in the other extremities. He also does not describe any paresthesias.       Musculoskeletal:   Left leg  Left leg is shortened and externally rotated  States pain with attempted log roll  Endorse SILT s/s/sp/dp/t  EHL/FHL intact, large bunion deformity at baseline on the left first toe  GS/TA unexaminable as does not follow commands at times  2+DP pulse  Thigh is soft and compressible  No wounds about the thigh or groin  NTTP in the knee and tibia     Right leg  No pain with log roll  NTTP femur, tibia, knee, ankle  Moves his right leg freely  Bunion deformity present on the first toe       XR HIP W OR WO PELVIS 2  OR 3 VIEWS, LEFT (CPT=73502)     Result Date: 1/25/2024  CONCLUSION:          Acute displaced, angulated, and impacted left femoral neck fracture.    A preliminary report was issued by the [facility] [facility] teleradiology service. There are no major discrepancies.      pression:     89yo M, household ambulator with walker, Aox1 with baseline dementia presents with a left femoral neck fracture after an unwitnessed fall at his nursing home     -Plan for left hip hemiarthroplasty  -Keep NPO  -NWB LLE      1-25-24    Procedure Performed:   LEFT HIP HEMIARTHROPLASTY    Surgical Findings: Comminuted left femoral neck fracture, good restoration of length and alignment and stability with cemented hemiarthroplasty       1-26-24     Nacho Tolbertjaylaon is a(n) 89 year old male now POD 1 s/p L hip hemiarthroplasty for femoral neck fracture. He has not been having much pain over night. Is still AOx1 but comfortable in bed. Has not mobilized. Is verbalizing but not appropriate.     Extremities:   Left leg  Dressing about the hip CDI  No erythema, no fluctuance  Appropriate TTP in thigh  Wiggles toes but does not follow command  2+ DP pulse  Toes oriented in the appropriate direction  Leg lengths appropriate and equal  Abduction pillow in place  Sensory exam and full motor exam limited due to baseline dementia and not following commands     CBC today 11.1  Assessment & Plan  Assessment and Plan:    A/P   89yo M now POD 1 s/p L hip hemiarthroplasty doing appropriate given his baseline dementia     WBAT  Posterior hip precautions x 12 weeks  Dont bend you hip past 90 degrees  Dont cross your legs  Dont twist your hip inwards-keep knees and toes pointed outwards     Abduction pillow while in bed  Ancef x 23hrs, then cefadroxil 500mg BID for 7 days  PT today  ASA 81mg BID for dvt ppx starting POD 1 (or other DVT ppx per primary)  CBC today 11.1     SW and CM for discharge planning and coordination    Lab Results   Component Value Date      WBC 11.5 (H) 01/26/2024     HGB 11.1 (L) 01/26/2024     HCT 33.8 (L) 01/26/2024     .0 01/26/2024     CREATSERUM 1.00 01/26/2024     BUN 17 01/26/2024      01/26/2024     K 4.6 01/26/2024     K 4.6 01/26/2024      01/26/2024     CO2 23.0 01/26/2024     GLU 87 01/26/2024     CA 8.4 (L) 01/26/2024             MEDICATIONS ADMINISTERED IN LAST 1 DAY:  ceFAZolin (Ancef) 2 g in 20mL IV syringe premix       Date Action Dose Route User    1/25/2024 1408 Given 2 g Intravenous Macy Toure CRNA          ceFAZolin (Ancef) 2 g in 20mL IV syringe premix       Date Action Dose Route User    1/26/2024 0514 Given 2 g Intravenous Neli Hicks RN    1/25/2024 2141 Given 2 g Intravenous Neli Hicks RN          EPHEDrine sulfate 50 mg/mL injection       Date Action Dose Route User    1/25/2024 1529 Given 5 mg Intravenous Yue Lim MD          fentaNYL (Sublimaze) 50 mcg/mL injection 50 mcg       Date Action Dose Route User    1/25/2024 1659 Given 50 mcg Intravenous Lesley Castillo RN    1/25/2024 1640 Given 50 mcg Intravenous Lesley Castillo RN    1/25/2024 1630 Given 50 mcg Intravenous Lesley Castillo RN          fentaNYL (Sublimaze) 50 mcg/mL injection       Date Action Dose Route User    1/25/2024 1441 Given 50 mcg Intravenous Macy Toure CRNA    1/25/2024 1355 Given 50 mcg Intravenous Macy Toure CRNA          fluticasone propionate (Flonase) 50 MCG/ACT nasal suspension 2 spray       Date Action Dose Route User    1/26/2024 0848 Given 2 spray Each Nare Antony Farrell RN          fluticasone-umeclidin-vilant (Trelegy Ellipta) 100-62.5-25 MCG/ACT inhaler 1 puff       Date Action Dose Route User    1/26/2024 0848 Given 1 puff Inhalation Antony Farrell RN          glycopyrrolate (Robinul) 0.2 MG/ML injection       Date Action Dose Route User    1/25/2024 1606 Given 0.4 mg Intravenous Yue Lim MD          heparin (Porcine) 5000 UNIT/ML injection 5,000 Units       Date Action Dose  Route User    1/26/2024 0515 Given 5,000 Units Subcutaneous (Right Lower Abdomen) Neli Hicks RN    1/25/2024 2141 Given 5,000 Units Subcutaneous (Right Lower Abdomen) Neli Hicks RN          HYDROmorphone (Dilaudid) 1 MG/ML injection 0.6 mg       Date Action Dose Route User    1/25/2024 1720 Given 0.5 mg Intravenous Lesley Castillo RN    1/25/2024 1710 Given 0.5 mg Intravenous Lesley Castillo RN          labetalol (Trandate) 5 mg/mL injection 5 mg       Date Action Dose Route User    1/25/2024 1800 Given 5 mg Intravenous Lesley Castillo RN          lactated ringers infusion       Date Action Dose Route User    1/25/2024 1539 New Bag (none) Intravenous Yue Lim MD    1/25/2024 1349 New Bag (none) Intravenous Macy Toure CRNA          lidocaine PF (Xylocaine-MPF) 1% injection       Date Action Dose Route User    1/25/2024 1355 Given 20 mg Intravenous Macy Toure CRNA          neostigmine (Bloxiverz) 10 mg/10mL injection       Date Action Dose Route User    1/25/2024 1606 Given 2 mg Intravenous Yue Lim MD          ondansetron (Zofran) 4 MG/2ML injection       Date Action Dose Route User    1/25/2024 1607 Given 4 mg Intravenous Yue Lim MD          clonidine-EPINEPHrine-ropivacaine-ketorolac (CERTS) (Duraclon-Adrenalin-Naropin-Toradol) pain cocktail irrigation       Date Action Dose Route User    1/25/2024 1556 Given (none) Intra-articular Kelvin Farrell MD          phenylephrine (Jermaine-Synephrine) 10 MG/ML injection       Date Action Dose Route User    1/25/2024 1529 Given 100 mcg Intravenous Yue Lim MD    1/25/2024 1428 Given 100 mcg Intravenous Macy Toure CRNA    1/25/2024 1423 Given 100 mcg Intravenous Macy Toure CRNA          propofol (Diprivan) 10 MG/ML injection       Date Action Dose Route User    1/25/2024 1355 Given 80 mg Intravenous Macy Toure CRNA          rocuronium (Zemuron) 50 mg/5mL injection       Date Action Dose Route User    1/25/2024  1355 Given 30 mg Intravenous Macy Toure CRNA          sodium chloride 0.9% infusion       Date Action Dose Route User    1/26/2024 0412 New Bag (none) Intravenous Neli Hicks, DELPHINE          tranexamic acid in sodium chloride 0.7% (Cyklokapron) 1000 mg/100mL infusion premix       Date Action Dose Route User    1/25/2024 1410 Given 1,000 mg Intravenous Macy Toure CRNA          vancomycin (Vancocin) 1 g injection       Date Action Dose Route User    1/25/2024 1540 Given 1 g Topical (Left Hip) Kelvin Farrell MD            Vitals (last day)       Date/Time Temp Pulse Resp BP SpO2 Weight O2 Device O2 Flow Rate (L/min) [facility]    01/26/24 0751 98.6 °F (37 °C) 84 16 146/71 95 % -- None (Room air) --     01/26/24 0405 99.5 °F (37.5 °C) 75 16 137/73 97 % -- Nasal cannula 2 L/min     01/25/24 1805 -- 120 24 229/213 100 % -- Nasal cannula --     01/25/24 1755 -- 122 32 207/187 96 % -- Nasal cannula 3 L/min     01/25/24 1745 -- 125 38 204/117 97 % -- Nasal cannula --     01/25/24 1735 -- 118 19 177/103 97 % -- Nasal cannula --     01/25/24 1725 -- 120 10 168/100 100 % -- Nasal cannula --     01/25/24 1715 -- 143 24 183/116 100 % -- Nasal cannula --     01/25/24 1705 -- 142 29 -- 100 % -- Nasal cannula --     01/25/24 0250 97.7 °F (36.5 °C) 119 20 197/113 100 % -- Nasal cannula 2.5 L/min DP    01/25/24 0215 -- 92 -- 164/91 96 % -- Nasal cannula 2.5 L/min     01/25/24 0145 -- 96 -- 177/94 96 % -- Nasal cannula 2.5 L/min     01/25/24 0045 -- 92 -- 170/91 98 % -- Nasal cannula -- BW

## 2024-01-26 NOTE — CDS QUERY
How to answer this Query:    1.) DON'T CLICK COSIGN BUTTON FIRST  2.) Click \"3 dots...\" to the right of cosign button and click EDIT on the toolbar.  2.) Type an \"X\" in the bracket for the diagnosis that applies. (You may also add additional clinical details as you feel necessary to substantiate your response).   3.) Finally click \"Sign\" to complete response.  Thank You    DOCUMENTATION CLARIFICATION FORM  Dear Doctor:KYLE  Clinical information suggests potential for impaired nutritional status.  PLEASE (X) DIAGNOSIS THAT APPLIES     SELECTION BY PHYSICIAN ONLY    ( X)  MODERATE MALNUTRITION    ( )  Other (please specify):         Documentation (include date/source): BMI: 17.06  126 RD EVAL-  Pt meets moderate malnutrition criteria.       CRITERIA FOR MALNUTRITION DIAGNOSIS:  Criteria for non-severe malnutrition diagnosis: chronic illness related to body fat mild depletion and muscle mass mild depletion.    RISK FACTORS: ADVANCED AGE  CLINICAL INDICATORS:SEE RD EVAL, BMI 17.06  TREATMENT:ENSURE ADDED, MONITOR WEIGHT    Other:    If you have any questions, please contact Clinical :  Sana GUERRA RN at [phone]     Thank You!    THIS FORM IS A PERMANENT PART OF THE MEDICAL RECORD

## 2024-01-26 NOTE — CM/SW NOTE
Submitted clinical via [employer] portal.  [employer] Case/Auth ID is [id number].  Final insurance authorization is pending at this time.      SW/CM assigned to the case will continue to follow auth status.      Ni Valle, DSC

## 2024-01-26 NOTE — PROGRESS NOTES
[facility]    Progress Note    Nacho Smythmaximilianiajaylaon Patient Status:  Inpatient    10/14/1933 MRN [id number]   Location [facility] 4W/SW/SE Attending Thuy Acevedo MD   Hosp Day # 1 PCP Oscar (Katie) MD Gabriel     SUBJECTIVE:  Pt seen and examined at bedside.   S/p Status post left hip hemiarthroplasty for femoral neck fracture.  Pain overall stable.  Confused  Speech and swallow eval this morning started on dysphagia diet.    /76 (BP Location: Right arm)   Pulse 102   Temp 97.6 °F (36.4 °C) (Axillary)   Resp 18   Ht 5' 2\" (1.575 m)   Wt 93 lb 4.8 oz (42.3 kg)   SpO2 96%   BMI 17.06 kg/m²     Physical Examination:    Gen: Awake, alert, oriented. Appears comfortable.  HEENT: PERRLA  Lungs: CTA B/L  Heart: Normal S1 S2, No M/G/R   Abd: Abdomen soft, nontender, nondistended, no organomegaly, bowel sounds present  Ext: No edema, no calf tenderness    Labs:   Lab Results   Component Value Date    WBC 11.5 2024    HGB 11.1 2024    HCT 33.8 2024    .0 2024    CREATSERUM 1.00 2024    BUN 17 2024     2024    K 4.6 2024    K 4.6 2024     2024    CO2 23.0 2024    GLU 87 2024    CA 8.4 2024       No results for input(s): \"PGLU\" in the last 168 hours.  No results for input(s): \"INR\" in the last 168 hours.    Imaging:  Imaging reviewed in Epic.    Meds:   Current Facility-Administered Medications   Medication Dose Route Frequency    morphINE PF 2 MG/ML injection 2 mg  2 mg Intravenous Q4H PRN    sodium chloride 0.9% infusion   Intravenous Continuous    heparin (Porcine) 5000 UNIT/ML injection 5,000 Units  5,000 Units Subcutaneous Q8H ALLEN    acetaminophen (Tylenol Extra Strength) tab 500 mg  500 mg Oral Q4H PRN    acetaminophen (Tylenol) tab 650 mg  650 mg Oral Q4H PRN    Or    HYDROcodone-acetaminophen (Norco) 5-325 MG per tab 1 tablet  1 tablet Oral Q4H PRN    Or    HYDROcodone-acetaminophen  (Norco) 5-325 MG per tab 2 tablet  2 tablet Oral Q4H PRN    morphINE PF 2 MG/ML injection 1 mg  1 mg Intravenous Q2H PRN    Or    morphINE PF 2 MG/ML injection 2 mg  2 mg Intravenous Q2H PRN    Or    morphINE PF 4 MG/ML injection 4 mg  4 mg Intravenous Q2H PRN    melatonin tab 3 mg  3 mg Oral Nightly PRN    ondansetron (Zofran) 4 MG/2ML injection 4 mg  4 mg Intravenous Q6H PRN    metoclopramide (Reglan) 5 mg/mL injection 5 mg  5 mg Intravenous Q8H PRN    fluticasone propionate (Flonase) 50 MCG/ACT nasal suspension 2 spray  2 spray Each Nare Daily    fluticasone-umeclidin-vilant (Trelegy Ellipta) 100-62.5-25 MCG/ACT inhaler 1 puff  1 puff Inhalation Daily    magnesium oxide (Mag-Ox) tab 400 mg  400 mg Oral Daily    pantoprazole (Protonix) DR tab 40 mg  40 mg Oral QAM AC    QUEtiapine (SEROquel) tab 50 mg  50 mg Oral Nightly    tamsulosin (Flomax) cap 0.4 mg  0.4 mg Oral Daily    traZODone (Desyrel) tab 50 mg  50 mg Oral Nightly    hydrALAzine (Apresoline) 20 mg/mL injection 10 mg  10 mg Intravenous Q4H PRN    aspirin DR tab 81 mg  81 mg Oral BID     Facility-Administered Medications Ordered in Other Encounters   Medication Dose Route Frequency    fentaNYL (Sublimaze) 50 mcg/mL injection   Intravenous PRN    lidocaine PF (Xylocaine-MPF) 1% injection   Intravenous PRN    propofol (Diprivan) 10 MG/ML injection   Intravenous PRN    rocuronium (Zemuron) 50 mg/5mL injection   Intravenous PRN    lactated ringers infusion   Intravenous Continuous PRN    tranexamic acid in sodium chloride 0.7% (Cyklokapron) 1000 mg/100mL infusion premix   Intravenous PRN    phenylephrine (Jermaine-Synephrine) 10 MG/ML injection   Intravenous PRN    EPHEDrine sulfate 50 mg/mL injection   Intravenous PRN    neostigmine (Bloxiverz) 10 mg/10mL injection   Intravenous PRN    glycopyrrolate (Robinul) 0.2 MG/ML injection   Intravenous PRN    ondansetron (Zofran) 4 MG/2ML injection   Intravenous PRN       Assessment:    1.  Acute displaced angulated and  impacted left femoral neck fracture  2.  Unwitnessed fall, CT head unremarkable for acute changes  3.  History of dementia  4.  BPH        Plan:  Patient admitted to medical floor  Pain control  Ortho consulted, plan for left hip hemiarthroplasty     Status post left hip hemiarthroplasty for femoral neck fracture.  Pain overall stable.  WBAT  DVT prophylaxis: Heparin sq  Resume other home medication  CODE STATUS: Full code  Discussed with nursing  Med rec done    PT/OT      Thuy Acevedo MD   1/26/2024  4:39 PM

## 2024-01-26 NOTE — PHYSICAL THERAPY NOTE
PHYSICAL THERAPY HIP EVALUATION - INPATIENT     Room Number: 428/428-A  Evaluation Date: 1/26/2024  Type of Evaluation: Initial  Physician Order: PT Eval and Treat    Presenting Problem: unwitnessed fall w/left hip pain and inability to ambulate s/p L hemiarthroplasty, WBAT, post hip precautions for 12 weeks  Co-Morbidities : COPD, malignant neoplasm of sigmoid colon  Reason for Therapy: Mobility Dysfunction and Discharge Planning    PHYSICAL THERAPY ASSESSMENT     Patient is a 89 year old male admitted 1/24/2024 for left hip pain after an unwitnessed fall and left hip pain, now s/p JEAN-CLAUDE left, WBAT with posterior hip precautions for 12 weeks per surgery note.  Patient's current functional deficits include bed mobility, transfers, gait, pain, standing balance, activity tolerance, which are below the patient's pre-admission status.  Patient is pleasant but needs frequent cues and direction. The patient's Approx Degree of Impairment: 61.29% has been calculated based on documentation in the [facility] '6 clicks' Inpatient Basic Mobility Short Form.  Research supports that patients with this level of impairment may benefit from [facility] and this is the recommendation.    DELPHINE Guadarrama approves participation in therapy session, seen in coordination with OT. Patient presents in bed and has removed his gown, nasal cannula, hep lock, IV, telemetry monitor and primofit, pt reporting he was 'tied up\". He preseverates during session about there only being women and wondering why that was. Although confused he does follow cues and directions for tasks,  benefits at times from prior demonstration. He is needing mod A for rollilng and supine to sit with assist to follow hip precautions. Initial sitting balance with mod A and progressing to supervision. He performs sit to stand with mod A, transfers wi th RW mod A and gait 5' with RW mod A and assist to manage RW for reciprocal gait. He is up in chair with all needs in reach. Able to perform LLE  exs with min A and mod cues. Alarm is set    Patient will benefit from continued IP PT services to address these deficits in preparation for discharge.    DISCHARGE RECOMMENDATIONS  PT Discharge Recommendations: Sub-acute rehabilitation    PLAN  PT Treatment Plan: Bed mobility;Patient education;Gait training;Neuromuscular re-educate;Range of motion;Strengthening;Transfer training;Balance training  Rehab Potential : Good  Frequency (Obs): Daily       PHYSICAL THERAPY MEDICAL/SOCIAL HISTORY     History related to current admission: 89-year-old male here after an unwitnessed fall.  Patient is unsure of how he fell, but he is complaining of left-sided hip pain.  Patient reports decreased range of motion secondary to pain.  He can normally ambulate with a walker.  He is unsure if he hit his head      Problem List  Principal Problem:    Closed fracture of left hip, initial encounter ([facility])  Active Problems:    Hip fracture ([facility])      HOME SITUATION  Home Situation  Type of Home: Skilled nursing facility ([facility])  Home Layout: One level     Prior Level of [place]: Per nephew, pt is primarily gets around his his wheelchair and from nephew's understanding, pt has not been able to ambulate in awhile. Per nephew, pt's health declined since his stroke in 2020. He has been at [facility] since Sept/Oct 2023    SUBJECTIVE  \"Why are there so many women here? Where are the men?\"    PHYSICAL THERAPY EXAMINATION     OBJECTIVE  Precautions: Hip abduction pillow;JEAN-CLAUDE - posterior;Limb alert - left;Bed/chair alarm  Fall Risk: High fall risk    WEIGHT BEARING RESTRICTION  Weight Bearing Restriction: L lower extremity           L Lower Extremity: Weight Bearing as Tolerated    PAIN ASSESSMENT  Rating: Unable to rate  Location: pt states ouch with active movement and walking but does not stop him from participating  Management Techniques: Activity promotion;Relaxation;Repositioning;Other (Comment) (medicated, ice  pack)    COGNITION  Overall Cognitive Status:  A&Ox1    RANGE OF MOTION AND STRENGTH ASSESSMENT  Upper extremity ROM and strength are within functional limits   Lower extremity ROM is within functional limits   Lower extremity strength is within functional limits except left leg limited by pain    BALANCE  Static Sitting: Good  Dynamic Sitting: Fair  Static Standing: Poor +  Dynamic Standing: Not tested    ACTIVITY TOLERANCE  Pulse: 113  Heart Rate Source: Monitor     BP: 128/74  BP Location: Right arm  BP Method: Automatic  Patient Position: Sitting    O2 WALK  Oxygen Therapy  SPO2% on Room Air at Rest: 84 (upon entering room, observed pt to remove O2. Reapplied and SpO2 up to 94% in 5 min)  SPO2% on Oxygen at Rest: 93  At rest oxygen flow (liters per minute): 2    AM-PAC '6-Clicks' INPATIENT SHORT FORM - BASIC MOBILITY  How much difficulty does the patient currently have...  Patient Difficulty: Turning over in bed (including adjusting bedclothes, sheets and blankets)?: A Little   Patient Difficulty: Sitting down on and standing up from a chair with arms (e.g., wheelchair, bedside commode, etc.): A Lot   Patient Difficulty: Moving from lying on back to sitting on the side of the bed?: A Little   How much help from another person does the patient currently need...   Help from Another: Moving to and from a bed to a chair (including a wheelchair)?: A Lot   Help from Another: Need to walk in hospital room?: A Lot   Help from Another: Climbing 3-5 steps with a railing?: A Lot     AM-PAC Score:  Raw Score: 14   Approx Degree of Impairment: 61.29%   Standardized Score (AM-PAC Scale): 38.1   CMS Modifier (G-Code): CL    FUNCTIONAL ABILITY STATUS  Functional Mobility/Gait Assessment  Gait Assistance: Moderate assistance  Distance (ft): 5  Assistive Device: Rolling walker  Pattern: Shuffle;L Decreased stance time    Bed Mobility: mod A, cues and assist for posterior hip precautions    Transfers: mod A sit to stand, bed  to/from chair with cues for LLE placement    Exercise/Education Provided:  Bed mobility  Functional activity tolerated  Gait training  ROM  Strengthening  Transfer training    Patient End of Session: Up in chair;Needs met;Call light within reach;RN aware of session/findings;All patient questions and concerns addressed;Ice applied;Alarm set;Discussed recommendations with / (ABD wedge between legs to prevent crossing)    CURRENT GOALS    Goals to be met by: 2/8/24  Patient Goal Patient's self-stated goal is: not stated   Goal #1 Patient is able to demonstrate supine - sit EOB @ level: minimal assistance   Goal #1   Current Status    Goal #2 Patient is able to demonstrate transfers Sit to/from Stand at assistance level: minimal assistance     Goal #2  Current Status    Goal #3 Patient is able to ambulate 10 feet with assistive device at assistance level: moderate assistance   Goal #3   Current Status    Goal #4 Patient will negotiate bed to/from chair transfers with RW with minimal assistance   Goal #4   Current Status    Goal #5 Patient verbalizes and/or demonstrates all precautions and safety concerns independently   Goal #5   Current Status    Goal #6 Patient independently performs home exercise program for ROM/strengthening per the instructions provided in preparation for discharge.   Goal #6  Current Status      Patient Evaluation Complexity Level:  History Moderate - 1 or 2 personal factors and/or co-morbidities   Examination of body systems Moderate - addressing a total of 3 or more elements   Clinical Presentation Low - Stable   Clinical Decision Making Low Complexity       Therapeutic Activity: 19 minutes

## 2024-01-26 NOTE — OCCUPATIONAL THERAPY NOTE
OCCUPATIONAL THERAPY EVALUATION - INPATIENT     Room Number: 428/428-A  Evaluation Date: 1/26/2024  Type of Evaluation: Initial  Presenting Problem: s/p LT JEAN-CLAUDE    Physician Order: IP Consult to Occupational Therapy  Reason for Therapy: ADL/IADL Dysfunction and Discharge Planning    OCCUPATIONAL THERAPY ASSESSMENT   Patient is a 89 year old male admitted 1/24/2024 following an unwitnessed fall. Pt dx with LT hip fx, s/p LT JEAN-CLAUDE, posterior THP, WBAT.    Nurse approves pt participation in session.  Upon entering room, pt naked and observed to have removed all lines/attachments including nasal cannula, hep lock, IV, telemetry monitor, primo fit, and gown. Pt appeared gown but indicating he was upset that he had been \"tied up\".  Pt appears thin and frail, demo SOB with bed level/bed side activity.     RA sats 83% with pt demo SOB. NC reapplied at 2LO2 with sats slowly improving to 93%.    Pt presents with significant cognitive impairments, unable to retain information but some simple follow commands.     ADLs  UE/LE dressing: Max A    Functional Mobility:   Supine to sit: Mod A  Bed to Chair: Mod A using R/W        In this OT evaluation patient presents with the following impairments: cognitive deficit, post op pain and weakness, surgical precautions.  These deficits manifest functionally while performing ADLs and functional mobility.   The patient is below baseline and would benefit from skilled inpatient OT to address the above deficits, maximizing patient's ability to return to prior level of function.    The patient's Approx Degree of Impairment: 63.03% has been calculated based on documentation in the [facility] '6 clicks' Inpatient Daily Activity Short Form.  Research supports that patients with this level of impairment may benefit from [facility] to maximize pt's recovery prior to returning to [facility].    DISCHARGE RECOMMENDATIONS  OT Discharge Recommendations: Sub-acute rehabilitation    PLAN  OT Treatment Plan: ADL  training;Functional transfer training       OCCUPATIONAL THERAPY MEDICAL/SOCIAL HISTORY   Problem List   Principal Problem:    Closed fracture of left hip, initial encounter ([facility])  Active Problems:    Hip fracture ([facility])    HOME SITUATION  Type of Home: Skilled nursing facility (Memory Care)  Home Layout: One level    Stairs in Home: 0  Assistive Device(s) Used: R/W     Prior Level of [place]: Per EMR, pt s a resident of [place] [facility] in a Memory unit. PTA pt was managing household distances using a R/W.     SUBJECTIVE  \"Why are there only women here\"    OCCUPATIONAL THERAPY EXAMINATION   OBJECTIVE  Precautions: Hip abduction pillow; JEAN-CLAUDE - posterior; Bed/chair alarm  Fall Risk: High fall risk    WEIGHT BEARING RESTRICTION  L Lower Extremity: Weight Bearing as Tolerated    PAIN ASSESSMENT  Rating: Unable to rate (indicates pain unable to rate)  Location: LT hip  Management Techniques: Relaxation; Repositioning    O2 SATURATIONS  Oxygen Therapy  SPO2% on Room Air at Rest: 84 (upon entering rom, observe pt to have removed nc)  SPO2% on Oxygen at Rest: 93  At rest oxygen flow (liters per minute): 2    COGNITION  Awake and alert, follows most commands  O to self only, poor safety, insight, problem solving    RANGE OF MOTION   Upper extremity ROM is within functional limits     STRENGTH ASSESSMENT  Upper extremity strength is within functional limits     ACTIVITIES OF DAILY LIVING ASSESSMENT  AM-PAC ‘6-Clicks’ Inpatient Daily Activity Short Form  How much help from another person does the patient currently need…  -   Putting on and taking off regular lower body clothing?: A Lot  -   Bathing (including washing, rinsing, drying)?: A Lot  -   Toileting, which includes using toilet, bedpan or urinal? : A Lot  -   Putting on and taking off regular upper body clothing?: A Lot  -   Taking care of personal grooming such as brushing teeth?: A Lot  -   Eating meals?: A Little    AM-PAC Score:  Score: 13  Approx Degree of  Impairment: 63.03%  Standardized Score (AM-PAC Scale): 32.03  CMS Modifier (G-Code): CL    FUNCTIONAL TRANSFER ASSESSMENT  Sit to Stand: Edge of Bed; Chair  Edge of Bed: Moderate Assist  Chair: Moderate Assist    BED MOBILITY  Rolling: Not Tested  Supine to Sit : Maximum Assist  Sit to Supine (OT): Not Tested    EDUCATION PROVIDED  Patient's Response to Education: Does Not Demonstrate Skills Needed for Learning    Patient End of Session: Up in chair;Needs met;Call light within reach;RN aware of session/findings;Alarm set    OT Goals  Patient self-stated goal is: pt unable to state    Patient will perform functional xfers with CGA  Comment:    Pt will perform bed mobility with supervision for pressure relief and supine self care  Comment:    Pt will tolerate 3 minutes in supported standing with supervision for participation in self care  Comment:     Comment:         Goals  on:24  Frequency:3x/week    Patient Evaluation Complexity Level:   Occupational Profile/Medical History LOW - Brief history including review of medical or therapy records    Specific performance deficits impacting engagement in ADL/IADL MODERATE  3 - 5 performance deficits   Client Assessment/Performance Deficits MODERATE - Comorbidities and min to mod modifications of tasks    Clinical Decision Making LOW - Analysis of occupational profile, problem-focused assessments, limited treatment options    Overall Complexity LOW     Self-Care Home Management: 15 minutes

## 2024-01-26 NOTE — CM/SW NOTE
01/26/24 1400   CM/SW Referral Data   Referral Source Physician   Reason for Referral Discharge planning   Informant Other  (Silvestre Cuevas)   Medical Hx   Does patient have an established PCP? Yes  (Dr. Katie Rios)   Patient Info   Patient's Current Mental Status at Time of Assessment Alert;Oriented   Patient's Home Environment Memory Care Facility   Patient Status Prior to Admission   Independent with ADLs and Mobility No   Pt. requires assistance with Housework;Driving;Meals;Medications   Services in place prior to admission DME/Supplies at home   Type of DME/Supplies Rollator Walker;Wheelchair   Discharge Needs   Anticipated D/C needs Subacute rehab   Services Requested   PASRR Level 1 Submitted Yes   Choice of Post-Acute Provider   Informed patient of right to choose their preferred provider Yes   List of appropriate post-acute services provided to patient/family with quality data   (Pending)     4585:  Pt's nephew, Silvestre emailed HCPOA to SINDY. Pt's ex-wife, Angella, is pt's primary HCPOA however due to official divorce and that Angella is now unable to care for herself as she now lives in an [facility] - pt's nephew, Silvestre, is listed as successor to such agent and nieceDaria, is the next successor agent.     SINDY sent copy to Registration, tube #[phone] to be uploaded to pt's records.     Pt's nephew, Silvestre, inquired about pt becoming DNR - but requesting further clinician conversation about this. SINDY sent updated request to RN.     ----------------  150:  SINDY received MDO for discharge planning. Per chart review, pt is from [facility] in [place]. D/t pt memory impairments, SINDY spoke to pt's nephew/TYESHA Rivers via phone for initial assessment and to discuss eventual discharge plans.     Pt has lived at [facility] since the September/October of 2023. Prior to moving there, pt was living at [facility] in [place], but they were no longer able to meet his needs in  which is the reason pt was moved to [facility]. Pt's nephew/POA Silvestre lives in [place] and pt also has a niece, Daria, who lives in [place]. There is no other family in the area, besides pt's ex-wife who lives in an [facility] in [place], [place] - nephew uncertain of the name of the care home but stated pt's ex-wife's name is Angella.     Per nephew, pt is primarily gets around his his wheelchair and from nephew's understanding, pt has not been able to ambulate in awhile. Per nephew, pt's health declined since his stroke in 2020.     SW discussed OT recommendations of [facility], PT evals pending. Pt's nephew is agreeable w/ pt going to [facility]. Pt has a hx at [facility] in the city where pt use to live, but is open to SARs near pt's current home. Pt's nephew stated he will email SW copy of HCPOA, due to not currently being on file here at [facility]. Nephew will review [facility] list when made available.     PASRR screening is currently queued for review (likely d/t seroquel 50 mg nightly).     [facility] referrals sent via Aidin for review.     NEED:   - Provide nephew/POA w/ [facility] list, please email to [email or web address]  - [facility] choice  - Insurance authorization  - PASRR status f/up     **Insurance authorization needed for rehab**    PLAN: [facility], location pending review, choice, insurance authorization    Anna Leach, YINKA - [phone]

## 2024-01-26 NOTE — PLAN OF CARE
Pt is POD #0-1. Vital signs within normal limits. Alert and oriented x1. Disoriented to time, place, and situation. Lethargic post-op but easily arousable. Remote tele in place. 3L of O2 via nasal cannula. Hx of modified diet and dysphagia screen triggered SLP consult and NPO status. Dr. Acevedo notified via [employer], no response at this time. IVF infusing. Check void. Dressing clean, dry, and intact. Max assist. Appropriate repositioning provided. Heparin subcutaneous and SCDs for DVT prophylaxis. Appropriate safety precautions maintained. Bed locked in lowest position, call light within reach, and frequent rounding maintained. Plan TBD.    Problem: Patient Centered Care  Goal: Patient preferences are identified and integrated in the patient's plan of care  Description: Interventions:  - What would you like us to know as we care for you?   - Provide timely, complete, and accurate information to patient/family  - Incorporate patient and family knowledge, values, beliefs, and cultural backgrounds into the planning and delivery of care  - Encourage patient/family to participate in care and decision-making at the level they choose  - Honor patient and family perspectives and choices  Outcome: Progressing     Problem: PAIN - ADULT  Goal: Verbalizes/displays adequate comfort level or patient's stated pain goal  Description: INTERVENTIONS:  - Encourage pt to monitor pain and request assistance  - Assess pain using appropriate pain scale  - Administer analgesics based on type and severity of pain and evaluate response  - Implement non-pharmacological measures as appropriate and evaluate response  - Consider cultural and social influences on pain and pain management  - Manage/alleviate anxiety  - Utilize distraction and/or relaxation techniques  - Monitor for opioid side effects  - Notify MD/LIP if interventions unsuccessful or patient reports new pain  - Anticipate increased pain with activity and pre-medicate as  appropriate  Outcome: Progressing     Problem: RISK FOR INFECTION - ADULT  Goal: Absence of fever/infection during anticipated neutropenic period  Description: INTERVENTIONS  - Monitor WBC  - Administer growth factors as ordered  - Implement neutropenic guidelines  Outcome: Progressing     Problem: SAFETY ADULT - FALL  Goal: Free from fall injury  Description: INTERVENTIONS:  - Assess pt frequently for physical needs  - Identify cognitive and physical deficits and behaviors that affect risk of falls.  - [place] fall precautions as indicated by assessment.  - Educate pt/family on patient safety including physical limitations  - Instruct pt to call for assistance with activity based on assessment  - Modify environment to reduce risk of injury  - Provide assistive devices as appropriate  - Consider OT/PT consult to assist with strengthening/mobility  - Encourage toileting schedule  Outcome: Progressing     Problem: DISCHARGE PLANNING  Goal: Discharge to home or other facility with appropriate resources  Description: INTERVENTIONS:  - Identify barriers to discharge w/pt and caregiver  - Include patient/family/discharge partner in discharge planning  - Arrange for needed discharge resources and transportation as appropriate  - Identify discharge learning needs (meds, wound care, etc)  - Arrange for interpreters to assist at discharge as needed  - Consider post-discharge preferences of patient/family/discharge partner  - Complete POLST form as appropriate  - Assess patient's ability to be responsible for managing their own health  - Refer to Case Management Department for coordinating discharge planning if the patient needs post-hospital services based on physician/LIP order or complex needs related to functional status, cognitive ability or social support system  Outcome: Progressing

## 2024-01-26 NOTE — PROGRESS NOTES
01/26/24 1100   VISIT TYPE   SLP Inpatient Visit Type (Documentation Required) Attempted Evaluation  (Clinical bedside exam was attempted at 11.45 am. Pt declined to be assessed and refused participation. SLP to f/u later this pm.)

## 2024-01-26 NOTE — PROGRESS NOTES
Patient was having trouble in swallowing water yesterday so speech evaluation was placed. Speech is here at bedside but patient has suspicious thoughts on all the care he is receiving here so he is not ready to swallow water, eat food with speech. he is taking off the tele box, and Pulled out the IV. And not allowing to connect him back to IV fluids. Called family members and left a voice message to return the call on [phone].

## 2024-01-26 NOTE — PLAN OF CARE
Problem: PAIN - ADULT  Goal: Verbalizes/displays adequate comfort level or patient's stated pain goal  Description: INTERVENTIONS:  - Encourage pt to monitor pain and request assistance  - Assess pain using appropriate pain scale  - Administer analgesics based on type and severity of pain and evaluate response  - Implement non-pharmacological measures as appropriate and evaluate response  - Consider cultural and social influences on pain and pain management  - Manage/alleviate anxiety  - Utilize distraction and/or relaxation techniques  - Monitor for opioid side effects  - Notify MD/LIP if interventions unsuccessful or patient reports new pain  - Anticipate increased pain with activity and pre-medicate as appropriate  Outcome: Progressing     Problem: RISK FOR INFECTION - ADULT  Goal: Absence of fever/infection during anticipated neutropenic period  Description: INTERVENTIONS  - Monitor WBC  - Administer growth factors as ordered  - Implement neutropenic guidelines  Outcome: Progressing

## 2024-01-26 NOTE — PROGRESS NOTES
[facility]    Progress Note    Nacho Mari Patient Status:  Inpatient    10/14/1933 MRN [id number]   Location [facility] 4W/SW/SE Attending Thuy Acevedo MD   Hosp Day # 1 PCP Oscar (AmairaniPraneeth) MD Gabriel        Subjective:   Nacho Mari is a(n) 89 year old male now POD 1 s/p L hip hemiarthroplasty for femoral neck fracture. He has not been having much pain over night. Is still AOx1 but comfortable in bed. Has not mobilized. Is verbalizing but not appropriate.         Objective:   Vital Signs:  Blood pressure 137/73, pulse 75, temperature 99.5 °F (37.5 °C), temperature source Oral, resp. rate 16, height 5' 2\" (1.575 m), weight 93 lb 4.8 oz (42.3 kg), SpO2 97%.     Physical Exam:   General: Alert, orientated x1 (person)  Vital Signs:  Blood pressure 137/73, pulse 75, temperature 99.5 °F (37.5 °C), temperature source Oral, resp. rate 16, height 5' 2\" (1.575 m), weight 93 lb 4.8 oz (42.3 kg), SpO2 97%.    Extremities:   Left leg  Dressing about the hip CDI  No erythema, no fluctuance  Appropriate TTP in thigh  Wiggles toes but does not follow command  2+ DP pulse  Toes oriented in the appropriate direction  Leg lengths appropriate and equal  Abduction pillow in place  Sensory exam and full motor exam limited due to baseline dementia and not following commands    CBC today 11.1      Assessment and Plan:    A/P   89yo M now POD 1 s/p L hip hemiarthroplasty doing appropriate given his baseline dementia     WBAT  Posterior hip precautions x 12 weeks  Dont bend you hip past 90 degrees  Dont cross your legs  Dont twist your hip inwards-keep knees and toes pointed outwards     Abduction pillow while in bed  Ancef x 23hrs, then cefadroxil 500mg BID for 7 days  PT today  ASA 81mg BID for dvt ppx starting POD 1 (or other DVT ppx per primary)  CBC today 11.1     SW and CM for discharge planning and coordination     Outpatient follow up in 2 weeks with Dr. Kelvin Farrell [phone]       Results:     Lab Results   Component Value Date    WBC 11.5 (H) 01/26/2024    HGB 11.1 (L) 01/26/2024    HCT 33.8 (L) 01/26/2024    .0 01/26/2024    CREATSERUM 1.00 01/26/2024    BUN 17 01/26/2024     01/26/2024    K 4.6 01/26/2024    K 4.6 01/26/2024     01/26/2024    CO2 23.0 01/26/2024    GLU 87 01/26/2024    CA 8.4 (L) 01/26/2024       XR PELVIS (1 VIEW) (CPT=72170)    Result Date: 1/25/2024  CONCLUSION:  1. Total left hip arthroplasty.    Dictated by (CST): Gabriel Benavidez MD on 1/25/2024 at 6:35 PM     Finalized by (CST): Gabriel Benavidez MD on 1/25/2024 at 6:35 PM          XR HIP W OR WO PELVIS 1 VIEW, LEFT (CPT=73501)    Result Date: 1/25/2024  CONCLUSION:  Intraoperative radiograph obtained during performance of a left hip hemiarthroplasty.    Dictated by (CST): Sami Cabrera MD on 1/25/2024 at 4:12 PM     Finalized by (CST): Sami Cabrera MD on 1/25/2024 at 4:14 PM          XR CHEST AP PORTABLE  (CPT=71045)    Result Date: 1/25/2024  CONCLUSION:   Linear atelectasis or scarring in the left midlung.  No other focal airspace disease or significant pleural effusion.  Hyperinflation/emphysema.   A preliminary report was issued by the [facility] teleradiology service. There are no major discrepancies.  elm-remote  Dictated by (CST): Brent Cho MD on 1/25/2024 at 8:18 AM     Finalized by (CST): Brent Cho MD on 1/25/2024 at 8:19 AM          CT HIP(BONE) LEFT (CPT=73700)    Result Date: 1/25/2024  CONCLUSION:  1. Acute displaced, angulated, and impacted left femoral neck fracture. 2. Small to moderate left hip joint effusion. 3. Chronic-appearing left sacroiliitis. 4. Partially imaged infrarenal abdominal aortic aneurysm, which measures at least 3.6 cm. 5. Colonic diverticulosis.  Suspected colonic anastomosis at the rectosigmoid colon. 6. Prostatomegaly. 7. Lesser incidental findings as above.   A preliminary report was issued by the [facility] teleradiology  service. There are no major discrepancies.  elm-remote  Dictated by (CST): Brent Cho MD on 1/25/2024 at 7:47 AM     Finalized by (CST): Brent Cho MD on 1/25/2024 at 7:51 AM          XR HIP W OR WO PELVIS 2 OR 3 VIEWS, LEFT (CPT=73502)    Result Date: 1/25/2024  CONCLUSION:   Acute displaced, angulated, and impacted left femoral neck fracture.    A preliminary report was issued by the [facility] teleradiology service. There are no major discrepancies.  elm-remote  Dictated by (CST): Brent Cho MD on 1/25/2024 at 7:45 AM     Finalized by (CST): Brent Cho MD on 1/25/2024 at 7:47 AM          CT SPINE CERVICAL (CPT=72125)    Result Date: 1/25/2024  CONCLUSION:  1. No acute fracture or traumatic subluxation of the cervical spine. 2. Advanced multilevel cervical spine degenerative changes. 3. Mild levoscoliosis of the cervical spine. 4. Bilateral carotid bifurcation atherosclerosis.   A preliminary report was issued by the [facility] teleradiology service. There are no major discrepancies.  elm-remote  Dictated by (CST): Brent Cho MD on 1/25/2024 at 6:28 AM     Finalized by (CST): Brent Cho MD on 1/25/2024 at 6:31 AM          CT BRAIN OR HEAD (27833)    Result Date: 1/25/2024  CONCLUSION:  1. No acute intracranial process by noncontrast CT technique. 2. Encephalomalacia at the left occipital lobe with associated ex vacuo dilation of the left lateral ventricular occipital horn. 3. Nonspecific white matter changes involving both cerebral hemispheres that most likely reflect sequelae of chronic microangiopathy. 4. Intracranial atherosclerosis.   A preliminary report was issued by the [facility] teleradiology service. There are no major discrepancies.  elm-remote  Dictated by (CST): Brent Cho MD on 1/25/2024 at 6:25 AM     Finalized by (CST): Brent Cho MD on 1/25/2024 at 6:28 AM

## 2024-01-27 LAB
ANION GAP SERPL CALC-SCNC: 6 MMOL/L (ref 0–18)
BASOPHILS # BLD AUTO: 0.07 X10(3) UL (ref 0–0.2)
BASOPHILS NFR BLD AUTO: 0.7 %
BUN BLD-MCNC: 21 MG/DL (ref 9–23)
BUN/CREAT SERPL: 21 (ref 10–20)
CALCIUM BLD-MCNC: 8.5 MG/DL (ref 8.7–10.4)
CHLORIDE SERPL-SCNC: 109 MMOL/L (ref 98–112)
CO2 SERPL-SCNC: 25 MMOL/L (ref 21–32)
CREAT BLD-MCNC: 1 MG/DL
DEPRECATED RDW RBC AUTO: 45.7 FL (ref 35.1–46.3)
EGFRCR SERPLBLD CKD-EPI 2021: 71 ML/MIN/1.73M2 (ref 60–?)
EOSINOPHIL # BLD AUTO: 0.18 X10(3) UL (ref 0–0.7)
EOSINOPHIL NFR BLD AUTO: 1.7 %
ERYTHROCYTE [DISTWIDTH] IN BLOOD BY AUTOMATED COUNT: 13.5 % (ref 11–15)
GLUCOSE BLD-MCNC: 78 MG/DL (ref 70–99)
HCT VFR BLD AUTO: 28.7 %
HGB BLD-MCNC: 9.8 G/DL
IMM GRANULOCYTES # BLD AUTO: 0.04 X10(3) UL (ref 0–1)
IMM GRANULOCYTES NFR BLD: 0.4 %
LYMPHOCYTES # BLD AUTO: 0.52 X10(3) UL (ref 1–4)
LYMPHOCYTES NFR BLD AUTO: 4.9 %
MCH RBC QN AUTO: 31.6 PG (ref 26–34)
MCHC RBC AUTO-ENTMCNC: 34.1 G/DL (ref 31–37)
MCV RBC AUTO: 92.6 FL
MONOCYTES # BLD AUTO: 1.16 X10(3) UL (ref 0.1–1)
MONOCYTES NFR BLD AUTO: 10.9 %
NEUTROPHILS # BLD AUTO: 8.69 X10 (3) UL (ref 1.5–7.7)
NEUTROPHILS # BLD AUTO: 8.69 X10(3) UL (ref 1.5–7.7)
NEUTROPHILS NFR BLD AUTO: 81.4 %
OSMOLALITY SERPL CALC.SUM OF ELEC: 292 MOSM/KG (ref 275–295)
PLATELET # BLD AUTO: 141 10(3)UL (ref 150–450)
POTASSIUM SERPL-SCNC: 4.2 MMOL/L (ref 3.5–5.1)
RBC # BLD AUTO: 3.1 X10(6)UL
SODIUM SERPL-SCNC: 140 MMOL/L (ref 136–145)
WBC # BLD AUTO: 10.7 X10(3) UL (ref 4–11)

## 2024-01-27 PROCEDURE — 85025 COMPLETE CBC W/AUTO DIFF WBC: CPT | Performed by: INTERNAL MEDICINE

## 2024-01-27 PROCEDURE — 97530 THERAPEUTIC ACTIVITIES: CPT

## 2024-01-27 PROCEDURE — 80048 BASIC METABOLIC PNL TOTAL CA: CPT | Performed by: INTERNAL MEDICINE

## 2024-01-27 RX ORDER — DOCUSATE SODIUM 100 MG/1
100 CAPSULE, LIQUID FILLED ORAL 2 TIMES DAILY
Status: DISCONTINUED | OUTPATIENT
Start: 2024-01-27 | End: 2024-01-31

## 2024-01-27 RX ORDER — ENEMA 19; 7 G/133ML; G/133ML
1 ENEMA RECTAL ONCE AS NEEDED
Status: DISCONTINUED | OUTPATIENT
Start: 2024-01-27 | End: 2024-01-31

## 2024-01-27 RX ORDER — BISACODYL 10 MG
10 SUPPOSITORY, RECTAL RECTAL
Status: DISCONTINUED | OUTPATIENT
Start: 2024-01-27 | End: 2024-01-31

## 2024-01-27 RX ORDER — POLYETHYLENE GLYCOL 3350 17 G/17G
17 POWDER, FOR SOLUTION ORAL DAILY PRN
Status: DISCONTINUED | OUTPATIENT
Start: 2024-01-27 | End: 2024-01-31

## 2024-01-27 NOTE — PLAN OF CARE
Nacho is POD #2, He is alert to self, Hx dementia. S/P left hip surgery. Aquacel dressing in place- dry and intact. Refused oxygen, scd's and abductor pillow. Incont of urine, unable to recall LBM. ABD nontender. Heparin subcutaneous for DVT proph. Patient accepted one dose but refused 2nd dose. Dysphagia diet- able to feed self, patient prefers to sit/dangle at side of bed for meals. Poor safety awareness, bed alarm and video in use. Frequent rounding. Norco PRN for pain. Plans for [facility] when medically cleared and insurance auth received.    Problem: PAIN - ADULT  Goal: Verbalizes/displays adequate comfort level or patient's stated pain goal  Description: INTERVENTIONS:  - Encourage pt to monitor pain and request assistance  - Assess pain using appropriate pain scale  - Administer analgesics based on type and severity of pain and evaluate response  - Implement non-pharmacological measures as appropriate and evaluate response  - Consider cultural and social influences on pain and pain management  - Manage/alleviate anxiety  - Utilize distraction and/or relaxation techniques  - Monitor for opioid side effects  - Notify MD/LIP if interventions unsuccessful or patient reports new pain  - Anticipate increased pain with activity and pre-medicate as appropriate  Outcome: Progressing     Problem: RISK FOR INFECTION - ADULT  Goal: Absence of fever/infection during anticipated neutropenic period  Description: INTERVENTIONS  - Monitor WBC  - Administer growth factors as ordered  - Implement neutropenic guidelines  Outcome: Progressing     Problem: SAFETY ADULT - FALL  Goal: Free from fall injury  Description: INTERVENTIONS:  - Assess pt frequently for physical needs  - Identify cognitive and physical deficits and behaviors that affect risk of falls.  - [place] fall precautions as indicated by assessment.  - Educate pt/family on patient safety including physical limitations  - Instruct pt to call for assistance with activity  based on assessment  - Modify environment to reduce risk of injury  - Provide assistive devices as appropriate  - Consider OT/PT consult to assist with strengthening/mobility  - Encourage toileting schedule  Outcome: Progressing     Problem: DISCHARGE PLANNING  Goal: Discharge to home or other facility with appropriate resources  Description: INTERVENTIONS:  - Identify barriers to discharge w/pt and caregiver  - Include patient/family/discharge partner in discharge planning  - Arrange for needed discharge resources and transportation as appropriate  - Identify discharge learning needs (meds, wound care, etc)  - Arrange for interpreters to assist at discharge as needed  - Consider post-discharge preferences of patient/family/discharge partner  - Complete POLST form as appropriate  - Assess patient's ability to be responsible for managing their own health  - Refer to Case Management Department for coordinating discharge planning if the patient needs post-hospital services based on physician/LIP order or complex needs related to functional status, cognitive ability or social support system  Outcome: Progressing

## 2024-01-27 NOTE — PROGRESS NOTES
Pt sitting up comfortably. Pleasantly confused.    LLE: Dressing CDI, comp soft, DF/PF/EHL intact, SILT, cap refill brisk    A/P 89 M s/p L hip elyssa arthroplasty on 1/25/2024.   PT/OT: WBAT LLE with posterior hip precautions  DVT proph  Pain control  Discharge planning - ortho stable for discharge. F/u in office in 2 weeks with Dr. Farrell. May remove aquacel dressing POD7

## 2024-01-27 NOTE — PHYSICAL THERAPY NOTE
PHYSICAL THERAPY TREATMENT NOTE - INPATIENT     Room Number: 428/428-A       Presenting Problem: unwitnessed fall w/left hip pain and inability to ambulate s/p L hemiarthroplasty, WBAT, post hip precautions for 12 weeks    Problem List  Principal Problem:    Closed fracture of left hip, initial encounter ([facility])  Active Problems:    Hip fracture ([facility])      PHYSICAL THERAPY ASSESSMENT   Chart reviewed. DELPHINE Mejía approved participation in physical therapy. 2nd attempt, Pt refused before. Max encouragement given and he was more cooperative.  PPE worn by therapist: mask and gloves. Patient was not wearing a mask during session. Patient presented in bed and alarm activated with unable to rate/10 pain. Patient with fair  progress towards goals during this session. Education provided on Total Hip precautions, Physical therapy plan of care, and physiological benefits of out of bed mobility. Patient with poor carryover. Patient is confused oriented to himself only. Impulsive behavior noted. Patient sit at EOB ~ 10 min with Min A to SBA at time. Strongly refused to sit in chair.      Bed mobility: Mod assist  Transfers:  NT  Gait Assistance: Not tested  Distance (ft): refused  Assistive Device: Rolling walker  Pattern: Shuffle;L Decreased stance time      Patient was left in bed and alarm activated at end of session with all needs in reach. The patient's Approx Degree of Impairment: 64.91% has been calculated based on documentation in the [facility] '6 clicks' Inpatient Basic Mobility Short Form.  Research supports that patients with this level of impairment may benefit from Long term acute care. Back to his [facility] care place. RN aware of patient status post session.    DISCHARGE RECOMMENDATIONS  PT Discharge Recommendations: Sub-acute rehabilitation     PLAN  PT Treatment Plan: Body mechanics;Endurance;Gait training;Patient education;Balance training;Transfer training;Coordination    SUBJECTIVE  Cooperative and  impulsive    OBJECTIVE  Precautions: Hip abduction pillow    WEIGHT BEARING RESTRICTION  Weight Bearing Restriction: L lower extremity           L Lower Extremity: Weight Bearing as Tolerated    PAIN ASSESSMENT   Rating: Unable to rate  Location: no complains  Management Techniques: Activity promotion;Body mechanics;Repositioning;Relaxation    BALANCE                                                                                                                       Static Sitting: Fair +  Dynamic Sitting: Fair           Static Standing: Not tested  Dynamic Standing: Not tested    ACTIVITY TOLERANCE  Pulse: 90  Heart Rate Source: Monitor  Resp: 18  BP: 122/76  BP Location: Right arm  BP Method: Automatic  Patient Position: Sitting    O2 WALK  Oxygen Therapy  SPO2% on Room Air at Rest: 92    AM-PAC '6-Clicks' INPATIENT SHORT FORM - BASIC MOBILITY  How much difficulty does the patient currently have...  Patient Difficulty: Turning over in bed (including adjusting bedclothes, sheets and blankets)?: A Little   Patient Difficulty: Sitting down on and standing up from a chair with arms (e.g., wheelchair, bedside commode, etc.): A Lot   Patient Difficulty: Moving from lying on back to sitting on the side of the bed?: A Lot   How much help from another person does the patient currently need...   Help from Another: Moving to and from a bed to a chair (including a wheelchair)?: A Lot   Help from Another: Need to walk in hospital room?: A Lot   Help from Another: Climbing 3-5 steps with a railing?: A Lot     AM-PAC Score:  Raw Score: 13   Approx Degree of Impairment: 64.91%   Standardized Score (AM-PAC Scale): 36.74   CMS Modifier (G-Code): CL      Additional information:     THERAPEUTIC EXERCISES  Lower Extremity Ankle pumps  Heel slides  Knee extension  Quad sets     Position Supine       Patient End of Session: In bed;Alarm set;All patient questions and concerns addressed;RN aware of session/findings;Call light within  reach;Needs met    CURRENT GOALS   Goals to be met by: 2/8/24  Patient Goal Patient's self-stated goal is: not stated   Goal #1 Patient is able to demonstrate supine - sit EOB @ level: minimal assistance   Goal #1   Current Status Min A x 2   Goal #2 Patient is able to demonstrate transfers Sit to/from Stand at assistance level: minimal assistance     Goal #2  Current Status NT   Goal #3 Patient is able to ambulate 10 feet with assistive device at assistance level: moderate assistance   Goal #3   Current Status NT   Goal #4 Patient will negotiate bed to/from chair transfers with RW with minimal assistance   Goal #4   Current Status    Goal #5 Patient verbalizes and/or demonstrates all precautions and safety concerns independently   Goal #5   Current Status instructed   Goal #6 Patient independently performs home exercise program for ROM/strengthening per the instructions provided in preparation for discharge.   Goal #6  Current Status instructed     Gait Training:  minutes  Therapeutic Activity: 30 minutes  Neuromuscular Re-education:  minutes  Therapeutic Exercise:  minutes   Canalith Repositioning:  minutes  Manual Therapy:  minutes  Can add/delete any of these

## 2024-01-27 NOTE — PROGRESS NOTES
[facility]    Progress Note    Nacho Mari Patient Status:  Inpatient    10/14/1933 MRN [id number]   Location [facility] 4W/SW/SE Attending Thuy Acevedo MD   Hosp Day # 2 PCP Oscar (AmairaniElieser Rios MD     SUBJECTIVE:  Pt seen and examined at bedside.   Pt comfortable  No pain reported     /74 (BP Location: Right arm)   Pulse 103   Temp 97.6 °F (36.4 °C) (Axillary)   Resp 18   Ht 5' 2\" (1.575 m)   Wt 95 lb (43.1 kg)   SpO2 92%   BMI 17.38 kg/m²     Physical Examination:    Gen: Awake, alert, oriented. Appears comfortable.  HEENT: PERRLA  Lungs: CTA B/L  Heart: Normal S1 S2, No M/G/R   Abd: Abdomen soft, nontender, nondistended, no organomegaly, bowel sounds present  Ext: No edema, no calf tenderness    Labs:   Lab Results   Component Value Date    WBC 10.7 2024    HGB 9.8 2024    HCT 28.7 2024    .0 2024    CREATSERUM 1.00 2024    BUN 21 2024     2024    K 4.2 2024     2024    CO2 25.0 2024    GLU 78 2024    CA 8.5 2024       No results for input(s): \"PGLU\" in the last 168 hours.  No results for input(s): \"INR\" in the last 168 hours.    Imaging:  Imaging reviewed in Epic.    Meds:   Current Facility-Administered Medications   Medication Dose Route Frequency    morphINE PF 2 MG/ML injection 2 mg  2 mg Intravenous Q4H PRN    sodium chloride 0.9% infusion   Intravenous Continuous    heparin (Porcine) 5000 UNIT/ML injection 5,000 Units  5,000 Units Subcutaneous Q8H ALELN    acetaminophen (Tylenol Extra Strength) tab 500 mg  500 mg Oral Q4H PRN    acetaminophen (Tylenol) tab 650 mg  650 mg Oral Q4H PRN    Or    HYDROcodone-acetaminophen (Norco) 5-325 MG per tab 1 tablet  1 tablet Oral Q4H PRN    Or    HYDROcodone-acetaminophen (Norco) 5-325 MG per tab 2 tablet  2 tablet Oral Q4H PRN    morphINE PF 2 MG/ML injection 1 mg  1 mg Intravenous Q2H PRN    Or    morphINE PF 2 MG/ML injection 2  mg  2 mg Intravenous Q2H PRN    Or    morphINE PF 4 MG/ML injection 4 mg  4 mg Intravenous Q2H PRN    melatonin tab 3 mg  3 mg Oral Nightly PRN    ondansetron (Zofran) 4 MG/2ML injection 4 mg  4 mg Intravenous Q6H PRN    metoclopramide (Reglan) 5 mg/mL injection 5 mg  5 mg Intravenous Q8H PRN    fluticasone propionate (Flonase) 50 MCG/ACT nasal suspension 2 spray  2 spray Each Nare Daily    fluticasone-umeclidin-vilant (Trelegy Ellipta) 100-62.5-25 MCG/ACT inhaler 1 puff  1 puff Inhalation Daily    magnesium oxide (Mag-Ox) tab 400 mg  400 mg Oral Daily    pantoprazole (Protonix) DR tab 40 mg  40 mg Oral QAM AC    QUEtiapine (SEROquel) tab 50 mg  50 mg Oral Nightly    tamsulosin (Flomax) cap 0.4 mg  0.4 mg Oral Daily    traZODone (Desyrel) tab 50 mg  50 mg Oral Nightly    hydrALAzine (Apresoline) 20 mg/mL injection 10 mg  10 mg Intravenous Q4H PRN    aspirin DR tab 81 mg  81 mg Oral BID     Facility-Administered Medications Ordered in Other Encounters   Medication Dose Route Frequency    fentaNYL (Sublimaze) 50 mcg/mL injection   Intravenous PRN    lidocaine PF (Xylocaine-MPF) 1% injection   Intravenous PRN    propofol (Diprivan) 10 MG/ML injection   Intravenous PRN    rocuronium (Zemuron) 50 mg/5mL injection   Intravenous PRN    lactated ringers infusion   Intravenous Continuous PRN    tranexamic acid in sodium chloride 0.7% (Cyklokapron) 1000 mg/100mL infusion premix   Intravenous PRN    phenylephrine (Jermaine-Synephrine) 10 MG/ML injection   Intravenous PRN    EPHEDrine sulfate 50 mg/mL injection   Intravenous PRN    neostigmine (Bloxiverz) 10 mg/10mL injection   Intravenous PRN    glycopyrrolate (Robinul) 0.2 MG/ML injection   Intravenous PRN    ondansetron (Zofran) 4 MG/2ML injection   Intravenous PRN       Assessment:     1.  Acute displaced angulated and impacted left femoral neck fracture  2.  Unwitnessed fall, CT head unremarkable for acute changes  3.  History of dementia  4.  BPH        Plan:  Patient admitted  to medical floor  Pain control  Ortho consulted, plan for left hip hemiarthroplasty      Status post left hip hemiarthroplasty for femoral neck fracture.  Pain overall stable.  WBAT  DVT prophylaxis: Heparin sq  Resume other home medication  CODE STATUS: Full code  Discussed with nursing  Med rec done     PT/OT  D/w CM  Stable for discharge.         Thuy Acevedo MD   1/27/2024  11:47 AM

## 2024-01-27 NOTE — PLAN OF CARE
Patient Alert oriented to self and confused. Unable to follow instructions. Removed IV's . notified MD. Refused and threw SCD's. On 3l O2. Incontinent of urine.Fall precautions in place, Bed alarm on. call light and personal belongings within arms reach. Camera on.   Problem: Patient Centered Care  Goal: Patient preferences are identified and integrated in the patient's plan of care  Description: Interventions:  - What would you like us to know as we care for you?   - Provide timely, complete, and accurate information to patient/family  - Incorporate patient and family knowledge, values, beliefs, and cultural backgrounds into the planning and delivery of care  - Encourage patient/family to participate in care and decision-making at the level they choose  - Honor patient and family perspectives and choices  Outcome: Progressing     Problem: PAIN - ADULT  Goal: Verbalizes/displays adequate comfort level or patient's stated pain goal  Description: INTERVENTIONS:  - Encourage pt to monitor pain and request assistance  - Assess pain using appropriate pain scale  - Administer analgesics based on type and severity of pain and evaluate response  - Implement non-pharmacological measures as appropriate and evaluate response  - Consider cultural and social influences on pain and pain management  - Manage/alleviate anxiety  - Utilize distraction and/or relaxation techniques  - Monitor for opioid side effects  - Notify MD/LIP if interventions unsuccessful or patient reports new pain  - Anticipate increased pain with activity and pre-medicate as appropriate  Outcome: Progressing

## 2024-01-28 LAB
ANION GAP SERPL CALC-SCNC: 8 MMOL/L (ref 0–18)
BASOPHILS # BLD AUTO: 0.06 X10(3) UL (ref 0–0.2)
BASOPHILS NFR BLD AUTO: 0.7 %
BUN BLD-MCNC: 21 MG/DL (ref 9–23)
BUN/CREAT SERPL: 23.3 (ref 10–20)
CALCIUM BLD-MCNC: 8.6 MG/DL (ref 8.7–10.4)
CHLORIDE SERPL-SCNC: 108 MMOL/L (ref 98–112)
CO2 SERPL-SCNC: 24 MMOL/L (ref 21–32)
CREAT BLD-MCNC: 0.9 MG/DL
DEPRECATED RDW RBC AUTO: 47 FL (ref 35.1–46.3)
EGFRCR SERPLBLD CKD-EPI 2021: 81 ML/MIN/1.73M2 (ref 60–?)
EOSINOPHIL # BLD AUTO: 0.41 X10(3) UL (ref 0–0.7)
EOSINOPHIL NFR BLD AUTO: 4.5 %
ERYTHROCYTE [DISTWIDTH] IN BLOOD BY AUTOMATED COUNT: 13.6 % (ref 11–15)
GLUCOSE BLD-MCNC: 90 MG/DL (ref 70–99)
HCT VFR BLD AUTO: 30.5 %
HGB BLD-MCNC: 10.3 G/DL
IMM GRANULOCYTES # BLD AUTO: 0.03 X10(3) UL (ref 0–1)
IMM GRANULOCYTES NFR BLD: 0.3 %
LYMPHOCYTES # BLD AUTO: 0.69 X10(3) UL (ref 1–4)
LYMPHOCYTES NFR BLD AUTO: 7.5 %
MCH RBC QN AUTO: 31.5 PG (ref 26–34)
MCHC RBC AUTO-ENTMCNC: 33.8 G/DL (ref 31–37)
MCV RBC AUTO: 93.3 FL
MONOCYTES # BLD AUTO: 0.93 X10(3) UL (ref 0.1–1)
MONOCYTES NFR BLD AUTO: 10.1 %
NEUTROPHILS # BLD AUTO: 7.07 X10 (3) UL (ref 1.5–7.7)
NEUTROPHILS # BLD AUTO: 7.07 X10(3) UL (ref 1.5–7.7)
NEUTROPHILS NFR BLD AUTO: 76.9 %
OSMOLALITY SERPL CALC.SUM OF ELEC: 293 MOSM/KG (ref 275–295)
PLATELET # BLD AUTO: 141 10(3)UL (ref 150–450)
POTASSIUM SERPL-SCNC: 4.1 MMOL/L (ref 3.5–5.1)
RBC # BLD AUTO: 3.27 X10(6)UL
SODIUM SERPL-SCNC: 140 MMOL/L (ref 136–145)
WBC # BLD AUTO: 9.2 X10(3) UL (ref 4–11)

## 2024-01-28 PROCEDURE — 85025 COMPLETE CBC W/AUTO DIFF WBC: CPT | Performed by: INTERNAL MEDICINE

## 2024-01-28 PROCEDURE — 80048 BASIC METABOLIC PNL TOTAL CA: CPT | Performed by: INTERNAL MEDICINE

## 2024-01-28 NOTE — PHYSICAL THERAPY NOTE
Chart reviewed and attempted treatment and pt was very sleepy and declined to participate in the therapy session. Pt education provided. Will attempt as schedule permits and is appropriate. RN aware.

## 2024-01-28 NOTE — PLAN OF CARE
Patient alert and oriented x 1. Disoriented to place, situation, and time. Post op day 3. tylenol administered for pain as needed. Dressing is aquacel. Sitting at edge bed. Voiding via primofit. Patients diet is Chopped/ bite sized. ASA for VTE prophylaxis. Refusing SCDs. Refused heparin. Vital signs monitored. Cough and deep breathe in addition to incentive spirometer. Bed in lowest position, call light within reach, and non skid socks in place for fall precautions. All needs within reach. Rounding done by nursing staff. Plan for discharge is [facility] pending clearance.       Problem: Patient Centered Care  Goal: Patient preferences are identified and integrated in the patient's plan of care  Description: Interventions:  - What would you like us to know as we care for you? Patient from [facility]  - Provide timely, complete, and accurate information to patient/family  - Incorporate patient and family knowledge, values, beliefs, and cultural backgrounds into the planning and delivery of care  - Encourage patient/family to participate in care and decision-making at the level they choose  - Honor patient and family perspectives and choices  Outcome: Progressing     Problem: Patient/Family Goals  Goal: Patient/Family Long Term Goal  Description: Patient's Long Term Goal: To be discharged    Interventions:  - get clearance   - See additional Care Plan goals for specific interventions  Outcome: Progressing  Goal: Patient/Family Short Term Goal  Description: Patient's Short Term Goal: Manage pain    Interventions:   - monitor and assess pain   - See additional Care Plan goals for specific interventions  Outcome: Progressing     Problem: PAIN - ADULT  Goal: Verbalizes/displays adequate comfort level or patient's stated pain goal  Description: INTERVENTIONS:  - Encourage pt to monitor pain and request assistance  - Assess pain using appropriate pain scale  - Administer analgesics based on type and severity of pain  and evaluate response  - Implement non-pharmacological measures as appropriate and evaluate response  - Consider cultural and social influences on pain and pain management  - Manage/alleviate anxiety  - Utilize distraction and/or relaxation techniques  - Monitor for opioid side effects  - Notify MD/LIP if interventions unsuccessful or patient reports new pain  - Anticipate increased pain with activity and pre-medicate as appropriate  Outcome: Progressing     Problem: RISK FOR INFECTION - ADULT  Goal: Absence of fever/infection during anticipated neutropenic period  Description: INTERVENTIONS  - Monitor WBC  - Administer growth factors as ordered  - Implement neutropenic guidelines  Outcome: Progressing     Problem: SAFETY ADULT - FALL  Goal: Free from fall injury  Description: INTERVENTIONS:  - Assess pt frequently for physical needs  - Identify cognitive and physical deficits and behaviors that affect risk of falls.  - [place] fall precautions as indicated by assessment.  - Educate pt/family on patient safety including physical limitations  - Instruct pt to call for assistance with activity based on assessment  - Modify environment to reduce risk of injury  - Provide assistive devices as appropriate  - Consider OT/PT consult to assist with strengthening/mobility  - Encourage toileting schedule  Outcome: Progressing     Problem: DISCHARGE PLANNING  Goal: Discharge to home or other facility with appropriate resources  Description: INTERVENTIONS:  - Identify barriers to discharge w/pt and caregiver  - Include patient/family/discharge partner in discharge planning  - Arrange for needed discharge resources and transportation as appropriate  - Identify discharge learning needs (meds, wound care, etc)  - Arrange for interpreters to assist at discharge as needed  - Consider post-discharge preferences of patient/family/discharge partner  - Complete POLST form as appropriate  - Assess patient's ability to be responsible  for managing their own health  - Refer to Case Management Department for coordinating discharge planning if the patient needs post-hospital services based on physician/LIP order or complex needs related to functional status, cognitive ability or social support system  Outcome: Progressing

## 2024-01-28 NOTE — CM/SW NOTE
CM emailed [facility] list to pts nephew Silvestre ([email or web address]).   Spoke to him over the phone. He will get back to me today with [facility] choice.     Nandini Givens RN, BSN  Nurse   [phone]

## 2024-01-28 NOTE — PLAN OF CARE
This nurse took over caring for the patient at 3pm. Pt resting comfortably in bed, irritable when being assessed. Incontinent of bladder, cliff care provided.

## 2024-01-28 NOTE — PLAN OF CARE
Patient Alert and Oriented x1. Confused. Hx of dementia. No iv's ok per md. Fall precautions in place. Call light within arms reach. Incontinent of urine. Plan for [facility] once medically cleared and ins authorization ok.  Problem: Patient Centered Care  Goal: Patient preferences are identified and integrated in the patient's plan of care  Description: Interventions:  - What would you like us to know as we care for you?   - Provide timely, complete, and accurate information to patient/family  - Incorporate patient and family knowledge, values, beliefs, and cultural backgrounds into the planning and delivery of care  - Encourage patient/family to participate in care and decision-making at the level they choose  - Honor patient and family perspectives and choices  Outcome: Progressing     Problem: PAIN - ADULT  Goal: Verbalizes/displays adequate comfort level or patient's stated pain goal  Description: INTERVENTIONS:  - Encourage pt to monitor pain and request assistance  - Assess pain using appropriate pain scale  - Administer analgesics based on type and severity of pain and evaluate response  - Implement non-pharmacological measures as appropriate and evaluate response  - Consider cultural and social influences on pain and pain management  - Manage/alleviate anxiety  - Utilize distraction and/or relaxation techniques  - Monitor for opioid side effects  - Notify MD/LIP if interventions unsuccessful or patient reports new pain  - Anticipate increased pain with activity and pre-medicate as appropriate  Outcome: Progressing

## 2024-01-29 LAB
ANION GAP SERPL CALC-SCNC: 8 MMOL/L (ref 0–18)
BASOPHILS # BLD AUTO: 0.08 X10(3) UL (ref 0–0.2)
BASOPHILS NFR BLD AUTO: 0.7 %
BUN BLD-MCNC: 15 MG/DL (ref 9–23)
BUN/CREAT SERPL: 24.6 (ref 10–20)
CALCIUM BLD-MCNC: 8.7 MG/DL (ref 8.7–10.4)
CHLORIDE SERPL-SCNC: 105 MMOL/L (ref 98–112)
CO2 SERPL-SCNC: 26 MMOL/L (ref 21–32)
CREAT BLD-MCNC: 0.61 MG/DL
DEPRECATED RDW RBC AUTO: 46.8 FL (ref 35.1–46.3)
EGFRCR SERPLBLD CKD-EPI 2021: 91 ML/MIN/1.73M2 (ref 60–?)
EOSINOPHIL # BLD AUTO: 0.39 X10(3) UL (ref 0–0.7)
EOSINOPHIL NFR BLD AUTO: 3.5 %
ERYTHROCYTE [DISTWIDTH] IN BLOOD BY AUTOMATED COUNT: 13.5 % (ref 11–15)
GLUCOSE BLD-MCNC: 79 MG/DL (ref 70–99)
HCT VFR BLD AUTO: 32.1 %
HGB BLD-MCNC: 10.8 G/DL
IMM GRANULOCYTES # BLD AUTO: 0.03 X10(3) UL (ref 0–1)
IMM GRANULOCYTES NFR BLD: 0.3 %
LYMPHOCYTES # BLD AUTO: 0.58 X10(3) UL (ref 1–4)
LYMPHOCYTES NFR BLD AUTO: 5.3 %
MCH RBC QN AUTO: 31.9 PG (ref 26–34)
MCHC RBC AUTO-ENTMCNC: 33.6 G/DL (ref 31–37)
MCV RBC AUTO: 94.7 FL
MONOCYTES # BLD AUTO: 1.2 X10(3) UL (ref 0.1–1)
MONOCYTES NFR BLD AUTO: 10.9 %
NEUTROPHILS # BLD AUTO: 8.76 X10 (3) UL (ref 1.5–7.7)
NEUTROPHILS # BLD AUTO: 8.76 X10(3) UL (ref 1.5–7.7)
NEUTROPHILS NFR BLD AUTO: 79.3 %
OSMOLALITY SERPL CALC.SUM OF ELEC: 288 MOSM/KG (ref 275–295)
PLATELET # BLD AUTO: 194 10(3)UL (ref 150–450)
POTASSIUM SERPL-SCNC: 4.1 MMOL/L (ref 3.5–5.1)
RBC # BLD AUTO: 3.39 X10(6)UL
SODIUM SERPL-SCNC: 139 MMOL/L (ref 136–145)
WBC # BLD AUTO: 11 X10(3) UL (ref 4–11)

## 2024-01-29 PROCEDURE — 85025 COMPLETE CBC W/AUTO DIFF WBC: CPT | Performed by: INTERNAL MEDICINE

## 2024-01-29 PROCEDURE — 92526 ORAL FUNCTION THERAPY: CPT

## 2024-01-29 PROCEDURE — 97530 THERAPEUTIC ACTIVITIES: CPT

## 2024-01-29 PROCEDURE — 80048 BASIC METABOLIC PNL TOTAL CA: CPT | Performed by: INTERNAL MEDICINE

## 2024-01-29 RX ORDER — ASPIRIN 81 MG/1
81 TABLET ORAL 2 TIMES DAILY
Qty: 70 TABLET | Refills: 0 | Status: SHIPPED | OUTPATIENT
Start: 2024-01-29 | End: 2024-03-04

## 2024-01-29 RX ORDER — CEPHALEXIN 500 MG/1
1000 CAPSULE ORAL EVERY 8 HOURS SCHEDULED
Qty: 42 CAPSULE | Refills: 0 | Status: SHIPPED | OUTPATIENT
Start: 2024-01-29 | End: 2024-02-05

## 2024-01-29 RX ORDER — CEPHALEXIN 500 MG/1
500 CAPSULE ORAL EVERY 8 HOURS SCHEDULED
Status: DISCONTINUED | OUTPATIENT
Start: 2024-01-29 | End: 2024-01-31

## 2024-01-29 NOTE — PROGRESS NOTES
[facility]    Progress Note    Nacho Mari Patient Status:  Inpatient    10/14/1933 MRN [id number]   Location [facility] 4W/SW/SE Attending Thuy Acevedo MD   Hosp Day # [facility] Gabriel     SUBJECTIVE:  Pt seen and examined at bedside.   Pt comfortable  No pain reported     /73 (BP Location: Right arm)   Pulse 72   Temp 98.2 °F (36.8 °C) (Axillary)   Resp 18   Ht 5' 2\" (1.575 m)   Wt 95 lb (43.1 kg)   SpO2 94%   BMI 17.38 kg/m²     Physical Examination:    Gen: Awake, alert, oriented. Appears comfortable.  HEENT: PERRLA  Lungs: CTA B/L  Heart: Normal S1 S2, No M/G/R   Abd: Abdomen soft, nontender, nondistended, no organomegaly, bowel sounds present  Ext: No edema, no calf tenderness    Labs:   Lab Results   Component Value Date    WBC 11.0 2024    HGB 10.8 2024    HCT 32.1 2024    .0 2024    CREATSERUM 0.61 2024    BUN 15 2024     2024    K 4.1 2024     2024    CO2 26.0 2024    GLU 79 2024    CA 8.7 2024       No results for input(s): \"PGLU\" in the last 168 hours.  No results for input(s): \"INR\" in the last 168 hours.    Imaging:  Imaging reviewed in Epic.    Meds:   Current Facility-Administered Medications   Medication Dose Route Frequency    cephalexin (Keflex) cap 500 mg  500 mg Oral Q8H ALLEN    docusate sodium (Colace) cap 100 mg  100 mg Oral BID    polyethylene glycol (PEG 3350) (Miralax) 17 g oral packet 17 g  17 g Oral Daily PRN    magnesium hydroxide (Milk of Magnesia) 400 MG/5ML oral suspension 30 mL  30 mL Oral Daily PRN    bisacodyl (Dulcolax) 10 MG rectal suppository 10 mg  10 mg Rectal Daily PRN    fleet enema (Fleet) 7-19 GM/118ML rectal enema 133 mL  1 enema Rectal Once PRN    morphINE PF 2 MG/ML injection 2 mg  2 mg Intravenous Q4H PRN    sodium chloride 0.9% infusion   Intravenous Continuous    heparin (Porcine) 5000 UNIT/ML injection 5,000 Units   5,000 Units Subcutaneous Q8H ALLEN    acetaminophen (Tylenol Extra Strength) tab 500 mg  500 mg Oral Q4H PRN    acetaminophen (Tylenol) tab 650 mg  650 mg Oral Q4H PRN    Or    HYDROcodone-acetaminophen (Norco) 5-325 MG per tab 1 tablet  1 tablet Oral Q4H PRN    Or    HYDROcodone-acetaminophen (Norco) 5-325 MG per tab 2 tablet  2 tablet Oral Q4H PRN    morphINE PF 2 MG/ML injection 1 mg  1 mg Intravenous Q2H PRN    Or    morphINE PF 2 MG/ML injection 2 mg  2 mg Intravenous Q2H PRN    Or    morphINE PF 4 MG/ML injection 4 mg  4 mg Intravenous Q2H PRN    melatonin tab 3 mg  3 mg Oral Nightly PRN    ondansetron (Zofran) 4 MG/2ML injection 4 mg  4 mg Intravenous Q6H PRN    metoclopramide (Reglan) 5 mg/mL injection 5 mg  5 mg Intravenous Q8H PRN    fluticasone propionate (Flonase) 50 MCG/ACT nasal suspension 2 spray  2 spray Each Nare Daily    fluticasone-umeclidin-vilant (Trelegy Ellipta) 100-62.5-25 MCG/ACT inhaler 1 puff  1 puff Inhalation Daily    magnesium oxide (Mag-Ox) tab 400 mg  400 mg Oral Daily    pantoprazole (Protonix) DR tab 40 mg  40 mg Oral QAM AC    QUEtiapine (SEROquel) tab 50 mg  50 mg Oral Nightly    tamsulosin (Flomax) cap 0.4 mg  0.4 mg Oral Daily    traZODone (Desyrel) tab 50 mg  50 mg Oral Nightly    hydrALAzine (Apresoline) 20 mg/mL injection 10 mg  10 mg Intravenous Q4H PRN    aspirin DR tab 81 mg  81 mg Oral BID     Facility-Administered Medications Ordered in Other Encounters   Medication Dose Route Frequency    fentaNYL (Sublimaze) 50 mcg/mL injection   Intravenous PRN    lidocaine PF (Xylocaine-MPF) 1% injection   Intravenous PRN    propofol (Diprivan) 10 MG/ML injection   Intravenous PRN    rocuronium (Zemuron) 50 mg/5mL injection   Intravenous PRN    lactated ringers infusion   Intravenous Continuous PRN    tranexamic acid in sodium chloride 0.7% (Cyklokapron) 1000 mg/100mL infusion premix   Intravenous PRN    phenylephrine (Jermaine-Synephrine) 10 MG/ML injection   Intravenous PRN    EPHEDrine  sulfate 50 mg/mL injection   Intravenous PRN    neostigmine (Bloxiverz) 10 mg/10mL injection   Intravenous PRN    glycopyrrolate (Robinul) 0.2 MG/ML injection   Intravenous PRN    ondansetron (Zofran) 4 MG/2ML injection   Intravenous PRN       Assessment:      1.  Acute displaced angulated and impacted left femoral neck fracture  2.  Unwitnessed fall, CT head unremarkable for acute changes  3.  History of dementia  4.  BPH        Plan:  Patient admitted to medical floor  Pain control  Ortho consulted, plan for left hip hemiarthroplasty      Status post left hip hemiarthroplasty for femoral neck fracture.  Pain overall stable.  WBAT  DVT prophylaxis: Heparin sq  Resume other home medication  CODE STATUS: Full code  Discussed with nursing  Med rec done     PT/OT  D/w CM  Stable for discharge.  Awaiting insurance auth.      Thuy Acevedo MD   1/29/2024  3:43 PM

## 2024-01-29 NOTE — PLAN OF CARE
Patient Alert and Oriented x1, confused. Incontinent of urine. Fall precautions in place. Camera on. Call light within arms reach. On 2l o2.   Problem: Patient Centered Care  Goal: Patient preferences are identified and integrated in the patient's plan of care  Description: Interventions:  - What would you like us to know as we care for you? [facility]  - Provide timely, complete, and accurate information to patient/family  - Incorporate patient and family knowledge, values, beliefs, and cultural backgrounds into the planning and delivery of care  - Encourage patient/family to participate in care and decision-making at the level they choose  - Honor patient and family perspectives and choices  Outcome: Progressing     Problem: PAIN - ADULT  Goal: Verbalizes/displays adequate comfort level or patient's stated pain goal  Description: INTERVENTIONS:  - Encourage pt to monitor pain and request assistance  - Assess pain using appropriate pain scale  - Administer analgesics based on type and severity of pain and evaluate response  - Implement non-pharmacological measures as appropriate and evaluate response  - Consider cultural and social influences on pain and pain management  - Manage/alleviate anxiety  - Utilize distraction and/or relaxation techniques  - Monitor for opioid side effects  - Notify MD/LIP if interventions unsuccessful or patient reports new pain  - Anticipate increased pain with activity and pre-medicate as appropriate  Outcome: Progressing

## 2024-01-29 NOTE — PROGRESS NOTES
[facility] Pharmacy Note: Antimicrobial Weight Based Dose Adjustment for: cephalexin (KEFLEX)    Nacho Mari is a 89 year old patient who has been prescribed cephalexin (KEFLEX) 1000 mg every 8 hours x 7 days .    Estimated Creatinine Clearance: 49.1 mL/min (A) (based on SCr of 0.61 mg/dL (L)).  Wt Readings from Last 6 Encounters:   01/26/24 43.1 kg (95 lb)     Body mass index is 17.38 kg/m².    Based on this criteria and renal function, dose will be adjusted to cephalexin (KEFLEX) 500 mg every 8 hours x 7 days.    Thank you,    Sintia Ernst, PharmD  1/29/2024  8:34 AM

## 2024-01-29 NOTE — PHYSICAL THERAPY NOTE
PHYSICAL THERAPY TREATMENT NOTE - INPATIENT     Room Number: 420/420-A       Presenting Problem: unwitnessed fall w/left hip pain and inability to ambulate s/p L hemiarthroplasty, WBAT, post hip precautions for 12 weeks    Problem List  Principal Problem:    Closed fracture of left hip, initial encounter ([facility])  Active Problems:    Hip fracture ([facility])      PHYSICAL THERAPY ASSESSMENT   Chart reviewed. DELPHINE Bloom approved participation in physical therapy. PPE worn by therapist: mask and gloves. Patient was not wearing a mask during session. Patient presented in bed with unable to rate pain. Patient with fair  progress towards goals during this session. Education provided on Total Hip precautions, Physical therapy plan of care, and physiological benefits of out of bed mobility. Patient with poor carryover. Pt is received in the bed and was cleared for therapy session. Pt with cognitive deficits and is A&O x1 but was able to follow a few simple directions. Pt is max A with bed mobility and to transfer to the EOB. Pt required assist with his B LE's and his upper trunk to sit up. Pt sat EOB for about 30 minutes with close SBA. Pt sat and ate his breakfast. Pt was assisted. Pt declined to return to supine and called DELPHINE Bucio to stay with pt. Handed pt off to RN. Pt is on track to dc to [facility] once medically cleared.     Bed mobility: Max assist  Transfers:  NT  Gait Assistance: Not tested  Distance (ft): refused  Assistive Device:  (-)  Pattern: Shuffle;L Decreased stance time          . Patient was left in  EOB and handed off to DELPHINE Bucio  at end of session with all needs in reach. The patient's Approx Degree of Impairment: 68.66% has been calculated based on documentation in the [facility] '6 clicks' Inpatient Basic Mobility Short Form.  Research supports that patients with this level of impairment may benefit from Subacute Rehab. RN aware of patient status post session.    DISCHARGE RECOMMENDATIONS  PT Discharge  Recommendations: Sub-acute rehabilitation     PLAN  PT Treatment Plan: Bed mobility;Body mechanics;Coordination;Endurance;Patient education;Gait training;Strengthening;Transfer training;Balance training    SUBJECTIVE  Pt was agreeable to therapy session.         OBJECTIVE  Precautions: Hip abduction pillow    WEIGHT BEARING RESTRICTION  Weight Bearing Restriction: L lower extremity           L Lower Extremity: Weight Bearing as Tolerated    PAIN ASSESSMENT   Rating: Unable to rate  Location: no complains  Management Techniques: Activity promotion;Body mechanics;Relaxation;Repositioning    BALANCE                                                                                                                       Static Sitting: Fair  Dynamic Sitting: Fair           Static Standing: Not tested  Dynamic Standing: Not tested    ACTIVITY TOLERANCE                         O2 WALK  Oxygen Therapy  SPO2% on Room Air at Rest: 98  At rest oxygen flow (liters per minute): 2    AM-PAC '6-Clicks' INPATIENT SHORT FORM - BASIC MOBILITY  How much difficulty does the patient currently have...  Patient Difficulty: Turning over in bed (including adjusting bedclothes, sheets and blankets)?: A Lot   Patient Difficulty: Sitting down on and standing up from a chair with arms (e.g., wheelchair, bedside commode, etc.): A Lot   Patient Difficulty: Moving from lying on back to sitting on the side of the bed?: A Lot   How much help from another person does the patient currently need...   Help from Another: Moving to and from a bed to a chair (including a wheelchair)?: A Lot   Help from Another: Need to walk in hospital room?: A Lot   Help from Another: Climbing 3-5 steps with a railing?: A Lot     AM-PAC Score:  Raw Score: 12   Approx Degree of Impairment: 68.66%   Standardized Score (AM-PAC Scale): 35.33   CMS Modifier (G-Code): CL              Patient End of Session: In bed;Needs met;Call light within reach;RN aware of session/findings;With   staff;All patient questions and concerns addressed    CURRENT GOALS   Goals to be met by: 2/8/24  Patient Goal Patient's self-stated goal is: not stated   Goal #1 Patient is able to demonstrate supine - sit EOB @ level: minimal assistance   Goal #1   Current Status Max A    Goal #2 Patient is able to demonstrate transfers Sit to/from Stand at assistance level: minimal assistance     Goal #2  Current Status NT   Goal #3 Patient is able to ambulate 10 feet with assistive device at assistance level: moderate assistance   Goal #3   Current Status NT   Goal #4 Patient will negotiate bed to/from chair transfers with RW with minimal assistance   Goal #4   Current Status Pt declined   Goal #5 Patient verbalizes and/or demonstrates all precautions and safety concerns independently   Goal #5   Current Status IN PROGRESS   Goal #6 Patient independently performs home exercise program for ROM/strengthening per the instructions provided in preparation for discharge.   Goal #6  Current Status IN PROGRESS                 Therapeutic Activity: 30 minutes

## 2024-01-29 NOTE — SLP NOTE
SPEECH DAILY NOTE - INPATIENT    ASSESSMENT & PLAN   ASSESSMENT  PPE REQUIRED. THIS THERAPIST WORE GLOVES AND MASK FOR DURATION OF EVALUATION. HANDS WASHED UPON ENTRANCE/EXIT.    SLP f/u for ongoing meal assessment per recommendations of soft bite sized/thin liquids per BSE. RN reports pt tolerates diet and medication well with no overt clinical s/s aspiration. Pt denies any swallowing challenges.     Pt positioned upright in bed, alert/cooperative. Pt afebrile, tolerating room air with oxygen status 94% prior to the start of oral trials. SLP reviewed aspiration precautions and safe swallowing compensatory strategies with the patient. Safe swallow guidelines remain written on the white board in purple. Patient v/u, no family present. Provided min assistance, pt tolerates soft bite sized consistency and thin liquids via cup with no overt clinical signs/symptoms of aspiration. Oxygen status remained stable t/o the entire session. Recommend remain on current diet with adherence to aspiration precautions and swallow strategies. No further swallow services warranted at this time. Please re consult if needed. RN alerted with results and recommendations.     MOST RECENT CXR 1/24  CONCLUSION:   Linear atelectasis or scarring in the left midlung.  No other focal airspace disease or significant pleural effusion.   Hyperinflation/emphysema.         Diet Recommendations - Solids: Mechanical soft chopped/ Soft & Bite Sized  Diet Recommendations - Liquids: Thin Liquids    Compensatory Strategies Recommended: Slow rate;Small bites and sips  Aspiration Precautions: Upright position;Slow rate  Medication Administration Recommendations:  (as tolerated)                     Discharge Recommendations  Discharge Recommendations/Plan: Undetermined    Treatment Plan  Treatment Plan/Recommendations: No further inpatient SLP service warranted    Interdisciplinary Communication: Plan posted at bedside            GOALS  Goal #1 The patient  will tolerate soft bite sized consistency and thin liquids without overt signs or symptoms of aspiration with 100 % accuracy over 1-2 session(s).  Goal Met 1/29   Goal #2 The patient/family/caregiver will demonstrate understanding and implementation of aspiration precautions and swallow strategies independently over 1-2 session(s).    Goal Met 1/29   Goal #3 The patient will utilize compensatory strategies as outlined by  BSSE (clinical evaluation) including Slow rate, Small bites, Small sips, Upright 90 degrees, Eliminate distractions with PRN assistance 100 % of the time across 1-2 sessions.  Goal Met 1/29     FOLLOW UP  Follow Up Needed (Documentation Required): No  SLP Follow-up Date: 01/29/24  Number of Visits to Meet Established Goals:  (1-2)    Session: 1    If you have any questions, please contact ИВАН Szymanski M.S. CCC-SLP  Speech Language Pathologist  Phone Number Ext. [phone]

## 2024-01-29 NOTE — PROGRESS NOTES
[facility]    Progress Note    Nacho Mari Patient Status:  Inpatient    10/14/1933 MRN [id number]   Location [facility] 4W/SW/SE Attending Thuy Acevedo MD   Hosp Day # 3 PCP Oscar (AmairaniElieser Rios MD     SUBJECTIVE:  Pt seen and examined at bedside.   Pt comfortable  No pain reported     /76   Pulse 83   Temp 97.8 °F (36.6 °C) (Axillary)   Resp 20   Ht 5' 2\" (1.575 m)   Wt 95 lb (43.1 kg)   SpO2 97%   BMI 17.38 kg/m²     Physical Examination:    Gen: Awake, alert, oriented. Appears comfortable.  HEENT: PERRLA  Lungs: CTA B/L  Heart: Normal S1 S2, No M/G/R   Abd: Abdomen soft, nontender, nondistended, no organomegaly, bowel sounds present  Ext: No edema, no calf tenderness    Labs:   Lab Results   Component Value Date    WBC 9.2 2024    HGB 10.3 2024    HCT 30.5 2024    .0 2024    CREATSERUM 0.90 2024    BUN 21 2024     2024    K 4.1 2024     2024    CO2 24.0 2024    GLU 90 2024    CA 8.6 2024       No results for input(s): \"PGLU\" in the last 168 hours.  No results for input(s): \"INR\" in the last 168 hours.    Imaging:  Imaging reviewed in Epic.    Meds:   Current Facility-Administered Medications   Medication Dose Route Frequency    docusate sodium (Colace) cap 100 mg  100 mg Oral BID    polyethylene glycol (PEG 3350) (Miralax) 17 g oral packet 17 g  17 g Oral Daily PRN    magnesium hydroxide (Milk of Magnesia) 400 MG/5ML oral suspension 30 mL  30 mL Oral Daily PRN    bisacodyl (Dulcolax) 10 MG rectal suppository 10 mg  10 mg Rectal Daily PRN    fleet enema (Fleet) 7-19 GM/118ML rectal enema 133 mL  1 enema Rectal Once PRN    morphINE PF 2 MG/ML injection 2 mg  2 mg Intravenous Q4H PRN    sodium chloride 0.9% infusion   Intravenous Continuous    heparin (Porcine) 5000 UNIT/ML injection 5,000 Units  5,000 Units Subcutaneous Q8H ALLEN    acetaminophen (Tylenol Extra Strength) tab  500 mg  500 mg Oral Q4H PRN    acetaminophen (Tylenol) tab 650 mg  650 mg Oral Q4H PRN    Or    HYDROcodone-acetaminophen (Norco) 5-325 MG per tab 1 tablet  1 tablet Oral Q4H PRN    Or    HYDROcodone-acetaminophen (Norco) 5-325 MG per tab 2 tablet  2 tablet Oral Q4H PRN    morphINE PF 2 MG/ML injection 1 mg  1 mg Intravenous Q2H PRN    Or    morphINE PF 2 MG/ML injection 2 mg  2 mg Intravenous Q2H PRN    Or    morphINE PF 4 MG/ML injection 4 mg  4 mg Intravenous Q2H PRN    melatonin tab 3 mg  3 mg Oral Nightly PRN    ondansetron (Zofran) 4 MG/2ML injection 4 mg  4 mg Intravenous Q6H PRN    metoclopramide (Reglan) 5 mg/mL injection 5 mg  5 mg Intravenous Q8H PRN    fluticasone propionate (Flonase) 50 MCG/ACT nasal suspension 2 spray  2 spray Each Nare Daily    fluticasone-umeclidin-vilant (Trelegy Ellipta) 100-62.5-25 MCG/ACT inhaler 1 puff  1 puff Inhalation Daily    magnesium oxide (Mag-Ox) tab 400 mg  400 mg Oral Daily    pantoprazole (Protonix) DR tab 40 mg  40 mg Oral QAM AC    QUEtiapine (SEROquel) tab 50 mg  50 mg Oral Nightly    tamsulosin (Flomax) cap 0.4 mg  0.4 mg Oral Daily    traZODone (Desyrel) tab 50 mg  50 mg Oral Nightly    hydrALAzine (Apresoline) 20 mg/mL injection 10 mg  10 mg Intravenous Q4H PRN    aspirin DR tab 81 mg  81 mg Oral BID     Facility-Administered Medications Ordered in Other Encounters   Medication Dose Route Frequency    fentaNYL (Sublimaze) 50 mcg/mL injection   Intravenous PRN    lidocaine PF (Xylocaine-MPF) 1% injection   Intravenous PRN    propofol (Diprivan) 10 MG/ML injection   Intravenous PRN    rocuronium (Zemuron) 50 mg/5mL injection   Intravenous PRN    lactated ringers infusion   Intravenous Continuous PRN    tranexamic acid in sodium chloride 0.7% (Cyklokapron) 1000 mg/100mL infusion premix   Intravenous PRN    phenylephrine (Jermaine-Synephrine) 10 MG/ML injection   Intravenous PRN    EPHEDrine sulfate 50 mg/mL injection   Intravenous PRN    neostigmine (Bloxiverz) 10  mg/10mL injection   Intravenous PRN    glycopyrrolate (Robinul) 0.2 MG/ML injection   Intravenous PRN    ondansetron (Zofran) 4 MG/2ML injection   Intravenous PRN       Assessment:   1.  Acute displaced angulated and impacted left femoral neck fracture  2.  Unwitnessed fall, CT head unremarkable for acute changes  3.  History of dementia  4.  BPH        Plan:  Patient admitted to medical floor  Pain control  Ortho consulted, plan for left hip hemiarthroplasty      Status post left hip hemiarthroplasty for femoral neck fracture.  Pain overall stable.  WBAT  DVT prophylaxis: Heparin sq  Resume other home medication  CODE STATUS: Full code  Discussed with nursing  Med rec done     PT/OT  D/w CM  Stable for discharge.              Thuy Acevedo MD   1/28/2024  6:11 PM

## 2024-01-29 NOTE — DISCHARGE INSTRUCTIONS
Posterior hip precautions x 12 weeks  Dont bend you hip past 90 degrees  Dont cross your legs  Dont twist your hip inwards-keep knees and toes pointed outwards     Abduction pillow while in bed    Cephalexin 1000mg TID for 7 days post op (ordered)

## 2024-01-29 NOTE — PROGRESS NOTES
[facility]    Progress Note    Nacho Mari Patient Status:  Inpatient    10/14/1933 MRN [id number]   Location [facility] 4W/SW/SE Attending Thuy Acevedo MD   [facility]osp Day [facility] 4 [facility] Gabriel        Subjective:   Nacho Mari is a(n) 89 year old male now s/p L hip hemiarthroplasty for femoral neck fracture. He is pleasantly confused at baseline. He does not seem to have much pain in the hip. He has been working some what with PT but it seems that it has been hard to get him to cooperate fully. No fevers noted in the chart      Objective:   Vital Signs:  Blood pressure 137/75, pulse 81, temperature 98.2 °F (36.8 °C), temperature source Axillary, resp. rate 18, height 5' 2\" (1.575 m), weight 95 lb (43.1 kg), SpO2 93%.     Physical Exam:   General: Alert, orientated x1 (person)  Vital Signs:  Blood pressure 137/75, pulse 81, temperature 98.2 °F (36.8 °C), temperature source Axillary, resp. rate 18, height 5' 2\" (1.575 m), weight 95 lb (43.1 kg), SpO2 93%.    Extremities:   Left leg  Dressing about the hip CDI  No erythema, no fluctuance  No pain with log roll  Wiggles toes but does not follow command  2+ DP pulse  Toes oriented in the appropriate direction  Leg lengths appropriate and equal  Sensory exam and full motor exam limited due to baseline dementia and not following commands          Assessment and Plan:    A/P   89yo M now s/p L hip hemiarthroplasty doing appropriate given his baseline dementia. PT attempts to work with him but limited participation due to baseline dementia.      WBAT with posterior hip precautions    Posterior hip precautions x 12 weeks  Dont bend you hip past 90 degrees  Dont cross your legs  Dont twist your hip inwards-keep knees and toes pointed outwards     Abduction pillow while in bed  Cefadroxil 500mg BID for 7 days or Cephalexin 1000mg TID for 7 days post op (ordered)  ASA 81mg BID for 35 days minimium (or other DVT ppx per primary  discretion)    SINDY and CULLEN for discharge planning and coordination     Outpatient follow up in 2 weeks with Dr. Kelvin Farrell [phone]

## 2024-01-30 LAB
ANION GAP SERPL CALC-SCNC: 11 MMOL/L (ref 0–18)
BASOPHILS # BLD AUTO: 0.06 X10(3) UL (ref 0–0.2)
BASOPHILS NFR BLD AUTO: 0.6 %
BUN BLD-MCNC: 17 MG/DL (ref 9–23)
BUN/CREAT SERPL: 21.5 (ref 10–20)
CALCIUM BLD-MCNC: 9.3 MG/DL (ref 8.7–10.4)
CHLORIDE SERPL-SCNC: 104 MMOL/L (ref 98–112)
CO2 SERPL-SCNC: 24 MMOL/L (ref 21–32)
CREAT BLD-MCNC: 0.79 MG/DL
DEPRECATED RDW RBC AUTO: 45.7 FL (ref 35.1–46.3)
EGFRCR SERPLBLD CKD-EPI 2021: 84 ML/MIN/1.73M2 (ref 60–?)
EOSINOPHIL # BLD AUTO: 0.46 X10(3) UL (ref 0–0.7)
EOSINOPHIL NFR BLD AUTO: 4.2 %
ERYTHROCYTE [DISTWIDTH] IN BLOOD BY AUTOMATED COUNT: 13.4 % (ref 11–15)
GLUCOSE BLD-MCNC: 101 MG/DL (ref 70–99)
HCT VFR BLD AUTO: 35.4 %
HGB BLD-MCNC: 11.7 G/DL
IMM GRANULOCYTES # BLD AUTO: 0.03 X10(3) UL (ref 0–1)
IMM GRANULOCYTES NFR BLD: 0.3 %
LYMPHOCYTES # BLD AUTO: 0.89 X10(3) UL (ref 1–4)
LYMPHOCYTES NFR BLD AUTO: 8.2 %
MCH RBC QN AUTO: 30.5 PG (ref 26–34)
MCHC RBC AUTO-ENTMCNC: 33.1 G/DL (ref 31–37)
MCV RBC AUTO: 92.4 FL
MONOCYTES # BLD AUTO: 1.09 X10(3) UL (ref 0.1–1)
MONOCYTES NFR BLD AUTO: 10 %
NEUTROPHILS # BLD AUTO: 8.33 X10 (3) UL (ref 1.5–7.7)
NEUTROPHILS # BLD AUTO: 8.33 X10(3) UL (ref 1.5–7.7)
NEUTROPHILS NFR BLD AUTO: 76.7 %
OSMOLALITY SERPL CALC.SUM OF ELEC: 290 MOSM/KG (ref 275–295)
PLATELET # BLD AUTO: 209 10(3)UL (ref 150–450)
POTASSIUM SERPL-SCNC: 3.8 MMOL/L (ref 3.5–5.1)
RBC # BLD AUTO: 3.83 X10(6)UL
SODIUM SERPL-SCNC: 139 MMOL/L (ref 136–145)
WBC # BLD AUTO: 10.9 X10(3) UL (ref 4–11)

## 2024-01-30 PROCEDURE — 80048 BASIC METABOLIC PNL TOTAL CA: CPT | Performed by: INTERNAL MEDICINE

## 2024-01-30 PROCEDURE — 97535 SELF CARE MNGMENT TRAINING: CPT

## 2024-01-30 PROCEDURE — 97530 THERAPEUTIC ACTIVITIES: CPT

## 2024-01-30 PROCEDURE — 85025 COMPLETE CBC W/AUTO DIFF WBC: CPT | Performed by: INTERNAL MEDICINE

## 2024-01-30 RX ORDER — POTASSIUM CHLORIDE 20 MEQ/1
40 TABLET, EXTENDED RELEASE ORAL ONCE
Status: COMPLETED | OUTPATIENT
Start: 2024-01-30 | End: 2024-01-30

## 2024-01-30 NOTE — CM/SW NOTE
Department  notified of Volodymyr approval.    Volodymyr ID [id number], approved from 1/29 to 1/31.  (PAC pend)    Assigned SW/CM to follow up with patient/family on discharge plan.     Notified LSW/ DOMINIC Valle DSC

## 2024-01-30 NOTE — PROGRESS NOTES
[facility]    Progress Note    Nacho Smythmaximilianmagaly Patient Status:  Inpatient    10/14/1933 MRN [id number]   Location [facility] 4W/SW/SE Attending Thuy Acevedo MD   Hosp Day # 5 PCP Oscar (Katie) MD Gabriel     SUBJECTIVE:  Pt seen and examined at bedside.   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  Trabecular bone with marrow hemorrhage, consistent with fracture.  Articular cartilage with degenerative changes.    BP (!) 176/75 (BP Location: Right arm)   Pulse 85   Temp 97.5 °F (36.4 °C) (Axillary)   Resp 18   Ht 5' 2\" (1.575 m)   Wt 95 lb (43.1 kg)   SpO2 93%   BMI 17.38 kg/m²     Physical Examination:    Gen: Awake, alert, oriented. Appears comfortable.  HEENT: PERRLA  Lungs: CTA B/L  Heart: Normal S1 S2, No M/G/R   Abd: Abdomen soft, nontender, nondistended, no organomegaly, bowel sounds present  Ext: No edema, no calf tenderness    Labs:   Lab Results   Component Value Date    WBC 10.9 2024    HGB 11.7 2024    HCT 35.4 2024    .0 2024    CREATSERUM 0.79 2024    BUN 17 2024     2024    K 3.8 2024     2024    CO2 24.0 2024     2024    CA 9.3 2024       No results for input(s): \"PGLU\" in the last 168 hours.  No results for input(s): \"INR\" in the last 168 hours.    Imaging:  Imaging reviewed in Epic.    Meds:   Current Facility-Administered Medications   Medication Dose Route Frequency    potassium chloride (K-Dur) tab 40 mEq  40 mEq Oral Once    cephalexin (Keflex) cap 500 mg  500 mg Oral Q8H ALLEN    docusate sodium (Colace) cap 100 mg  100 mg Oral BID    polyethylene glycol (PEG 3350) (Miralax) 17 g oral packet 17 g  17 g Oral Daily PRN    magnesium hydroxide (Milk of Magnesia) 400 MG/5ML oral suspension 30 mL  30 mL Oral Daily PRN     bisacodyl (Dulcolax) 10 MG rectal suppository 10 mg  10 mg Rectal Daily PRN    fleet enema (Fleet) 7-19 GM/118ML rectal enema 133 mL  1 enema Rectal Once PRN    morphINE PF 2 MG/ML injection 2 mg  2 mg Intravenous Q4H PRN    sodium chloride 0.9% infusion   Intravenous Continuous    heparin (Porcine) 5000 UNIT/ML injection 5,000 Units  5,000 Units Subcutaneous Q8H ALLEN    acetaminophen (Tylenol Extra Strength) tab 500 mg  500 mg Oral Q4H PRN    acetaminophen (Tylenol) tab 650 mg  650 mg Oral Q4H PRN    Or    HYDROcodone-acetaminophen (Norco) 5-325 MG per tab 1 tablet  1 tablet Oral Q4H PRN    Or    HYDROcodone-acetaminophen (Norco) 5-325 MG per tab 2 tablet  2 tablet Oral Q4H PRN    morphINE PF 2 MG/ML injection 1 mg  1 mg Intravenous Q2H PRN    Or    morphINE PF 2 MG/ML injection 2 mg  2 mg Intravenous Q2H PRN    Or    morphINE PF 4 MG/ML injection 4 mg  4 mg Intravenous Q2H PRN    melatonin tab 3 mg  3 mg Oral Nightly PRN    ondansetron (Zofran) 4 MG/2ML injection 4 mg  4 mg Intravenous Q6H PRN    metoclopramide (Reglan) 5 mg/mL injection 5 mg  5 mg Intravenous Q8H PRN    fluticasone propionate (Flonase) 50 MCG/ACT nasal suspension 2 spray  2 spray Each Nare Daily    fluticasone-umeclidin-vilant (Trelegy Ellipta) 100-62.5-25 MCG/ACT inhaler 1 puff  1 puff Inhalation Daily    magnesium oxide (Mag-Ox) tab 400 mg  400 mg Oral Daily    pantoprazole (Protonix) DR tab 40 mg  40 mg Oral QAM AC    QUEtiapine (SEROquel) tab 50 mg  50 mg Oral Nightly    tamsulosin (Flomax) cap 0.4 mg  0.4 mg Oral Daily    traZODone (Desyrel) tab 50 mg  50 mg Oral Nightly    hydrALAzine (Apresoline) 20 mg/mL injection 10 mg  10 mg Intravenous Q4H PRN    aspirin DR tab 81 mg  81 mg Oral BID     Facility-Administered Medications Ordered in Other Encounters   Medication Dose Route Frequency    fentaNYL (Sublimaze) 50 mcg/mL injection   Intravenous PRN    lidocaine PF (Xylocaine-MPF) 1% injection   Intravenous PRN    propofol (Diprivan) 10 MG/ML  injection   Intravenous PRN    rocuronium (Zemuron) 50 mg/5mL injection   Intravenous PRN    lactated ringers infusion   Intravenous Continuous PRN    tranexamic acid in sodium chloride 0.7% (Cyklokapron) 1000 mg/100mL infusion premix   Intravenous PRN    phenylephrine (Jermaine-Synephrine) 10 MG/ML injection   Intravenous PRN    EPHEDrine sulfate 50 mg/mL injection   Intravenous PRN    neostigmine (Bloxiverz) 10 mg/10mL injection   Intravenous PRN    glycopyrrolate (Robinul) 0.2 MG/ML injection   Intravenous PRN    ondansetron (Zofran) 4 MG/2ML injection   Intravenous PRN       Assessment:       1.  Acute displaced angulated and impacted left femoral neck fracture  2.  Unwitnessed fall, CT head unremarkable for acute changes  3.  History of dementia  4.  BPH        Plan:  Patient admitted to medical floor  Pain control  Ortho consulted, plan for left hip hemiarthroplasty      Status post left hip hemiarthroplasty for femoral neck fracture.  Pain overall stable.  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  Trabecular bone with marrow hemorrhage, consistent with fracture.  Articular cartilage with degenerative changes.  DVT prophylaxis: Heparin sq  Resume other home medication  CODE STATUS: Full code  Discussed with nursing  Med rec done   will get oncology consult.  PT/OT  D/w CM  Stable for discharge.  Awaiting insurance auth.         Thuy Acevedo MD   1/30/2024  2:00 PM

## 2024-01-30 NOTE — PLAN OF CARE
Alert and oriented x2. Left hip arthroplasty. POD 5. Stand pivot, moderate assistance. Incontinent x2. No IV MD aware, patient removed IV. Blood pressure elevated. Antibiotic oral. Plan for [facility] pending authorization  Problem: Patient Centered Care  Goal: Patient preferences are identified and integrated in the patient's plan of care  Description: Interventions:  - What would you like us to know as we care for you? [facility]  - Provide timely, complete, and accurate information to patient/family  - Incorporate patient and family knowledge, values, beliefs, and cultural backgrounds into the planning and delivery of care  - Encourage patient/family to participate in care and decision-making at the level they choose  - Honor patient and family perspectives and choices  Outcome: Progressing     Problem: Patient/Family Goals  Goal: Patient/Family Long Term Goal  Description: Patient's Long Term Goal: To be discharged    Interventions:  - get clearance   - See additional Care Plan goals for specific interventions  Outcome: Progressing  Goal: Patient/Family Short Term Goal  Description: Patient's Short Term Goal: Manage pain    Interventions:   - monitor and assess pain   - See additional Care Plan goals for specific interventions  Outcome: Progressing     Problem: PAIN - ADULT  Goal: Verbalizes/displays adequate comfort level or patient's stated pain goal  Description: INTERVENTIONS:  - Encourage pt to monitor pain and request assistance  - Assess pain using appropriate pain scale  - Administer analgesics based on type and severity of pain and evaluate response  - Implement non-pharmacological measures as appropriate and evaluate response  - Consider cultural and social influences on pain and pain management  - Manage/alleviate anxiety  - Utilize distraction and/or relaxation techniques  - Monitor for opioid side effects  - Notify MD/LIP if interventions unsuccessful or patient reports  new pain  - Anticipate increased pain with activity and pre-medicate as appropriate  Outcome: Progressing     Problem: RISK FOR INFECTION - ADULT  Goal: Absence of fever/infection during anticipated neutropenic period  Description: INTERVENTIONS  - Monitor WBC  - Administer growth factors as ordered  - Implement neutropenic guidelines  Outcome: Progressing     Problem: SAFETY ADULT - FALL  Goal: Free from fall injury  Description: INTERVENTIONS:  - Assess pt frequently for physical needs  - Identify cognitive and physical deficits and behaviors that affect risk of falls.  - [place] fall precautions as indicated by assessment.  - Educate pt/family on patient safety including physical limitations  - Instruct pt to call for assistance with activity based on assessment  - Modify environment to reduce risk of injury  - Provide assistive devices as appropriate  - Consider OT/PT consult to assist with strengthening/mobility  - Encourage toileting schedule  Outcome: Progressing     Problem: DISCHARGE PLANNING  Goal: Discharge to home or other facility with appropriate resources  Description: INTERVENTIONS:  - Identify barriers to discharge w/pt and caregiver  - Include patient/family/discharge partner in discharge planning  - Arrange for needed discharge resources and transportation as appropriate  - Identify discharge learning needs (meds, wound care, etc)  - Arrange for interpreters to assist at discharge as needed  - Consider post-discharge preferences of patient/family/discharge partner  - Complete POLST form as appropriate  - Assess patient's ability to be responsible for managing their own health  - Refer to Case Management Department for coordinating discharge planning if the patient needs post-hospital services based on physician/LIP order or complex needs related to functional status, cognitive ability or social support system  Outcome: Progressing

## 2024-01-30 NOTE — PLAN OF CARE
Patient A&Ox1-2. RA. BP elevated this morning. Forgetful. Has been sleeping for most of day. POD#5. Aquacel dressing was changed today. Patient is a feeder and has minimal appetite. Medications administered crush with apple sauce. Refused heparin shot today. Incontinent x2. No IV, MD aware. Bed is to lowest position, bed alarms are on, camera is in room for fall prevention measures. Oncologist was consulted today. Plan is for rehab on discharge.   Problem: Patient Centered Care  Goal: Patient preferences are identified and integrated in the patient's plan of care  Description: Interventions:  - What would you like us to know as we care for you? Patient from [facility]  - Provide timely, complete, and accurate information to patient/family  - Incorporate patient and family knowledge, values, beliefs, and cultural backgrounds into the planning and delivery of care  - Encourage patient/family to participate in care and decision-making at the level they choose  - Honor patient and family perspectives and choices  Outcome: Progressing     Problem: Patient/Family Goals  Goal: Patient/Family Long Term Goal  Description: Patient's Long Term Goal: To be discharged    Interventions:  - get clearance   - See additional Care Plan goals for specific interventions  Outcome: Progressing  Goal: Patient/Family Short Term Goal  Description: Patient's Short Term Goal: Manage pain    Interventions:   - monitor and assess pain   - See additional Care Plan goals for specific interventions  Outcome: Progressing     Problem: PAIN - ADULT  Goal: Verbalizes/displays adequate comfort level or patient's stated pain goal  Description: INTERVENTIONS:  - Encourage pt to monitor pain and request assistance  - Assess pain using appropriate pain scale  - Administer analgesics based on type and severity of pain and evaluate response  - Implement non-pharmacological measures as appropriate and evaluate response  - Consider cultural and  social influences on pain and pain management  - Manage/alleviate anxiety  - Utilize distraction and/or relaxation techniques  - Monitor for opioid side effects  - Notify MD/LIP if interventions unsuccessful or patient reports new pain  - Anticipate increased pain with activity and pre-medicate as appropriate  Outcome: Progressing     Problem: RISK FOR INFECTION - ADULT  Goal: Absence of fever/infection during anticipated neutropenic period  Description: INTERVENTIONS  - Monitor WBC  - Administer growth factors as ordered  - Implement neutropenic guidelines  Outcome: Progressing     Problem: SAFETY ADULT - FALL  Goal: Free from fall injury  Description: INTERVENTIONS:  - Assess pt frequently for physical needs  - Identify cognitive and physical deficits and behaviors that affect risk of falls.  - [place] fall precautions as indicated by assessment.  - Educate pt/family on patient safety including physical limitations  - Instruct pt to call for assistance with activity based on assessment  - Modify environment to reduce risk of injury  - Provide assistive devices as appropriate  - Consider OT/PT consult to assist with strengthening/mobility  - Encourage toileting schedule  Outcome: Progressing     Problem: DISCHARGE PLANNING  Goal: Discharge to home or other facility with appropriate resources  Description: INTERVENTIONS:  - Identify barriers to discharge w/pt and caregiver  - Include patient/family/discharge partner in discharge planning  - Arrange for needed discharge resources and transportation as appropriate  - Identify discharge learning needs (meds, wound care, etc)  - Arrange for interpreters to assist at discharge as needed  - Consider post-discharge preferences of patient/family/discharge partner  - Complete POLST form as appropriate  - Assess patient's ability to be responsible for managing their own health  - Refer to Case Management Department for coordinating discharge planning if the patient needs  post-hospital services based on physician/LIP order or complex needs related to functional status, cognitive ability or social support system  Outcome: Progressing

## 2024-01-30 NOTE — PLAN OF CARE
POD 4 of a left hip hemiarthroplasty. Pt alert to self, confused but follows direction. On 2lt o2 via nasal canula, attempted to wean off and patient dropped to 88% on room air. Sat at the side of the bed wit PT for breakfast today. Pt is incontinent. No complaints of pain. When asked if pt wanted pain medication when he was working with PT, pt refused. Plan is [facility] pending ins auth.     Problem: Patient Centered Care  Goal: Patient preferences are identified and integrated in the patient's plan of care  Description: Interventions:  - What would you like us to know as we care for you? Patient from [facility]  - Provide timely, complete, and accurate information to patient/family  - Incorporate patient and family knowledge, values, beliefs, and cultural backgrounds into the planning and delivery of care  - Encourage patient/family to participate in care and decision-making at the level they choose  - Honor patient and family perspectives and choices  Outcome: Progressing     Problem: PAIN - ADULT  Goal: Verbalizes/displays adequate comfort level or patient's stated pain goal  Description: INTERVENTIONS:  - Encourage pt to monitor pain and request assistance  - Assess pain using appropriate pain scale  - Administer analgesics based on type and severity of pain and evaluate response  - Implement non-pharmacological measures as appropriate and evaluate response  - Consider cultural and social influences on pain and pain management  - Manage/alleviate anxiety  - Utilize distraction and/or relaxation techniques  - Monitor for opioid side effects  - Notify MD/LIP if interventions unsuccessful or patient reports new pain  - Anticipate increased pain with activity and pre-medicate as appropriate  Outcome: Progressing     Problem: RISK FOR INFECTION - ADULT  Goal: Absence of fever/infection during anticipated neutropenic period  Description: INTERVENTIONS  - Monitor WBC  - Administer growth factors as ordered  -  Implement neutropenic guidelines  Outcome: Progressing     Problem: SAFETY ADULT - FALL  Goal: Free from fall injury  Description: INTERVENTIONS:  - Assess pt frequently for physical needs  - Identify cognitive and physical deficits and behaviors that affect risk of falls.  - [place] fall precautions as indicated by assessment.  - Educate pt/family on patient safety including physical limitations  - Instruct pt to call for assistance with activity based on assessment  - Modify environment to reduce risk of injury  - Provide assistive devices as appropriate  - Consider OT/PT consult to assist with strengthening/mobility  - Encourage toileting schedule  Outcome: Progressing     Problem: DISCHARGE PLANNING  Goal: Discharge to home or other facility with appropriate resources  Description: INTERVENTIONS:  - Identify barriers to discharge w/pt and caregiver  - Include patient/family/discharge partner in discharge planning  - Arrange for needed discharge resources and transportation as appropriate  - Identify discharge learning needs (meds, wound care, etc)  - Arrange for interpreters to assist at discharge as needed  - Consider post-discharge preferences of patient/family/discharge partner  - Complete POLST form as appropriate  - Assess patient's ability to be responsible for managing their own health  - Refer to Case Management Department for coordinating discharge planning if the patient needs post-hospital services based on physician/LIP order or complex needs related to functional status, cognitive ability or social support system  Outcome: Progressing

## 2024-01-30 NOTE — DIETARY NOTE
ADULT NUTRITION REASSESSMENT    Pt is at moderate nutrition risk.  Pt meets moderate malnutrition criteria.      CRITERIA FOR MALNUTRITION DIAGNOSIS:  Criteria for non-severe malnutrition diagnosis: chronic illness related to body fat mild depletion and muscle mass mild depletion.    RECOMMENDATIONS TO MD:   RD increased ONS to TID. Recommend 1:1 feeds with encouragement from RN staff to promote improved/adequate PO intake.   See Nutrition Intervention    ADMITTING DIAGNOSIS:  Closed fracture of left hip, initial encounter ([facility]) [S72.002A]    PERTINENT PAST MEDICAL HISTORY:  has no past medical history on file.     PATIENT STATUS: Initial 01/26/24: seeing pt due to low BMI. Pt is POD #1 L hip hemiarthroplasty. Is from a memory care unit.  Pt is having \"trust\" issues today per RN. When I entered the room and introduced myself he said he doesn't trust me. I said you dont know me. He said that's why I dont trust you. He answered a few of my questions, but they did not seem reliable.  [facility]ed [facility] and staff there told me that he is a good eater. [facility]--regular liquids. The last 2 months wts have been 111#--question accuracy of our wt of 93.3#--request reweigh. Pt looks thin with at least mild muscle and fat deficits--unable to do NFPE was worried of startling pt due to his trust issues. Later pt allowed SLP to see and rec mech soft bite sized with thin liquids--awaiting order.    01/30/24 UPDATE: POD#5 s/p left hip hemiarthroplasty. Remains confused. Awaiting insurance authorization for [facility] placement. Pt sleeping at time of visit. Breakfast tray at bedside with poor intake.      FOOD/NUTRITION RELATED HISTORY:  Appetite: NPO  Intake: ~54% x4 meals documented since admit  Intake Meeting Needs: No, but oral nutrition supplements (ONS) to maximize  Percent Meals Eaten (last 6 days)       Date/Time Percent Meals Eaten (%)    01/27/24 0900 5 %    01/27/24 1250 100 %    01/27/24 1809 80 %     01/28/24 1800 30 %        Food Allergies: No Known Food Allergies (NKFA)  Cultural/Ethnic/Muslim Preferences: Not Obtained    GASTROINTESTINAL: +BM no recorded stool output since admission, constipation, dysphagia, Swallow evaluation noted, and abdomen soft, nondistended, hypoactive bowel sounds    MEDICATIONS: reviewed; Noted cardiac electrolyte replacement protocol ordered; Miralax PRN - given on 1/29   sodium chloride 100 mL/hr at 01/26/24 0412      cephalexin  500 mg Oral Q8H ALLEN    docusate sodium  100 mg Oral BID    heparin  5,000 Units Subcutaneous Q8H ALLEN    fluticasone propionate  2 spray Each Nare Daily    fluticasone-umeclidin-vilant  1 puff Inhalation Daily    magnesium oxide  400 mg Oral Daily    pantoprazole  40 mg Oral QAM AC    QUEtiapine  50 mg Oral Nightly    tamsulosin  0.4 mg Oral Daily    traZODone  50 mg Oral Nightly    aspirin  81 mg Oral BID     LABS: reviewed  Recent Labs     01/28/24  0657 01/29/24  0439 01/30/24  0453   GLU 90 79 101*   BUN 21 15 17   CREATSERUM 0.90 0.61* 0.79   CA 8.6* 8.7 9.3    139 139   K 4.1 4.1 3.8    105 104   CO2 24.0 26.0 24.0   OSMOCALC 293 288 290     NUTRITION RELATED PHYSICAL FINDINGS:  - Nutrition Focused Physical Exam (NFPE):  Pt asleep at time of visit. Visualized mild muscle and fat deficits (temporal, orbital, and BL extremities) --complete NFPE as able when pt appropriate.      - Fluid Accumulation: none  see RN documentation for details  - Skin Integrity: at risk and surgical wound(s) see RN documentation for details    ANTHROPOMETRICS:  HT: 157.5 cm (5' 2\")  WT: 43.1 kg (95 lb) -- questionable accuracy, was 111# at the beginning of the month at [facility] and they report he eats well.  Use 110# as dosing wt until more accurate standing scale wt can be obtained.    BMI: Body mass index is 20.12 kg/m².  BMI CLASSIFICATION: <22 considered underweight for advanced age  IBW: 118 lbs        93% IBW  Usual Body Wt: seen by an RD at another  facility in 5/2023 and 114# UBW obtained by them      96% UBW    WEIGHT HISTORY:  Patient Weight(s) for the past 336 hrs:   Weight   01/26/24 1700 43.1 kg (95 lb)   01/25/24 0255 42.3 kg (93 lb 4.8 oz)     Wt Readings from Last 10 Encounters:   01/26/24 43.1 kg (95 lb)   12/1/23 111#  1/1/24 111#  8/11/23 100.4#  8/6/23 98.5#     NUTRITION DIAGNOSIS/PROBLEM:    Moderate Malnutrition related to Chronic - Physiological causes increasing nutrient needs due to illness or condition chronic illness as evidenced by BMI less than 22 in elderly adult, no po intake since admission post op and at least mild muscle and fat deficits.      NUTRITION DIAGNOSIS PROGRESS:  No Improvement (continue) - recorded tray intake appears variable     NUTRITION INTERVENTION:     NUTRITION PRESCRIPTION:   Estimated Nutrition needs: --dosing wt of 50kg - recent wt at [facility]  Calories: 6557-5525 calories/day (30-35 calories per kg  recent wt at [facility] )  Protein: 65-75 g protein/day (1.3-1.5g protein/kg recent wt at [facility])    - Diet:       Procedures    Regular/General diet Fluid Consistency: Thin Liquids ; Texture Consistency: Chopped / Soft & Bite Sized; Is Patient on Accuchecks? No      - RD Malnutrition Care Plan: Initiated ONS (oral nutritional supplements) and Requested  with ordering meals, tray set up and/or feeding as needed  - Meals and snacks: Encouraged increased PO intake  - Medical Food Supplements: RD adjusted ONS to Ensure Compact (220 calories/ 9 g protein each) Vanilla Daily and Chocolate BID with meal trays.   - Vitamin and mineral supplements: none  - Feeding assistance: meal set up and assistance with menu selection and encouragement at meal times  - Nutrition education: not appropriate   - Coordination of nutrition care: collaboration with other providers  - Discharge and transfer of nutrition care to new setting or provider: awaiting SNIF placement, needs insurance authorization    MONITOR  AND EVALUATE/NUTRITION GOALS:  - Food and Nutrient Intake:      Monitor: adequacy of PO intake and adequacy of supplement intake  - Food and Nutrient Administration:      Monitor: N/A  - Anthropometric Measurement:    Monitor weight  - Nutrition Goals:      gradual wt gain as able, PO and supplement greater than 75% of needs, promote healing, and improved GI status    DIETITIAN FOLLOW UP: RD to follow and monitor nutrition status    Elizabeth Escobedo MS, RD, LDN, CNSC  [id number]

## 2024-01-30 NOTE — PHYSICAL THERAPY NOTE
PHYSICAL THERAPY TREATMENT NOTE - INPATIENT     Room Number: 420/420-A       Presenting Problem: unwitnessed fall w/left hip pain and inability to ambulate s/p L hemiarthroplasty, WBAT, post hip precautions for 12 weeks  Co-Morbidities : COPD, malignant neoplasm of sigmoid colon    Problem List  Principal Problem:    Closed fracture of left hip, initial encounter ([facility])  Active Problems:    Hip fracture ([facility])      PHYSICAL THERAPY ASSESSMENT     RN approved participation with physical therapy. Pt was received resting in bed; introduced self and role; pt found to be incontinent of urine. Confused, oriented to self only. Minimally verbal this date but groans with movement. Max A for rolling L and R for linen change and don brief; able to maintain sidelying position up to 2 minutes bilaterally with mod A. Max A x 2 to scoot upward in bed. Pt refused further activity despite education provided. Pt was left resting in bed with needs within reach, bed alarm on, handoff to RN complete.    The patient's Approx Degree of Impairment: 68.66% has been calculated based on documentation in the [facility] '6 clicks' Inpatient Basic Mobility Short Form.  Research supports that patients with this level of impairment may benefit from [facility].    DISCHARGE RECOMMENDATIONS  PT Discharge Recommendations: Sub-acute rehabilitation     PLAN  PT Treatment Plan: Bed mobility;Body mechanics;Coordination;Endurance;Patient education;Gait training;Strengthening;Transfer training;Balance training  Frequency (Obs): Daily    SUBJECTIVE  \"No\" when asked to sit at edge of bed    OBJECTIVE  Precautions: JEAN-CLAUDE - posterior;Hip abduction pillow;Bed/chair alarm    WEIGHT BEARING RESTRICTION  L Lower Extremity: Weight Bearing as Tolerated    PAIN ASSESSMENT   Rating: Unable to rate  Location: unable to state; groans with movement  Management Techniques: Activity promotion;Body mechanics;Breathing techniques;Repositioning    BALANCE  Static Sitting: Not tested  Dynamic  Sitting: Not tested  Static Standing: Not tested  Dynamic Standing: Not tested    AM-PAC '6-Clicks' INPATIENT SHORT FORM - BASIC MOBILITY  How much difficulty does the patient currently have...  Patient Difficulty: Turning over in bed (including adjusting bedclothes, sheets and blankets)?: A Lot   Patient Difficulty: Sitting down on and standing up from a chair with arms (e.g., wheelchair, bedside commode, etc.): A Lot   Patient Difficulty: Moving from lying on back to sitting on the side of the bed?: A Lot   How much help from another person does the patient currently need...   Help from Another: Moving to and from a bed to a chair (including a wheelchair)?: A Lot   Help from Another: Need to walk in hospital room?: A Lot   Help from Another: Climbing 3-5 steps with a railing?: A Lot     AM-PAC Score:  Raw Score: 12   Approx Degree of Impairment: 68.66%   Standardized Score (AM-PAC Scale): 35.33   CMS Modifier (G-Code): CL    FUNCTIONAL ABILITY STATUS  Functional Mobility/Gait Assessment  Gait Assistance: Not tested  Distance (ft): refused  Assistive Device:  (-)  Pattern: Shuffle;L Decreased stance time    Patient End of Session: In bed;Needs met;Call light within reach;RN aware of session/findings;All patient questions and concerns addressed;Alarm set    CURRENT GOALS   Goals to be met by: 2/8/24  Patient Goal Patient's self-stated goal is: not stated   Goal #1 Patient is able to demonstrate supine - sit EOB @ level: minimal assistance   Goal #1   Current Status NT - rolling and scooting only this date.    Goal #2 Patient is able to demonstrate transfers Sit to/from Stand at assistance level: minimal assistance      Goal #2  Current Status NT   Goal #3 Patient is able to ambulate 10 feet with assistive device at assistance level: moderate assistance   Goal #3   Current Status NT   Goal #4 Patient will negotiate bed to/from chair transfers with RW with minimal assistance   Goal #4   Current Status Pt declined    Goal #5 Patient verbalizes and/or demonstrates all precautions and safety concerns independently   Goal #5   Current Status IN PROGRESS   Goal #6 Patient independently performs home exercise program for ROM/strengthening per the instructions provided in preparation for discharge.   Goal #6  Current Status IN PROGRESS        Therapeutic Activity: 10 minutes

## 2024-01-30 NOTE — CM/SW NOTE
SINDY followed up on DC planning.     SINDY spoke with pts nephew who states he had emailed back questions and a couple other facilities for SW to look at     SW answered questions and confirmed some facilities were unavailable. Nephew would like [facility] information resent to him - SINDY re-emailed SNF list with [facility] Cisse's information attached    Silvestre stating he will let SW know the choice by the end of the day as he is currently working as well in [place]    UPDATE:     SINDY confirmed with pt's nephew that the choice is [facility] - reserved in aidin    Notified them to anticipate DC tomorrow pending med clear    Ins auth received - asked DSC to update the PAC in [place]    Per discussion in aidin  with [facility] Rep - they can accept pt tomorrow at 3:00PM    Pt requires an ambulance according to the RN and therapy notes due to being a max assist. SINDY called and ordered an Ambulance from [facility] to transport pt to [facility] at 3:00 PM.  PCS flow sheet completed. RN to attached to AVS and print out.     PLAN: [facility], Ambulance already scheduled for 3PM, PCS form completed - pending confirmation of med clear    Daria Flowers, LSW, MSW ext. [phone]

## 2024-01-31 VITALS
RESPIRATION RATE: 18 BRPM | SYSTOLIC BLOOD PRESSURE: 168 MMHG | OXYGEN SATURATION: 98 % | HEIGHT: 62 IN | DIASTOLIC BLOOD PRESSURE: 87 MMHG | HEART RATE: 89 BPM | WEIGHT: 95 LBS | BODY MASS INDEX: 17.48 KG/M2 | TEMPERATURE: 98 F

## 2024-01-31 LAB
ANION GAP SERPL CALC-SCNC: 7 MMOL/L (ref 0–18)
BASOPHILS # BLD AUTO: 0.08 X10(3) UL (ref 0–0.2)
BASOPHILS NFR BLD AUTO: 0.8 %
BUN BLD-MCNC: 14 MG/DL (ref 9–23)
BUN/CREAT SERPL: 18.7 (ref 10–20)
CALCIUM BLD-MCNC: 9 MG/DL (ref 8.7–10.4)
CHLORIDE SERPL-SCNC: 107 MMOL/L (ref 98–112)
CO2 SERPL-SCNC: 26 MMOL/L (ref 21–32)
CREAT BLD-MCNC: 0.75 MG/DL
DEPRECATED RDW RBC AUTO: 44 FL (ref 35.1–46.3)
EGFRCR SERPLBLD CKD-EPI 2021: 86 ML/MIN/1.73M2 (ref 60–?)
EOSINOPHIL # BLD AUTO: 0.51 X10(3) UL (ref 0–0.7)
EOSINOPHIL NFR BLD AUTO: 5.4 %
ERYTHROCYTE [DISTWIDTH] IN BLOOD BY AUTOMATED COUNT: 13.2 % (ref 11–15)
GLUCOSE BLD-MCNC: 91 MG/DL (ref 70–99)
HCT VFR BLD AUTO: 32.3 %
HGB BLD-MCNC: 11.3 G/DL
IMM GRANULOCYTES # BLD AUTO: 0.03 X10(3) UL (ref 0–1)
IMM GRANULOCYTES NFR BLD: 0.3 %
LYMPHOCYTES # BLD AUTO: 0.72 X10(3) UL (ref 1–4)
LYMPHOCYTES NFR BLD AUTO: 7.6 %
MCH RBC QN AUTO: 31.9 PG (ref 26–34)
MCHC RBC AUTO-ENTMCNC: 35 G/DL (ref 31–37)
MCV RBC AUTO: 91.2 FL
MONOCYTES # BLD AUTO: 1.14 X10(3) UL (ref 0.1–1)
MONOCYTES NFR BLD AUTO: 12 %
NEUTROPHILS # BLD AUTO: 7.03 X10 (3) UL (ref 1.5–7.7)
NEUTROPHILS # BLD AUTO: 7.03 X10(3) UL (ref 1.5–7.7)
NEUTROPHILS NFR BLD AUTO: 73.9 %
OSMOLALITY SERPL CALC.SUM OF ELEC: 290 MOSM/KG (ref 275–295)
PLATELET # BLD AUTO: 225 10(3)UL (ref 150–450)
POTASSIUM SERPL-SCNC: 4.6 MMOL/L (ref 3.5–5.1)
POTASSIUM SERPL-SCNC: 4.6 MMOL/L (ref 3.5–5.1)
RBC # BLD AUTO: 3.54 X10(6)UL
SODIUM SERPL-SCNC: 140 MMOL/L (ref 136–145)
WBC # BLD AUTO: 9.5 X10(3) UL (ref 4–11)

## 2024-01-31 PROCEDURE — 80048 BASIC METABOLIC PNL TOTAL CA: CPT | Performed by: INTERNAL MEDICINE

## 2024-01-31 PROCEDURE — 84132 ASSAY OF SERUM POTASSIUM: CPT | Performed by: INTERNAL MEDICINE

## 2024-01-31 PROCEDURE — 85025 COMPLETE CBC W/AUTO DIFF WBC: CPT | Performed by: INTERNAL MEDICINE

## 2024-01-31 NOTE — PLAN OF CARE
Patient A&Ox1-2. Discharged to [facility] via ambulance on 2L. IV removed before leaving. Report given to nurse Burch before discharge.   Problem: Patient Centered Care  Goal: Patient preferences are identified and integrated in the patient's plan of care  Description: Interventions:  - What would you like us to know as we care for you? Patient from [facility]  - Provide timely, complete, and accurate information to patient/family  - Incorporate patient and family knowledge, values, beliefs, and cultural backgrounds into the planning and delivery of care  - Encourage patient/family to participate in care and decision-making at the level they choose  - Honor patient and family perspectives and choices  Outcome: Adequate for Discharge     Problem: Patient/Family Goals  Goal: Patient/Family Long Term Goal  Description: Patient's Long Term Goal: To be discharged    Interventions:  - get clearance   - See additional Care Plan goals for specific interventions  Outcome: Adequate for Discharge  Goal: Patient/Family Short Term Goal  Description: Patient's Short Term Goal: Manage pain    Interventions:   - monitor and assess pain   - See additional Care Plan goals for specific interventions  Outcome: Adequate for Discharge     Problem: PAIN - ADULT  Goal: Verbalizes/displays adequate comfort level or patient's stated pain goal  Description: INTERVENTIONS:  - Encourage pt to monitor pain and request assistance  - Assess pain using appropriate pain scale  - Administer analgesics based on type and severity of pain and evaluate response  - Implement non-pharmacological measures as appropriate and evaluate response  - Consider cultural and social influences on pain and pain management  - Manage/alleviate anxiety  - Utilize distraction and/or relaxation techniques  - Monitor for opioid side effects  - Notify MD/LIP if interventions unsuccessful or patient reports new pain  - Anticipate increased pain with  activity and pre-medicate as appropriate  Outcome: Adequate for Discharge     Problem: RISK FOR INFECTION - ADULT  Goal: Absence of fever/infection during anticipated neutropenic period  Description: INTERVENTIONS  - Monitor WBC  - Administer growth factors as ordered  - Implement neutropenic guidelines  Outcome: Adequate for Discharge     Problem: SAFETY ADULT - FALL  Goal: Free from fall injury  Description: INTERVENTIONS:  - Assess pt frequently for physical needs  - Identify cognitive and physical deficits and behaviors that affect risk of falls.  - [place] fall precautions as indicated by assessment.  - Educate pt/family on patient safety including physical limitations  - Instruct pt to call for assistance with activity based on assessment  - Modify environment to reduce risk of injury  - Provide assistive devices as appropriate  - Consider OT/PT consult to assist with strengthening/mobility  - Encourage toileting schedule  Outcome: Adequate for Discharge     Problem: DISCHARGE PLANNING  Goal: Discharge to home or other facility with appropriate resources  Description: INTERVENTIONS:  - Identify barriers to discharge w/pt and caregiver  - Include patient/family/discharge partner in discharge planning  - Arrange for needed discharge resources and transportation as appropriate  - Identify discharge learning needs (meds, wound care, etc)  - Arrange for interpreters to assist at discharge as needed  - Consider post-discharge preferences of patient/family/discharge partner  - Complete POLST form as appropriate  - Assess patient's ability to be responsible for managing their own health  - Refer to Case Management Department for coordinating discharge planning if the patient needs post-hospital services based on physician/LIP order or complex needs related to functional status, cognitive ability or social support system  Outcome: Adequate for Discharge

## 2024-01-31 NOTE — CM/SW NOTE
01/31/24 1000   Discharge disposition   Expected discharge disposition subacute   Post Acute Care Provider [facility]   Discharge transportation [facility]     Pt discussed during nursing rounds. Pt is stable for dc today. MD dc order entered. Pt will dc to [place] [facility] for Dalia LAZO in admissions confirmed bed is available today. Pt's nephew Silvestre agreeable of transfer today. [facility] scheduled for 3pm .    Number for nurse report is [phone].    Plan: [place] for [facility] today.    / to remain available for support and/or discharge planning.     ERASMO Kimball    [phone]

## 2024-01-31 NOTE — PLAN OF CARE
Alert and oriented x1. Left hip arthoplasty. POD 6. Incontinent x2. No BM. Milk of magnesia given. Refusing scds and heparin. Oncology consulted for abnormal specimen from surgery. Plan for Rehab once cleared.  Problem: Patient Centered Care  Goal: Patient preferences are identified and integrated in the patient's plan of care  Description: Interventions:  - What would you like us to know as we care for you? Patient from [facility]  - Provide timely, complete, and accurate information to patient/family  - Incorporate patient and family knowledge, values, beliefs, and cultural backgrounds into the planning and delivery of care  - Encourage patient/family to participate in care and decision-making at the level they choose  - Honor patient and family perspectives and choices  Outcome: Progressing     Problem: Patient/Family Goals  Goal: Patient/Family Long Term Goal  Description: Patient's Long Term Goal: To be discharged    Interventions:  - get clearance   - See additional Care Plan goals for specific interventions  Outcome: Progressing  Goal: Patient/Family Short Term Goal  Description: Patient's Short Term Goal: Manage pain    Interventions:   - monitor and assess pain   - See additional Care Plan goals for specific interventions  Outcome: Progressing     Problem: PAIN - ADULT  Goal: Verbalizes/displays adequate comfort level or patient's stated pain goal  Description: INTERVENTIONS:  - Encourage pt to monitor pain and request assistance  - Assess pain using appropriate pain scale  - Administer analgesics based on type and severity of pain and evaluate response  - Implement non-pharmacological measures as appropriate and evaluate response  - Consider cultural and social influences on pain and pain management  - Manage/alleviate anxiety  - Utilize distraction and/or relaxation techniques  - Monitor for opioid side effects  - Notify MD/LIP if interventions unsuccessful or patient reports new pain  -  Anticipate increased pain with activity and pre-medicate as appropriate  Outcome: Progressing     Problem: RISK FOR INFECTION - ADULT  Goal: Absence of fever/infection during anticipated neutropenic period  Description: INTERVENTIONS  - Monitor WBC  - Administer growth factors as ordered  - Implement neutropenic guidelines  Outcome: Progressing     Problem: SAFETY ADULT - FALL  Goal: Free from fall injury  Description: INTERVENTIONS:  - Assess pt frequently for physical needs  - Identify cognitive and physical deficits and behaviors that affect risk of falls.  - [place] fall precautions as indicated by assessment.  - Educate pt/family on patient safety including physical limitations  - Instruct pt to call for assistance with activity based on assessment  - Modify environment to reduce risk of injury  - Provide assistive devices as appropriate  - Consider OT/PT consult to assist with strengthening/mobility  - Encourage toileting schedule  Outcome: Progressing     Problem: DISCHARGE PLANNING  Goal: Discharge to home or other facility with appropriate resources  Description: INTERVENTIONS:  - Identify barriers to discharge w/pt and caregiver  - Include patient/family/discharge partner in discharge planning  - Arrange for needed discharge resources and transportation as appropriate  - Identify discharge learning needs (meds, wound care, etc)  - Arrange for interpreters to assist at discharge as needed  - Consider post-discharge preferences of patient/family/discharge partner  - Complete POLST form as appropriate  - Assess patient's ability to be responsible for managing their own health  - Refer to Case Management Department for coordinating discharge planning if the patient needs post-hospital services based on physician/LIP order or complex needs related to functional status, cognitive ability or social support system  Outcome: Progressing

## 2024-02-01 NOTE — PAYOR COMM NOTE
--------------  DISCHARGE REVIEW    Payor: [employer]  Subscriber #:  [id number]  Authorization Number: [id number]    Admit date: 1/25/24  Admit time:   2:45 AM  Discharge Date: 1/31/2024  3:47 PM     Admitting Physician: Thuy Acevedo MD  Attending Physician:  Rose Marie att. providers found  Primary Care Physician: Katie Rios MD

## 2024-03-23 NOTE — ANESTHESIA POSTPROCEDURE EVALUATION
Patient: Nacho Mari    Procedure Summary       Date: 01/25/24 Room / Location: [facility] MAIN OR 04 / [facility] MAIN OR    Anesthesia Start: 1349 Anesthesia Stop:     Procedure: LEFT HIP HEMIARTHROPLASTY (Left: Hip) Diagnosis:       Hip fracture (HCC)      (Hip fracture (HCC) [S72.009A])    Surgeons: Kelvin Farrell MD Anesthesiologist: Sebastian Lim MD    Anesthesia Type: general ASA Status: 3            Anesthesia Type: general    Vitals Value Taken Time   /103 01/25/24 1725   Temp 98.8°F (36.9 °C) 01/25/24 1725   Pulse 118 01/25/24 1725   Resp 19 01/25/24 1725   SpO2 98 % 01/25/24 1725       [facility] AN Post Evaluation:   Patient Evaluated in PACU  Patient Participation: complete - patient participated  Level of Consciousness: awake  Pain Score: 3  Pain Management: adequate  Airway Patency:patent  Dental exam unchanged from preop  Yes    Cardiovascular Status: acceptable  Respiratory Status: acceptable and nasal cannula  Postoperative Hydration acceptable      SEBASTIAN LIM MD  3/22/2024 7:04 PM

## 2024-07-27 ENCOUNTER — APPOINTMENT (OUTPATIENT)
Dept: CT IMAGING | Facility: HOSPITAL | Age: 89
End: 2024-07-27
Attending: INTERNAL MEDICINE
Payer: MEDICARE

## 2024-07-27 ENCOUNTER — APPOINTMENT (OUTPATIENT)
Dept: CT IMAGING | Facility: HOSPITAL | Age: 89
End: 2024-07-27
Attending: Other
Payer: MEDICARE

## 2024-07-27 ENCOUNTER — HOSPITAL ENCOUNTER (INPATIENT)
Facility: HOSPITAL | Age: 89
LOS: 5 days | Discharge: HOSPICE | End: 2024-08-01
Attending: STUDENT IN AN ORGANIZED HEALTH CARE EDUCATION/TRAINING PROGRAM | Admitting: INTERNAL MEDICINE
Payer: MEDICARE

## 2024-07-27 ENCOUNTER — APPOINTMENT (OUTPATIENT)
Dept: CT IMAGING | Facility: HOSPITAL | Age: 89
End: 2024-07-27
Attending: STUDENT IN AN ORGANIZED HEALTH CARE EDUCATION/TRAINING PROGRAM
Payer: MEDICARE

## 2024-07-27 ENCOUNTER — HOSPITAL ENCOUNTER (INPATIENT)
Facility: HOSPITAL | Age: 89
LOS: 5 days | Discharge: HOSPICE | End: 2024-08-01
Attending: STUDENT IN AN ORGANIZED HEALTH CARE EDUCATION/TRAINING PROGRAM | Admitting: INTERNAL MEDICINE
Payer: MEDICARE

## 2024-07-27 DIAGNOSIS — K92.2 GASTROINTESTINAL HEMORRHAGE, UNSPECIFIED GASTROINTESTINAL HEMORRHAGE TYPE: Primary | ICD-10-CM

## 2024-07-27 DIAGNOSIS — C19 ADENOCARCINOMA OF RECTOSIGMOID JUNCTION (HCC): ICD-10-CM

## 2024-07-27 DIAGNOSIS — K52.89 STERCORAL COLITIS: ICD-10-CM

## 2024-07-27 DIAGNOSIS — K62.5 BRBPR (BRIGHT RED BLOOD PER RECTUM): ICD-10-CM

## 2024-07-27 PROBLEM — I61.0 NONTRAUMATIC SUBCORTICAL HEMORRHAGE OF RIGHT CEREBRAL HEMISPHERE (HCC): Status: ACTIVE | Noted: 2024-07-27

## 2024-07-27 PROBLEM — I77.3 FIBROMUSCULAR DYSPLASIA (HCC): Status: ACTIVE | Noted: 2024-07-27

## 2024-07-27 PROBLEM — K27.9 PEPTIC ULCER DISEASE: Status: RESOLVED | Noted: 2019-07-04 | Resolved: 2024-07-27

## 2024-07-27 PROBLEM — I68.0 CEREBRAL AMYLOID ANGIOPATHY (HCC): Status: ACTIVE | Noted: 2024-07-27

## 2024-07-27 PROBLEM — G31.84 MILD COGNITIVE IMPAIRMENT: Status: RESOLVED | Noted: 2024-07-27 | Resolved: 2024-07-27

## 2024-07-27 PROBLEM — Z86.59 HISTORY OF DEMENTIA: Status: ACTIVE | Noted: 2024-07-27

## 2024-07-27 PROBLEM — R94.31 ABNORMAL ELECTROCARDIOGRAM (ECG) (EKG): Status: RESOLVED | Noted: 2023-08-11 | Resolved: 2024-07-27

## 2024-07-27 PROBLEM — I77.3 FIBROMUSCULAR DYSPLASIA (HCC): Status: RESOLVED | Noted: 2024-07-27 | Resolved: 2024-07-27

## 2024-07-27 PROBLEM — E87.20 LACTIC ACIDOSIS: Status: RESOLVED | Noted: 2023-05-20 | Resolved: 2024-07-27

## 2024-07-27 PROBLEM — E87.0 HYPERNATREMIA: Status: RESOLVED | Noted: 2023-05-20 | Resolved: 2024-07-27

## 2024-07-27 PROBLEM — R06.09 DYSPNEA ON EXERTION: Status: RESOLVED | Noted: 2019-07-04 | Resolved: 2024-07-27

## 2024-07-27 PROBLEM — R00.0 SINUS TACHYCARDIA: Status: RESOLVED | Noted: 2023-08-11 | Resolved: 2024-07-27

## 2024-07-27 PROBLEM — E87.6 HYPOKALEMIA: Status: RESOLVED | Noted: 2023-05-20 | Resolved: 2024-07-27

## 2024-07-27 PROBLEM — R47.01 APHASIA: Status: ACTIVE | Noted: 2024-07-27

## 2024-07-27 PROBLEM — I48.0 PAROXYSMAL ATRIAL FIBRILLATION (HCC): Status: RESOLVED | Noted: 2019-07-04 | Resolved: 2024-07-27

## 2024-07-27 PROBLEM — R41.3 MEMORY DIFFICULTIES: Status: RESOLVED | Noted: 2018-03-07 | Resolved: 2024-07-27

## 2024-07-27 PROBLEM — A41.9 SEPSIS (HCC): Status: RESOLVED | Noted: 2023-05-20 | Resolved: 2024-07-27

## 2024-07-27 PROBLEM — E85.4 CEREBRAL AMYLOID ANGIOPATHY (HCC): Status: ACTIVE | Noted: 2024-07-27

## 2024-07-27 PROBLEM — I49.1 APC (ATRIAL PREMATURE CONTRACTIONS): Status: RESOLVED | Noted: 2023-08-11 | Resolved: 2024-07-27

## 2024-07-27 PROBLEM — C18.7 MALIGNANT NEOPLASM OF SIGMOID COLON (HCC): Status: RESOLVED | Noted: 2021-03-17 | Resolved: 2024-07-27

## 2024-07-27 PROBLEM — I45.10 INCOMPLETE RIGHT BUNDLE BRANCH BLOCK: Status: RESOLVED | Noted: 2023-08-11 | Resolved: 2024-07-27

## 2024-07-27 PROBLEM — R07.0 THROAT PAIN: Status: RESOLVED | Noted: 2023-04-11 | Resolved: 2024-07-27

## 2024-07-27 PROBLEM — I35.0 AORTIC VALVE STENOSIS: Status: RESOLVED | Noted: 2023-08-11 | Resolved: 2024-07-27

## 2024-07-27 PROBLEM — J43.8 OTHER EMPHYSEMA (HCC): Status: RESOLVED | Noted: 2020-02-12 | Resolved: 2024-07-27

## 2024-07-27 PROBLEM — I61.9 INTRAPARENCHYMAL HEMORRHAGE OF BRAIN (HCC): Status: RESOLVED | Noted: 2017-12-20 | Resolved: 2024-07-27

## 2024-07-27 PROBLEM — I71.21 ANEURYSM OF ASCENDING AORTA WITHOUT RUPTURE (HCC): Status: RESOLVED | Noted: 2023-08-11 | Resolved: 2024-07-27

## 2024-07-27 PROBLEM — I68.0 CEREBRAL AMYLOID ANGIOPATHY (HCC): Status: RESOLVED | Noted: 2024-07-27 | Resolved: 2024-07-27

## 2024-07-27 PROBLEM — N17.9 AKI (ACUTE KIDNEY INJURY) (HCC): Status: RESOLVED | Noted: 2023-05-20 | Resolved: 2024-07-27

## 2024-07-27 PROBLEM — I61.3 PONTINE HEMORRHAGE (HCC): Status: RESOLVED | Noted: 2020-07-10 | Resolved: 2024-07-27

## 2024-07-27 PROBLEM — I10 ESSENTIAL HYPERTENSION: Status: RESOLVED | Noted: 2019-07-04 | Resolved: 2024-07-27

## 2024-07-27 PROBLEM — R41.0 DELIRIUM: Status: RESOLVED | Noted: 2020-07-13 | Resolved: 2024-07-27

## 2024-07-27 PROBLEM — E85.4 CEREBRAL AMYLOID ANGIOPATHY (HCC): Status: RESOLVED | Noted: 2024-07-27 | Resolved: 2024-07-27

## 2024-07-27 PROBLEM — I05.9 MITRAL VALVE DISEASE: Status: RESOLVED | Noted: 2023-08-11 | Resolved: 2024-07-27

## 2024-07-27 PROBLEM — I25.10 CORONARY ARTERY DISEASE INVOLVING NATIVE CORONARY ARTERY OF NATIVE HEART: Status: RESOLVED | Noted: 2023-08-11 | Resolved: 2024-07-27

## 2024-07-27 PROBLEM — I44.0 FIRST DEGREE AV BLOCK: Status: RESOLVED | Noted: 2023-08-11 | Resolved: 2024-07-27

## 2024-07-27 LAB
ALBUMIN SERPL-MCNC: 4 G/DL (ref 3.2–4.8)
ALBUMIN/GLOB SERPL: 1.5 {RATIO} (ref 1–2)
ALP LIVER SERPL-CCNC: 66 U/L
ALT SERPL-CCNC: 10 U/L
ALT SERPL-CCNC: <7 U/L
ANION GAP SERPL CALC-SCNC: 6 MMOL/L (ref 0–18)
ANTIBODY SCREEN: NEGATIVE
APTT PPP: 24.8 SECONDS (ref 23–36)
AST SERPL-CCNC: 25 U/L (ref ?–34)
BASOPHILS # BLD AUTO: 0.04 X10(3) UL (ref 0–0.2)
BASOPHILS NFR BLD AUTO: 0.6 %
BILIRUB SERPL-MCNC: 0.8 MG/DL (ref 0.2–0.9)
BUN BLD-MCNC: 18 MG/DL (ref 9–23)
CALCIUM BLD-MCNC: 9.6 MG/DL (ref 8.7–10.4)
CHLORIDE SERPL-SCNC: 104 MMOL/L (ref 98–112)
CO2 SERPL-SCNC: 26 MMOL/L (ref 21–32)
CREAT BLD-MCNC: 1.29 MG/DL
DEPRECATED RDW RBC AUTO: 46.4 FL (ref 35.1–46.3)
EGFRCR SERPLBLD CKD-EPI 2021: 53 ML/MIN/1.73M2 (ref 60–?)
EOSINOPHIL # BLD AUTO: 0.3 X10(3) UL (ref 0–0.7)
EOSINOPHIL NFR BLD AUTO: 4.2 %
ERYTHROCYTE [DISTWIDTH] IN BLOOD BY AUTOMATED COUNT: 13.5 % (ref 11–15)
GLOBULIN PLAS-MCNC: 2.7 G/DL (ref 2–3.5)
GLUCOSE BLD-MCNC: 152 MG/DL (ref 70–99)
GLUCOSE BLDC GLUCOMTR-MCNC: 136 MG/DL (ref 70–99)
GLUCOSE BLDC GLUCOMTR-MCNC: 88 MG/DL (ref 70–99)
HCT VFR BLD AUTO: 37.8 %
HGB BLD-MCNC: 12.8 G/DL
IMM GRANULOCYTES # BLD AUTO: 0.03 X10(3) UL (ref 0–1)
IMM GRANULOCYTES NFR BLD: 0.4 %
INR BLD: 0.98 (ref 0.8–1.2)
LYMPHOCYTES # BLD AUTO: 1.43 X10(3) UL (ref 1–4)
LYMPHOCYTES NFR BLD AUTO: 20.2 %
MAGNESIUM SERPL-MCNC: 1.8 MG/DL (ref 1.6–2.6)
MCH RBC QN AUTO: 31.8 PG (ref 26–34)
MCHC RBC AUTO-ENTMCNC: 33.9 G/DL (ref 31–37)
MCV RBC AUTO: 94 FL
MONOCYTES # BLD AUTO: 0.68 X10(3) UL (ref 0.1–1)
MONOCYTES NFR BLD AUTO: 9.6 %
NEUTROPHILS # BLD AUTO: 4.61 X10 (3) UL (ref 1.5–7.7)
NEUTROPHILS # BLD AUTO: 4.61 X10(3) UL (ref 1.5–7.7)
NEUTROPHILS NFR BLD AUTO: 65 %
PLATELET # BLD AUTO: 250 10(3)UL (ref 150–450)
POTASSIUM SERPL-SCNC: 3.6 MMOL/L (ref 3.5–5.1)
PROT SERPL-MCNC: 6.7 G/DL (ref 5.7–8.2)
PROTHROMBIN TIME: 13.6 SECONDS (ref 11.6–14.8)
RBC # BLD AUTO: 4.02 X10(6)UL
RH BLOOD TYPE: POSITIVE
SODIUM SERPL-SCNC: 136 MMOL/L (ref 136–145)
WBC # BLD AUTO: 7.1 X10(3) UL (ref 4–11)

## 2024-07-27 PROCEDURE — 74177 CT ABD & PELVIS W/CONTRAST: CPT | Performed by: STUDENT IN AN ORGANIZED HEALTH CARE EDUCATION/TRAINING PROGRAM

## 2024-07-27 PROCEDURE — 70496 CT ANGIOGRAPHY HEAD: CPT | Performed by: OTHER

## 2024-07-27 PROCEDURE — 99291 CRITICAL CARE FIRST HOUR: CPT | Performed by: OTHER

## 2024-07-27 PROCEDURE — 70498 CT ANGIOGRAPHY NECK: CPT | Performed by: OTHER

## 2024-07-27 RX ORDER — POLYETHYLENE GLYCOL 3350 17 G/17G
17 POWDER, FOR SOLUTION ORAL 2 TIMES DAILY
Status: DISCONTINUED | OUTPATIENT
Start: 2024-07-27 | End: 2024-08-01

## 2024-07-27 RX ORDER — LORAZEPAM 0.5 MG/1
0.5 TABLET ORAL ONCE
Status: COMPLETED | OUTPATIENT
Start: 2024-07-27 | End: 2024-07-27

## 2024-07-27 RX ORDER — PANTOPRAZOLE SODIUM 40 MG/1
40 TABLET, DELAYED RELEASE ORAL
Status: DISCONTINUED | OUTPATIENT
Start: 2024-07-28 | End: 2024-07-31

## 2024-07-27 RX ORDER — QUETIAPINE FUMARATE 25 MG/1
50 TABLET, FILM COATED ORAL NIGHTLY
Status: DISCONTINUED | OUTPATIENT
Start: 2024-07-27 | End: 2024-07-29

## 2024-07-27 RX ORDER — LORAZEPAM 2 MG/ML
0.5 INJECTION INTRAMUSCULAR ONCE
Status: DISCONTINUED | OUTPATIENT
Start: 2024-07-27 | End: 2024-07-28

## 2024-07-27 RX ORDER — FLUTICASONE PROPIONATE 50 MCG
2 SPRAY, SUSPENSION (ML) NASAL DAILY
Status: DISCONTINUED | OUTPATIENT
Start: 2024-07-27 | End: 2024-08-01

## 2024-07-27 RX ORDER — ACETAMINOPHEN 650 MG/1
650 SUPPOSITORY RECTAL EVERY 4 HOURS PRN
Status: DISCONTINUED | OUTPATIENT
Start: 2024-07-27 | End: 2024-08-01

## 2024-07-27 RX ORDER — ACETAMINOPHEN 325 MG/1
650 TABLET ORAL EVERY 4 HOURS PRN
Status: DISCONTINUED | OUTPATIENT
Start: 2024-07-27 | End: 2024-08-01

## 2024-07-27 RX ORDER — TRAZODONE HYDROCHLORIDE 50 MG/1
50 TABLET ORAL NIGHTLY
Status: DISCONTINUED | OUTPATIENT
Start: 2024-07-27 | End: 2024-07-29

## 2024-07-27 RX ORDER — LIDOCAINE HYDROCHLORIDE 20 MG/ML
10 JELLY TOPICAL ONCE
Status: COMPLETED | OUTPATIENT
Start: 2024-07-27 | End: 2024-07-27

## 2024-07-27 RX ORDER — MAGNESIUM OXIDE 400 MG/1
400 TABLET ORAL DAILY
Status: DISCONTINUED | OUTPATIENT
Start: 2024-07-27 | End: 2024-08-01

## 2024-07-27 RX ORDER — LABETALOL HYDROCHLORIDE 5 MG/ML
10 INJECTION, SOLUTION INTRAVENOUS EVERY 10 MIN PRN
Status: DISCONTINUED | OUTPATIENT
Start: 2024-07-27 | End: 2024-08-01

## 2024-07-27 RX ORDER — SODIUM CHLORIDE 9 MG/ML
INJECTION, SOLUTION INTRAVENOUS CONTINUOUS
Status: DISCONTINUED | OUTPATIENT
Start: 2024-07-27 | End: 2024-08-01

## 2024-07-27 RX ORDER — OLANZAPINE 10 MG/1
10 TABLET, ORALLY DISINTEGRATING ORAL ONCE
Status: DISCONTINUED | OUTPATIENT
Start: 2024-07-27 | End: 2024-07-27

## 2024-07-27 RX ORDER — LORAZEPAM 0.5 MG/1
0.5 TABLET ORAL
Status: DISCONTINUED | OUTPATIENT
Start: 2024-07-28 | End: 2024-07-27

## 2024-07-27 RX ORDER — LORAZEPAM 2 MG/ML
INJECTION INTRAMUSCULAR
Status: COMPLETED
Start: 2024-07-27 | End: 2024-07-27

## 2024-07-27 RX ORDER — LABETALOL HYDROCHLORIDE 5 MG/ML
10 INJECTION, SOLUTION INTRAVENOUS EVERY 4 HOURS PRN
Status: DISCONTINUED | OUTPATIENT
Start: 2024-07-27 | End: 2024-08-01

## 2024-07-27 RX ORDER — LORAZEPAM 2 MG/ML
0.5 INJECTION INTRAMUSCULAR ONCE
Status: COMPLETED | OUTPATIENT
Start: 2024-07-27 | End: 2024-07-27

## 2024-07-27 RX ORDER — HYDRALAZINE HYDROCHLORIDE 20 MG/ML
10 INJECTION INTRAMUSCULAR; INTRAVENOUS EVERY 2 HOUR PRN
Status: DISCONTINUED | OUTPATIENT
Start: 2024-07-27 | End: 2024-08-01

## 2024-07-27 RX ORDER — DEXMEDETOMIDINE HYDROCHLORIDE 4 UG/ML
INJECTION, SOLUTION INTRAVENOUS CONTINUOUS
Status: DISCONTINUED | OUTPATIENT
Start: 2024-07-27 | End: 2024-07-28

## 2024-07-27 RX ORDER — TAMSULOSIN HYDROCHLORIDE 0.4 MG/1
0.4 CAPSULE ORAL DAILY
Status: DISCONTINUED | OUTPATIENT
Start: 2024-07-27 | End: 2024-07-30

## 2024-07-27 RX ORDER — MAGNESIUM SULFATE HEPTAHYDRATE 40 MG/ML
2 INJECTION, SOLUTION INTRAVENOUS ONCE
Status: DISCONTINUED | OUTPATIENT
Start: 2024-07-27 | End: 2024-08-01

## 2024-07-27 NOTE — ED INITIAL ASSESSMENT (HPI)
Pt to ED via EMS from [facility]. Staff at facility called [id number] because pt was found slumped over in the doorway after breakfast and has been more agitated.   Baseline A&Ox1-2.   EMS also noticed rectal bleeding upon transfer.   Bright red blood noted on arrival in pts brief and pants.   Per EMS, pt is not on any blood thinners.

## 2024-07-27 NOTE — CONSULTS
[facility]  [address] [place] [address], [address]  [facility] [address]  [phone]          STROKE  CONSULTATION NOTE  [facility]  part of [facility]    Report of Consultation    Nacho Mari Patient Status:  Inpatient     10/14/1933 MRN [id number]    Location [facility] 5SW/SE Attending Eric Fierro MD    Hosp Day # 0 PCP Oscar Rios MD ([facility])      Date of Admission:  2024  Date of Consult:  2024  Reason for Admission/Consultation: Evaluate for hypertensive emergency/urgency versus ICH in patient with dementia  Requested by: Eric Fierro MD  Name of Outside Hospital if Transferred: N/A  Time of patient arrival: 10:09 AM   on 24   Time of initial page: 4:26 PM on 24         Time of encounter:4:46 PM on 24          _________________________________________________________________________________________    Chief Complaint:   Chief Complaint   Patient presents with    GI Bleeding    Altered Mental Status       HPI:  Nacho Mari is a 89 year old  male w/ a pmhx sig. 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    Last known well is unknown.  HPI as per patient, chart review, and his RN.    He presented to the emergency department from a nursing home after being found slumped over and more confused.  He was found to have bright red blood in his  diaper.  At baseline the patient has a history of agitated delirium, particularly when he is hospitalized.  Regarding his rectal cancer he currently is being managed with surveillance.  He was deemed not a good candidate for chemotherapy.           Review and summation of prior records  Outside hospital records from telepsychiatry from 8/5/2023 from [facility]:   \"Nacho Mari is a 89 y.o. male with hx of dementia, copd htn, afib who was brought to ED from NH due to AMS. Pt is laying in bed, eating lunch. He is calm, appropriate, no apparent emotional distress at this time.       Per RN pt with increase agitation, impulsive, verbally aggressive during the afternoon hours. Per records NH nurse shared pt with increase frustration, agitation, running a wheelchair into the wall of his room. Per records pt with increase behaviors , agitation since aug 1st. Pt remained quiet, calm, minimal interaction during this visit. No aggressiveness, agitation at this time. No need for mechanical or chemical restraints.     Per records pt has been concerned \" only want to take his money\"  Possible paranoia.   CTH no acute pathology, labs unremarkable...All systems reviewed and negative in reference to constitutional, HEENT, CV, pulmonary, GI, , musculoskeletal, integumentary, endocrine, and neurologic  Mental Status Exam:      General Appearance and Behavior  Minimally/Generally calm, eating lunch, no emotional distress at this time, good ec,limit interaction during this visit      Speech: soft spoken, short answers      Mood/Affect:neutral     Thought Process: unable to access      Thought content: unable to access      Cognition: alert oriented x name only memory unable to access distracted      Insight and Judgment:limit...Pt with  hx of dementia, copd htn, afib who was brought to ED from NH due to AMS. Pt sx highly likely due to underlying cognitive disorder. At this moment pt is calm, and relaxed. I will not start any  psychotropics given pts age but if pt gets agitated or aggressive later in the evening or at night will prescribe zyprexa 2.5 mg po qd prn. I recommend not to start psychotropics  On scheduled basis until patients becomes agitated.  Recommend patient to be scheduled with outpatient memory clinic for further evaluation, recommendations. No psychiatry contraindication for d/c      Problems list   Agitation   Impulsiveness  Dementia      Plan   No psychiatry contraindication for d/c   Consider zyprexa 2.5 mg po qd prn for agitation , aggressiveness, psychosis  Follow up with pcp   Consider memory clinic   Follow up with psychiatrist for further recommendations   Continue current safety protocol, precautions, medical management by primary team       Thank you very much for this consult and please feel free to contact me with any questions or concerns regarding this patient.    \"    ROS:  Pertinent positive and negatives per HPI.  All others were reviewed and negative.    Past Medical History:    Abnormal electrocardiogram (ECG) (EKG)    MICHAEL (acute kidney injury) (HCC)    Aneurysm of ascending aorta without rupture (HCC)    Aortic valve stenosis    APC (atrial premature contractions)    Cerebral amyloid angiopathy (HCC)    Coronary artery disease involving native coronary artery of native heart    Delirium    Dyspnea on exertion    Essential hypertension    Fibromuscular dysplasia (HCC)    Formatting of this note might be different from the original.    bilateral ICAs and R V2-3 without dissection      First degree AV block    Hypernatremia    Hypokalemia    Incomplete right bundle branch block    Intraparenchymal hemorrhage of brain (HCC)    Lactic acidosis    Malignant neoplasm of sigmoid colon (HCC)    Memory difficulties    Mild cognitive impairment    Mitral valve disease    Other emphysema (HCC)    Paroxysmal atrial fibrillation (HCC)    Peptic ulcer disease    Pontine hemorrhage (HCC)    Sepsis (HCC)    Sinus  tachycardia    SVT (supraventricular tachycardia) (HCC)    Throat pain       History reviewed. No pertinent surgical history.    No family history on file.    Social History     Socioeconomic History    Marital status:      Spouse name: Not on file    Number of children: Not on file    Years of education: Not on file    Highest education level: Not on file   Occupational History    Not on file   Tobacco Use    Smoking status: Never    Smokeless tobacco: Never   Substance and Sexual Activity    Alcohol use: Not on file    Drug use: Not on file    Sexual activity: Not on file   Other Topics Concern    Not on file   Social History Narrative    Not on file     Social Determinants of Health     Financial Resource Strain: Low Risk  (5/20/2023)    Received from [facility]    Financial Resource Strain     In the past year, have you or any family members you live with been unable to get any of the following when it was really needed? Check all that apply.: None   Food Insecurity: Unknown (7/27/2024)    Food Insecurity     Food Insecurity: Patient unable to answer   Transportation Needs: Unknown (7/27/2024)    Transportation Needs     Lack of Transportation: Patient unable to answer     Car Seat: Not on file   Physical Activity: Inactive (3/3/2021)    Received from [facility]    Exercise Vital Sign     Days of Exercise per Week: 0 days     Minutes of Exercise per Session: 0 min   Stress: Low Risk  (4/9/2021)    Received from [facility]    Stress     How Stressed: Not on file   Social Connections: High Risk (5/20/2023)    [facility]    Social Connections     How often do you see or talk to people that you care about and feel close to? (For example: talking to friends on the phone, visiting friends or family, going to [employer] or club meetings): Less than once a week    Housing Stability: Unknown (7/27/2024)    Housing Stability     Housing Instability: Patient unable to answer     Housing Instability Emergency: Not on file     Crib or Bassinette: Not on file       Outpatient Medications  Current Outpatient Medications   Medication Instructions    acetaminophen (TYLENOL) 325 mg, Oral, Every 6 hours PRN    fluticasone propionate 50 MCG/ACT Nasal Suspension 2 sprays, Each Nare, Daily    fluticasone-umeclidin-vilant (TRELEGY ELLIPTA) 100-62.5-25 MCG/ACT Inhalation Aerosol Powder, Breath Activated 1 puff, Inhalation, Daily    magnesium oxide (MAG-OX) 400 mg, Oral, Daily    Melatonin 3 mg, Oral, Daily    pantoprazole (PROTONIX) 40 mg, Oral, Every morning before breakfast    Polyethylene Glycol 3350 (MIRALAX) 17 g, Oral, As needed    QUEtiapine (SEROQUEL) 50 mg, Oral, Nightly    tamsulosin (FLOMAX) 0.4 mg, Oral, Daily    traZODone (DESYREL) 50 mg, Oral, Nightly        Inpatient Medications  No current outpatient medications on file.        polyethylene glycol (PEG 3350)  17 g Oral BID    LORazepam  0.5 mg Intravenous Once    fluticasone propionate  2 spray Each Nare Daily    fluticasone-umeclidin-vilant  1 puff Inhalation Daily    magnesium oxide  400 mg Oral Daily    Melatonin  3 mg Oral Daily    [START ON 7/28/2024] pantoprazole  40 mg Oral QAM AC    QUEtiapine  50 mg Oral Nightly    tamsulosin  0.4 mg Oral Daily    traZODone  50 mg Oral Nightly         labetalol    Objective:  Last vitals and weight :  Vitals:    07/27/24 1636   BP: (!) 161/75   Pulse: 81   Resp:    Temp:        Exam:  - General: appears older than stated age, fatigued, no distress, and uncooperative  - CV: symmetric pulses     - Pulmonary: no signs of respiratory distress. Normal excursion of the chest.   Neurologic Exam  - Mental Status: Alert and attentive. Oriented to person.  He does not know his age.  He does not know the month.  He does not know the name of the hospital.speaks in short phrases.   Intermittently follows commands.  Refuses to participate much in the exam.  He cannot name.    Speech is spontaneous, fluent, and prosodic. Comprehension is variable.  repetition intact. Phrase length and rate are decreased.  + Uncooperative.  Often refuses to participate.  States that he is not having a stroke.  Often questions why he is being examined.  He will not voluntarily lift his legs or keep them up antigravity.  He refused to lift his arms for 10 seconds.  + Aphasia.  He cannot name. + Finger anomia.  He was able to read, but he admitted the last word of every sentence.  - Cranial Nerves: No gaze preference. Visual fields: Visual fields are not reliable.  Suspect that he has a right   hemianopsia.  He states he can see the examiners entire face.  He refuses to participate in confrontational testing.  Blink to threat intact.  He keeps his left eye closed.  He has a ecchymoses under his left eye.  Pupils are 4mm briskly constricting to 2mm and equally round and reactive to light  in a well lit room. EOMI. No nystagmus. No ptosis. V1-V3 intact B/L to light touch.No pathological facial asymmetry. No flattening of the nasolabial fold. .  Hearing grossly intact.  Tongue midline. No atrophy or fasiculations of the tongue noted. Palate and uvula elevate symmetrically.  Shoulder shrug symmetric.  - Fundoscopic exam: Refuses to participate.   - Motor:  normal tone,Diffusely decreased bulk.    no interosseous wasting. No flattening of hypothenar eminences.     He is at least antigravity in both of his upper extremities.  He is able to hold the NIH stroke scale cards with his right hand without issue.  However he would not keep his arms up for 10 seconds as requested.  He repeatedly said \"why should I keep my arms up?\"      Right Left     Motor Strength   Deltoids 3 3  Triceps 3 3  Biceps 3 3  Wrist Extensors 3 3   3 3     Knee extensors 2 to 3 bilaterally          Pronator drift: bilateral arm drifts down before  10 seconds.   leg drift: Both lower extremities drift to the bed before 5 seconds.       Asterixis: No asterixis noted.   Tremor: None.     Reflexes:    C5 C6 C7  L4 S1   R 2+ 2+  2+ 2+   L 2+ 2+  2+ 2+   Adductor Spread: No adductor spread noted.        - Sensory:   Light touch: normal     - Cerebellum: No truncal ataxia. No titubations. No dysmetria, no dysdiadochokinesis. No overshoot.   - Gait/station: Normal gait and station. Symmetric arm swing.   - Plantar response: extensor bilaterally    NIH Stroke Scale  Person Administering Scale: Kiritlen EricmaDO  1a  Level of consciousness: 0 = Alert keenly responsive    1b. LOC questions:  2 = Answers neither questions correctly   1c. LOC commands: 0 = Performs both tasks correctly    2.  Best Gaze: 0 = Normal    3.  Visual: 1 = Partial hemianopia     4. Facial Palsy: 0 = Normal symmetrical movement     5a.  Motor left arm: 2 = Some effort against gravity, limb cannot get to or maintain (if cued 90ºor 45º) drifts down to bed, but has some effort against gravity. 3 = No effort against gravity, limb falls 4 = No movement   5b.  Motor right arm: 2 = Some effort against gravity, limb cannot get to or maintain (if cued 90ºor 45º) drifts down to bed, but has some effort against gravity. 3 = No effort against gravity, limb falls 4 = No movement   6a. motor left le = Some effort against gravity, falls to bed w/in 5 sec     6b  Motor right le = Some effort against gravity, falls to bed w/in 5 sec     7. Limb Ataxia: 0 = Absent     8.  Sensory: 0 = Normal, no sensory loss     9. Best Language:  2 = Severe aphasia; (cannot identify pictures)     10. Dysarthria: 0 = Normal articulation   11. Extinction and Inattention: 0 = No abnormality     Total:   13     Modified Xavier Score: 4 - Moderately severe disability. Unable to attend to own bodily needs without assistance, and unable to walk unassisted.                                Data reviewed    ECG findings by monitor  or 12-lead:  Ecg:   Results for orders placed or performed during the hospital encounter of 07/27/24   EKG 12 Lead   Result Value Ref Range    Ventricular rate 64 BPM    Atrial rate  BPM    P-R Interval  ms    QRS Duration 126 ms    Q-T Interval 480 ms    QTC Calculation (Bezet) 495 ms    P Axis  degrees    R Axis 54 degrees    T Axis 59 degrees       Test results/Imaging:   No results found for: \"CHOL\", \"TRIG\", \"HDL\", \"LDL\", \"CHOLHDL\"  Recent Labs   Lab 07/27/24  1026   WBC 7.1   HGB 12.8*   HCT 37.8*   .0     Recent Labs   Lab 07/27/24  1026 07/27/24  1245     --    K  --  3.6     --    CO2 26.0  --    BUN  --  18   ALT 10 <7*   AST  --  25     No results found for: \"A1C\"  Last A1c value was  % done  .            No results found.    Performed an independent visualization of: CT brain WO from prior admission  Imaging revealed: Agree with radiology read.        Nacho Mari is a 89 year old  male w/ a pmhx sig. for cerebral amyloid angiopathy, atrial fibrillation not on anticoagulation,?  Prior left occipital infarct, Hx of intracranial hemorrhage, Hx intra pontine hemorrhage, HTN, Prior history of smoking,COPD, fibromuscular dysplasia (involving bilateral internal carotid arteries and right V2 V3 segments without dissection),dementia with behavioral disturbances, rectosigmoid adenocarcinoma s/p resection in 2021, and hip fracture in January 2024 who is currently admitted for GI bleed and is being evaluated on an urgent/emergent basis per the request of the hospitalist for possible hypertensive urgency/emergency versus acute ischemic stroke.    Agitation, unwillingness to participate in exam, aphasia, in the setting of known history of dementia,cerebral amyloid angiopathy, and f intracranial hemorrhage  Differential Diagnosis:  Most likely due to known history of dementia with behavioral disturbances  Possible patient has change in mental status/worsening agitation due to hemorrhage given  known history of cerebral amyloid angiopathy and fibromuscular dysplasia.  However suspicion for this is low.  Most of his deficits are likely chronic and related to dementia and his unwillingness to proceed and exam rather than due to focal lesion.  Therefore suspicion for acute ischemic stroke is low.  Low suspicion for hypertensive urgency/emergency as clinically questioned          Reperfusion therapy eligibility: patient is not eligible because: out of the  time window  not an acute ischemic stroke  not a candidate for thrombectomy because no large vessel occlusion  not a candidate for thrombectomy because of premorbid level of disability.  History of previous intracranial hemorrhage Intracranial neoplasm, AVM, or an aneurysm   Agent and time of first antithrombotic administration (or contraindication):   Cont asa 81   BP goal:   Additional brain and vascular imaging ordered:   CT brain WO and CTA head/neck    Cardiac imaging: Telemetry   Additional labs ordered:   Labs:  pending imaging  Dysphagia status:   DysphagiaNo acute facial droop; per RN swallow evaluation.  Fluids/nutrition:   ivfstroke : AVOID Hypotonic fluids in patients with acute ischemic stroke or cerebral edema.  Hypotonic fluids can worsen cerebral edema.  This includes D5 W, half-normal saline, and lactated Ringer's.  If patients need glucose then please use normal saline.  DVT prophylaxis:   SCD's while in bed  Therapy/rehab services ordered (speech/PT/OT/rehab consult):   Physical therapy, Occupational Therapy, speech therapy evaluations ordered.   maintain oxygen saturation >94%. No supplemental oxygen  in nonhypoxic patients with acute ischemic stroke (AIS).  Tx hyperthermia (temperature >38°C) and identify source  Evidence indicates that persistent in-hospital hyperglycemia during the first 24 hours after AIS is associated with worse outcomes than normoglycemia and thus, it is reasonable to treat hyperglycemia to achieve blood glucose  levels in a range of 140 to 180 mg/dL and to closely monitor to prevent hypoglycemia in patients with AIS.  Neuro checks Q2.  Telemetry.        Update at 8:46 PM: possible right subinsular hemorrhage; will transfer to icu. Will put in ich order set.  ICH score: 1  Will order mri brain stat and place order for precedex drip.       Intracerebral Hemorrhage, PBD 0   - ICH Volume < 30 ml, ICH Score 1   - CT Head as above, repeat in am; stat mri now to confirm if ICH   - transfer to ICU   - precedex drip for agitation  precedex ordered so he can get the mri of the brain.    - Coags  will be ordered     - Goal Plt > 100, INR < 1.5    - SBP goal < 150   - Nsg c/s placed,    - PT/OT/SLP consulted                       This document is not intended to support charting by exception.  Sections left blank in a completed note should be presumed not to have been done.    Total critical care time spent exceeds  35 minutes.      Disclaimer:   This record was dictated using Dragon software. There may be errors due to voice recognition problems that were not realized and corrected during the completion of the note.       Thank you.  Kirit Lee DO   [facility]

## 2024-07-27 NOTE — CONSULTS
Is this a shared or split note between Advanced Practice Provider and Physician? Yes       [facility]   Gastroenterology Consultation Note    Nacho Mari  Patient Status:    Emergency  Date of Admission:         7/27/2024, Hospital day #0  Attending:   Jose Lui MD  PCP:     Oscar Rios MD ([facility])    Reason for Consultation:  Bright red blood per rectum, abnormal CT a/p    History of Present Illness:  Nacho Mari is a 89 year old male w/ PMHx of paroxysmal a fib, COPD, dementia, rectosigmoid adenocarcinoma s/p resection in 2021, hip fracture 1/2024 who presented to ER from nursing home after being found slumped over and being more confused. GI consulted because he had bright red blood found in his depends. Patient agitated and non verbal in ER. I discussed case with nurse and called POA for further history. Per family, patient often can be confused and baseline and is often more confused when in the hospital. Family states he has not had episode of bright red blood per rectum as of recent. Patient did previously get diagnosed with rectal cancer in 2021. He was seen in follow up with oncology and was deemed not a great candidate for chemotherapy. Family and patient decided on surveillance at that time. Last OV with oncology in 2022. Last endoscopic procedure in 3/2021. Today, patient was found sitting in bright red blood. Per staff unsure if baseline of constipation at home. Patient unable to answer questions today.     In ER, hemoglobin 12.8. BUN, CR wnl. CT a/p demonstrated post distal sigmoid colon with colorectal anastomoasis, no obsturction, large volume fecal material possible proctocolitis and or stercoral colitis.     Social Hx:  - No tobacco use/No ETOH  - Denies illicit drug use  - Lives with: nursing home  - Occupation: retired     History:  History reviewed. No pertinent past medical history.  History reviewed. No pertinent surgical history.  No family history on  file.   reports that he has never smoked. He has never used smokeless tobacco.    Allergies:  No Known Allergies    Medications:    Current Facility-Administered Medications:     OLANZapine (ZyPREXA Zydis) disintegrating tab 10 mg, 10 mg, Oral, Once    Review of Systems:   Unable to complete due to lack of response from patient.     Physical Exam:    Blood pressure 153/69, pulse 57, temperature 97.9 °F (36.6 °C), temperature source Temporal, resp. rate 12, SpO2 94%. There is no height or weight on file to calculate BMI.    Gen: NAD, does not answer to name  HEENT: EOMI, the sclera appears anicteric, oropharynx clear, mucus membranes appear moist  CV: RRR  Lung: no increased respiratory effort  Abdomen: soft NTND abdomen with NABS appreciated     Ext: no LE edema is evident  Neuro: not alert  Psych: combative    Laboratory Data:  Lab Results   Component Value Date    WBC 7.1 07/27/2024    HGB 12.8 07/27/2024    HCT 37.8 07/27/2024    .0 07/27/2024    CREATSERUM 1.29 07/27/2024    BUN  07/27/2024      Comment:      Test not reported due to hemolysis.  Test reordered by laboratory.         07/27/2024    K  07/27/2024      Comment:      Test not reported due to hemolysis.  Test reordered by laboratory.         07/27/2024    CO2 26.0 07/27/2024     07/27/2024    CA 9.6 07/27/2024    ALB 4.0 07/27/2024    ALKPHO 66 07/27/2024    BILT 0.8 07/27/2024    TP 6.7 07/27/2024    AST  07/27/2024      Comment:      Test not reported due to hemolysis.  Test reordered by laboratory.        ALT 10 07/27/2024    PTT 24.8 07/27/2024    INR 0.98 07/27/2024       Imaging:  CT ABDOMEN+PELVIS(CONTRAST ONLY)(CPT=74177)    Result Date: 7/27/2024  CONCLUSION:  1.  Post distal sigmoid colon with colorectal anastomosis.  No obstruction at the surgical site however the bowel at and distal to anastomosis to the lower rectum has a large volume of fecal material within and there is also nonuniform wall thickening in  the  same distribution.  There is perirectal and presacral fat stranding.  Findings suggest a nonspecific proctocolitis and/or stercoral colitis. 2.  Post left hip arthroplasty. 3.  Renal cysts. 4.  Coronary atherosclerosis.   Dictated by (CST): Jonnie Nicholas MD on 7/27/2024 at 12:30 PM     Finalized by (CST): Jonnie Nicholas MD on 7/27/2024 at 12:35 PM           Assessment & Plan   Nacho Mair is a 89 year old male w/ PMHx of paroxysmal a fib, COPD, dementia, rectosigmoid adenocarcinoma s/p resection in 2021, hip fracture 1/2024 who presented to ER from nursing home after being found slumped over and being more confused. GI consulted because he had bright red blood found in his depends. Patient agitated and non verbal in ER. I discussed case with nurse and called POA for further history. Per family, patient often can be confused and baseline and is often more confused when in the hospital. Family states he has not had episode of bright red blood per rectum as of recent. Patient did previously get diagnosed with rectal cancer in 2021. He was seen in follow up with oncology and was deemed not a great candidate for chemotherapy. Family and patient decided on surveillance at that time. Last OV with oncology in 2022. Last endoscopic procedure in 3/2021. Today, patient was found sitting in bright red blood. Per staff unsure if baseline of constipation at home. Patient unable to answer questions today.     #bright red blood per rectum  #?stercoral colitis  -found confused sitting in bright red blood per rectum  -known history of rectosigmoid carcinoma s/p resection in 2021  -not following with oncology at this time  -CT a/p with possible stercoral colitis or proctosigmoid colitis  -etiology possibly due to stool burden causing stercoral colitis, malignancy  -plan for tap water enema now and trial id patient becomes more alert  -will  need second tap water enema most likely for constipation in am    #confusion    #dementia  -per POA patient can be confused and often heighten with hospital stay     Recommend:  -trend hemoglobin, goal >7  -tap water enema now and in am  -start miralax BID, if able to be more alert      Thank you for the opportunity to participate in the care of this patient.    Case discussed with Suresh Acevedo MD and Payton AKERS.    Senia Saldaña PA-C  [facility]  7/27/2024

## 2024-07-27 NOTE — ED QUICK NOTES
This RN spoke to RN at pts facility, per facility's RN - pt was found very lethargic, agitated, and she noticed blood in his pants so she called EMS.   Per facility's RN - pt is on Aspirin 81mg daily.   Hx of dementia, A&Ox1, however, per RN at pt's facility, pt is normally able to engage in conversations with the staff.   RN at facility also states that pt had a fall last week when walking to the bathroom, leaving a bruise to the left side of patients face.

## 2024-07-27 NOTE — ED QUICK NOTES
2PCT and 2RN present to assist with straight catheter and enema. Patient combative. Meds ordered by MD.     Little to no output from enema at this time. Full linen change provided.

## 2024-07-27 NOTE — ED PROVIDER NOTES
History     Chief Complaint   Patient presents with    GI Bleeding    Altered Mental Status       HPI    89 year old male with history of paroxysmal A-fib, hypertension, COPD, dementia presents from nursing facility.  Nurses there had called this patient after pixels noted to be slumped over in the chair, EMS found the patient sitting in a pool of blood in his depends.  Patient is nonspeaking here.    Per review of his medication list, he is not on medicines.  Per chart review, patient has a prior history of rectosigmoid adenocarcinoma  . Rectosigmoid:  -Adenocarcinoma, grade 2, 4.5 cm, invading pericolonic adipose tissue.  -Metastatic carcinoma to one of fifteen (1/15) pericolonic lymph nodes.  -Tubular adenoma.  -Diverticulosis coli.  -Resection margins negative for malignancy. (See comment)           Past Medical History:    Abnormal electrocardiogram (ECG) (EKG)    MICHAEL (acute kidney injury) (HCC)    Aneurysm of ascending aorta without rupture (HCC)    Aortic valve stenosis    APC (atrial premature contractions)    Cerebral amyloid angiopathy (HCC)    Chronic atrial fibrillation (HCC)    Coronary artery disease involving native coronary artery of native heart    Delirium    Dementia (HCC)    Dyspnea on exertion    Essential hypertension    Fibromuscular dysplasia (HCC)    Formatting of this note might be different from the original.    bilateral ICAs and R V2-3 without dissection      First degree AV block    Hypernatremia    Hypokalemia    Incomplete right bundle branch block    Intraparenchymal hemorrhage of brain (HCC)    Lactic acidosis    Malignant neoplasm of sigmoid colon (HCC)    Memory difficulties    Mild cognitive impairment    Mitral valve disease    Other emphysema (HCC)    Paroxysmal atrial fibrillation (HCC)    Peptic ulcer disease    Pontine hemorrhage (HCC)    Sepsis (HCC)    Sinus tachycardia    SVT (supraventricular tachycardia) (HCC)    Throat pain       History reviewed. No pertinent  surgical history.    Social History     Socioeconomic History    Marital status:    Tobacco Use    Smoking status: Former     Types: Cigarettes    Smokeless tobacco: Never     Social Determinants of Health     Financial Resource Strain: Low Risk  (5/20/2023)    Received from [employer]    Financial Resource Strain     In the past year, have you or any family members you live with been unable to get any of the following when it was really needed? Check all that apply.: None   Food Insecurity: Unknown (7/27/2024)    Food Insecurity     Food Insecurity: Patient unable to answer   Transportation Needs: Unknown (7/27/2024)    Transportation Needs     Lack of Transportation: Patient unable to answer   Physical Activity: Inactive (3/3/2021)    Received from [facility]    Exercise Vital Sign     Days of Exercise per Week: 0 days     Minutes of Exercise per Session: 0 min    Received from [employer]    Stress   Social Connections: High Risk (5/20/2023)    Received from [employer]    Social Connections     How often do you see or talk to people that you care about and feel close to? (For example: talking to friends on the phone, visiting friends or family, going to Sikhism or club meetings): Less than once a week   Housing Stability: Unknown (7/27/2024)    Housing Stability     Housing Instability: Patient unable to answer                   Physical Exam     ED Triage Vitals [07/27/24 1015]   /63   Pulse 75   Resp 18   Temp 97.9 °F (36.6 °C)   Temp src Temporal   SpO2 (!) 88 %   O2 Device None (Room air)       Physical Exam  Constitutional:       General: He is not in acute distress.     Comments: Will pull away and try swinging his arms to physical stimuli, will not respond to verbal stimuli   Eyes:      Extraocular Movements: Extraocular movements intact.    Cardiovascular:      Rate and Rhythm: Tachycardia present.      Pulses: Normal pulses.   Pulmonary:      Effort: Pulmonary effort is normal. No respiratory distress.   Abdominal:      General: Abdomen is flat. There is no distension.      Tenderness: There is no abdominal tenderness. There is no guarding.   Genitourinary:     Comments: Dried bright blood along the anus, no hemorrhoids or fissures noted  Musculoskeletal:      Cervical back: Normal range of motion.   Skin:     General: Skin is warm and dry.   Neurological:      General: No focal deficit present.              ED Course     Labs Reviewed   COMP METABOLIC PANEL (14) - Abnormal; Notable for the following components:       Result Value    Glucose 152 (*)     eGFR-Cr 53 (*)     All other components within normal limits   REDRAW CMP (P) - Abnormal; Notable for the following components:    ALT <7 (*)     All other components within normal limits   POCT GLUCOSE - Abnormal; Notable for the following components:    POC Glucose  136 (*)     All other components within normal limits   CBC W/ DIFFERENTIAL - Abnormal; Notable for the following components:    HGB 12.8 (*)     HCT 37.8 (*)     RDW-SD 46.4 (*)     All other components within normal limits   PROTHROMBIN TIME (PT) - Normal   PTT, ACTIVATED - Normal   MAGNESIUM - Normal   CBC WITH DIFFERENTIAL WITH PLATELET    Narrative:     The following orders were created for panel order CBC With Differential With Platelet.  Procedure                               Abnormality         Status                     ---------                               -----------         ------                     CBC W/ DIFFERENTIAL[[id number]]          Abnormal            Final result                 Please view results for these tests on the individual orders.   TYPE AND SCREEN    Narrative:     The following orders were created for panel order Type and screen.  Procedure                               Abnormality         Status                      ---------                               -----------         ------                     ABORH (Blood Type)[[id number]]                               Final result               Antibody Screen[[id number]]                                  Final result                 Please view results for these tests on the individual orders.   ABORH (BLOOD TYPE)   ANTIBODY SCREEN   RAINBOW DRAW LAVENDER   RAINBOW DRAW LIGHT GREEN   RAINBOW DRAW BLUE     CT ABDOMEN+PELVIS(CONTRAST ONLY)(CPT=74177)    Result Date: 7/27/2024  CONCLUSION:  1.  Post distal sigmoid colon with colorectal anastomosis.  No obstruction at the surgical site however the bowel at and distal to anastomosis to the lower rectum has a large volume of fecal material within and there is also nonuniform wall thickening in  the same distribution.  There is perirectal and presacral fat stranding.  Findings suggest a nonspecific proctocolitis and/or stercoral colitis. 2.  Post left hip arthroplasty. 3.  Renal cysts. 4.  Coronary atherosclerosis.   Dictated by (CST): Jonnie Nicholas MD on 7/27/2024 at 12:30 PM     Finalized by (CST): Jonnie Nicholas MD on 7/27/2024 at 12:35 PM               MDM     Vitals:    07/27/24 1500 07/27/24 1541 07/27/24 1557 07/27/24 1636   BP: (!) 158/91 (!) 181/85  (!) 161/75   BP Location:  Right arm  Left arm   Pulse: 79 80  81   Resp: 18 18     Temp:       TempSrc:  Axillary     SpO2: 100% 99%  98%   Weight:   46.3 kg        Bright red bleeding per rectum.  Possible diverticulosis or internal hemorrhoids, given his history of colon cancer this is a concern.  Blood pressure is reassuring here, his abdomen is without peritonitis.    ED Course as of 07/27/24 1811  ------------------------------------------------------------  Time: 07/27 1037  Comment: EKG interpretation by me: EKG sinus rhythm at a rate of 64, axis nomal, bifasicular block, no stemi  ------------------------------------------------------------  Time: 07/27 5590  Comment:  Per chart review patient had a low anterior resection of his cancer April 2021  ------------------------------------------------------------  Time: 07/27 1232  Comment: My Interpretation of CT with large volume stool in the rectum, no free air  ------------------------------------------------------------  Time: 07/27 1248  Comment: Rad noting no masses, post op changes and nonspecific proctocolitis and/or stercoral colitis  ------------------------------------------------------------  Time: 07/27 1353  Comment: Patient appears to have significant urinary distention as well, decompressed with straight cath, administered enema.  Bleeding may be from ischemic changes from colitis.  Consulted with surgery and GI.  Patient remains symptomatically stable here.  Endorsed to hospitalist for admission.  Patient required a small dose of olanzapine to allow for cath and enema.         Disposition and Plan     Clinical Impression:  1. Gastrointestinal hemorrhage, unspecified gastrointestinal hemorrhage type    2. BRBPR (bright red blood per rectum)    3. Adenocarcinoma of rectosigmoid junction (HCC)    4. Stercoral colitis        Disposition:  Admit    Follow-up:  No follow-up provider specified.    Medications Prescribed:  Current Discharge Medication List          Hospital Problems       Present on Admission  Date Reviewed: 1/29/2024            ICD-10-CM Noted POA    * (Principal) Gastrointestinal hemorrhage, unspecified gastrointestinal hemorrhage type K92.2 7/27/2024 Unknown    Adenocarcinoma of rectosigmoid junction (HCC) C19 7/27/2024 Unknown    Aphasia R47.01 7/27/2024 Unknown    BRBPR (bright red blood per rectum) K62.5 7/27/2024 Unknown    Cerebral amyloid angiopathy (HCC) E85.4, I68.0 7/27/2024 Yes    Fibromuscular dysplasia (HCC) I77.3 7/27/2024 Yes    Overview Signed 7/27/2024  5:13 PM by Kirit Lee, DO     Formatting of this note might be different from the original.    bilateral ICAs and R V2-3 without  dissection         History of dementia Z86.59 7/27/2024 Unknown    Stercoral colitis K52.89 7/27/2024 Unknown

## 2024-07-27 NOTE — ED QUICK NOTES
Orders for admission, patient is aware of plan and ready to go upstairs. Any questions, please call ED RN Payton at extension [phone].     Patient Covid vaccination status: Unvaccinated     COVID Test Ordered in ED: None    COVID Suspicion at Admission: N/A    Running Infusions:      Mental Status/LOC at time of transport: Aox1    Other pertinent information:   CIWA score: N/A   NIH score:  N/A

## 2024-07-27 NOTE — PLAN OF CARE
Problem: Patient Centered Care  Goal: Patient preferences are identified and integrated in the patient's plan of care  Description: Interventions:  - What would you like us to know as we care for you?   - Provide timely, complete, and accurate information to patient/family  - Incorporate patient and family knowledge, values, beliefs, and cultural backgrounds into the planning and delivery of care  - Encourage patient/family to participate in care and decision-making at the level they choose  - Honor patient and family perspectives and choices  Outcome: Progressing     Problem: PAIN - ADULT  Goal: Verbalizes/displays adequate comfort level or patient's stated pain goal  Description: INTERVENTIONS:  - Encourage pt to monitor pain and request assistance  - Assess pain using appropriate pain scale  - Administer analgesics based on type and severity of pain and evaluate response  - Implement non-pharmacological measures as appropriate and evaluate response  - Consider cultural and social influences on pain and pain management  - Manage/alleviate anxiety  - Utilize distraction and/or relaxation techniques  - Monitor for opioid side effects  - Notify MD/LIP if interventions unsuccessful or patient reports new pain  - Anticipate increased pain with activity and pre-medicate as appropriate  Outcome: Progressing     Problem: RISK FOR INFECTION - ADULT  Goal: Absence of fever/infection during anticipated neutropenic period  Description: INTERVENTIONS  - Monitor WBC  - Administer growth factors as ordered  - Implement neutropenic guidelines  Outcome: Progressing     Problem: SAFETY ADULT - FALL  Goal: Free from fall injury  Description: INTERVENTIONS:  - Assess pt frequently for physical needs  - Identify cognitive and physical deficits and behaviors that affect risk of falls.  - [place] fall precautions as indicated by assessment.  - Educate pt/family on patient safety including physical limitations  - Instruct pt to call  for assistance with activity based on assessment  - Modify environment to reduce risk of injury  - Provide assistive devices as appropriate  - Consider OT/PT consult to assist with strengthening/mobility  - Encourage toileting schedule  Outcome: Progressing     Problem: DISCHARGE PLANNING  Goal: Discharge to home or other facility with appropriate resources  Description: INTERVENTIONS:  - Identify barriers to discharge w/pt and caregiver  - Include patient/family/discharge partner in discharge planning  - Arrange for needed discharge resources and transportation as appropriate  - Identify discharge learning needs (meds, wound care, etc)  - Arrange for interpreters to assist at discharge as needed  - Consider post-discharge preferences of patient/family/discharge partner  - Complete POLST form as appropriate  - Assess patient's ability to be responsible for managing their own health  - Refer to Case Management Department for coordinating discharge planning if the patient needs post-hospital services based on physician/LIP order or complex needs related to functional status, cognitive ability or social support system  Outcome: Progressing

## 2024-07-28 ENCOUNTER — APPOINTMENT (OUTPATIENT)
Dept: CT IMAGING | Facility: HOSPITAL | Age: 89
End: 2024-07-28
Attending: Other
Payer: MEDICARE

## 2024-07-28 LAB
ALBUMIN SERPL-MCNC: 3.5 G/DL (ref 3.2–4.8)
ALBUMIN/GLOB SERPL: 1.7 {RATIO} (ref 1–2)
ALP LIVER SERPL-CCNC: 66 U/L
ALT SERPL-CCNC: <7 U/L
ANION GAP SERPL CALC-SCNC: 3 MMOL/L (ref 0–18)
AST SERPL-CCNC: 25 U/L (ref ?–34)
BILIRUB SERPL-MCNC: 0.8 MG/DL (ref 0.2–0.9)
BUN BLD-MCNC: 18 MG/DL (ref 9–23)
BUN/CREAT SERPL: 19.1 (ref 10–20)
CALCIUM BLD-MCNC: 9 MG/DL (ref 8.7–10.4)
CHLORIDE SERPL-SCNC: 109 MMOL/L (ref 98–112)
CO2 SERPL-SCNC: 30 MMOL/L (ref 21–32)
CREAT BLD-MCNC: 0.94 MG/DL
DEPRECATED RDW RBC AUTO: 46.9 FL (ref 35.1–46.3)
EGFRCR SERPLBLD CKD-EPI 2021: 77 ML/MIN/1.73M2 (ref 60–?)
ERYTHROCYTE [DISTWIDTH] IN BLOOD BY AUTOMATED COUNT: 13.4 % (ref 11–15)
GLOBULIN PLAS-MCNC: 2.1 G/DL (ref 2–3.5)
GLUCOSE BLD-MCNC: 80 MG/DL (ref 70–99)
GLUCOSE BLDC GLUCOMTR-MCNC: 107 MG/DL (ref 70–99)
GLUCOSE BLDC GLUCOMTR-MCNC: 108 MG/DL (ref 70–99)
GLUCOSE BLDC GLUCOMTR-MCNC: 79 MG/DL (ref 70–99)
GLUCOSE BLDC GLUCOMTR-MCNC: 95 MG/DL (ref 70–99)
HCT VFR BLD AUTO: 32.4 %
HGB BLD-MCNC: 10.8 G/DL
MAGNESIUM SERPL-MCNC: 1.9 MG/DL (ref 1.6–2.6)
MCH RBC QN AUTO: 31.5 PG (ref 26–34)
MCHC RBC AUTO-ENTMCNC: 33.3 G/DL (ref 31–37)
MCV RBC AUTO: 94.5 FL
OSMOLALITY SERPL CALC.SUM OF ELEC: 295 MOSM/KG (ref 275–295)
PLATELET # BLD AUTO: 196 10(3)UL (ref 150–450)
POTASSIUM SERPL-SCNC: 4.4 MMOL/L (ref 3.5–5.1)
POTASSIUM SERPL-SCNC: 4.6 MMOL/L (ref 3.5–5.1)
PROT SERPL-MCNC: 5.6 G/DL (ref 5.7–8.2)
Q-T INTERVAL: 480 MS
QRS DURATION: 126 MS
QTC CALCULATION (BEZET): 495 MS
R AXIS: 54 DEGREES
RBC # BLD AUTO: 3.43 X10(6)UL
SODIUM SERPL-SCNC: 142 MMOL/L (ref 136–145)
T AXIS: 59 DEGREES
VENTRICULAR RATE: 64 BPM
WBC # BLD AUTO: 10.5 X10(3) UL (ref 4–11)

## 2024-07-28 PROCEDURE — 99223 1ST HOSP IP/OBS HIGH 75: CPT | Performed by: SURGERY

## 2024-07-28 PROCEDURE — 99291 CRITICAL CARE FIRST HOUR: CPT | Performed by: OTHER

## 2024-07-28 PROCEDURE — 70450 CT HEAD/BRAIN W/O DYE: CPT | Performed by: OTHER

## 2024-07-28 PROCEDURE — 99223 1ST HOSP IP/OBS HIGH 75: CPT | Performed by: INTERNAL MEDICINE

## 2024-07-28 RX ORDER — MELATONIN
3 NIGHTLY
Status: DISCONTINUED | OUTPATIENT
Start: 2024-07-28 | End: 2024-08-01

## 2024-07-28 RX ORDER — BUDESONIDE 0.5 MG/2ML
0.5 INHALANT ORAL
Status: DISCONTINUED | OUTPATIENT
Start: 2024-07-28 | End: 2024-08-01

## 2024-07-28 RX ORDER — HALOPERIDOL 5 MG/ML
2 INJECTION INTRAMUSCULAR ONCE
Status: COMPLETED | OUTPATIENT
Start: 2024-07-28 | End: 2024-07-28

## 2024-07-28 RX ORDER — ENEMA 19; 7 G/133ML; G/133ML
1 ENEMA RECTAL ONCE
Status: COMPLETED | OUTPATIENT
Start: 2024-07-28 | End: 2024-07-28

## 2024-07-28 RX ORDER — IPRATROPIUM BROMIDE AND ALBUTEROL SULFATE 2.5; .5 MG/3ML; MG/3ML
3 SOLUTION RESPIRATORY (INHALATION) ONCE
Status: COMPLETED | OUTPATIENT
Start: 2024-07-28 | End: 2024-07-28

## 2024-07-28 RX ORDER — IPRATROPIUM BROMIDE AND ALBUTEROL SULFATE 2.5; .5 MG/3ML; MG/3ML
3 SOLUTION RESPIRATORY (INHALATION)
Status: DISCONTINUED | OUTPATIENT
Start: 2024-07-28 | End: 2024-08-01

## 2024-07-28 NOTE — PLAN OF CARE
Pt A&Ox1 (baseline), on RA. Denied any pain or discomfort throughout shift. Did not fully participate in the neuro checks, pt was refusing assessment on/off. Neurologist notified. Informed Dr. Acevedo about pt refusing to drink fluids, did not give miralax. Large bowel movement after fleet enema given, no blood noted. Appetite poor, passed speech eval, ST recommended thin liquids, no straw, pureed diet. Continuing q1 neuro checks. Silvestre ARAMBULA called for updates, questions answered and aware of care plan. Safety measures in place: bed alarm, call light within reach, and bed at lowest position.     Problem: Patient Centered Care  Goal: Patient preferences are identified and integrated in the patient's plan of care  Description: Interventions:  - What would you like us to know as we care for you? Pt likes chocolate pudding.   - Provide timely, complete, and accurate information to patient/family  - Incorporate patient and family knowledge, values, beliefs, and cultural backgrounds into the planning and delivery of care  - Encourage patient/family to participate in care and decision-making at the level they choose  - Honor patient and family perspectives and choices  Outcome: Progressing     Problem: PAIN - ADULT  Goal: Verbalizes/displays adequate comfort level or patient's stated pain goal  Description: INTERVENTIONS:  - Encourage pt to monitor pain and request assistance  - Assess pain using appropriate pain scale  - Administer analgesics based on type and severity of pain and evaluate response  - Implement non-pharmacological measures as appropriate and evaluate response  - Consider cultural and social influences on pain and pain management  - Manage/alleviate anxiety  - Utilize distraction and/or relaxation techniques  - Monitor for opioid side effects  - Notify MD/LIP if interventions unsuccessful or patient reports new pain  - Anticipate increased pain with activity and pre-medicate as appropriate  Outcome:  Progressing     Problem: RISK FOR INFECTION - ADULT  Goal: Absence of fever/infection during anticipated neutropenic period  Description: INTERVENTIONS  - Monitor WBC  - Administer growth factors as ordered  - Implement neutropenic guidelines  Outcome: Progressing     Problem: SAFETY ADULT - FALL  Goal: Free from fall injury  Description: INTERVENTIONS:  - Assess pt frequently for physical needs  - Identify cognitive and physical deficits and behaviors that affect risk of falls.  - [place] fall precautions as indicated by assessment.  - Educate pt/family on patient safety including physical limitations  - Instruct pt to call for assistance with activity based on assessment  - Modify environment to reduce risk of injury  - Provide assistive devices as appropriate  - Consider OT/PT consult to assist with strengthening/mobility  - Encourage toileting schedule  Outcome: Progressing     Problem: DISCHARGE PLANNING  Goal: Discharge to home or other facility with appropriate resources  Description: INTERVENTIONS:  - Identify barriers to discharge w/pt and caregiver  - Include patient/family/discharge partner in discharge planning  - Arrange for needed discharge resources and transportation as appropriate  - Identify discharge learning needs (meds, wound care, etc)  - Arrange for interpreters to assist at discharge as needed  - Consider post-discharge preferences of patient/family/discharge partner  - Complete POLST form as appropriate  - Assess patient's ability to be responsible for managing their own health  - Refer to Case Management Department for coordinating discharge planning if the patient needs post-hospital services based on physician/LIP order or complex needs related to functional status, cognitive ability or social support system  Outcome: Progressing     Problem: NEUROLOGICAL - ADULT  Goal: Achieves stable or improved neurological status  Description: INTERVENTIONS  - Assess for and report changes in  neurological status  - Initiate measures to prevent increased intracranial pressure  - Maintain blood pressure and fluid volume within ordered parameters to optimize cerebral perfusion and minimize risk of hemorrhage  - Monitor temperature, glucose, and sodium. Initiate appropriate interventions as ordered  Outcome: Progressing  Goal: Achieves maximal functionality and self care  Description: INTERVENTIONS  - Monitor swallowing and airway patency with patient fatigue and changes in neurological status  - Encourage and assist patient to increase activity and self care with guidance from PT/OT  - Encourage visually impaired, hearing impaired and aphasic patients to use assistive/communication devices  Outcome: Progressing

## 2024-07-28 NOTE — PHYSICAL THERAPY NOTE
PT orders received, chart reviewed and spoke with DELPHINE Sewell. Patient was transferred last evening to CCU due to restlessness and agitation, now on bedrest and HOB>30* orders. He is awaiting an MRI and neurosurgery consult. Possible ICH-CT head shows new 4mm focus in R subinsular region. Patient is not following commands well today so not appropriate for PT eval. Will check back tomorrow. RN aware.

## 2024-07-28 NOTE — PROGRESS NOTES
[facility]  [address], [address]  [facility] [address]  [phone]          INPATIENT STROKE NEUROLOGY   FOLLOW UP PROGRESS NOTE  [facility]  part of [facility]    Nacho Mari Patient Status:  Inpatient     10/14/1933 MRN [id number]    Location [facility] 2W/SW Attending Eric Fierro MD    Hosp Day # 2 PCP Oscar Rios MD ([facility])    Date of Admission:  2024  Date of Consult Follow Up:  2024       Assessment and Plan:   Nacho Mari is a 89 year old   male w/ a pmhx sig. for cerebral amyloid angiopathy, atrial fibrillation not on anticoagulation,?  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  Hospital course has been complicated by agitated delirium related to dementia.  Patient has a stat MRI that is still pending.  Repeat head CT is stable.      His blood pressures have been slightly outside of the goal of less than 150.  Also patient is refusing oral agents.  Started him on losartan and hydrochlorothiazide.  Although he has a diagnosis of hypertension he is not on any antihypertensives.  Patient previously was on Precedex for agitation but became bradycardic and hypotensive.  He will have to remain in the unit until he is started taking oral agents again.  MRI of the brain is ordered essentially to confirm whether or not the patient has a small ICH.  Most likely this is due to cerebral amyloid angiopathy but could  be hypertensive.  If his blood pressure is stable and he is willing to take his p.o. antihypertensives that he can be transferred out of the unit.    Right temporal hemorrhage in the setting of known cerebral amyloid angiopathy, Hx intracranial hemorrhage, Hx ischemic stroke and fibromuscular dysplasia  Differential Diagnosis for stroke/TIA mechanism:  Hemorrhage due to cerebral amyloid angiopathy  Hypertensive hemorrhage  Artifact on head CT; doubt       Plan    Diagnostics:  Imaging: MRI brain WO and CT brain WO   Cardiac imaging: Telemetry   Labs:   Therapeutics:  Blood pressure goal: Less than 150 systolic.  Add losartan/hydrochlorothiazide.  Please avoid blood pressure variability. wide fluctuations in BP can lead to stroke expansion.    PRN hydralazine and labetalol for sustained pressures outside of goal  Neuro checks Q2.  Telemetry.NIH stroke scale per protocol.  Blood glucose goal: .  Accuchecks Q6 if patient has DM  closely monitor to prevent hypoglycemia in patients with AIS.  Antithrombotics: None.  Contraindicated.    - ICH Volume < 30 ml, ICH Score 1         - CT Head as above, repeat in am; stat mri now to confirm if ICH         - transfer to ICU         - precedex drip for agitation  precedex ordered so he can get the mri of the brain.          - Coags  will be ordered           - Goal Plt > 100, INR < 1.5          - SBP goal < 150         - Nsg c/s placed,          - PT/OT/SLP consulted    Avoid hypotonic fluids as this can worsen cerebral edema.  Physical therapy, Occupational Therapy, speech therapy evaluations ordered.  maintain oxygen saturation >94%. No supplemental oxygen  in nonhypoxic patients with acute ischemic stroke (AIS).  Tx hyperthermia (temperature >38°C) and identify source  STAT head CT and page neurology if any change in neurological exam or focal deficits.           INTERVAL EVENTS  07/28/24: could not tolerate precedex; Bp slightly elevated; repeat ct stable.         SUBJECTIVE:     Patient is incoherent.  He has an agitated delirium.  He keeps asking for water.  When this author brings the cup and straw close to him he says \"not water it.  I want you to water the water.\"  Does not appear to be in any pain.  Denies being in pain when asked.  Otherwise does not respond appropriately.  Does follow pantomime commands.      Pertinent positive and negatives per HPI.  All others were reviewed and negative.       Objective   OBJECTIVE:   Last vitals and weight :  Blood pressure 134/75, pulse 107, temperature 97.2 °F (36.2 °C), temperature source Temporal, resp. rate 21, weight 98 lb 5.2 oz (44.6 kg), SpO2 94%.   Vitals:    07/28/24 1400 07/28/24 1600 07/28/24 1800 07/28/24 1830   BP: 138/67 138/71 157/90 134/75   BP Location: Right arm Right arm Right arm Right arm   Pulse: 69 74 86 107   Resp: 13 16 20 21   Temp:       TempSrc:       SpO2: 94% 100% 93% 94%   Weight:          Exam:   Exam:  - General: appears older than stated age, fatigued, no distress, and uncooperative  - CV: symmetric pulses     - Pulmonary: no signs of respiratory distress. Normal excursion of the chest.   Neurologic Exam  - Mental Status: Alert and attentive. Oriented to person.  .speaks in short phrases.  Intermittently follows commands.  Refuses to participate much in the exam.  He cannot name.    . Comprehension is variable.  repetition intact. Phrase length and rate are decreased.  + Uncooperative.    He will not voluntarily lift his legs or keep them up antigravity.  He refused to lift his arms for 10 seconds.  + Aphasia.  + anomia.   - Cranial Nerves: No gaze preference. Visual fields: Visual fields are not reliable.  Suspect that he has a right   hemianopsia.  He states he can see the examiners entire face.  He refuses to participate in confrontational testing.  Blink to threat intact.  He keeps his left eye closed.  He has a ecchymoses under his left eye.  Pupils are 4mm briskly constricting to 2mm and  equally round and reactive to light  in a well lit room. EOMI. No nystagmus. No ptosis. V1-V3 intact B/L to light touch.No pathological facial asymmetry. No flattening of the nasolabial fold. .  Hearing grossly intact.  Tongue midline. No atrophy or fasiculations of the tongue noted. Palate and uvula elevate symmetrically.  Shoulder shrug symmetric.  - Fundoscopic exam: Refuses to participate.   - Motor:  normal tone,Diffusely decreased bulk.    no interosseous wasting. No flattening of hypothenar eminences.     He is at least antigravity in both of his upper extremities.  He is able to hold the NIH stroke scale cards with his right hand without issue.  However he would not keep his arms up for 10 seconds as requested.   He is spontaneously moving both upper extremities.      Right Left     Motor Strength   Deltoids 3 3  Triceps 3 3  Biceps 3 3  Wrist Extensors 3 3   3 3     Knee extensors 2 to 3 bilaterally          Pronator drift: bilateral arm drifts down before 10 seconds.   leg drift: Both lower extremities drift to the bed before 5 seconds.       Asterixis: No asterixis noted.   Tremor: None.     Reflexes:    C5 C6 C7  L4 S1   R 2+ 2+  2+ 2+   L 2+ 2+  2+ 2+   Adductor Spread: No adductor spread noted.        - Sensory:   Light touch: normal     - Cerebellum: No truncal ataxia. No titubations. No dysmetria, no dysdiadochokinesis. No overshoot.   - Gait/station: Normal gait and station. Symmetric arm swing.   - Plantar response: extensor bilaterally    Medications:   budesonide  0.5 mg Nebulization Daily    ipratropium-albuterol  3 mL Nebulization Daily    melatonin  3 mg Oral Nightly    losartan (Cozaar) 50 mg, hydroCHLOROthiazide 12.5 mg for Benicar HCT 20/12.5 (Critical access hospital only)   Oral Daily    polyethylene glycol (PEG 3350)  17 g Oral BID    fluticasone propionate  2 spray Each Nare Daily    magnesium oxide  400 mg Oral Daily    pantoprazole  40 mg Oral QAM AC    QUEtiapine  50 mg Oral Nightly    tamsulosin   0.4 mg Oral Daily    traZODone  50 mg Oral Nightly    magnesium sulfate  2 g Intravenous Once       PRNS:   labetalol    acetaminophen **OR** acetaminophen    labetalol    hydrALAzine    Infusions:    sodium chloride 75 mL/hr at 07/28/24 1227          Results:   Laboratory Data:  Lab Results   Component Value Date    WBC 10.5 07/28/2024    HGB 10.8 (L) 07/28/2024    HCT 32.4 (L) 07/28/2024    .0 07/28/2024    CREATSERUM 0.94 07/28/2024    BUN 18 07/28/2024     07/28/2024    K 4.4 07/28/2024     07/28/2024    CO2 30.0 07/28/2024    GLU 80 07/28/2024    CA 9.0 07/28/2024    ALB 3.5 07/28/2024    ALKPHO 66 07/28/2024    TP 5.6 (L) 07/28/2024    AST 25 07/28/2024    ALT <7 (L) 07/28/2024    PTT 24.8 07/27/2024    INR 0.98 07/27/2024    PTP 13.6 07/27/2024    MG 1.9 07/28/2024     Recent Results (from the past 72 hour(s))   RAINBOW DRAW LAVENDER    Collection Time: 07/27/24 10:26 AM   Result Value Ref Range    Hold Lavender Auto Resulted    RAINBOW DRAW LIGHT GREEN    Collection Time: 07/27/24 10:26 AM   Result Value Ref Range    Hold Lt Green Auto Resulted    RAINBOW DRAW BLUE    Collection Time: 07/27/24 10:26 AM   Result Value Ref Range    Hold Blue Auto Resulted    Comp Metabolic Panel (14)    Collection Time: 07/27/24 10:26 AM   Result Value Ref Range    Glucose 152 (H) 70 - 99 mg/dL    Sodium 136 136 - 145 mmol/L    Potassium      Chloride 104 98 - 112 mmol/L    CO2 26.0 21.0 - 32.0 mmol/L    Anion Gap 6 0 - 18 mmol/L    BUN      Creatinine 1.29 0.70 - 1.30 mg/dL    BUN/CREA Ratio      Calcium, Total 9.6 8.7 - 10.4 mg/dL    eGFR-Cr 53 (L) >=60 mL/min/1.73m2    ALT 10 10 - 49 U/L    AST      Alkaline Phosphatase 66 45 - 117 U/L    Bilirubin, Total 0.8 0.2 - 0.9 mg/dL    Total Protein 6.7 5.7 - 8.2 g/dL    Albumin 4.0 3.2 - 4.8 g/dL    Globulin  2.7 2.0 - 3.5 g/dL    A/G Ratio 1.5 1.0 - 2.0   CBC W/ DIFFERENTIAL    Collection Time: 07/27/24 10:26 AM   Result Value Ref Range    WBC 7.1 4.0 - 11.0  x10(3) uL    RBC 4.02 3.80 - 5.80 x10(6)uL    HGB 12.8 (L) 13.0 - 17.5 g/dL    HCT 37.8 (L) 39.0 - 53.0 %    MCV 94.0 80.0 - 100.0 fL    MCH 31.8 26.0 - 34.0 pg    MCHC 33.9 31.0 - 37.0 g/dL    RDW-SD 46.4 (H) 35.1 - 46.3 fL    RDW 13.5 11.0 - 15.0 %    .0 150.0 - 450.0 10(3)uL    Neutrophil Absolute Prelim 4.61 1.50 - 7.70 x10 (3) uL    Neutrophil Absolute 4.61 1.50 - 7.70 x10(3) uL    Lymphocyte Absolute 1.43 1.00 - 4.00 x10(3) uL    Monocyte Absolute 0.68 0.10 - 1.00 x10(3) uL    Eosinophil Absolute 0.30 0.00 - 0.70 x10(3) uL    Basophil Absolute 0.04 0.00 - 0.20 x10(3) uL    Immature Granulocyte Absolute 0.03 0.00 - 1.00 x10(3) uL    Neutrophil % 65.0 %    Lymphocyte % 20.2 %    Monocyte % 9.6 %    Eosinophil % 4.2 %    Basophil % 0.6 %    Immature Granulocyte % 0.4 %   Prothrombin Time (PT)    Collection Time: 07/27/24 10:26 AM   Result Value Ref Range    PT 13.6 11.6 - 14.8 seconds    INR 0.98 0.80 - 1.20   PTT, Activated    Collection Time: 07/27/24 10:26 AM   Result Value Ref Range    PTT 24.8 23.0 - 36.0 seconds   POCT Glucose    Collection Time: 07/27/24 10:28 AM   Result Value Ref Range    POC Glucose  136 (H) 70 - 99 mg/dL   EKG 12 Lead    Collection Time: 07/27/24 10:35 AM   Result Value Ref Range    Ventricular rate 64 BPM    Atrial rate  BPM    P-R Interval  ms    QRS Duration 126 ms    Q-T Interval 480 ms    QTC Calculation (Bezet) 495 ms    P Axis  degrees    R Axis 54 degrees    T Axis 59 degrees   ABORH (Blood Type)    Collection Time: 07/27/24 10:47 AM   Result Value Ref Range    ABO BLOOD TYPE O     RH BLOOD TYPE Positive    Antibody Screen    Collection Time: 07/27/24 10:47 AM   Result Value Ref Range    Antibody Screen Negative    REDRAW CMP (P)    Collection Time: 07/27/24 12:45 PM   Result Value Ref Range    Potassium 3.6 3.5 - 5.1 mmol/L    AST 25 <34 U/L    ALT <7 (L) 10 - 49 U/L    BUN 18 9 - 23 mg/dL   Magnesium    Collection Time: 07/27/24 12:45 PM   Result Value Ref Range     Magnesium 1.8 1.6 - 2.6 mg/dL   POCT Glucose    Collection Time: 07/27/24 10:54 PM   Result Value Ref Range    POC Glucose  88 70 - 99 mg/dL   Potassium    Collection Time: 07/28/24  4:20 AM   Result Value Ref Range    Potassium 4.6 3.5 - 5.1 mmol/L   Magnesium    Collection Time: 07/28/24  4:20 AM   Result Value Ref Range    Magnesium 1.9 1.6 - 2.6 mg/dL   POCT Glucose    Collection Time: 07/28/24  5:29 AM   Result Value Ref Range    POC Glucose  79 70 - 99 mg/dL   CBC, Platelet; No Differential    Collection Time: 07/28/24  7:33 AM   Result Value Ref Range    WBC 10.5 4.0 - 11.0 x10(3) uL    RBC 3.43 (L) 3.80 - 5.80 x10(6)uL    HGB 10.8 (L) 13.0 - 17.5 g/dL    HCT 32.4 (L) 39.0 - 53.0 %    MCV 94.5 80.0 - 100.0 fL    MCH 31.5 26.0 - 34.0 pg    MCHC 33.3 31.0 - 37.0 g/dL    RDW 13.4 11.0 - 15.0 %    RDW-SD 46.9 (H) 35.1 - 46.3 fL    .0 150.0 - 450.0 10(3)uL   Comp Metabolic Panel (14)    Collection Time: 07/28/24  7:33 AM   Result Value Ref Range    Glucose 80 70 - 99 mg/dL    Sodium 142 136 - 145 mmol/L    Potassium 4.4 3.5 - 5.1 mmol/L    Chloride 109 98 - 112 mmol/L    CO2 30.0 21.0 - 32.0 mmol/L    Anion Gap 3 0 - 18 mmol/L    BUN 18 9 - 23 mg/dL    Creatinine 0.94 0.70 - 1.30 mg/dL    BUN/CREA Ratio 19.1 10.0 - 20.0    Calcium, Total 9.0 8.7 - 10.4 mg/dL    Calculated Osmolality 295 275 - 295 mOsm/kg    eGFR-Cr 77 >=60 mL/min/1.73m2    ALT <7 (L) 10 - 49 U/L    AST 25 <34 U/L    Alkaline Phosphatase 66 45 - 117 U/L    Bilirubin, Total 0.8 0.2 - 0.9 mg/dL    Total Protein 5.6 (L) 5.7 - 8.2 g/dL    Albumin 3.5 3.2 - 4.8 g/dL    Globulin  2.1 2.0 - 3.5 g/dL    A/G Ratio 1.7 1.0 - 2.0   POCT Glucose    Collection Time: 07/28/24 12:02 PM   Result Value Ref Range    POC Glucose  95 70 - 99 mg/dL   POCT Glucose    Collection Time: 07/28/24  5:35 PM   Result Value Ref Range    POC Glucose  107 (H) 70 - 99 mg/dL            Last A1c was done on  .       Test results/Imaging:   CTA BRAIN + CTA CAROTIDS  (CPT=70496/52184)    Result Date: 7/27/2024  CONCLUSION:  1. No major vessel occlusion, hemodynamically significant stenosis, dissection, AVM. 2. 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 3. No intracranial aneurysm. 4. New 4 mm focus of hyperattenuation in right subinsular region suspicious for a small focus of hemorrhage. 5. 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 6. Old left thalamic lacunar infarct. 7. Advanced changes of chronic small vessel disease in cerebral white matter.  8. 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  Advanced emphysema.      Dictated by (CST): Sohail Weathers MD on 7/27/2024 at 8:23 PM     Finalized by (CST): Sohail Weathers MD on 7/27/2024 at 8:41 PM          CT ABDOMEN+PELVIS(CONTRAST ONLY)(CPT=74177)    Result Date: 7/27/2024  CONCLUSION:  1.  Post distal sigmoid colon with colorectal anastomosis.  No obstruction at the surgical site however the bowel at and distal to anastomosis to the lower rectum has a large volume of fecal material within and there is also nonuniform wall thickening in  the same distribution.  There is perirectal and presacral fat stranding.  Findings suggest a nonspecific proctocolitis and/or stercoral colitis. 2.  Post left hip arthroplasty. 3.  Renal cysts. 4.  Coronary atherosclerosis.   Dictated by (CST): Jonnie Nicholas MD on 7/27/2024 at 12:30 PM     Finalized by (CST): Jonnie Nicholas MD on 7/27/2024 at 12:35 PM         EKG 12 Lead    Result Date: 7/28/2024  Wide QRS rhythm Right bundle branch block Septal infarct , age undetermined Abnormal ECG When compared with ECG of 24-JAN-2024 22:16, Wide QRS rhythm has replaced Sinus rhythm Vent. rate has decreased BY  37 BPM Confirmed by TYLER TRIPATHI DAVID ([id number]) on 7/28/2024 12:11:43 PM     CT BRAIN OR HEAD (CPT=70450)    Result Date: 7/28/2024  CONCLUSION:  1. Stable 4 mm mildly dense lesion in the right subinsular region, which could represent a small subacute focus of  hemorrhage.  Continued imaging follow-up is suggested. 2. Stable chronic left occipital lobe cortical infarction. 3. Stable chronic left thalamic lacunar infarction. 4. Stable mild generalized atrophy and severe chronic microangiopathic ischemic changes with large vessel calcific atherosclerosis.    Dictated by (CST): Sami Cabrera MD on 7/28/2024 at 8:27 PM     Finalized by (CST): Sami Cabrera MD on 7/28/2024 at 8:31 PM          CTA BRAIN + CTA CAROTIDS (CPT=70496/61188)    Result Date: 7/27/2024  CONCLUSION:  1. No major vessel occlusion, hemodynamically significant stenosis, dissection, AVM. 2. 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 3. No intracranial aneurysm. 4. New 4 mm focus of hyperattenuation in right subinsular region suspicious for a small focus of hemorrhage. 5. 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 6. Old left thalamic lacunar infarct. 7. Advanced changes of chronic small vessel disease in cerebral white matter.  8. 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  Advanced emphysema.      Dictated by (CST): Sohail Weathers MD on 7/27/2024 at 8:23 PM     Finalized by (CST): Sohail Weathers MD on 7/27/2024 at 8:41 PM           Performed an independent visualization of head CT from 7/28/2024,  Imaging revealed: Agree with read.         Disclaimer:   This record was dictated using Dragon software. There may be errors due to voice recognition problems that were not realized and corrected during the completion of the note.      This document is not intended to support charting by exception.  Sections left blank in a completed note should be presumed not to have been done.     Total critical care time spent exceeds  35 minutes.      Thank you.  Kirit Lee D.O.   Vascular & General Neurology    7/28/2024  6:58 PM

## 2024-07-28 NOTE — SLP NOTE
ADULT SWALLOWING EVALUATION    ASSESSMENT    ASSESSMENT/OVERALL IMPRESSION:  A clinical swallowing assessment was completed and the exam is indicative of mild oropharyngeal dysphagia.   RN was consulted before the visit. Pt was alert but did not participate in the assessment fully. Pt was positioned upright. Pt is on 2LNC with stable oxygen saturation levels. Pt was fully alert and followed simple verbal commands and conversational speech with 100% accuracy. Oral motor exam was indicative of mild age related changes. Some atrophy and tremors were noted of all lips and tongue.   Pt was fed puree and thin liquid consistencies as part of trial swallow assessment. Labial seal was WFL. Oral bolus prep and oral transit appeared to be reduced for the puree consistency. Hyolaryngeal excursion was perceived to be mildly reduced across trials. Pt utilized 2-3 swallows for the puree boluses. No overt s/s of laryngeal penetration or aspiration were noted for any consistency. Voice was clear throughout the session.   Pt refused solid trials.    Recommendations:  Diet: Pureed solids and thin liquids.  Tx: SLP to f/u x1-2 for diet upgrade.    Pt verbalized understanding of plan and RN was informed of the session.          RECOMMENDATIONS   Diet Recommendations - Solids: Puree  Diet Recommendations - Liquids: Thin Liquids                        Compensatory Strategies Recommended: Slow rate;No straws;Small bites and sips  Aspiration Precautions: Slow rate;Upright position;Small bites and sips;No straw  Medication Administration Recommendations: Whole in puree  Treatment Plan/Recommendations: Aspiration precautions    HISTORY   MEDICAL HISTORY  Reason for Referral: Stroke protocol    Problem List  Principal Problem:    Gastrointestinal hemorrhage, unspecified gastrointestinal hemorrhage type  Active Problems:    BRBPR (bright red blood per rectum)    Adenocarcinoma of rectosigmoid junction (HCC)    Stercoral colitis    Cerebral  amyloid angiopathy (HCC)    Fibromuscular dysplasia (HCC)    Aphasia    History of dementia    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    bilateral ICAs and R V2-3 without dissection      First degree AV block    Hypernatremia    Hypokalemia    Incomplete right bundle branch block    Intraparenchymal hemorrhage of brain (HCC)    Lactic acidosis    Malignant neoplasm of sigmoid colon (HCC)    Memory difficulties    Mild cognitive impairment    Mitral valve disease    Other emphysema (HCC)    Paroxysmal atrial fibrillation (HCC)    Peptic ulcer disease    Pontine hemorrhage (HCC)    Sepsis (HCC)    Sinus tachycardia    SVT (supraventricular tachycardia) (HCC)    Throat pain       Prior Living Situation: Assisted living  Diet Prior to Admission: Mechanical soft chopped/ Soft & Bite Sized;Thin liquids  Precautions: Aspiration    Patient/Family Goals: Eat    SWALLOWING HISTORY  Current Diet Consistency: NPO  Dysphagia History: None reported. Per RN, pt [facility] thin liquids and [facility] solids [facility] his place of residence.   Imaging Results:     CT - 7/27  1. No major vessel occlusion, hemodynamically significant stenosis, dissection, AVM.   2. 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   3. No intracranial aneurysm.   4. New 4 mm focus of hyperattenuation in right subinsular  region suspicious for a small focus of hemorrhage.   5. 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   6. Old left thalamic lacunar infarct.   7. Advanced changes of chronic small vessel disease in cerebral white matter.     SUBJECTIVE       OBJECTIVE   ORAL MOTOR EXAMINATION  Dentition: Functional  Symmetry: Within Functional Limits  Strength: Reduced right facial;Reduced left facial;Reduced right lingual;Reduced left lingual  Tone: Reduced right facial;Reduced left facial;Reduced right lingual;Reduced left lingual  Range of Motion: Reduced right facial;Reduced left facial;Reduced right lingual;Reduced left lingual  Rate of Motion: Reduced    Voice Quality: Weak;Clear  Respiratory Status: Nasal cannula (2L)  Consistencies Trialed: Thin liquids;Puree  Method of Presentation: Staff/Clinician assistance;Spoon;Straw;Cup  Patient Positioning: Upright    Oral Phase of Swallow: Impaired  Bolus Retrieval: Impaired  Bilabial Seal: Intact  Bolus Formation: Impaired  Bolus Propulsion: Intact  Mastication: Impaired  Retention: Impaired    Pharyngeal Phase of Swallow: Impaired  Laryngeal Elevation Timing: Appears impaired  Laryngeal Elevation Strength: Appears impaired  Laryngeal Elevation Coordination: Appears impaired  (Please note: Silent aspiration cannot be evaluated clinically. Videofluoroscopic Swallow Study is required to rule-out silent aspiration.)    Esophageal Phase of Swallow: No complaints consistent with possible esophageal involvement                GOALS  Goal #1 The patient will tolerate puree consistency and thin liquids without overt signs or symptoms of aspiration with 100 % accuracy over 2 session(s).  In Progress   Goal #2 The patient/family/caregiver will demonstrate understanding and implementation of aspiration precautions and swallow strategies independently over 2 session(s).    In Progress   Goal #3 The patient will tolerate trial upgrade of [facility] soft bite sized solid consistency and thin liquids without  overt signs or symptoms of aspiration with 100 % accuracy over 2 session(s).  In Progress     FOLLOW UP  Treatment Plan/Recommendations: Aspiration precautions  Number of Visits to Meet Established Goals: 2  Follow Up Needed (Documentation Required): Yes  SLP Follow-up Date: 07/29/24    Thank you for your referral.   If you have any questions, please contact ИВАН Jensen, Ph.D., CCC-SLP  Speech-Language Pathologist  Phone number Ext.: [phone]

## 2024-07-28 NOTE — CM/SW NOTE
Per chart review, patient is resident of [facility] in [place], [place].  CM sent return referral to [facility] via [facility].    MDO received for Home health evaluation.  CM contacted [facility] [phone] to determine if [facility] has a preferred HHA.  CULLEN was given OLIVIA Jolley contact information to inquire: ph - [phone].    Per [facility] OLIVIA Jama, facility has 24 hour nursing care and patient has had OP PT arranged with [facility] in the past.  However, if HH PT/OT indicated, [facility].    PT/OT evaluations pending at this time.    CM sent tentative HH referrals via Aidin, orders and face to face entered.    / to remain available for support and/or discharge planning.     Plan: Return to [facility] and HH services (pending list/choice/patient approval) once medically cleared    Isis Zavala RN, BSN  Nurse   [phone]

## 2024-07-28 NOTE — OCCUPATIONAL THERAPY NOTE
Order received and chart reviewed. Attempted to see pt for OT  today. Pt transferred to CCU yesterday for increased restlessness/agitation. He is awaiting MRI and neurosurgery consult. CT head showed possible 4mm focus in R subinsular region. Patient is on bedrest with HOB >30 degrees. Patient is also not following commands and not appropriate for therapy today. Will follow up tomorrow.    Neli Rodrigez, OTR/L

## 2024-07-28 NOTE — CONSULTS
[facility]  Report of Consultation    Nacho Mari Patient Status:  Inpatient    10/14/1933 MRN [id number]   Location [facility] 2W/SW Attending Eric Fierro MD   Hosp Day # 1 PCP Oscar Rios MD ([facility])     Requesting Physician:  Dr. Jose Lui    Reason for Consultation:  Rectal Bleeding    Chief Complaint:  Bright red blood per rectum    History of Present Illness:  Nacho Mari is a 89 year old male with a history of dementia, COPD and paroxysmal atrial fibrillation who presents to [facility] on 2024 for evaluation of right red blood per rectum.  The patient is a poor historian.  History is obtained from the chart.  Records indicate that the patient was found that the nursing home to be slumped over and sitting in a pool of blood.  He was transferred to the emergency department for evaluation.      Upon presentation to the hospital, the patient was afebrile and became hypotensive while in the emergency department with blood pressure of 85/55.  WBC 7.1 hemoglobin 12.8 platelets 250,000.  BUN 18 creatinine 0.94.  CT scan of the abdomen pelvis with findings of sigmoid colon resection with colorectal anastomosis, no obstruction at the surgical site however the bowel at and distal to anastomosis to the lower rectum has large volume of fecal material within.  Well-known emergency department, the patient was noted to have bright red blood in his diaper.  Gastroenterology has evaluated the patient and plan for tapwater enemas.    Past abdominal surgical history includes robotic low anterior resection on 4/15/2021 by Dr. Khang Byrne.        History:  Past Medical History:    Abnormal electrocardiogram (ECG) (EKG)    MICHAEL (acute kidney injury) (HCC)    Aneurysm of ascending aorta without rupture (HCC)    Aortic valve stenosis    APC (atrial premature contractions)    Cerebral amyloid angiopathy (HCC)    Chronic atrial fibrillation (HCC)    Coronary  artery disease involving native coronary artery of native heart    Delirium    Dementia (HCC)    Dyspnea on exertion    Essential hypertension    Fibromuscular dysplasia (HCC)    Formatting of this note might be different from the original.    bilateral ICAs and R V2-3 without dissection      First degree AV block    Hypernatremia    Hypokalemia    Incomplete right bundle branch block    Intraparenchymal hemorrhage of brain (HCC)    Lactic acidosis    Malignant neoplasm of sigmoid colon (HCC)    Memory difficulties    Mild cognitive impairment    Mitral valve disease    Other emphysema (HCC)    Paroxysmal atrial fibrillation (HCC)    Peptic ulcer disease    Pontine hemorrhage (HCC)    Sepsis (HCC)    Sinus tachycardia    SVT (supraventricular tachycardia) (HCC)    Throat pain     History reviewed. No pertinent surgical history.  No family history on file.   reports that he has quit smoking. His smoking use included cigarettes. He has never used smokeless tobacco.    Allergies:  No Known Allergies    Medications:  Medications Prior to Admission   Medication Sig    acetaminophen 325 MG Oral Tab Take 1 tablet (325 mg total) by mouth every 6 (six) hours as needed for Pain.    magnesium oxide 400 MG Oral Tab Take 1 tablet (400 mg total) by mouth daily.    Melatonin 3 MG Oral Cap Take 1 capsule (3 mg total) by mouth daily.    pantoprazole 40 MG Oral Tab EC Take 1 tablet (40 mg total) by mouth every morning before breakfast.    fluticasone propionate 50 MCG/ACT Nasal Suspension 2 sprays by Each Nare route daily.    Polyethylene Glycol 3350 17 g Oral Powd Pack Take 17 g by mouth as needed.    QUEtiapine 50 MG Oral Tab Take 1 tablet (50 mg total) by mouth nightly.    tamsulosin 0.4 MG Oral Cap Take 1 capsule (0.4 mg total) by mouth daily.    traZODone 50 MG Oral Tab Take 1 tablet (50 mg total) by mouth nightly.    fluticasone-umeclidin-vilant (TRELEGY ELLIPTA) 100-62.5-25 MCG/ACT Inhalation Aerosol Powder, Breath Activated  Inhale 1 puff into the lungs daily.         Current Facility-Administered Medications:     polyethylene glycol (PEG 3350) (Miralax) 17 g oral packet 17 g, 17 g, Oral, BID    LORazepam (Ativan) 2 mg/mL injection 0.5 mg, 0.5 mg, Intravenous, Once    fluticasone propionate (Flonase) 50 MCG/ACT nasal suspension 2 spray, 2 spray, Each Nare, Daily    fluticasone-umeclidin-vilant (Trelegy Ellipta) 100-62.5-25 MCG/ACT inhaler 1 puff, 1 puff, Inhalation, Daily    magnesium oxide (Mag-Ox) tab 400 mg, 400 mg, Oral, Daily    Melatonin CAPS 3 mg, 3 mg, Oral, Nightly    pantoprazole (Protonix) DR tab 40 mg, 40 mg, Oral, QAM AC    QUEtiapine (SEROquel) tab 50 mg, 50 mg, Oral, Nightly    tamsulosin (Flomax) cap 0.4 mg, 0.4 mg, Oral, Daily    traZODone (Desyrel) tab 50 mg, 50 mg, Oral, Nightly    labetalol (Trandate) 5 mg/mL injection 10 mg, 10 mg, Intravenous, Q4H PRN    magnesium sulfate in sterile water for injection 2 g/50mL IVPB premix 2 g, 2 g, Intravenous, Once    sodium chloride 0.9% infusion, , Intravenous, Continuous    acetaminophen (Tylenol) tab 650 mg, 650 mg, Oral, Q4H PRN **OR** acetaminophen (Tylenol) rectal suppository 650 mg, 650 mg, Rectal, Q4H PRN    labetalol (Trandate) 5 mg/mL injection 10 mg, 10 mg, Intravenous, Q10 Min PRN    hydrALAzine (Apresoline) 20 mg/mL injection 10 mg, 10 mg, Intravenous, Q2H PRN    dexmedeTOMIDine in sodium chloride 0.9% (Precedex) 400 mcg/100mL infusion premix, 0.2-1.5 mcg/kg/hr (Dosing Weight), Intravenous, Continuous    Review of Systems:  Review of Systems   Unable to perform ROS: Dementia       Physical Exam:  /61 (BP Location: Right arm)   Pulse 67   Temp 97.4 °F (36.3 °C) (Temporal)   Resp 13   Wt 98 lb 5.2 oz (44.6 kg)   SpO2 100%   BMI 17.98 kg/m²   Physical Exam  Constitutional:       General: He is not in acute distress.     Appearance: He is not ill-appearing.   HENT:      Head: Normocephalic and atraumatic.   Cardiovascular:      Rate and Rhythm: Normal rate  and regular rhythm.      Pulses: Normal pulses.   Pulmonary:      Effort: Pulmonary effort is normal. No respiratory distress.   Abdominal:      General: There is no distension.      Palpations: Abdomen is soft.      Tenderness: There is no abdominal tenderness. There is no guarding or rebound.   Musculoskeletal:         General: Normal range of motion.      Cervical back: Normal range of motion.   Skin:     General: Skin is warm and dry.   Neurological:      Mental Status: He is alert.   Psychiatric:         Mood and Affect: Mood normal.         Behavior: Behavior normal.         Laboratory Data:  Recent Labs   Lab 07/27/24  1026 07/28/24  0733   RBC 4.02 3.43*   HGB 12.8* 10.8*   HCT 37.8* 32.4*   MCV 94.0 94.5   MCH 31.8 31.5   MCHC 33.9 33.3   RDW 13.5 13.4   NEPRELIM 4.61  --    WBC 7.1 10.5   .0 196.0       Recent Labs   Lab 07/27/24  1026 07/27/24  1245 07/28/24  0420 07/28/24  0733   *  --   --  80   BUN  --  18  --  18   CREATSERUM 1.29  --   --  0.94   CA 9.6  --   --  9.0   ALB 4.0  --   --  3.5     --   --  142   K  --  3.6 4.6 4.4     --   --  109   CO2 26.0  --   --  30.0   ALKPHO 66  --   --  66   AST  --  25  --  25   ALT 10 <7*  --  <7*   BILT 0.8  --   --  0.8   TP 6.7  --   --  5.6*         Recent Labs   Lab 07/27/24  1026   PTP 13.6   INR 0.98   PTT 24.8         CTA BRAIN + CTA CAROTIDS (CPT=70496/62596)    Result Date: 7/27/2024  CONCLUSION:  1. No major vessel occlusion, hemodynamically significant stenosis, dissection, AVM. 2. 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 3. No intracranial aneurysm. 4. New 4 mm focus of hyperattenuation in right subinsular region suspicious for a small focus of hemorrhage. 5. 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 6. Old left thalamic lacunar infarct. 7. Advanced changes of chronic small vessel disease in cerebral white matter.  8. 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   Advanced emphysema.      Dictated by (CST): Sohail Weathers MD on 7/27/2024 at 8:23 PM     Finalized by (CST): Sohail Weathers MD on 7/27/2024 at 8:41 PM          CT ABDOMEN+PELVIS(CONTRAST ONLY)(CPT=74177)    Result Date: 7/27/2024  CONCLUSION:  1.  Post distal sigmoid colon with colorectal anastomosis.  No obstruction at the surgical site however the bowel at and distal to anastomosis to the lower rectum has a large volume of fecal material within and there is also nonuniform wall thickening in  the same distribution.  There is perirectal and presacral fat stranding.  Findings suggest a nonspecific proctocolitis and/or stercoral colitis. 2.  Post left hip arthroplasty. 3.  Renal cysts. 4.  Coronary atherosclerosis.   Dictated by (CST): Jonnie Nicholas MD on 7/27/2024 at 12:30 PM     Finalized by (CST): Jonnie Nicholas MD on 7/27/2024 at 12:35 PM           Is this a shared or split note between Advanced Practice Provider and Physician? 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 Nunez.  No plans for urgent or emergent surgical intervention.  Continue diet as tolerated.  Continue tapwater enemas as per GI recommendations.  Continue to trend hemoglobin.  Thank you for consultation.  We will continue to follow.    Jesika Vinson PA-C  7/28/2024  11:53 AM      Seen and examined  No acute surgical issues  May resume diet from my  standpoint    Edward Nunez MD  Complex General Surgical Oncology  [facility]  [email or web address]

## 2024-07-28 NOTE — H&P
[facility]  part of [facility]    Report of Admission    Nacho Mari Patient Status:  Inpatient    10/14/1933 MRN [id number]   Location [facility] 2W/SW Attending Eric Fierro MD   Hosp Day # 0 PCP Oscar Rios MD ([facility])     Date of Admission:  2024  Date of Consult:  2024    Reason for Admission:   GI bleed possible intra parenchymal bleed. possible hypertensive emergency.    History of Present Illness:   Patient is a 89 year old male who was admitted to the hospital for Gastrointestinal hemorrhage, unspecified gastrointestinal hemorrhage type: Patient is unable to provide history most of the history was obtained from review of chart and multiple visits in different hospitals.  Patient presented this time from nursing facility after having a fall and sustaining bruises on the left side of face.  Baseline patient is alert and oriented x 2 and is conversant with family and nursing home staff.  But has most likely a lot of generalized weakness but appears to have been mobile.  Patient sustained a fall and was noted to have worsening of his baseline mental status and hence brought to the ER in the emergency room patient was noted to have elevated blood pressure patient also had GI bleed which was a presentation complaint from the nursing home where the nurse noted blood in his pants.  Further evaluation and workup was attempted and hence patient was admitted during the course of his admission up on the medical floor patient was noted to have altered mental status which was slightly worse than his baseline patient also had very elevated blood pressure.  And hence a CTA of the neck and the brain was performed which showed questionable insular bleed.      Past Medical History  Past Medical History:    Abnormal electrocardiogram (ECG) (EKG)    MICHAEL (acute kidney injury) (HCC)    Aneurysm of ascending aorta without rupture (HCC)    Aortic valve stenosis    APC  (atrial premature contractions)    Cerebral amyloid angiopathy (HCC)    Chronic atrial fibrillation (HCC)    Coronary artery disease involving native coronary artery of native heart    Delirium    Dementia (HCC)    Dyspnea on exertion    Essential hypertension    Fibromuscular dysplasia (HCC)    Formatting of this note might be different from the original.    bilateral ICAs and R V2-3 without dissection      First degree AV block    Hypernatremia    Hypokalemia    Incomplete right bundle branch block    Intraparenchymal hemorrhage of brain (HCC)    Lactic acidosis    Malignant neoplasm of sigmoid colon (HCC)    Memory difficulties    Mild cognitive impairment    Mitral valve disease    Other emphysema (HCC)    Paroxysmal atrial fibrillation (HCC)    Peptic ulcer disease    Pontine hemorrhage (HCC)    Sepsis (HCC)    Sinus tachycardia    SVT (supraventricular tachycardia) (HCC)    Throat pain       Past Surgical History  History reviewed. No pertinent surgical history.    Family History  No family history on file.    Social History  Pediatric History   Patient Parents    Not on file     Other Topics Concern    Not on file   Social History Narrative    Not on file           Current Medications:    Current Facility-Administered Medications:     polyethylene glycol (PEG 3350) (Miralax) 17 g oral packet 17 g, 17 g, Oral, BID    LORazepam (Ativan) 2 mg/mL injection 0.5 mg, 0.5 mg, Intravenous, Once    fluticasone propionate (Flonase) 50 MCG/ACT nasal suspension 2 spray, 2 spray, Each Nare, Daily    fluticasone-umeclidin-vilant (Trelegy Ellipta) 100-62.5-25 MCG/ACT inhaler 1 puff, 1 puff, Inhalation, Daily    magnesium oxide (Mag-Ox) tab 400 mg, 400 mg, Oral, Daily    Melatonin CAPS 3 mg, 3 mg, Oral, Nightly    [START ON 7/28/2024] pantoprazole (Protonix) DR tab 40 mg, 40 mg, Oral, QAM AC    QUEtiapine (SEROquel) tab 50 mg, 50 mg, Oral, Nightly    tamsulosin (Flomax) cap 0.4 mg, 0.4 mg, Oral, Daily    traZODone (Desyrel)  tab 50 mg, 50 mg, Oral, Nightly    labetalol (Trandate) 5 mg/mL injection 10 mg, 10 mg, Intravenous, Q4H PRN    potassium chloride 40 mEq in 250mL sodium chloride 0.9% IVPB premix, 40 mEq, Intravenous, Once    magnesium sulfate in sterile water for injection 2 g/50mL IVPB premix 2 g, 2 g, Intravenous, Once    sodium chloride 0.9% infusion, , Intravenous, Continuous    acetaminophen (Tylenol) tab 650 mg, 650 mg, Oral, Q4H PRN **OR** acetaminophen (Tylenol) rectal suppository 650 mg, 650 mg, Rectal, Q4H PRN    labetalol (Trandate) 5 mg/mL injection 10 mg, 10 mg, Intravenous, Q10 Min PRN    hydrALAzine (Apresoline) 20 mg/mL injection 10 mg, 10 mg, Intravenous, Q2H PRN    dexmedeTOMIDine in sodium chloride 0.9% (Precedex) 400 mcg/100mL infusion premix, 0.2-1.5 mcg/kg/hr (Dosing Weight), Intravenous, Continuous  Medications Prior to Admission   Medication Sig    acetaminophen 325 MG Oral Tab Take 1 tablet (325 mg total) by mouth every 6 (six) hours as needed for Pain.    magnesium oxide 400 MG Oral Tab Take 1 tablet (400 mg total) by mouth daily.    Melatonin 3 MG Oral Cap Take 1 capsule (3 mg total) by mouth daily.    pantoprazole 40 MG Oral Tab EC Take 1 tablet (40 mg total) by mouth every morning before breakfast.    fluticasone propionate 50 MCG/ACT Nasal Suspension 2 sprays by Each Nare route daily.    Polyethylene Glycol 3350 17 g Oral Powd Pack Take 17 g by mouth as needed.    QUEtiapine 50 MG Oral Tab Take 1 tablet (50 mg total) by mouth nightly.    tamsulosin 0.4 MG Oral Cap Take 1 capsule (0.4 mg total) by mouth daily.    traZODone 50 MG Oral Tab Take 1 tablet (50 mg total) by mouth nightly.    fluticasone-umeclidin-vilant (TRELEGY ELLIPTA) 100-62.5-25 MCG/ACT Inhalation Aerosol Powder, Breath Activated Inhale 1 puff into the lungs daily.       Allergies  No Known Allergies    Review of Systems:    A comprehensive review of systems was negative except as per HPI    Physical Exam:     Vitals:    07/27/24 2300    BP: 148/78   Pulse: 70   Resp: 18   Temp:        Intake/Output Summary (Last 24 hours) at 7/27/2024 2330  Last data filed at 7/27/2024 2107  Gross per 24 hour   Intake 1000 ml   Output 401 ml   Net 599 ml     Wt Readings from Last 2 Encounters:   07/27/24 102 lb 1.6 oz (46.3 kg)   01/26/24 95 lb (43.1 kg)       General appearance:    Head: Normocephalic, without obvious abnormality, atraumatic  Eyes: conjunctivae/corneas clear. PERRL, EOM's intact. Fundi benign.  Ears: normal TM's and external ear canals both ears  Nose: Nares normal. Septum midline. Mucosa normal. No drainage or sinus tenderness.  Throat: lips, mucosa, and tongue normal; teeth and gums normal  Neck: no adenopathy, no carotid bruit, no JVD, supple, symmetrical, trachea midline, and thyroid not enlarged, symmetric, no tenderness/mass/nodules  Pulmonary: clear to auscultation bilaterally  Cardiovascular: S1, S2 normal, no murmur, click, rub or gallop, regular rate and rhythm  Abdominal: soft, non-tender; bowel sounds normal; no masses,  no organomegaly  Extremities: extremities normal, atraumatic, no cyanosis or edema  Pulses: 2+ and symmetric  Skin: Skin color, texture, turgor normal. No rashes or lesions  Neurologic: Unable to perform as patient does not follow commands.  No signs of cerebral irritation no neck rigidity.  No focal deficit (patient moves limbs spontaneously).  Genital Exam: defer exam    Results:     Laboratory Data:  Lab Results   Component Value Date    WBC 7.1 07/27/2024    HGB 12.8 (L) 07/27/2024    HCT 37.8 (L) 07/27/2024    .0 07/27/2024     07/27/2024    K 3.6 07/27/2024     07/27/2024    CO2 26.0 07/27/2024    CREATSERUM 1.29 07/27/2024    BUN 18 07/27/2024     (H) 07/27/2024    CA 9.6 07/27/2024    ALB 4.0 07/27/2024    ALKPHO 66 07/27/2024    BILT 0.8 07/27/2024    TP 6.7 07/27/2024    AST 25 07/27/2024    ALT <7 (L) 07/27/2024    PTT 24.8 07/27/2024    INR 0.98 07/27/2024    MG 1.8 07/27/2024      No results found for this visit on 07/27/24.    Imaging:  CTA BRAIN + CTA CAROTIDS (CPT=70496/53660)    Result Date: 7/27/2024  CONCLUSION:  1. No major vessel occlusion, hemodynamically significant stenosis, dissection, AVM. 2. 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 3. No intracranial aneurysm. 4. New 4 mm focus of hyperattenuation in right subinsular region suspicious for a small focus of hemorrhage. 5. 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 6. Old left thalamic lacunar infarct. 7. Advanced changes of chronic small vessel disease in cerebral white matter.  8. 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  Advanced emphysema.      Dictated by (CST): Sohail Weathers MD on 7/27/2024 at 8:23 PM     Finalized by (CST): Sohail Weathers MD on 7/27/2024 at 8:41 PM          CT ABDOMEN+PELVIS(CONTRAST ONLY)(CPT=74177)    Result Date: 7/27/2024  CONCLUSION:  1.  Post distal sigmoid colon with colorectal anastomosis.  No obstruction at the surgical site however the bowel at and distal to anastomosis to the lower rectum has a large volume of fecal material within and there is also nonuniform wall thickening in  the same distribution.  There is perirectal and presacral fat stranding.  Findings suggest a nonspecific proctocolitis and/or stercoral colitis. 2.  Post left hip arthroplasty. 3.  Renal cysts. 4.  Coronary atherosclerosis.   Dictated by (CST): Jonnie Nicholas MD on 7/27/2024 at 12:30 PM     Finalized by (CST): Jonnie Nicholas MD on 7/27/2024 at 12:35 PM              Impression:     Gastrointestinal hemorrhage, unspecified gastrointestinal hemorrhage type        BRBPR (bright red blood per rectum)        Adenocarcinoma of rectosigmoid junction (HCC)        Stercoral colitis        Cerebral amyloid angiopathy (HCC)        Fibromuscular dysplasia (HCC)        Aphasia        History of dementia        Nontraumatic subcortical hemorrhage of right cerebral hemisphere  ([facility])    Assessment and plan  Problem 1 altered mental status patient has altered mental status in the setting of elevated blood pressure progressively worsening baseline and history of falls and also history of questionable insular bleed patient will be transferred to ICU patient's care was discussed with the primary RN and also neurologist.  In addition patient also has history of dementia.  I will make attempts to speak with the patient's family and get a better understanding of family's wishes and also of his baseline mental status.    Problem #2 bright red blood per rectum this will be evaluated GI will be consulted patient will be off aspirin patient does not seem to have acute hematochezia or BRBPR.  Will avoid heparin for DVT prophylaxis.    Problem #3 atrial fibrillation: Patient is not a candidate for anticoagulation and hence will even discontinue aspirin in light of current GI bleed and questionable insular cortex bleed.    Problem #4 adenocarcinoma of the colon: Patient currently does not have any obstructive features but surgical oncology has been consulted.  Further input and likely nonoperative process will be followed given patient's advanced age.    Problem #5 dementia: Supportive care will be provided patient is not on memantine will consider putting patient on donepezil and memantine further input for from neurology is awaited.    Problem #6 DVT prophylaxis: Patient will be on SCD boots.    Problem #7 GI prophylaxis: Patient will be on Protonix.    Total time spent seeing patient was 70 minutes patient was critically ill in the ICU.      Recommendations:  Patient seen and evaluated full H & P dictated.    Thank you for allowing me to participate in the care of your patient.    JOSE ARMANDO RAM MD  7/27/2024  [email or web address]

## 2024-07-29 PROBLEM — F01.518 VASCULAR DEMENTIA WITH BEHAVIORAL DISTURBANCE (HCC): Status: ACTIVE | Noted: 2024-07-29

## 2024-07-29 PROBLEM — F05 DELIRIUM SUPERIMPOSED ON DEMENTIA: Status: ACTIVE | Noted: 2024-07-29

## 2024-07-29 PROBLEM — F39 EPISODIC MOOD DISORDER (HCC): Status: ACTIVE | Noted: 2024-07-29

## 2024-07-29 LAB
ALBUMIN SERPL-MCNC: 3.6 G/DL (ref 3.2–4.8)
ALBUMIN/GLOB SERPL: 1.5 {RATIO} (ref 1–2)
ALP LIVER SERPL-CCNC: 75 U/L
ALT SERPL-CCNC: 7 U/L
ANION GAP SERPL CALC-SCNC: 9 MMOL/L (ref 0–18)
AST SERPL-CCNC: 29 U/L (ref ?–34)
BASOPHILS # BLD AUTO: 0.04 X10(3) UL (ref 0–0.2)
BASOPHILS NFR BLD AUTO: 0.5 %
BILIRUB SERPL-MCNC: 0.5 MG/DL (ref 0.2–0.9)
BUN BLD-MCNC: 17 MG/DL (ref 9–23)
BUN/CREAT SERPL: 19.3 (ref 10–20)
CALCIUM BLD-MCNC: 8.9 MG/DL (ref 8.7–10.4)
CHLORIDE SERPL-SCNC: 110 MMOL/L (ref 98–112)
CO2 SERPL-SCNC: 24 MMOL/L (ref 21–32)
CREAT BLD-MCNC: 0.88 MG/DL
DEPRECATED RDW RBC AUTO: 45.6 FL (ref 35.1–46.3)
EGFRCR SERPLBLD CKD-EPI 2021: 82 ML/MIN/1.73M2 (ref 60–?)
EOSINOPHIL # BLD AUTO: 0.15 X10(3) UL (ref 0–0.7)
EOSINOPHIL NFR BLD AUTO: 1.7 %
ERYTHROCYTE [DISTWIDTH] IN BLOOD BY AUTOMATED COUNT: 13.5 % (ref 11–15)
GLOBULIN PLAS-MCNC: 2.4 G/DL (ref 2–3.5)
GLUCOSE BLD-MCNC: 148 MG/DL (ref 70–99)
GLUCOSE BLDC GLUCOMTR-MCNC: 114 MG/DL (ref 70–99)
GLUCOSE BLDC GLUCOMTR-MCNC: 130 MG/DL (ref 70–99)
GLUCOSE BLDC GLUCOMTR-MCNC: 130 MG/DL (ref 70–99)
GLUCOSE BLDC GLUCOMTR-MCNC: 146 MG/DL (ref 70–99)
HCT VFR BLD AUTO: 34.1 %
HGB BLD-MCNC: 11.7 G/DL
IMM GRANULOCYTES # BLD AUTO: 0.02 X10(3) UL (ref 0–1)
IMM GRANULOCYTES NFR BLD: 0.2 %
LYMPHOCYTES # BLD AUTO: 0.54 X10(3) UL (ref 1–4)
LYMPHOCYTES NFR BLD AUTO: 6.3 %
MAGNESIUM SERPL-MCNC: 1.6 MG/DL (ref 1.6–2.6)
MCH RBC QN AUTO: 31.5 PG (ref 26–34)
MCHC RBC AUTO-ENTMCNC: 34.3 G/DL (ref 31–37)
MCV RBC AUTO: 91.9 FL
MONOCYTES # BLD AUTO: 0.7 X10(3) UL (ref 0.1–1)
MONOCYTES NFR BLD AUTO: 8.1 %
NEUTROPHILS # BLD AUTO: 7.16 X10 (3) UL (ref 1.5–7.7)
NEUTROPHILS # BLD AUTO: 7.16 X10(3) UL (ref 1.5–7.7)
NEUTROPHILS NFR BLD AUTO: 83.2 %
OSMOLALITY SERPL CALC.SUM OF ELEC: 300 MOSM/KG (ref 275–295)
PHOSPHATE SERPL-MCNC: 3 MG/DL (ref 2.4–5.1)
PLATELET # BLD AUTO: 220 10(3)UL (ref 150–450)
POTASSIUM SERPL-SCNC: 3.6 MMOL/L (ref 3.5–5.1)
PROT SERPL-MCNC: 6 G/DL (ref 5.7–8.2)
RBC # BLD AUTO: 3.71 X10(6)UL
SODIUM SERPL-SCNC: 143 MMOL/L (ref 136–145)
WBC # BLD AUTO: 8.6 X10(3) UL (ref 4–11)

## 2024-07-29 PROCEDURE — 99232 SBSQ HOSP IP/OBS MODERATE 35: CPT | Performed by: OTHER

## 2024-07-29 PROCEDURE — 99233 SBSQ HOSP IP/OBS HIGH 50: CPT | Performed by: INTERNAL MEDICINE

## 2024-07-29 PROCEDURE — 99291 CRITICAL CARE FIRST HOUR: CPT | Performed by: STUDENT IN AN ORGANIZED HEALTH CARE EDUCATION/TRAINING PROGRAM

## 2024-07-29 PROCEDURE — 90792 PSYCH DIAG EVAL W/MED SRVCS: CPT | Performed by: OTHER

## 2024-07-29 RX ORDER — LAMOTRIGINE 25 MG/1
25 TABLET ORAL 2 TIMES DAILY
Status: DISCONTINUED | OUTPATIENT
Start: 2024-07-29 | End: 2024-08-01

## 2024-07-29 RX ORDER — TRAZODONE HYDROCHLORIDE 50 MG/1
25 TABLET ORAL NIGHTLY
Status: DISCONTINUED | OUTPATIENT
Start: 2024-07-29 | End: 2024-08-01

## 2024-07-29 RX ORDER — QUETIAPINE FUMARATE 25 MG/1
75 TABLET, FILM COATED ORAL NIGHTLY
Status: CANCELLED | OUTPATIENT
Start: 2024-07-29

## 2024-07-29 RX ORDER — OLANZAPINE 2.5 MG/1
5 TABLET, FILM COATED ORAL EVERY EVENING
Status: DISCONTINUED | OUTPATIENT
Start: 2024-07-29 | End: 2024-08-01

## 2024-07-29 NOTE — PROGRESS NOTES
[facility]  [address], [address]  [facility] [address]  [phone]          INPATIENT STROKE NEUROLOGY   FOLLOW UP PROGRESS NOTE  [facility]  part of [facility]    Nacho Mari Patient Status:  Inpatient     10/14/1933 MRN [id number]    Location [facility] 2W/SW Attending Eric Fierro MD    Hosp Day # 2 PCP Oscar Rios MD ([place])    Date of Admission:  2024  Date of Consult Follow Up:  2024       Assessment and Plan:   Nacho Mari is a 89 year old   male w/ a pmhx sig. for cerebral amyloid angiopathy, atrial fibrillation not on anticoagulation,?  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  Hospital course has been complicated by agitated delirium related to dementia.  Patient has a stat MRI that is still pending.  Repeat head CT is stable.      His blood pressures have been slightly outside of the goal of less than 150.  Also patient is refusing oral agents.  Started him on losartan and hydrochlorothiazide.  Although he has a diagnosis of hypertension he is not on any antihypertensives.  Patient previously was on Precedex for agitation but became bradycardic and hypotensive.  He will have to remain in the unit until he is started taking oral agents again.  MRI of the brain is ordered essentially to confirm whether or not the patient has a small ICH.  Most likely this is due to cerebral amyloid angiopathy but could  be hypertensive.  If his blood pressure is stable and he is willing to take his p.o. antihypertensives that he can be transferred out of the unit.    On 07/29/24 he is stable to transfer to the floor.       Right temporal hemorrhage in the setting of known cerebral amyloid angiopathy, Hx intracranial hemorrhage, Hx ischemic stroke and fibromuscular dysplasia  Differential Diagnosis for stroke/TIA mechanism:  Hemorrhage due to cerebral amyloid angiopathy  Hypertensive hemorrhage  Artifact on head CT; doubt       Plan    Diagnostics:  Imaging: MRI brain WO   Cardiac imaging: Telemetry   Labs:   Therapeutics:  Blood pressure goal: Less than 150 systolic.  Add losartan/hydrochlorothiazide.  Please avoid blood pressure variability. wide fluctuations in BP can lead to stroke expansion.    PRN hydralazine and labetalol for sustained pressures outside of goal  Neuro checks Q2.  Telemetry.NIH stroke scale per protocol.  Blood glucose goal: .  Accuchecks Q6 if patient has DM  closely monitor to prevent hypoglycemia in patients with AIS.  Antithrombotics: None.  Contraindicated.    - ICH Volume < 30 ml, ICH Score 1         - CT Head as above, repeat in am; stat mri now to confirm if ICH         - transfer to ICU         - precedex drip for agitation  precedex ordered so he can get the mri of the brain.          - Coags  will be ordered           - Goal Plt > 100, INR < 1.5          - SBP goal < 150         - Nsg c/s placed,          - PT/OT/SLP consulted    Avoid hypotonic fluids as this can worsen cerebral edema.  Physical therapy, Occupational Therapy, speech therapy evaluations ordered.  maintain oxygen saturation >94%. No supplemental oxygen  in nonhypoxic patients with acute ischemic stroke (AIS).  Tx hyperthermia (temperature >38°C) and identify source  STAT head CT and page neurology if any change in neurological exam or focal deficits.           INTERVAL EVENTS  07/28/24: could not tolerate precedex; Bp slightly  elevated; repeat ct stable.   07/29/24: taking PO agents. Stable for the floor.         SUBJECTIVE:     Patient is stll incoherent.  not appear to be in any pain.  Denies being in pain when asked.  Otherwise does not respond appropriately.  Does follow pantomime commands.      Pertinent positive and negatives per HPI.  All others were reviewed and negative.       Objective   OBJECTIVE:   Last vitals and weight :  Blood pressure 122/70, pulse 81, temperature 97.8 °F (36.6 °C), temperature source Temporal, resp. rate 17, weight 98 lb 5.2 oz (44.6 kg), SpO2 96%.   Vitals:    07/29/24 0600 07/29/24 0800 07/29/24 1000 07/29/24 1200   BP: 112/62 136/59 129/65 122/70   BP Location: Right arm Right arm Right arm    Pulse: 75 80 91 81   Resp: 16 14 17 17   Temp:  97.2 °F (36.2 °C)  97.8 °F (36.6 °C)   TempSrc:  Temporal  Temporal   SpO2: 94% 98% 91% 96%   Weight:          Exam:   Exam:  - General: appears older than stated age, fatigued, no distress, and uncooperative  - CV: symmetric pulses     - Pulmonary: no signs of respiratory distress. Normal excursion of the chest.   Neurologic Exam  - Mental Status: Alert and attentive. Oriented to person.  .speaks in short phrases.  Intermittently follows commands.  Refuses to participate much in the exam.  He cannot name.    . Comprehension is variable.  repetition intact. Phrase length and rate are decreased.  + Uncooperative.    He will not voluntarily lift his legs or keep them up antigravity.  He refused to lift his arms for 10 seconds.  + Aphasia.  + anomia.   - Cranial Nerves: No gaze preference. Visual fields: Visual fields are not reliable.  Suspect that he has a right   hemianopsia.  He states he can see the examiners entire face.  He refuses to participate in confrontational testing.  Blink to threat intact.  He keeps his left eye closed.  He has a ecchymoses under his left eye.  Pupils are 4mm briskly constricting to 2mm and equally round and reactive to light  in a well  lit room. EOMI. No nystagmus. No ptosis. V1-V3 intact B/L to light touch.No pathological facial asymmetry. No flattening of the nasolabial fold. .  Hearing grossly intact.  Tongue midline. No atrophy or fasiculations of the tongue noted. Palate and uvula elevate symmetrically.  Shoulder shrug symmetric.  - Fundoscopic exam: Refuses to participate.   - Motor:  normal tone,Diffusely decreased bulk.    no interosseous wasting. No flattening of hypothenar eminences.     He is at least antigravity in both of his upper extremities.  He is able to hold the NIH stroke scale cards with his right hand without issue.  However he would not keep his arms up for 10 seconds as requested.   He is spontaneously moving both upper extremities.      Right Left     Motor Strength   Deltoids 3 3  Triceps 3 3  Biceps 3 3  Wrist Extensors 3 3   3 3     Knee extensors 2 to 3 bilaterally          Pronator drift: bilateral arm drifts down before 10 seconds.   leg drift: Both lower extremities drift to the bed before 5 seconds.       Asterixis: No asterixis noted.   Tremor: None.     Reflexes:    C5 C6 C7  L4 S1   R 2+ 2+  2+ 2+   L 2+ 2+  2+ 2+   Adductor Spread: No adductor spread noted.        - Sensory:   Light touch: normal     - Cerebellum: No truncal ataxia. No titubations. No dysmetria, no dysdiadochokinesis. No overshoot.   - Gait/station: Normal gait and station. Symmetric arm swing.   - Plantar response: extensor bilaterally    Medications:   budesonide  0.5 mg Nebulization Daily    ipratropium-albuterol  3 mL Nebulization Daily    melatonin  3 mg Oral Nightly    losartan (Cozaar) 50 mg, hydroCHLOROthiazide 12.5 mg for Benicar HCT 20/12.5 (Formerly [facility] only)   Oral Daily    polyethylene glycol (PEG 3350)  17 g Oral BID    fluticasone propionate  2 spray Each Nare Daily    magnesium oxide  400 mg Oral Daily    pantoprazole  40 mg Oral QAM AC    QUEtiapine  50 mg Oral Nightly    tamsulosin  0.4 mg Oral Daily    traZODone  50 mg Oral  Nightly    magnesium sulfate  2 g Intravenous Once       PRNS:   labetalol    acetaminophen **OR** acetaminophen    labetalol    hydrALAzine    Infusions:    sodium chloride 75 mL/hr at 07/28/24 1227          Results:   Laboratory Data:  Lab Results   Component Value Date    WBC 10.5 07/28/2024    HGB 10.8 (L) 07/28/2024    HCT 32.4 (L) 07/28/2024    .0 07/28/2024    CREATSERUM 0.94 07/28/2024    BUN 18 07/28/2024     07/28/2024    K 4.4 07/28/2024     07/28/2024    CO2 30.0 07/28/2024    GLU 80 07/28/2024    CA 9.0 07/28/2024    ALB 3.5 07/28/2024    ALKPHO 66 07/28/2024    TP 5.6 (L) 07/28/2024    AST 25 07/28/2024    ALT <7 (L) 07/28/2024    PTT 24.8 07/27/2024    INR 0.98 07/27/2024    PTP 13.6 07/27/2024    MG 1.9 07/28/2024     Recent Results (from the past 72 hour(s))   RAINBOW DRAW LAVENDER    Collection Time: 07/27/24 10:26 AM   Result Value Ref Range    Hold Lavender Auto Resulted    RAINBOW DRAW LIGHT GREEN    Collection Time: 07/27/24 10:26 AM   Result Value Ref Range    Hold Lt Green Auto Resulted    RAINBOW DRAW BLUE    Collection Time: 07/27/24 10:26 AM   Result Value Ref Range    Hold Blue Auto Resulted    Comp Metabolic Panel (14)    Collection Time: 07/27/24 10:26 AM   Result Value Ref Range    Glucose 152 (H) 70 - 99 mg/dL    Sodium 136 136 - 145 mmol/L    Potassium      Chloride 104 98 - 112 mmol/L    CO2 26.0 21.0 - 32.0 mmol/L    Anion Gap 6 0 - 18 mmol/L    BUN      Creatinine 1.29 0.70 - 1.30 mg/dL    BUN/CREA Ratio      Calcium, Total 9.6 8.7 - 10.4 mg/dL    eGFR-Cr 53 (L) >=60 mL/min/1.73m2    ALT 10 10 - 49 U/L    AST      Alkaline Phosphatase 66 45 - 117 U/L    Bilirubin, Total 0.8 0.2 - 0.9 mg/dL    Total Protein 6.7 5.7 - 8.2 g/dL    Albumin 4.0 3.2 - 4.8 g/dL    Globulin  2.7 2.0 - 3.5 g/dL    A/G Ratio 1.5 1.0 - 2.0   CBC W/ DIFFERENTIAL    Collection Time: 07/27/24 10:26 AM   Result Value Ref Range    WBC 7.1 4.0 - 11.0 x10(3) uL    RBC 4.02 3.80 - 5.80 x10(6)uL     HGB 12.8 (L) 13.0 - 17.5 g/dL    HCT 37.8 (L) 39.0 - 53.0 %    MCV 94.0 80.0 - 100.0 fL    MCH 31.8 26.0 - 34.0 pg    MCHC 33.9 31.0 - 37.0 g/dL    RDW-SD 46.4 (H) 35.1 - 46.3 fL    RDW 13.5 11.0 - 15.0 %    .0 150.0 - 450.0 10(3)uL    Neutrophil Absolute Prelim 4.61 1.50 - 7.70 x10 (3) uL    Neutrophil Absolute 4.61 1.50 - 7.70 x10(3) uL    Lymphocyte Absolute 1.43 1.00 - 4.00 x10(3) uL    Monocyte Absolute 0.68 0.10 - 1.00 x10(3) uL    Eosinophil Absolute 0.30 0.00 - 0.70 x10(3) uL    Basophil Absolute 0.04 0.00 - 0.20 x10(3) uL    Immature Granulocyte Absolute 0.03 0.00 - 1.00 x10(3) uL    Neutrophil % 65.0 %    Lymphocyte % 20.2 %    Monocyte % 9.6 %    Eosinophil % 4.2 %    Basophil % 0.6 %    Immature Granulocyte % 0.4 %   Prothrombin Time (PT)    Collection Time: 07/27/24 10:26 AM   Result Value Ref Range    PT 13.6 11.6 - 14.8 seconds    INR 0.98 0.80 - 1.20   PTT, Activated    Collection Time: 07/27/24 10:26 AM   Result Value Ref Range    PTT 24.8 23.0 - 36.0 seconds   POCT Glucose    Collection Time: 07/27/24 10:28 AM   Result Value Ref Range    POC Glucose  136 (H) 70 - 99 mg/dL   EKG 12 Lead    Collection Time: 07/27/24 10:35 AM   Result Value Ref Range    Ventricular rate 64 BPM    Atrial rate  BPM    P-R Interval  ms    QRS Duration 126 ms    Q-T Interval 480 ms    QTC Calculation (Bezet) 495 ms    P Axis  degrees    R Axis 54 degrees    T Axis 59 degrees   ABORH (Blood Type)    Collection Time: 07/27/24 10:47 AM   Result Value Ref Range    ABO BLOOD TYPE O     RH BLOOD TYPE Positive    Antibody Screen    Collection Time: 07/27/24 10:47 AM   Result Value Ref Range    Antibody Screen Negative    REDRAW CMP (P)    Collection Time: 07/27/24 12:45 PM   Result Value Ref Range    Potassium 3.6 3.5 - 5.1 mmol/L    AST 25 <34 U/L    ALT <7 (L) 10 - 49 U/L    BUN 18 9 - 23 mg/dL   Magnesium    Collection Time: 07/27/24 12:45 PM   Result Value Ref Range    Magnesium 1.8 1.6 - 2.6 mg/dL   POCT Glucose     Collection Time: 07/27/24 10:54 PM   Result Value Ref Range    POC Glucose  88 70 - 99 mg/dL   Potassium    Collection Time: 07/28/24  4:20 AM   Result Value Ref Range    Potassium 4.6 3.5 - 5.1 mmol/L   Magnesium    Collection Time: 07/28/24  4:20 AM   Result Value Ref Range    Magnesium 1.9 1.6 - 2.6 mg/dL   POCT Glucose    Collection Time: 07/28/24  5:29 AM   Result Value Ref Range    POC Glucose  79 70 - 99 mg/dL   CBC, Platelet; No Differential    Collection Time: 07/28/24  7:33 AM   Result Value Ref Range    WBC 10.5 4.0 - 11.0 x10(3) uL    RBC 3.43 (L) 3.80 - 5.80 x10(6)uL    HGB 10.8 (L) 13.0 - 17.5 g/dL    HCT 32.4 (L) 39.0 - 53.0 %    MCV 94.5 80.0 - 100.0 fL    MCH 31.5 26.0 - 34.0 pg    MCHC 33.3 31.0 - 37.0 g/dL    RDW 13.4 11.0 - 15.0 %    RDW-SD 46.9 (H) 35.1 - 46.3 fL    .0 150.0 - 450.0 10(3)uL   Comp Metabolic Panel (14)    Collection Time: 07/28/24  7:33 AM   Result Value Ref Range    Glucose 80 70 - 99 mg/dL    Sodium 142 136 - 145 mmol/L    Potassium 4.4 3.5 - 5.1 mmol/L    Chloride 109 98 - 112 mmol/L    CO2 30.0 21.0 - 32.0 mmol/L    Anion Gap 3 0 - 18 mmol/L    BUN 18 9 - 23 mg/dL    Creatinine 0.94 0.70 - 1.30 mg/dL    BUN/CREA Ratio 19.1 10.0 - 20.0    Calcium, Total 9.0 8.7 - 10.4 mg/dL    Calculated Osmolality 295 275 - 295 mOsm/kg    eGFR-Cr 77 >=60 mL/min/1.73m2    ALT <7 (L) 10 - 49 U/L    AST 25 <34 U/L    Alkaline Phosphatase 66 45 - 117 U/L    Bilirubin, Total 0.8 0.2 - 0.9 mg/dL    Total Protein 5.6 (L) 5.7 - 8.2 g/dL    Albumin 3.5 3.2 - 4.8 g/dL    Globulin  2.1 2.0 - 3.5 g/dL    A/G Ratio 1.7 1.0 - 2.0   POCT Glucose    Collection Time: 07/28/24 12:02 PM   Result Value Ref Range    POC Glucose  95 70 - 99 mg/dL   POCT Glucose    Collection Time: 07/28/24  5:35 PM   Result Value Ref Range    POC Glucose  107 (H) 70 - 99 mg/dL   POCT Glucose    Collection Time: 07/28/24 11:19 PM   Result Value Ref Range    POC Glucose  108 (H) 70 - 99 mg/dL   POCT Glucose    Collection  Time: 07/29/24  5:25 AM   Result Value Ref Range    POC Glucose  114 (H) 70 - 99 mg/dL   POCT Glucose    Collection Time: 07/29/24 11:52 AM   Result Value Ref Range    POC Glucose  146 (H) 70 - 99 mg/dL            Last A1c was done on  .       Test results/Imaging:   CT BRAIN OR HEAD (CPT=70450)    Result Date: 7/28/2024  CONCLUSION:  1. Stable 4 mm mildly dense lesion in the right subinsular region, which could represent a small subacute focus of hemorrhage.  Continued imaging follow-up is suggested. 2. Stable chronic left occipital lobe cortical infarction. 3. Stable chronic left thalamic lacunar infarction. 4. Stable mild generalized atrophy and severe chronic microangiopathic ischemic changes with large vessel calcific atherosclerosis.    Dictated by (CST): Sami Cabrera MD on 7/28/2024 at 8:27 PM     Finalized by (CST): Sami Cabrera MD on 7/28/2024 at 8:31 PM          CTA BRAIN + CTA CAROTIDS (CPT=70496/27532)    Result Date: 7/27/2024  CONCLUSION:  1. No major vessel occlusion, hemodynamically significant stenosis, dissection, AVM. 2. 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 3. No intracranial aneurysm. 4. New 4 mm focus of hyperattenuation in right subinsular region suspicious for a small focus of hemorrhage. 5. 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 6. Old left thalamic lacunar infarct. 7. Advanced changes of chronic small vessel disease in cerebral white matter.  8. 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  Advanced emphysema.      Dictated by (CST): Sohail Weathers MD on 7/27/2024 at 8:23 PM     Finalized by (CST): Sohail Weathers MD on 7/27/2024 at 8:41 PM               CT BRAIN OR HEAD (CPT=70450)    Result Date: 7/28/2024  CONCLUSION:  1. Stable 4 mm mildly dense lesion in the right subinsular region, which could represent a small subacute focus of hemorrhage.  Continued imaging follow-up is suggested. 2. Stable chronic left occipital  lobe cortical infarction. 3. Stable chronic left thalamic lacunar infarction. 4. Stable mild generalized atrophy and severe chronic microangiopathic ischemic changes with large vessel calcific atherosclerosis.    Dictated by (CST): Sami Cabrera MD on 7/28/2024 at 8:27 PM     Finalized by (CST): Sami Cabrera MD on 7/28/2024 at 8:31 PM          CTA BRAIN + CTA CAROTIDS (CPT=70496/26406)    Result Date: 7/27/2024  CONCLUSION:  1. No major vessel occlusion, hemodynamically significant stenosis, dissection, AVM. 2. 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 3. No intracranial aneurysm. 4. New 4 mm focus of hyperattenuation in right subinsular region suspicious for a small focus of hemorrhage. 5. 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 6. Old left thalamic lacunar infarct. 7. Advanced changes of chronic small vessel disease in cerebral white matter.  8. 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  Advanced emphysema.      Dictated by (CST): Sohail Weathers MD on 7/27/2024 at 8:23 PM     Finalized by (CST): Sohail Weathers MD on 7/27/2024 at 8:41 PM           Performed an independent visualization of head CT from 7/28/2024,  Imaging revealed: Agree with read.         Disclaimer:   This record was dictated using Dragon software. There may be errors due to voice recognition problems that were not realized and corrected during the completion of the note.      This document is not intended to support charting by exception.  Sections left blank in a completed note should be presumed not to have been done.     Total time spent involved in the care of the patient including time spent writing the note, reviewing the labs, reviewing the relevant imaging, and face to face time was 35 minutes, more than 50% of the time was spent in counseling and/or coordination of care related to  ICH, delirium, dementia.        Thank you.  Kirit Lee D.O.   Vascular & General  Neurology       07/29/24

## 2024-07-29 NOTE — PROGRESS NOTES
[facility]     Gastroenterology Progress Note    Nacho Mari Patient Status:  Inpatient    10/14/1933 MRN [id number]   Location [facility] 2W/SW Attending Eric Fierro MD   Hosp Day # 2 PCP Oscar (Katie) MD Gabriel       Subjective:   No specific complaints this morning. D/w nursing, stool brown. Tolerating diet.    Objective:   Blood pressure 129/65, pulse 91, temperature 97.2 °F (36.2 °C), temperature source Temporal, resp. rate 17, weight 98 lb 5.2 oz (44.6 kg), SpO2 91%. Body mass index is 17.98 kg/m².    General: awake, alert and oriented, no acute distress  HEENT: moist mucus membranes  PULM: no conversational dyspnea  CARDIOVASCULAR: regular rate and rhythm, the extremities are warm and well perfused  GI: soft, non-tender, non-distended, + BS, no rebound/guarding   EXTREMITIES: no edema, moving all extremities  SKIN: no visible rash  NEURO: appropriate and interactive    Assessment and Plan:   89 yo M with dementia, MMP presenting with some rectal bleeding. Hx of CRC and resection noted. CT reviewed showing stercoral proctitis which is now improved with conservative management.     Hgb stable yesterday  Miralax BID as bowel regimen    GI will be available as needed, please call with questions    Davida Doty MD  [facility]      Results:     Lab Results   Component Value Date    WBC 10.5 2024    HGB 10.8 (L) 2024    HCT 32.4 (L) 2024    .0 2024    CREATSERUM 0.94 2024    BUN 18 2024     2024    K 4.4 2024     2024    CO2 30.0 2024    GLU 80 2024    CA 9.0 2024    ALB 3.5 2024    ALKPHO 66 2024    BILT 0.8 2024    TP 5.6 (L) 2024    AST 25 2024    ALT <7 (L) 2024    PTT 24.8 2024    INR 0.98 2024    MG 1.9 2024       CT BRAIN OR HEAD (CPT=70450)    Result Date: 2024  CONCLUSION:  1. Stable 4 mm  mildly dense lesion in the right subinsular region, which could represent a small subacute focus of hemorrhage.  Continued imaging follow-up is suggested. 2. Stable chronic left occipital lobe cortical infarction. 3. Stable chronic left thalamic lacunar infarction. 4. Stable mild generalized atrophy and severe chronic microangiopathic ischemic changes with large vessel calcific atherosclerosis.    Dictated by (CST): Sami Cabrera MD on 7/28/2024 at 8:27 PM     Finalized by (CST): Sami Cabrera MD on 7/28/2024 at 8:31 PM          CTA BRAIN + CTA CAROTIDS (CPT=70496/41616)    Result Date: 7/27/2024  CONCLUSION:  1. No major vessel occlusion, hemodynamically significant stenosis, dissection, AVM. 2. 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 3. No intracranial aneurysm. 4. New 4 mm focus of hyperattenuation in right subinsular region suspicious for a small focus of hemorrhage. 5. 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 6. Old left thalamic lacunar infarct. 7. Advanced changes of chronic small vessel disease in cerebral white matter.  8. 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  Advanced emphysema.      Dictated by (CST): Sohail Weathers MD on 7/27/2024 at 8:23 PM     Finalized by (CST): Sohail Weathers MD on 7/27/2024 at 8:41 PM          CT ABDOMEN+PELVIS(CONTRAST ONLY)(CPT=74177)    Result Date: 7/27/2024  CONCLUSION:  1.  Post distal sigmoid colon with colorectal anastomosis.  No obstruction at the surgical site however the bowel at and distal to anastomosis to the lower rectum has a large volume of fecal material within and there is also nonuniform wall thickening in  the same distribution.  There is perirectal and presacral fat stranding.  Findings suggest a nonspecific proctocolitis and/or stercoral colitis. 2.  Post left hip arthroplasty. 3.  Renal cysts. 4.  Coronary atherosclerosis.   Dictated by (CST): Jonnie Nicholas MD on 7/27/2024 at 12:30 PM      Finalized by (CST): Jonnie Nicholas MD on 7/27/2024 at 12:35 PM

## 2024-07-29 NOTE — OCCUPATIONAL THERAPY NOTE
OCCUPATIONAL THERAPY EVALUATION - INPATIENT     Room Number: 214/214-A  Evaluation Date: 7/29/2024  Type of Evaluation: Initial  Presenting Problem: GI hemorrhage, AMS (hx of dementia)    Physician Order: IP Consult to Occupational Therapy  Reason for Therapy: ADL/IADL Dysfunction and Discharge Planning    OCCUPATIONAL THERAPY ASSESSMENT   Patient is a 89 year old male admitted 7/27/2024 for GI hemorrhage, AMS,  Right subinsular hemorrhage ; hx of dementia, lives at a memory care facility. Prior to admission, patient's baseline is unclear -- pt is from memory care, has 24 hour nursing care .  Patient is currently functioning below baseline with ADLs and functional mobility.  Patient is requiring Mod A as a result of the following impairments: limited reach, impaired cognition (baseline), decreased sitting balance, fatigue. Occupational Therapy will continue to follow for duration of hospitalization.    Recommend return to memory care with continued 24 hour nursing care -- HHOT.     PLAN  OT Treatment Plan: Balance activities;Energy conservation/work simplification techniques;ADL training;Functional transfer training;Equipment eval/education;Compensatory technique education  OT Device Recommendations: TBD    OCCUPATIONAL THERAPY MEDICAL/SOCIAL HISTORY   Problem List  Principal Problem:    Gastrointestinal hemorrhage, unspecified gastrointestinal hemorrhage type  Active Problems:    BRBPR (bright red blood per rectum)    Adenocarcinoma of rectosigmoid junction (HCC)    Stercoral colitis    Cerebral amyloid angiopathy (HCC)    Fibromuscular dysplasia (HCC)    Aphasia    History of dementia    Nontraumatic subcortical hemorrhage of right cerebral hemisphere (HCC)    Vascular dementia with behavioral disturbance (HCC)    HOME SITUATION  Type of Home: Skilled nursing facility  Home Layout: One level  Lives With: Staff 24 hours  Drives: No  Patient Regularly Uses: None    SUBJECTIVE  \"I'm still working!! ..not sure what \"      OCCUPATIONAL THERAPY EXAMINATION    OBJECTIVE  Precautions: Restraints  Fall Risk: High fall risk    PAIN ASSESSMENT  Ratin    ACTIVITY TOLERANCE  Room air 74 bpm   96%   136/59    COGNITION  Impaired  Alert to self  Followed one -step cues with time  Pt self directed     RANGE OF MOTION   Upper extremity ROM is within functional limits     STRENGTH ASSESSMENT  Upper extremity strength is within functional limits     COORDINATION  Gross Motor: WFL   Fine Motor: WFL     ACTIVITIES OF DAILY LIVING ASSESSMENT  AM-PAC ‘6-Clicks’ Inpatient Daily Activity Short Form  How much help from another person does the patient currently need…  -   Putting on and taking off regular lower body clothing?: A Lot  -   Bathing (including washing, rinsing, drying)?: A Lot  -   Toileting, which includes using toilet, bedpan or urinal? : A Lot  -   Putting on and taking off regular upper body clothing?: A Lot  -   Taking care of personal grooming such as brushing teeth?: A Little  -   Eating meals?: A Little    AM-PAC Score:  Score: 14  Approx Degree of Impairment: 59.67%  Standardized Score (AM-PAC Scale): 33.39  CMS Modifier (G-Code): CK    FUNCTIONAL TRANSFER ASSESSMENT  Sit to Stand: Edge of Bed  Edge of Bed: Not Tested    BED MOBILITY  Supine to Sit : Moderate Assist  Sit to Supine (OT): Minimal Assist    FUNCTIONAL ADL ASSESSMENT  Grooming Seated: Moderate Assist (sitting balance)  LB Dressing Seated: Maximum Assist    Skilled Therapy Provided: Pt was pleasant, following one-step cues. Pt at times self-directed but re-directed to tasks with cues if needed. Pt sat eob for self-feeding, light hygiene with ModA overall.  Pt returned himself to supine.     EDUCATION PROVIDED  Patient: Role of Occupational Therapy; Plan of Care; Functional Transfer Techniques; Compensatory ADL Techniques  Patient's Response to Education: Returned Demonstration; Verbalized Understanding    Patient End of Session: In bed;Needs met;Call light within  reach;RN aware of session/findings;Other (Comment) (no chair in room, pt returned self back to bed)    OT Goals  Patients self stated goal is: home     Patient will complete functional transfer with SBA  Comment:     Patient will complete toileting with SBA  Comment:     Patient will tolerate standing for 3 minutes in prep for adls with SBA   Comment:    Patient will complete item retrieval with SBA  Comment:          Goals  on: 24  Frequency: 3x/w    Patient Evaluation Complexity Level:   Occupational Profile/Medical History MODERATE - Expanded review of history including review of medical or therapy record   Specific performance deficits impacting engagement in ADL/IADL MODERATE  3 - 5 performance deficits   Client Assessment/Performance Deficits MODERATE - Comorbidities and min to mod modifications of tasks    Clinical Decision Making MODERATE - Analysis of occupational profile, detailed assessments, several treatment options    Overall Complexity MODERATE     OT Session Time: 15 minutes  Self-Care Home Management: 15 minutes

## 2024-07-29 NOTE — CONSULTS
[facility]  part of [facility]    Report of Consultation    Nacho Tolbertjaylaon Patient Status:  Inpatient    10/14/1933 MRN [id number]   Location [facility] 2W/SW Attending Eric Fierro MD   Hosp Day # 2 PCP Oscar Rios MD ([facility])     Date of Admission:  2024  Date of Consult:  2024   Reason for Consultation:   Patient presented with increased confusion, restlessness, agitation, Eric Devine MD requested psychiatric consult for evaluation and advice.    Consult Duration     The patient seen for initial psychiatric consult evaluation.   Record reviewed, communication with attending, communication with RN and patient seen face to face evaluation.    History of Present Illness:   Patient is a 89 year old , , male with past medical history of afib, hypertension, COPD, dementia who was admitted to the hospital for Gastrointestinal hemorrhage, unspecified gastrointestinal hemorrhage type: The patient has been demonstrating increased confusion, restlessness.  Patient indicated for psych consult for evaluation and advise.    Per chart review, the patient presented to the hospital by EMS from nursing facility after patient was found slumped over.     The patient received the following psychotropic medications: Seroquel 50 mg nightly    Labs and imaging reviewed: glucose 80, sodium 142, wbc 10.5    Communicating with RN, patient has been paranoid and refusing medications.    The patient seen today in his room laying down in his bed with soft wrist restraint.  Patient has not been demonstrating any agitation but noticeable confusion.    The patient today is disoriented to place, date or condition.  Patient was surprised that he is at hospital denying any knowledge of how he ended up in the hospital.  Patient otherwise demonstrating attempt to engage but noticeable distractibility.    Patient today has not been demonstrating agitation  otherwise staff indicated that the patient has been sundowning and having difficulty with his sleep and some restlessness.    The patient today denying any history of depression, anxiety or suicidal ideation.  He denying any auditory or visual hallucination?  Patient denied history of substance abuse.    The patient has been demonstrating delirium episode with alternation in mood and cognition with episodes of  increased confusion, restlessness, agitation and response to internal stimuli.   Dementia, mood disorder    Past Psychiatric/Medication History:  1. Prior diagnoses: Dementia, mood disorder  2. Past psychiatric inpatient: Unknown  3. Past outpatient history: Trazodone  4. Past suicide history: None  5. Medication history: Trazodone and Seroquel    Social History:     male.  Unknown history of alcohol or substance abuse    Family History:  Unknown family history  Medical History:   Past Medical History  Past Medical History:    Abnormal electrocardiogram (ECG) (EKG)    MICHAEL (acute kidney injury) (HCC)    Aneurysm of ascending aorta without rupture (HCC)    Aortic valve stenosis    APC (atrial premature contractions)    Cerebral amyloid angiopathy (HCC)    Chronic atrial fibrillation (HCC)    Coronary artery disease involving native coronary artery of native heart    Delirium    Dementia (HCC)    Dyspnea on exertion    Essential hypertension    Fibromuscular dysplasia (HCC)    Formatting of this note might be different from the original.    bilateral ICAs and R V2-3 without dissection      First degree AV block    Hypernatremia    Hypokalemia    Incomplete right bundle branch block    Intraparenchymal hemorrhage of brain (HCC)    Lactic acidosis    Malignant neoplasm of sigmoid colon (HCC)    Memory difficulties    Mild cognitive impairment    Mitral valve disease    Other emphysema (HCC)    Paroxysmal atrial fibrillation (HCC)    Peptic ulcer disease    Pontine hemorrhage (HCC)    Sepsis (HCC)     Sinus tachycardia    SVT (supraventricular tachycardia) (HCC)    Throat pain       Past Surgical History  History reviewed. No pertinent surgical history.    Family History  No family history on file.    Social History  Social History     Socioeconomic History    Marital status:    Tobacco Use    Smoking status: Former     Types: Cigarettes    Smokeless tobacco: Never     Social Determinants of Health     Financial Resource Strain: Low Risk  (5/20/2023)    Received from [facility]    Financial Resource Strain     In the past year, have you or any family members you live with been unable to get any of the following when it was really needed? Check all that apply.: None   Food Insecurity: Unknown (7/27/2024)    Food Insecurity     Food Insecurity: Patient unable to answer   Transportation Needs: Unknown (7/27/2024)    Transportation Needs     Lack of Transportation: Patient unable to answer   Physical Activity: Inactive (3/3/2021)    Received from [facility]    Exercise Vital Sign     Days of Exercise per Week: 0 days     Minutes of Exercise per Session: 0 min    Received from [employer]    Stress   Social Connections: High Risk (5/20/2023)    Received from [facility]    Social Connections     How often do you see or talk to people that you care about and feel close to? (For example: talking to friends on the phone, visiting friends or family, going to [employer] or club meetings): Less than once a week   Housing Stability: Unknown (7/27/2024)    Housing Stability     Housing Instability: Patient unable to answer           Current Medications:  Current Facility-Administered Medications   Medication Dose Route Frequency    budesonide (Pulmicort) 0.5 MG/2ML nebulizer suspension 0.5 mg  0.5 mg Nebulization Daily    ipratropium-albuterol (Duoneb) 0.5-2.5 (3) MG/3ML  inhalation solution 3 mL  3 mL Nebulization Daily    melatonin tab 3 mg  3 mg Oral Nightly    losartan (Cozaar) 50 mg, hydroCHLOROthiazide 12.5 mg for Benicar HCT 20/12.5 ([facility] only)   Oral Daily    polyethylene glycol (PEG 3350) (Miralax) 17 g oral packet 17 g  17 g Oral BID    fluticasone propionate (Flonase) 50 MCG/ACT nasal suspension 2 spray  2 spray Each Nare Daily    magnesium oxide (Mag-Ox) tab 400 mg  400 mg Oral Daily    pantoprazole (Protonix) DR tab 40 mg  40 mg Oral QAM AC    QUEtiapine (SEROquel) tab 50 mg  50 mg Oral Nightly    tamsulosin (Flomax) cap 0.4 mg  0.4 mg Oral Daily    traZODone (Desyrel) tab 50 mg  50 mg Oral Nightly    labetalol (Trandate) 5 mg/mL injection 10 mg  10 mg Intravenous Q4H PRN    magnesium sulfate in sterile water for injection 2 g/50mL IVPB premix 2 g  2 g Intravenous Once    sodium chloride 0.9% infusion   Intravenous Continuous    acetaminophen (Tylenol) tab 650 mg  650 mg Oral Q4H PRN    Or    acetaminophen (Tylenol) rectal suppository 650 mg  650 mg Rectal Q4H PRN    labetalol (Trandate) 5 mg/mL injection 10 mg  10 mg Intravenous Q10 Min PRN    hydrALAzine (Apresoline) 20 mg/mL injection 10 mg  10 mg Intravenous Q2H PRN     Medications Prior to Admission   Medication Sig    acetaminophen 325 MG Oral Tab Take 1 tablet (325 mg total) by mouth every 6 (six) hours as needed for Pain.    magnesium oxide 400 MG Oral Tab Take 1 tablet (400 mg total) by mouth daily.    Melatonin 3 MG Oral Cap Take 1 capsule (3 mg total) by mouth daily.    pantoprazole 40 MG Oral Tab EC Take 1 tablet (40 mg total) by mouth every morning before breakfast.    fluticasone propionate 50 MCG/ACT Nasal Suspension 2 sprays by Each Nare route daily.    Polyethylene Glycol 3350 17 g Oral Powd Pack Take 17 g by mouth as needed.    QUEtiapine 50 MG Oral Tab Take 1 tablet (50 mg total) by mouth nightly.    tamsulosin 0.4 MG Oral Cap Take 1 capsule (0.4 mg total) by mouth daily.    traZODone 50 MG Oral Tab  Take 1 tablet (50 mg total) by mouth nightly.    fluticasone-umeclidin-vilant (TRELEGY ELLIPTA) 100-62.5-25 MCG/ACT Inhalation Aerosol Powder, Breath Activated Inhale 1 puff into the lungs daily.       Allergies  No Known Allergies    Review of Systems:   As by Admitting/Attending    Results:   Laboratory Data:  Lab Results   Component Value Date    WBC 10.5 07/28/2024    HGB 10.8 (L) 07/28/2024    HCT 32.4 (L) 07/28/2024    .0 07/28/2024    CREATSERUM 0.94 07/28/2024    BUN 18 07/28/2024     07/28/2024    K 4.4 07/28/2024     07/28/2024    CO2 30.0 07/28/2024    GLU 80 07/28/2024    CA 9.0 07/28/2024    ALB 3.5 07/28/2024    ALKPHO 66 07/28/2024    TP 5.6 (L) 07/28/2024    AST 25 07/28/2024    ALT <7 (L) 07/28/2024    PTT 24.8 07/27/2024    INR 0.98 07/27/2024    PTP 13.6 07/27/2024    MG 1.9 07/28/2024         Imaging:  CT BRAIN OR HEAD (CPT=70450)    Result Date: 7/28/2024  CONCLUSION:  1. Stable 4 mm mildly dense lesion in the right subinsular region, which could represent a small subacute focus of hemorrhage.  Continued imaging follow-up is suggested. 2. Stable chronic left occipital lobe cortical infarction. 3. Stable chronic left thalamic lacunar infarction. 4. Stable mild generalized atrophy and severe chronic microangiopathic ischemic changes with large vessel calcific atherosclerosis.    Dictated by (CST): Sami Cabrera MD on 7/28/2024 at 8:27 PM     Finalized by (CST): Sami Cabrera MD on 7/28/2024 at 8:31 PM          CTA BRAIN + CTA CAROTIDS (CPT=70496/43450)    Result Date: 7/27/2024  CONCLUSION:  1. No major vessel occlusion, hemodynamically significant stenosis, dissection, AVM. 2. 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 3. No intracranial aneurysm. 4. New 4 mm focus of hyperattenuation in right subinsular region suspicious for a small focus of hemorrhage. 5. 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 6. Old left thalamic  lacunar infarct. 7. Advanced changes of chronic small vessel disease in cerebral white matter.  8. 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  Advanced emphysema.      Dictated by (CST): Sohail Weathers MD on 7/27/2024 at 8:23 PM     Finalized by (CST): Sohail Weathers MD on 7/27/2024 at 8:41 PM          CT ABDOMEN+PELVIS(CONTRAST ONLY)(CPT=74177)    Result Date: 7/27/2024  CONCLUSION:  1.  Post distal sigmoid colon with colorectal anastomosis.  No obstruction at the surgical site however the bowel at and distal to anastomosis to the lower rectum has a large volume of fecal material within and there is also nonuniform wall thickening in  the same distribution.  There is perirectal and presacral fat stranding.  Findings suggest a nonspecific proctocolitis and/or stercoral colitis. 2.  Post left hip arthroplasty. 3.  Renal cysts. 4.  Coronary atherosclerosis.   Dictated by (CST): Jonnie Nicholas MD on 7/27/2024 at 12:30 PM     Finalized by (CST): Jonnie Nicholas MD on 7/27/2024 at 12:35 PM           Vital Signs:   Blood pressure 122/70, pulse 81, temperature 97.8 °F (36.6 °C), temperature source Temporal, resp. rate 17, weight 44.6 kg (98 lb 5.2 oz), SpO2 96%.    Mental Status Exam:   Appearance: Stated age male, in hospital gown, laying down in hospital bed.  Psychomotor: Patient has been demonstrating restlessness and agitation.  No agitation during evaluation today.  Orientation: Alert and oriented to person. Patient confused  Gait: Not evaluated.  Attitude/Coorperation: limited cooperation due to confusion, restlessness, and distractibility  Behavior: episodes of restlessness and agitation.  Speech: Regular rate and rhythm speech.  No dysarthria  Mood: Reporting feeling fine.  Affect: Apathetic and restricted affect..  Thought process: Confused, disorganized  Thought content: No reports of  suicidal or homicidal ideation.  Perceptions: Patient has been demonstrating response to internal stimuli.  Concentration:  Grossly impaired  Memory: Grossly impaired  Intellect: Average.  Judgment and Insight: Questionable.     Impression:     Delirium superimposed on dementia.  Vascular dementia with behavioral disturbance.  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    The patient has been demonstrating delirium episode with alternation in mood and cognition with episodes of  increased confusion, restlessness, agitation and response to internal stimuli.     Discussed risk and benefit, acknowledging the current symptom and severity.  At this point, I would recommend the following approach:     Focus on safety  Focus on education and support.  Focus on insight orientation helping the patient understand diagnosis and treatment plan.  Discontinue Seroquel.  Start Zyprexa 5 mg in the evening.  Change trazodone to 25 mg nightly.  Continue melatonin 3 mg nightly.  Start lamotrigine 25 mg twice daily  Coordinate plan with team    Orders This Visit:  Orders Placed This Encounter   Procedures    CBC With Differential With Platelet    Comp Metabolic Panel (14)    Prothrombin Time (PT)    PTT, Activated    REDRAW CMP (P)    Magnesium    Potassium    Magnesium    CBC, Platelet; No Differential    Comp Metabolic Panel (14)    Type and screen    ABORH (Blood Type)    Antibody Screen       Meds This Visit:  Requested  Prescriptions      No prescriptions requested or ordered in this encounter       Tylor Dang MD  7/29/2024    Note to Patient: The 21st Century Cures Act makes medical notes like these available to patients in the interest of transparency. However, be advised this is a medical document. It is intended as peer to peer communication. It is written in medical language and may contain abbreviations or verbiage that are unfamiliar. It may appear blunt or direct. Medical documents are intended to carry relevant information, facts as evident, and the clinical opinion of the practitioner. This note may have been transcribed using a voice dictation system. Voice recognition errors may occur. This should not be taken to alter the content or meaning of this note.

## 2024-07-29 NOTE — PROGRESS NOTES
[facility]  part of [facility]    Progress Note    Nacho Mari Patient Status:  Inpatient    10/14/1933 MRN [id number]   Location [facility] 2W/SW Attending Eric Fierro MD   Hosp Day # 1 PCP Oscar (Katie) MD Gabriel       Subjective:   Nacho Mari is a(n) 89 year old male GI bleed.    Objective:     Vitals:    24 2300   BP: 135/66   Pulse: 84   Resp: 19   Temp:        Intake/Output Summary (Last 24 hours) at 2024 2331  Last data filed at 2024 2200  Gross per 24 hour   Intake 1690.2 ml   Output --   Net 1690.2 ml     Wt Readings from Last 2 Encounters:   24 98 lb 5.2 oz (44.6 kg)   24 95 lb (43.1 kg)       General appearance:  alert, appears stated age, and cooperative  Head: Normocephalic, without obvious abnormality, atraumatic  Eyes: conjunctivae/corneas clear. PERRL, EOM's intact. Fundi benign.  Neck: no adenopathy, no carotid bruit, no JVD, supple, symmetrical, trachea midline, and thyroid not enlarged, symmetric, no tenderness/mass/nodules  Pulmonary: clear to auscultation bilaterally  Cardiovascular: S1, S2 normal, no murmur, click, rub or gallop, regular rate and rhythm  Abdominal: soft, non-tender; bowel sounds normal; no masses,  no organomegaly  Extremities: extremities normal, atraumatic, no cyanosis or edema  Pulses: 2+ and symmetric  Neurologic: Grossly normal  Genital Exam: defer exam    Current Medications:    Current Facility-Administered Medications:     budesonide (Pulmicort) 0.5 MG/2ML nebulizer suspension 0.5 mg, 0.5 mg, Nebulization, Daily    ipratropium-albuterol (Duoneb) 0.5-2.5 (3) MG/3ML inhalation solution 3 mL, 3 mL, Nebulization, Daily    melatonin tab 3 mg, 3 mg, Oral, Nightly    losartan (Cozaar) 50 mg, hydroCHLOROthiazide 12.5 mg for Benicar HCT 20/12.5 (EE[facility] only), , Oral, Daily    polyethylene glycol (PEG 3350) (Miralax) 17 g oral packet 17 g, 17 g, Oral, BID    fluticasone propionate (Flonase) 50  MCG/ACT nasal suspension 2 spray, 2 spray, Each Nare, Daily    magnesium oxide (Mag-Ox) tab 400 mg, 400 mg, Oral, Daily    pantoprazole (Protonix) DR tab 40 mg, 40 mg, Oral, QAM AC    QUEtiapine (SEROquel) tab 50 mg, 50 mg, Oral, Nightly    tamsulosin (Flomax) cap 0.4 mg, 0.4 mg, Oral, Daily    traZODone (Desyrel) tab 50 mg, 50 mg, Oral, Nightly    labetalol (Trandate) 5 mg/mL injection 10 mg, 10 mg, Intravenous, Q4H PRN    magnesium sulfate in sterile water for injection 2 g/50mL IVPB premix 2 g, 2 g, Intravenous, Once    sodium chloride 0.9% infusion, , Intravenous, Continuous    acetaminophen (Tylenol) tab 650 mg, 650 mg, Oral, Q4H PRN **OR** acetaminophen (Tylenol) rectal suppository 650 mg, 650 mg, Rectal, Q4H PRN    labetalol (Trandate) 5 mg/mL injection 10 mg, 10 mg, Intravenous, Q10 Min PRN    hydrALAzine (Apresoline) 20 mg/mL injection 10 mg, 10 mg, Intravenous, Q2H PRN    Allergies  No Known Allergies    Assessment and Plan:     Gastrointestinal hemorrhage, unspecified gastrointestinal hemorrhage type          BRBPR (bright red blood per rectum)          Adenocarcinoma of rectosigmoid junction (HCC)          Stercoral colitis          Cerebral amyloid angiopathy (HCC)          Fibromuscular dysplasia (HCC)          Aphasia          History of dementia          Nontraumatic subcortical hemorrhage of right cerebral hemisphere (HCC)     Assessment and plan  Problem 1 altered mental status patient has altered mental status in the setting of elevated blood pressure progressively worsening baseline and history of falls and also history of questionable insular bleed patient will be transferred to ICU patient's care was discussed with the primary RN and also neurologist.  In addition patient also has history of dementia.  I will make attempts to speak with the patient's family and get a better understanding of family's wishes and also of his baseline mental status.     Problem #2 bright red blood per rectum this  will be evaluated GI will be consulted patient will be off aspirin patient does not seem to have acute hematochezia or BRBPR.  Will avoid heparin for DVT prophylaxis.     Problem #3 atrial fibrillation: Patient is not a candidate for anticoagulation and hence will even discontinue aspirin in light of current GI bleed and questionable insular cortex bleed.     Problem #4 adenocarcinoma of the colon: Patient currently does not have any obstructive features but surgical oncology has been consulted.  Further input and likely nonoperative process will be followed given patient's advanced age.     Problem #5 dementia: Supportive care will be provided patient is not on memantine will consider putting patient on donepezil and memantine further input for from neurology is awaited.     Problem #6 DVT prophylaxis: Patient will be on SCD boots.     Problem #7 GI prophylaxis: Patient will be on Protonix.    July 28, 2024  Patient is more alert neuro logic exam is unchanged.  Patient is in ICU appears stable.  Appreciate general surgery evaluation and neurologic evaluation.  Patient has some sundowning and started on haloperidol at bedtime.  Psych consult has been requested.  Will advance diet as no surgical intervention is planned.  Transfer out of the ICU once stable.    Results:     Lab Results   Component Value Date    WBC 10.5 07/28/2024    HGB 10.8 (L) 07/28/2024    HCT 32.4 (L) 07/28/2024    .0 07/28/2024     07/28/2024    K 4.4 07/28/2024     07/28/2024    CO2 30.0 07/28/2024    CREATSERUM 0.94 07/28/2024    BUN 18 07/28/2024    GLU 80 07/28/2024    CA 9.0 07/28/2024    ALB 3.5 07/28/2024    ALKPHO 66 07/28/2024    BILT 0.8 07/28/2024    TP 5.6 (L) 07/28/2024    AST 25 07/28/2024    ALT <7 (L) 07/28/2024    PTT 24.8 07/27/2024    INR 0.98 07/27/2024    MG 1.9 07/28/2024     No results found for this visit on 07/27/24.    CT BRAIN OR HEAD (CPT=70450)    Result Date: 7/28/2024  CONCLUSION:  1. Stable 4 mm  mildly dense lesion in the right subinsular region, which could represent a small subacute focus of hemorrhage.  Continued imaging follow-up is suggested. 2. Stable chronic left occipital lobe cortical infarction. 3. Stable chronic left thalamic lacunar infarction. 4. Stable mild generalized atrophy and severe chronic microangiopathic ischemic changes with large vessel calcific atherosclerosis.    Dictated by (CST): Sami Cabrera MD on 7/28/2024 at 8:27 PM     Finalized by (CST): Sami Cabrera MD on 7/28/2024 at 8:31 PM          CTA BRAIN + CTA CAROTIDS (CPT=70496/42135)    Result Date: 7/27/2024  CONCLUSION:  1. No major vessel occlusion, hemodynamically significant stenosis, dissection, AVM. 2. 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 3. No intracranial aneurysm. 4. New 4 mm focus of hyperattenuation in right subinsular region suspicious for a small focus of hemorrhage. 5. 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 6. Old left thalamic lacunar infarct. 7. Advanced changes of chronic small vessel disease in cerebral white matter.  8. 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  Advanced emphysema.      Dictated by (CST): Sohail Weathers MD on 7/27/2024 at 8:23 PM     Finalized by (CST): Sohail Weathers MD on 7/27/2024 at 8:41 PM          CT ABDOMEN+PELVIS(CONTRAST ONLY)(CPT=74177)    Result Date: 7/27/2024  CONCLUSION:  1.  Post distal sigmoid colon with colorectal anastomosis.  No obstruction at the surgical site however the bowel at and distal to anastomosis to the lower rectum has a large volume of fecal material within and there is also nonuniform wall thickening in  the same distribution.  There is perirectal and presacral fat stranding.  Findings suggest a nonspecific proctocolitis and/or stercoral colitis. 2.  Post left hip arthroplasty. 3.  Renal cysts. 4.  Coronary atherosclerosis.   Dictated by (CST): Jonnie Nicholas MD on 7/27/2024 at 12:30 PM      Finalized by (CST): Jonnie Nicholas MD on 7/27/2024 at 12:35 PM         EKG 12 Lead    Result Date: 7/28/2024  Wide QRS rhythm Right bundle branch block Septal infarct , age undetermined Abnormal ECG When compared with ECG of 24-JAN-2024 22:16, Wide QRS rhythm has replaced Sinus rhythm Vent. rate has decreased BY  37 BPM Confirmed by TYLER TRIPATHI DAVID ([id number]) on 7/28/2024 12:11:43 PM     **Certification      PHYSICIAN Certification of Need for Inpatient Hospitalization - Initial Certification    Patient will require inpatient services that will reasonably be expected to span two midnight's based on the clinical documentation in H+P.   Based on patients current state of illness, I anticipate that, after discharge, patient will require TBD.        JOSE ARMANDO RAM MD  7/28/2024

## 2024-07-29 NOTE — PLAN OF CARE
Pt agitated and paranoid tonight. Refusing all oral medications. Began removing medical equipment with increasing agitation. Reoriented and redirected. Dr. Fierro contacted, see orders. Bilateral wrist restraints applied for pt safety and maintenance of equipment. All safety measures in place, frequent nursing rounds made.     Problem: Safety Risk - Non-Violent Restraints  Goal: Patient will remain free from self-harm  Description: INTERVENTIONS:  - Apply the least restrictive restraint to prevent harm  - Notify patient and family of reasons restraints applied  - Assess for any contributing factors to confusion (electrolyte disturbances, delirium, medications)  - Discontinue any unnecessary medical devices as soon as possible  - Assess the patient's physical comfort, circulation, skin condition, hydration, nutrition and elimination needs   - Reorient and redirection as needed  - Assess for the need to continue restraints  Outcome: Progressing

## 2024-07-29 NOTE — PLAN OF CARE
Patient A&Ox1- baseline per report. On RA, saturating well. IVF continued. All other meds given per MAR. Patient w/ fair appetite, full assistance w/ meals provided. Blood glucose monitored. External Cath maintained and cliff care provided as needed, adequate urine output noted. No BM this shift. Patient turned and repositioned every 2 hrs and as needed. All safety precautions maintained.     Problem: Patient Centered Care  Goal: Patient preferences are identified and integrated in the patient's plan of care  Description: Interventions:  - What would you like us to know as we care for you? From Terra [place]   - Provide timely, complete, and accurate information to patient/family  - Incorporate patient and family knowledge, values, beliefs, and cultural backgrounds into the planning and delivery of care  - Encourage patient/family to participate in care and decision-making at the level they choose  - Honor patient and family perspectives and choices  Outcome: Progressing     Problem: PAIN - ADULT  Goal: Verbalizes/displays adequate comfort level or patient's stated pain goal  Description: INTERVENTIONS:  - Encourage pt to monitor pain and request assistance  - Assess pain using appropriate pain scale  - Administer analgesics based on type and severity of pain and evaluate response  - Implement non-pharmacological measures as appropriate and evaluate response  - Consider cultural and social influences on pain and pain management  - Manage/alleviate anxiety  - Utilize distraction and/or relaxation techniques  - Monitor for opioid side effects  - Notify MD/LIP if interventions unsuccessful or patient reports new pain  - Anticipate increased pain with activity and pre-medicate as appropriate  Outcome: Progressing     Problem: NEUROLOGICAL - ADULT  Goal: Achieves stable or improved neurological status  Description: INTERVENTIONS  - Assess for and report changes in neurological status  - Initiate measures to prevent  increased intracranial pressure  - Maintain blood pressure and fluid volume within ordered parameters to optimize cerebral perfusion and minimize risk of hemorrhage  - Monitor temperature, glucose, and sodium. Initiate appropriate interventions as ordered  Outcome: Progressing     Problem: SAFETY ADULT - FALL  Goal: Free from fall injury  Description: INTERVENTIONS:  - Assess pt frequently for physical needs  - Identify cognitive and physical deficits and behaviors that affect risk of falls.  - [place] fall precautions as indicated by assessment.  - Educate pt/family on patient safety including physical limitations  - Instruct pt to call for assistance with activity based on assessment  - Modify environment to reduce risk of injury  - Provide assistive devices as appropriate  - Consider OT/PT consult to assist with strengthening/mobility  - Encourage toileting schedule  Outcome: Progressing

## 2024-07-29 NOTE — PLAN OF CARE
Patient w/ bilateral soft wrist restraints for safety. Patient A&O x1, restless at times. Peripheral neurovascular assessment: WDL.   Problem: Safety Risk - Non-Violent Restraints  Goal: Patient will remain free from self-harm  Description: INTERVENTIONS:  - Apply the least restrictive restraint to prevent harm  - Notify patient and family of reasons restraints applied  - Assess for any contributing factors to confusion (electrolyte disturbances, delirium, medications)  - Discontinue any unnecessary medical devices as soon as possible  - Assess the patient's physical comfort, circulation, skin condition, hydration, nutrition and elimination needs   - Reorient and redirection as needed  - Assess for the need to continue restraints  Outcome: Progressing

## 2024-07-29 NOTE — PLAN OF CARE
Pt mentation fluctuating tonight. AxO x1, intermittently paranoid, refusing medications and medical equipment. Pt became agitated, haldol given x1 and restraints applied. Pt later more agreeable and able to take medications. Q4 neuro checks done, no changes. PRN given for BP management. All safety measures in place, frequent nursing rounds made.     Problem: Patient Centered Care  Goal: Patient preferences are identified and integrated in the patient's plan of care  Description: Interventions:  - What would you like us to know as we care for you?   - Provide timely, complete, and accurate information to patient/family  - Incorporate patient and family knowledge, values, beliefs, and cultural backgrounds into the planning and delivery of care  - Encourage patient/family to participate in care and decision-making at the level they choose  - Honor patient and family perspectives and choices  Outcome: Progressing     Problem: PAIN - ADULT  Goal: Verbalizes/displays adequate comfort level or patient's stated pain goal  Description: INTERVENTIONS:  - Encourage pt to monitor pain and request assistance  - Assess pain using appropriate pain scale  - Administer analgesics based on type and severity of pain and evaluate response  - Implement non-pharmacological measures as appropriate and evaluate response  - Consider cultural and social influences on pain and pain management  - Manage/alleviate anxiety  - Utilize distraction and/or relaxation techniques  - Monitor for opioid side effects  - Notify MD/LIP if interventions unsuccessful or patient reports new pain  - Anticipate increased pain with activity and pre-medicate as appropriate  Outcome: Progressing     Problem: RISK FOR INFECTION - ADULT  Goal: Absence of fever/infection during anticipated neutropenic period  Description: INTERVENTIONS  - Monitor WBC  - Administer growth factors as ordered  - Implement neutropenic guidelines  Outcome: Progressing     Problem: SAFETY  ADULT - FALL  Goal: Free from fall injury  Description: INTERVENTIONS:  - Assess pt frequently for physical needs  - Identify cognitive and physical deficits and behaviors that affect risk of falls.  - [place] fall precautions as indicated by assessment.  - Educate pt/family on patient safety including physical limitations  - Instruct pt to call for assistance with activity based on assessment  - Modify environment to reduce risk of injury  - Provide assistive devices as appropriate  - Consider OT/PT consult to assist with strengthening/mobility  - Encourage toileting schedule  Outcome: Progressing     Problem: DISCHARGE PLANNING  Goal: Discharge to home or other facility with appropriate resources  Description: INTERVENTIONS:  - Identify barriers to discharge w/pt and caregiver  - Include patient/family/discharge partner in discharge planning  - Arrange for needed discharge resources and transportation as appropriate  - Identify discharge learning needs (meds, wound care, etc)  - Arrange for interpreters to assist at discharge as needed  - Consider post-discharge preferences of patient/family/discharge partner  - Complete POLST form as appropriate  - Assess patient's ability to be responsible for managing their own health  - Refer to Case Management Department for coordinating discharge planning if the patient needs post-hospital services based on physician/LIP order or complex needs related to functional status, cognitive ability or social support system  Outcome: Progressing     Problem: Patient/Family Goals  Goal: Patient/Family Long Term Goal  Description: Patient's Long Term Goal:     Interventions:  -   - See additional Care Plan goals for specific interventions  Outcome: Progressing  Goal: Patient/Family Short Term Goal  Description: Patient's Short Term Goal:     Interventions:   -   - See additional Care Plan goals for specific interventions  Outcome: Progressing     Problem: NEUROLOGICAL - ADULT  Goal:  Achieves stable or improved neurological status  Description: INTERVENTIONS  - Assess for and report changes in neurological status  - Initiate measures to prevent increased intracranial pressure  - Maintain blood pressure and fluid volume within ordered parameters to optimize cerebral perfusion and minimize risk of hemorrhage  - Monitor temperature, glucose, and sodium. Initiate appropriate interventions as ordered  Outcome: Progressing  Goal: Achieves maximal functionality and self care  Description: INTERVENTIONS  - Monitor swallowing and airway patency with patient fatigue and changes in neurological status  - Encourage and assist patient to increase activity and self care with guidance from PT/OT  - Encourage visually impaired, hearing impaired and aphasic patients to use assistive/communication devices  Outcome: Progressing     Problem: Safety Risk - Non-Violent Restraints  Goal: Patient will remain free from self-harm  Description: INTERVENTIONS:  - Apply the least restrictive restraint to prevent harm  - Notify patient and family of reasons restraints applied  - Assess for any contributing factors to confusion (electrolyte disturbances, delirium, medications)  - Discontinue any unnecessary medical devices as soon as possible  - Assess the patient's physical comfort, circulation, skin condition, hydration, nutrition and elimination needs   - Reorient and redirection as needed  - Assess for the need to continue restraints  7/29/2024 0518 by Natividad Barakat, RN  Outcome: Progressing  7/28/2024 2326 by Natividad Barakat, RN  Outcome: Progressing

## 2024-07-29 NOTE — PHYSICAL THERAPY NOTE
PHYSICAL THERAPY EVALUATION - INPATIENT     Room Number: 214/214-A  Evaluation Date: 7/29/2024  Type of Evaluation: Initial   Physician Order: PT Eval and Treat    Presenting Problem: GI bleed     Reason for Therapy: Mobility Dysfunction and Discharge Planning    PHYSICAL THERAPY ASSESSMENT   Patient is a 89 year old male admitted 7/27/2024 for GI bleed.  Prior to admission, patient's baseline is assist for ADL's and ambulation with rolling walker per patient, however patient poor historian.  Patient is currently functioning below baseline with bed mobility, transfers, and gait.  Patient is requiring moderate assist as a result of the following impairments: cognitive deficits (history of dementia) and medical status.  Physical Therapy will continue to follow for duration of hospitalization.    Patient will benefit from continued skilled PT Services return to long term care with restorative therapies, patient can [facility] with therapy as needed.     PLAN  PT Treatment Plan: Bed mobility;Body mechanics;Patient education;Gait training;Transfer training;Balance training  Rehab Potential : Fair  Frequency (Obs): 3-5x/week    PHYSICAL THERAPY MEDICAL/SOCIAL HISTORY   Problem List  Principal Problem:    Gastrointestinal hemorrhage, unspecified gastrointestinal hemorrhage type  Active Problems:    BRBPR (bright red blood per rectum)    Adenocarcinoma of rectosigmoid junction (HCC)    Stercoral colitis    Cerebral amyloid angiopathy (HCC)    Fibromuscular dysplasia (HCC)    Aphasia    History of dementia    Nontraumatic subcortical hemorrhage of right cerebral hemisphere (HCC)    Vascular dementia with behavioral disturbance (HCC)    HOME SITUATION  Home Situation  Type of Home: Skilled nursing facility  Home Layout: One level  Stairs to Enter : 0  Railing: No  Stairs to Bedroom: 0  Railing: No  Lives With: Staff 24 hours  Drives: No  Patient Owned Equipment: Rolling walker  Patient Regularly Uses: None      SUBJECTIVE  \"I can do that\"    PHYSICAL THERAPY EXAMINATION   OBJECTIVE  Precautions: Restraints  Fall Risk: High fall risk    WEIGHT BEARING RESTRICTION  Weight Bearing Restriction: None                PAIN ASSESSMENT  Ratin          COGNITION  Overall Cognitive Status:  Impaired    RANGE OF MOTION AND STRENGTH ASSESSMENT  Lower extremity ROM is within functional limits  BLE WNL, however patient with deformity noted in bilateral great toes   Lower extremity strength is within functional limits  BLE WNL    BALANCE  Static Sitting: Fair  Dynamic Sitting: Fair -  Static Standing: Not tested  Dynamic Standing: Not tested    AM-PAC '6-Clicks' INPATIENT SHORT FORM - BASIC MOBILITY  How much difficulty does the patient currently have...  Patient Difficulty: Turning over in bed (including adjusting bedclothes, sheets and blankets)?: A Lot   Patient Difficulty: Sitting down on and standing up from a chair with arms (e.g., wheelchair, bedside commode, etc.): Unable   Patient Difficulty: Moving from lying on back to sitting on the side of the bed?: A Lot   How much help from another person does the patient currently need...   Help from Another: Moving to and from a bed to a chair (including a wheelchair)?: Total   Help from Another: Need to walk in hospital room?: Total   Help from Another: Climbing 3-5 steps with a railing?: Total     AM-PAC Score:  Raw Score: 8   Approx Degree of Impairment: 86.62%   Standardized Score (AM-PAC Scale): 28.58   CMS Modifier (G-Code): CM    FUNCTIONAL ABILITY STATUS  Functional Mobility/Gait Assessment  Gait Assistance: Not tested  Rolling:  min A x 1  Supine to Sit: moderate assist  Sit to Supine: contact guard assist  Sit to Stand:  not tested     Exercise/Education Provided:  Bed mobility  Body mechanics    Skilled Therapy Provided: Patient on room air. Patient currently with mod A x 1 for supine to sit but able to get himself back to bed when finished sitting edge of bed. Patient  able to sit edge of bed for about 5 minutes with CGA, reports ambulation but unsure of how accurate this is. Patient oxygen sat 96% and heart rate 73, blood pressure 136/59. The patient's Approx Degree of Impairment: 86.62% has been calculated based on documentation in the [facility] '6 clicks' Inpatient Basic Mobility Short Form.  Research supports that patients with this level of impairment may benefit from long term care, however patient can return [facility] [place] with therapy as needed. Patient received semi-fowlers in bed, agreeable to physical therapy evaluation. Education with patient provided verbally on physical therapy plan of care and physiological benefits of out of bed mobility. Next session anticipate to progress bed mobility, transfers, and gait.    Patient history and/or personal factors that may impact the plan of care include dementia. Based on the physical therapy examination of the noted systems and functional activity/participation limitations, the patient presentation is evolving given the patient demonstrates worsening of previously stable condition, demonstrates worsening of co-morbidities, demonstrates cognitive skills affecting safety, and demonstrates impulsivity/decreased safety awareness.   Final disposition will be made by interdisciplinary medical team. Restraints in place at end of session.     Patient End of Session: In bed;Needs met;Call light within reach;RN aware of session/findings;All patient questions and concerns addressed;Restraints    CURRENT GOALS  Goals to be met by: 8/15/24  Patient Goal Patient's self-stated goal is: to go home   Goal #1 Patient is able to demonstrate supine - sit EOB @ level: minimum assistance     Goal #1   Current Status    Goal #2 Patient is able to demonstrate transfers Sit to/from Stand at assistance level: minimum assistance with walker - rolling     Goal #2  Current Status    Goal #3 Patient is able to ambulate 10 feet with assist device: walker -  rolling at assistance level: minimum assistance   Goal #3   Current Status    Goal #4    Goal #4   Current Status    Goal #5 Patient to demonstrate independence with home activity/exercise instructions provided to patient in preparation for discharge.   Goal #5   Current Status    Goal #6    Goal #6  Current Status      Patient Evaluation Complexity Level:  History Moderate - 1 or 2 personal factors and/or co-morbidities   Examination of body systems Moderate - addressing a total of 3 or more elements   Clinical Presentation  Moderate - Evolving   Clinical Decision Making  Moderate Complexity     Therapeutic Activity:  23 minutes

## 2024-07-29 NOTE — CONSULTS
Neurosurgery Consult Note    Nacho Mari Patient Status:  Inpatient    10/14/1933 MRN [id number]   Location [facility] 2W/SW Attending Eric Fierro MD   Hosp Day # 2 PCP Oscar Rios MD ([place])     REASON FOR CONSULTATION: Right subinsular hemorrhage    HISTORY OF PRESENT ILLNESS:    Nacho Mari is a(n) 89 year old male with a pertinent medical history of dementia, cerebral amyloid angiopathy, intracranial hemorrhages, and A-fib not on anticoagulation antibiotic therapy, who presented to the ED from his nursing home after reported fall.  Further workup in the ED with a CTA head/neck demonstrated concern for a small subacute hemorrhage in the right subinsular region, for which neurosurgery was consulted.  Repeat scan demonstrates stability.  The patient is a marginal historian.  Majority of history obtained from chart review.  Currently, he denies any complaints.    PAST MEDICAL HISTORY:  Past Medical History:    Abnormal electrocardiogram (ECG) (EKG)    MICHAEL (acute kidney injury) (HCC)    Aneurysm of ascending aorta without rupture (HCC)    Aortic valve stenosis    APC (atrial premature contractions)    Cerebral amyloid angiopathy (HCC)    Chronic atrial fibrillation (HCC)    Coronary artery disease involving native coronary artery of native heart    Delirium    Dementia (HCC)    Dyspnea on exertion    Essential hypertension    Fibromuscular dysplasia (HCC)    Formatting of this note might be different from the original.    bilateral ICAs and R V2-3 without dissection      First degree AV block    Hypernatremia    Hypokalemia    Incomplete right bundle branch block    Intraparenchymal hemorrhage of brain (HCC)    Lactic acidosis    Malignant neoplasm of sigmoid colon (HCC)    Memory difficulties    Mild cognitive impairment    Mitral valve disease    Other emphysema (HCC)    Paroxysmal atrial fibrillation (HCC)    Peptic ulcer disease    Pontine hemorrhage (HCC)    Sepsis (HCC)     Sinus tachycardia    SVT (supraventricular tachycardia) (HCC)    Throat pain       PAST SURGICAL HISTORY:  History reviewed. No pertinent surgical history.    FAMILY HISTORY:  family history is not on file.    SOCIAL HISTORY:   reports that he has quit smoking. His smoking use included cigarettes. He has never used smokeless tobacco.    ALLERGIES:  No Known Allergies    MEDICATIONS:  Medications Prior to Admission   Medication Sig Dispense Refill Last Dose    acetaminophen 325 MG Oral Tab Take 1 tablet (325 mg total) by mouth every 6 (six) hours as needed for Pain.   7/26/2024    magnesium oxide 400 MG Oral Tab Take 1 tablet (400 mg total) by mouth daily.   7/26/2024    Melatonin 3 MG Oral Cap Take 1 capsule (3 mg total) by mouth daily.   7/26/2024    pantoprazole 40 MG Oral Tab EC Take 1 tablet (40 mg total) by mouth every morning before breakfast.   7/26/2024    fluticasone propionate 50 MCG/ACT Nasal Suspension 2 sprays by Each Nare route daily.   7/26/2024    Polyethylene Glycol 3350 17 g Oral Powd Pack Take 17 g by mouth as needed.   7/26/2024    QUEtiapine 50 MG Oral Tab Take 1 tablet (50 mg total) by mouth nightly.   7/26/2024    tamsulosin 0.4 MG Oral Cap Take 1 capsule (0.4 mg total) by mouth daily.   7/26/2024    traZODone 50 MG Oral Tab Take 1 tablet (50 mg total) by mouth nightly.   7/26/2024    fluticasone-umeclidin-vilant (TRELEGY ELLIPTA) 100-62.5-25 MCG/ACT Inhalation Aerosol Powder, Breath Activated Inhale 1 puff into the lungs daily.   7/26/2024     Current Facility-Administered Medications   Medication Dose Route Frequency    budesonide (Pulmicort) 0.5 MG/2ML nebulizer suspension 0.5 mg  0.5 mg Nebulization Daily    ipratropium-albuterol (Duoneb) 0.5-2.5 (3) MG/3ML inhalation solution 3 mL  3 mL Nebulization Daily    melatonin tab 3 mg  3 mg Oral Nightly    losartan (Cozaar) 50 mg, hydroCHLOROthiazide 12.5 mg for Benicar HCT 20/12.5 ([facility] only)   Oral Daily    polyethylene glycol (PEG 3350)  (Miralax) 17 g oral packet 17 g  17 g Oral BID    fluticasone propionate (Flonase) 50 MCG/ACT nasal suspension 2 spray  2 spray Each Nare Daily    magnesium oxide (Mag-Ox) tab 400 mg  400 mg Oral Daily    pantoprazole (Protonix) DR tab 40 mg  40 mg Oral QAM AC    QUEtiapine (SEROquel) tab 50 mg  50 mg Oral Nightly    tamsulosin (Flomax) cap 0.4 mg  0.4 mg Oral Daily    traZODone (Desyrel) tab 50 mg  50 mg Oral Nightly    labetalol (Trandate) 5 mg/mL injection 10 mg  10 mg Intravenous Q4H PRN    magnesium sulfate in sterile water for injection 2 g/50mL IVPB premix 2 g  2 g Intravenous Once    sodium chloride 0.9% infusion   Intravenous Continuous    acetaminophen (Tylenol) tab 650 mg  650 mg Oral Q4H PRN    Or    acetaminophen (Tylenol) rectal suppository 650 mg  650 mg Rectal Q4H PRN    labetalol (Trandate) 5 mg/mL injection 10 mg  10 mg Intravenous Q10 Min PRN    hydrALAzine (Apresoline) 20 mg/mL injection 10 mg  10 mg Intravenous Q2H PRN       REVIEW OF SYSTEMS:  Comprehensive review of systems completed and negative with the exception of aforementioned information in the HPI.     PHYSICAL EXAMINATION:    Vitals: /62 (BP Location: Right arm)   Pulse 75   Temp 98.6 °F (37 °C) (Temporal)   Resp 16   Wt 98 lb 5.2 oz (44.6 kg)   SpO2 94%   BMI 17.98 kg/m²  Temp (24hrs), Av.2 °F (36.8 °C), Min:97.2 °F (36.2 °C), Max:99.3 °F (37.4 °C)     Wt Readings from Last 6 Encounters:   24 98 lb 5.2 oz (44.6 kg)   24 95 lb (43.1 kg)        General: Well developed, well nourished, in no acute distress.     Neurological / Musculoskeletal: Awake, alert, interactive.  Oriented to self and , but not current year or or place.  No dysarthria. Coordination and motor control grossly intact. Patient follows commands briskly and appropriately.  Defer pronator drift secondary to wrist restraints in place for patient's safety.   strength 5/5.  Able to push and pull examiner with good strength.  Full strength  in bilateral lower extremities.  Pupils equal, round, and reactive to light bilaterally.  Does not participate in EOMs or visual field testing.  No facial asymmetry.    I&O: I/O last 3 completed shifts:  In: 2248.2 [P.O.:60; I.V.:2188.2]  Out: 451 [Urine:451]       LABS:   Recent Labs   Lab 07/27/24  1026 07/28/24  0733   RBC 4.02 3.43*   HGB 12.8* 10.8*   HCT 37.8* 32.4*   MCV 94.0 94.5   MCH 31.8 31.5   MCHC 33.9 33.3   RDW 13.5 13.4   NEPRELIM 4.61  --    WBC 7.1 10.5   .0 196.0     Recent Labs   Lab 07/27/24  1026 07/27/24  1245 07/28/24  0420 07/28/24  0733   *  --   --  80   BUN  --  18  --  18   CREATSERUM 1.29  --   --  0.94   EGFRCR 53*  --   --  77   CA 9.6  --   --  9.0   ALB 4.0  --   --  3.5     --   --  142   K  --  3.6 4.6 4.4     --   --  109   CO2 26.0  --   --  30.0   ALKPHO 66  --   --  66   AST  --  25  --  25   ALT 10 <7*  --  <7*   BILT 0.8  --   --  0.8   TP 6.7  --   --  5.6*      Recent Labs   Lab 07/27/24  1026   INR 0.98   PTT 24.8        IMAGING:  CT BRAIN OR HEAD (CPT=70450)    Result Date: 7/28/2024  CONCLUSION:  1. Stable 4 mm mildly dense lesion in the right subinsular region, which could represent a small subacute focus of hemorrhage.  Continued imaging follow-up is suggested. 2. Stable chronic left occipital lobe cortical infarction. 3. Stable chronic left thalamic lacunar infarction. 4. Stable mild generalized atrophy and severe chronic microangiopathic ischemic changes with large vessel calcific atherosclerosis.    Dictated by (CST): Sami Cabrera MD on 7/28/2024 at 8:27 PM     Finalized by (CST): Sami Cabrera MD on 7/28/2024 at 8:31 PM          CTA BRAIN + CTA CAROTIDS (CPT=70496/54686)    Result Date: 7/27/2024  CONCLUSION:  1. No major vessel occlusion, hemodynamically significant stenosis, dissection, AVM. 2. 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 3. No intracranial aneurysm. 4. New 4 mm  focus of hyperattenuation in right subinsular region suspicious for a small focus of hemorrhage. 5. 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 6. Old left thalamic lacunar infarct. 7. Advanced changes of chronic small vessel disease in cerebral white matter.  8. 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  Advanced emphysema.      Dictated by (CST): Sohail Weathers MD on 7/27/2024 at 8:23 PM     Finalized by (CST): Sohail Weathers MD on 7/27/2024 at 8:41 PM           Imaging reviewed.  Results relayed to patient.    ASSESSMENT:  Patient Active Problem List   Diagnosis    Hip fracture (HCC)    Closed fracture of left hip, initial encounter (HCC)    Gastrointestinal hemorrhage, unspecified gastrointestinal hemorrhage type    BRBPR (bright red blood per rectum)    Adenocarcinoma of rectosigmoid junction (HCC)    Stercoral colitis    Cerebral amyloid angiopathy (HCC)    Fibromuscular dysplasia (HCC)    Aphasia    History of dementia    Nontraumatic subcortical hemorrhage of right cerebral hemisphere (HCC)       Patient is a pleasant 89-year-old male with dementia not on anticoagulation or antiplatelet therapy who presents to the ED from his nursing home, found to have a small subacute right subinsular hemorrhage that was stable on repeat scanning.    PLAN:  -No role for neurosurgical intervention  -Medical management per hospitalist team  -Neurology on board.  MRI brain ordered.  -Cleared for PT/OT from a neurosurgical standpoint  -No outpatient neurosurgical follow-up necessary    Will plan to sign off at this time.  Please reach out with any questions/concerns.      Thank you for allowing us to care for this patient.    Plan of care discussed with Dr. Gill.     Is this a shared or split note between Advanced Practice Provider and Physician? Yes    Yaakov Perez PA-C  Physician Assistant- Neurosurgery   [facility]  7/29/2024, 7:47 AM        This document was created using Dragon voice recognition  software and transcription variances may occur. Please contact documenting provider for clarification of contents if needed.

## 2024-07-30 ENCOUNTER — ANESTHESIA (OUTPATIENT)
Dept: MEDSURG UNIT | Facility: HOSPITAL | Age: 89
End: 2024-07-30
Payer: MEDICARE

## 2024-07-30 ENCOUNTER — APPOINTMENT (OUTPATIENT)
Dept: CT IMAGING | Facility: HOSPITAL | Age: 89
End: 2024-07-30
Attending: INTERNAL MEDICINE
Payer: MEDICARE

## 2024-07-30 ENCOUNTER — APPOINTMENT (OUTPATIENT)
Dept: GENERAL RADIOLOGY | Facility: HOSPITAL | Age: 89
End: 2024-07-30
Attending: NURSE PRACTITIONER
Payer: MEDICARE

## 2024-07-30 ENCOUNTER — ANESTHESIA EVENT (OUTPATIENT)
Dept: MEDSURG UNIT | Facility: HOSPITAL | Age: 89
End: 2024-07-30
Payer: MEDICARE

## 2024-07-30 ENCOUNTER — APPOINTMENT (OUTPATIENT)
Dept: GENERAL RADIOLOGY | Facility: HOSPITAL | Age: 89
End: 2024-07-30
Attending: INTERNAL MEDICINE
Payer: MEDICARE

## 2024-07-30 LAB
ANION GAP SERPL CALC-SCNC: 7 MMOL/L (ref 0–18)
APTT PPP: 27.3 SECONDS (ref 23–36)
BUN BLD-MCNC: 20 MG/DL (ref 9–23)
BUN/CREAT SERPL: 22.2 (ref 10–20)
CALCIUM BLD-MCNC: 8.3 MG/DL (ref 8.7–10.4)
CHLORIDE SERPL-SCNC: 115 MMOL/L (ref 98–112)
CO2 SERPL-SCNC: 23 MMOL/L (ref 21–32)
CREAT BLD-MCNC: 0.9 MG/DL
DEPRECATED RDW RBC AUTO: 48.3 FL (ref 35.1–46.3)
EGFRCR SERPLBLD CKD-EPI 2021: 81 ML/MIN/1.73M2 (ref 60–?)
ERYTHROCYTE [DISTWIDTH] IN BLOOD BY AUTOMATED COUNT: 13.8 % (ref 11–15)
GLUCOSE BLD-MCNC: 153 MG/DL (ref 70–99)
GLUCOSE BLDC GLUCOMTR-MCNC: 132 MG/DL (ref 70–99)
GLUCOSE BLDC GLUCOMTR-MCNC: 148 MG/DL (ref 70–99)
GLUCOSE BLDC GLUCOMTR-MCNC: 159 MG/DL (ref 70–99)
HCT VFR BLD AUTO: 23.1 %
HCT VFR BLD AUTO: 25.6 %
HCT VFR BLD AUTO: 25.6 %
HGB BLD-MCNC: 7.8 G/DL
HGB BLD-MCNC: 8.4 G/DL
HGB BLD-MCNC: 8.6 G/DL
INR BLD: 1.13 (ref 0.8–1.2)
MCH RBC QN AUTO: 31.3 PG (ref 26–34)
MCHC RBC AUTO-ENTMCNC: 32.8 G/DL (ref 31–37)
MCV RBC AUTO: 95.5 FL
OSMOLALITY SERPL CALC.SUM OF ELEC: 306 MOSM/KG (ref 275–295)
PLATELET # BLD AUTO: 182 10(3)UL (ref 150–450)
POTASSIUM SERPL-SCNC: 3.9 MMOL/L (ref 3.5–5.1)
PROTHROMBIN TIME: 15.3 SECONDS (ref 11.6–14.8)
RBC # BLD AUTO: 2.68 X10(6)UL
SARS-COV-2 RNA RESP QL NAA+PROBE: NOT DETECTED
SODIUM SERPL-SCNC: 145 MMOL/L (ref 136–145)
WBC # BLD AUTO: 11.2 X10(3) UL (ref 4–11)

## 2024-07-30 PROCEDURE — 99233 SBSQ HOSP IP/OBS HIGH 50: CPT | Performed by: INTERNAL MEDICINE

## 2024-07-30 PROCEDURE — 74174 CTA ABD&PLVS W/CONTRAST: CPT | Performed by: INTERNAL MEDICINE

## 2024-07-30 PROCEDURE — 71045 X-RAY EXAM CHEST 1 VIEW: CPT | Performed by: INTERNAL MEDICINE

## 2024-07-30 PROCEDURE — 71045 X-RAY EXAM CHEST 1 VIEW: CPT | Performed by: NURSE PRACTITIONER

## 2024-07-30 RX ORDER — TAMSULOSIN HYDROCHLORIDE 0.4 MG/1
0.4 CAPSULE ORAL DAILY
Status: DISCONTINUED | OUTPATIENT
Start: 2024-07-30 | End: 2024-08-01

## 2024-07-30 NOTE — PLAN OF CARE
Pt had several large red liquid bowel movements, became more lethargic with a decrease in HR and BP. Dr Christiansen, nocturnist, at bedside, labs orders. Dr. Wagoner of Gi notified and stat CT and fluid bolus ordered. Care endorsed to DELPHINE Hernandez. TYESHA Rivers called and updated about pt condition.    Problem: Patient Centered Care  Goal: Patient preferences are identified and integrated in the patient's plan of care  Description: Interventions:  - What would you like us to know as we care for you?   - Provide timely, complete, and accurate information to patient/family  - Incorporate patient and family knowledge, values, beliefs, and cultural backgrounds into the planning and delivery of care  - Encourage patient/family to participate in care and decision-making at the level they choose  - Honor patient and family perspectives and choices  Outcome: Progressing     Problem: PAIN - ADULT  Goal: Verbalizes/displays adequate comfort level or patient's stated pain goal  Description: INTERVENTIONS:  - Encourage pt to monitor pain and request assistance  - Assess pain using appropriate pain scale  - Administer analgesics based on type and severity of pain and evaluate response  - Implement non-pharmacological measures as appropriate and evaluate response  - Consider cultural and social influences on pain and pain management  - Manage/alleviate anxiety  - Utilize distraction and/or relaxation techniques  - Monitor for opioid side effects  - Notify MD/LIP if interventions unsuccessful or patient reports new pain  - Anticipate increased pain with activity and pre-medicate as appropriate  Outcome: Progressing     Problem: RISK FOR INFECTION - ADULT  Goal: Absence of fever/infection during anticipated neutropenic period  Description: INTERVENTIONS  - Monitor WBC  - Administer growth factors as ordered  - Implement neutropenic guidelines  Outcome: Progressing     Problem: SAFETY ADULT - FALL  Goal: Free from fall injury  Description:  INTERVENTIONS:  - Assess pt frequently for physical needs  - Identify cognitive and physical deficits and behaviors that affect risk of falls.  - [place] fall precautions as indicated by assessment.  - Educate pt/family on patient safety including physical limitations  - Instruct pt to call for assistance with activity based on assessment  - Modify environment to reduce risk of injury  - Provide assistive devices as appropriate  - Consider OT/PT consult to assist with strengthening/mobility  - Encourage toileting schedule  Outcome: Progressing     Problem: DISCHARGE PLANNING  Goal: Discharge to home or other facility with appropriate resources  Description: INTERVENTIONS:  - Identify barriers to discharge w/pt and caregiver  - Include patient/family/discharge partner in discharge planning  - Arrange for needed discharge resources and transportation as appropriate  - Identify discharge learning needs (meds, wound care, etc)  - Arrange for interpreters to assist at discharge as needed  - Consider post-discharge preferences of patient/family/discharge partner  - Complete POLST form as appropriate  - Assess patient's ability to be responsible for managing their own health  - Refer to Case Management Department for coordinating discharge planning if the patient needs post-hospital services based on physician/LIP order or complex needs related to functional status, cognitive ability or social support system  Outcome: Progressing     Problem: Patient/Family Goals  Goal: Patient/Family Long Term Goal  Description: Patient's Long Term Goal:     Interventions:  -   - See additional Care Plan goals for specific interventions  Outcome: Progressing  Goal: Patient/Family Short Term Goal  Description: Patient's Short Term Goal:     Interventions:   -  - See additional Care Plan goals for specific interventions  Outcome: Progressing     Problem: NEUROLOGICAL - ADULT  Goal: Achieves stable or improved neurological  status  Description: INTERVENTIONS  - Assess for and report changes in neurological status  - Initiate measures to prevent increased intracranial pressure  - Maintain blood pressure and fluid volume within ordered parameters to optimize cerebral perfusion and minimize risk of hemorrhage  - Monitor temperature, glucose, and sodium. Initiate appropriate interventions as ordered  Outcome: Progressing  Goal: Achieves maximal functionality and self care  Description: INTERVENTIONS  - Monitor swallowing and airway patency with patient fatigue and changes in neurological status  - Encourage and assist patient to increase activity and self care with guidance from PT/OT  - Encourage visually impaired, hearing impaired and aphasic patients to use assistive/communication devices  Outcome: Progressing     Problem: Safety Risk - Non-Violent Restraints  Goal: Patient will remain free from self-harm  Description: INTERVENTIONS:  - Apply the least restrictive restraint to prevent harm  - Notify patient and family of reasons restraints applied  - Assess for any contributing factors to confusion (electrolyte disturbances, delirium, medications)  - Discontinue any unnecessary medical devices as soon as possible  - Assess the patient's physical comfort, circulation, skin condition, hydration, nutrition and elimination needs   - Reorient and redirection as needed  - Assess for the need to continue restraints  Outcome: Progressing

## 2024-07-30 NOTE — ANESTHESIA PREPROCEDURE EVALUATION
Anesthesia PreOp Note    HPI:     Nacho Mari is a 89 year old male who presents for preoperative consultation requested by: Davida Doty MD    Date of Surgery: 7/30/2024    Procedure(s):  ESOPHAGOGASTRODUODENOSCOPY (EGD)  COLONOSCOPY  Indication: hematochezia    Relevant Problems   No relevant active problems       NPO:                         History Review:  Patient Active Problem List    Diagnosis Date Noted    Vascular dementia with behavioral disturbance (HCC) 07/29/2024    Delirium superimposed on dementia 07/29/2024    Episodic mood disorder (HCC) 07/29/2024    Gastrointestinal hemorrhage, unspecified gastrointestinal hemorrhage type 07/27/2024    BRBPR (bright red blood per rectum) 07/27/2024    Adenocarcinoma of rectosigmoid junction (HCC) 07/27/2024    Stercoral colitis 07/27/2024    Cerebral amyloid angiopathy (HCC) 07/27/2024    Fibromuscular dysplasia (HCC) 07/27/2024    Aphasia 07/27/2024    History of dementia 07/27/2024    Nontraumatic subcortical hemorrhage of right cerebral hemisphere (HCC) 07/27/2024    Hip fracture ([facility]) 01/24/2024    Closed fracture of left hip, initial encounter ([facility]) 01/24/2024       Past Medical History:    Abnormal electrocardiogram (ECG) (EKG)    MICHAEL (acute kidney injury) (HCC)    Aneurysm of ascending aorta without rupture (HCC)    Aortic valve stenosis    APC (atrial premature contractions)    Cerebral amyloid angiopathy (HCC)    Chronic atrial fibrillation (HCC)    Coronary artery disease involving native coronary artery of native heart    Delirium    Dementia (HCC)    Dyspnea on exertion    Essential hypertension    Fibromuscular dysplasia (HCC)    Formatting of this note might be different from the original.    bilateral ICAs and R V2-3 without dissection      First degree AV block    Hypernatremia    Hypokalemia    Incomplete right bundle branch block    Intraparenchymal hemorrhage of brain (HCC)    Lactic acidosis    Malignant neoplasm of sigmoid colon  (HCC)    Memory difficulties    Mild cognitive impairment    Mitral valve disease    Other emphysema (HCC)    Paroxysmal atrial fibrillation (HCC)    Peptic ulcer disease    Pontine hemorrhage (HCC)    Sepsis (HCC)    Sinus tachycardia    SVT (supraventricular tachycardia) (HCC)    Throat pain       History reviewed. No pertinent surgical history.    Medications Prior to Admission   Medication Sig Dispense Refill Last Dose    acetaminophen 325 MG Oral Tab Take 1 tablet (325 mg total) by mouth every 6 (six) hours as needed for Pain.   7/26/2024    magnesium oxide 400 MG Oral Tab Take 1 tablet (400 mg total) by mouth daily.   7/26/2024    Melatonin 3 MG Oral Cap Take 1 capsule (3 mg total) by mouth daily.   7/26/2024    pantoprazole 40 MG Oral Tab EC Take 1 tablet (40 mg total) by mouth every morning before breakfast.   7/26/2024    fluticasone propionate 50 MCG/ACT Nasal Suspension 2 sprays by Each Nare route daily.   7/26/2024    Polyethylene Glycol 3350 17 g Oral Powd Pack Take 17 g by mouth as needed.   7/26/2024    QUEtiapine 50 MG Oral Tab Take 1 tablet (50 mg total) by mouth nightly.   7/26/2024    tamsulosin 0.4 MG Oral Cap Take 1 capsule (0.4 mg total) by mouth daily.   7/26/2024    traZODone 50 MG Oral Tab Take 1 tablet (50 mg total) by mouth nightly.   7/26/2024    fluticasone-umeclidin-vilant (TRELEGY ELLIPTA) 100-62.5-25 MCG/ACT Inhalation Aerosol Powder, Breath Activated Inhale 1 puff into the lungs daily.   7/26/2024     Current Facility-Administered Medications Ordered in Epic   Medication Dose Route Frequency Provider Last Rate Last Admin    tamsulosin (Flomax) cap 0.4 mg  0.4 mg Oral Daily Jessica Israel APRN        traZODone (Desyrel) tab 25 mg  25 mg Oral Nightly Tylor Dang MD   25 mg at 07/29/24 2029    OLANZapine (ZyPREXA) tab 5 mg  5 mg Oral QPM Tylor Dang MD   5 mg at 07/29/24 1808    lamoTRIgine (LaMICtal) tab 25 mg  25 mg Oral BID Tyolr Dang MD   25 mg at 07/29/24 2030     budesonide (Pulmicort) 0.5 MG/2ML nebulizer suspension 0.5 mg  0.5 mg Nebulization Daily Eric Fierro MD   0.5 mg at 07/30/24 0927    ipratropium-albuterol (Duoneb) 0.5-2.5 (3) MG/3ML inhalation solution 3 mL  3 mL Nebulization Daily Eric Fierro MD   3 mL at 07/30/24 0854    melatonin tab 3 mg  3 mg Oral Nightly Eric Fierro MD   3 mg at 07/29/24 2030    [Held by provider] losartan (Cozaar) 50 mg, hydroCHLOROthiazide 12.5 mg for Benicar HCT 20/12.5 (H only)   Oral Daily Kirit Lee DO   Given at 07/29/24 0919    polyethylene glycol (PEG 3350) (Miralax) 17 g oral packet 17 g  17 g Oral BID Senia Saldaña PA-C        fluticasone propionate (Flonase) 50 MCG/ACT nasal suspension 2 spray  2 spray Each Nare Daily Eric Fierro MD   2 spray at 07/30/24 0832    magnesium oxide (Mag-Ox) tab 400 mg  400 mg Oral Daily Eric Fierro MD   400 mg at 07/29/24 0919    pantoprazole (Protonix) DR tab 40 mg  40 mg Oral QAM AC Eric Fierro MD   40 mg at 07/29/24 0923    labetalol (Trandate) 5 mg/mL injection 10 mg  10 mg Intravenous Q4H PRN Eric Fierro MD   10 mg at 07/28/24 2212    magnesium sulfate in sterile water for injection 2 g/50mL IVPB premix 2 g  2 g Intravenous Once Eric Fierro MD        sodium chloride 0.9% infusion   Intravenous Continuous Kirit Lee DO 75 mL/hr at 07/30/24 0230 New Bag at 07/30/24 0230    acetaminophen (Tylenol) tab 650 mg  650 mg Oral Q4H PRN Kirit Lee DO        Or    acetaminophen (Tylenol) rectal suppository 650 mg  650 mg Rectal Q4H PRN Kirit Lee DO        labetalol (Trandate) 5 mg/mL injection 10 mg  10 mg Intravenous Q10 Min PRN Kirit Lee DO        hydrALAzine (Apresoline) 20 mg/mL injection 10 mg  10 mg Intravenous Q2H PRN Kirit Lee DO   10 mg at 07/29/24 1607     No current Epic-ordered outpatient medications on file.       No Known Allergies    No family history on file.  Social  History     Socioeconomic History    Marital status:    Tobacco Use    Smoking status: Former     Types: Cigarettes    Smokeless tobacco: Never       Available pre-op labs reviewed.  Lab Results   Component Value Date    WBC 11.2 (H) 07/30/2024    RBC 2.68 (L) 07/30/2024    HGB 8.4 (L) 07/30/2024    HCT 25.6 (L) 07/30/2024    MCV 95.5 07/30/2024    MCH 31.3 07/30/2024    MCHC 32.8 07/30/2024    RDW 13.8 07/30/2024    .0 07/30/2024     Lab Results   Component Value Date     07/30/2024    K 3.9 07/30/2024     (H) 07/30/2024    CO2 23.0 07/30/2024    BUN 20 07/30/2024    CREATSERUM 0.90 07/30/2024     (H) 07/30/2024    PGLU 159 (H) 07/30/2024    CA 8.3 (L) 07/30/2024     Lab Results   Component Value Date    INR 1.13 07/30/2024       Vital Signs:  Body mass index is 20.04 kg/m².   weight is 49.7 kg (109 lb 9.1 oz). His temporal temperature is 97.4 °F (36.3 °C). His blood pressure is 149/69 and his pulse is 116. His respiration is 16 and oxygen saturation is 93%.   Vitals:    07/30/24 0800 07/30/24 0900 07/30/24 1000 07/30/24 1200   BP: 106/85  98/67 149/69   Pulse: 110 92 108 116   Resp: 21 12 18 16   Temp: 97.4 °F (36.3 °C)   97.4 °F (36.3 °C)   TempSrc: Temporal   Temporal   SpO2: 96% 100% 99% 93%   Weight:            Anesthesia Evaluation     Patient summary reviewed and Nursing notes reviewed    No history of anesthetic complications   Airway   Mallampati: I  TM distance: >3 FB  Neck ROM: full  Dental      Pulmonary - normal exam   (+) COPD, shortness of breath  Cardiovascular - normal exam  (+) hypertension, CAD, dysrhythmias    ROS comment: 1. Left ventricle: The cavity size is normal. Wall thickness is normal.      Systolic function is normal. The ejection fraction was measured by visual      estimation. Doppler parameters are consistent with high ventricular filling      pressure. The ejection fraction is 60%.   2. Aortic valve: The annulus is moderately calcified. The valve is  trileaflet.      The leaflets are moderately thickened and moderately calcified. Velocity is      increased less than expected. There is at least mild stenosis.   3. Ascending aorta: The vessel is mildly dilated at 4.3 cm   4. Right ventricle: The cavity size is normal. Systolic function is normal.      Systolic pressure is mildly increased. The estimated peak pressure is 35mm      Hg.   5. Inferior vena cava: The IVC is normal-sized.       Neuro/Psych      Comments: Small brain bleed      GI/Hepatic/Renal      Endo/Other    (+) blood dyscrasia  Abdominal                  Anesthesia Plan:   ASA:  3  Plan:   MAC  Informed Consent Plan and Risks Discussed With:  Patient      I have informed Nacho Branon and/or legal guardian or family member of the nature of the anesthetic plan, benefits, risks including possible dental damage if relevant, major complications, and any alternative forms of anesthetic management.   All of the patient's questions were answered to the best of my ability. The patient desires the anesthetic management as planned.  Aziza Roque MD  7/30/2024 1:09 PM  Present on Admission:   Cerebral amyloid angiopathy (HCC)   Fibromuscular dysplasia (HCC)

## 2024-07-30 NOTE — PROGRESS NOTES
[facility]  part of [facility]    Progress Note    Nacho Mari Patient Status:  Inpatient    10/14/1933 MRN [id number]   Location [facility] 2W/SW Attending Eric Fierro MD   Hosp Day # 2 PCP Oscar Chung) MD Gabriel       Subjective:   Nacho Mari is a(n) 89 year old male AMS    Objective:     Vitals:    24   BP: (!) 135/94   Pulse: 87   Resp: 16   Temp: 99.4 °F (37.4 °C)       Intake/Output Summary (Last 24 hours) at 2024 2349  Last data filed at 2024 2145  Gross per 24 hour   Intake 1966 ml   Output 950 ml   Net 1016 ml     Wt Readings from Last 2 Encounters:   24 98 lb 5.2 oz (44.6 kg)   24 95 lb (43.1 kg)       General appearance:  alert, appears stated age, and cooperative  Head: Normocephalic, without obvious abnormality, atraumatic  Eyes: conjunctivae/corneas clear. PERRL, EOM's intact. Fundi benign.  Neck: no adenopathy, no carotid bruit, no JVD, supple, symmetrical, trachea midline, and thyroid not enlarged, symmetric, no tenderness/mass/nodules  Pulmonary: clear to auscultation bilaterally  Cardiovascular: S1, S2 normal, no murmur, click, rub or gallop, regular rate and rhythm  Abdominal: soft, non-tender; bowel sounds normal; no masses,  no organomegaly  Extremities: extremities normal, atraumatic, no cyanosis or edema  Pulses: 2+ and symmetric  Neurologic: Grossly normal  Genital Exam: defer exam    Current Medications:    Current Facility-Administered Medications:     traZODone (Desyrel) tab 25 mg, 25 mg, Oral, Nightly    OLANZapine (ZyPREXA) tab 5 mg, 5 mg, Oral, QPM    lamoTRIgine (LaMICtal) tab 25 mg, 25 mg, Oral, BID    potassium chloride 40 mEq in 250mL sodium chloride 0.9% IVPB premix, 40 mEq, Intravenous, Once    budesonide (Pulmicort) 0.5 MG/2ML nebulizer suspension 0.5 mg, 0.5 mg, Nebulization, Daily    ipratropium-albuterol (Duoneb) 0.5-2.5 (3) MG/3ML inhalation solution 3 mL, 3 mL, Nebulization,  Daily    melatonin tab 3 mg, 3 mg, Oral, Nightly    losartan (Cozaar) 50 mg, hydroCHLOROthiazide 12.5 mg for Benicar HCT 20/12.5 ([facility] only), , Oral, Daily    polyethylene glycol (PEG 3350) (Miralax) 17 g oral packet 17 g, 17 g, Oral, BID    fluticasone propionate (Flonase) 50 MCG/ACT nasal suspension 2 spray, 2 spray, Each Nare, Daily    magnesium oxide (Mag-Ox) tab 400 mg, 400 mg, Oral, Daily    pantoprazole (Protonix) DR tab 40 mg, 40 mg, Oral, QAM AC    tamsulosin (Flomax) cap 0.4 mg, 0.4 mg, Oral, Daily    labetalol (Trandate) 5 mg/mL injection 10 mg, 10 mg, Intravenous, Q4H PRN    magnesium sulfate in sterile water for injection 2 g/50mL IVPB premix 2 g, 2 g, Intravenous, Once    sodium chloride 0.9% infusion, , Intravenous, Continuous    acetaminophen (Tylenol) tab 650 mg, 650 mg, Oral, Q4H PRN **OR** acetaminophen (Tylenol) rectal suppository 650 mg, 650 mg, Rectal, Q4H PRN    labetalol (Trandate) 5 mg/mL injection 10 mg, 10 mg, Intravenous, Q10 Min PRN    hydrALAzine (Apresoline) 20 mg/mL injection 10 mg, 10 mg, Intravenous, Q2H PRN    Allergies  No Known Allergies    Assessment and Plan:     Gastrointestinal hemorrhage, unspecified gastrointestinal hemorrhage type          BRBPR (bright red blood per rectum)          Adenocarcinoma of rectosigmoid junction (HCC)          Stercoral colitis          Cerebral amyloid angiopathy (HCC)          Fibromuscular dysplasia (HCC)          Aphasia          History of dementia          Nontraumatic subcortical hemorrhage of right cerebral hemisphere (HCC)     Assessment and plan  Problem 1 altered mental status patient has altered mental status in the setting of elevated blood pressure progressively worsening baseline and history of falls and also history of questionable insular bleed patient will be transferred to ICU patient's care was discussed with the primary RN and also neurologist.  In addition patient also has history of dementia.  I will make attempts to speak  with the patient's family and get a better understanding of family's wishes and also of his baseline mental status.     Problem #2 bright red blood per rectum this will be evaluated GI will be consulted patient will be off aspirin patient does not seem to have acute hematochezia or BRBPR.  Will avoid heparin for DVT prophylaxis.     Problem #3 atrial fibrillation: Patient is not a candidate for anticoagulation and hence will even discontinue aspirin in light of current GI bleed and questionable insular cortex bleed.     Problem #4 adenocarcinoma of the colon: Patient currently does not have any obstructive features but surgical oncology has been consulted.  Further input and likely nonoperative process will be followed given patient's advanced age.     Problem #5 dementia: Supportive care will be provided patient is not on memantine will consider putting patient on donepezil and memantine further input for from neurology is awaited.     Problem #6 DVT prophylaxis: Patient will be on SCD boots.     Problem #7 GI prophylaxis: Patient will be on Protonix.     July 28, 2024  Patient is more alert neuro logic exam is unchanged.  Patient is in ICU appears stable.  Appreciate general surgery evaluation and neurologic evaluation.  Patient has some sundowning and started on haloperidol at bedtime.  Psych consult has been requested.  Will advance diet as no surgical intervention is planned.  Transfer out of the ICU once stable.    July 29, 2024  Patient has fluctuating mental status.  Requiring soft restraints.  No neurosurgical intervention planned.  Patient is not directable to perform physical therapy.  Dementia and delirium with multifactorial causes for delirium.  Per neurology patient is contraindicated for antithrombotics.  GI bleed currently seems to be stable but is still present.  Differential diagnosis includes stercoral colitis versus hemorrhoidal bleeding.  Patient is not cooperating with colon  prep.      Results:     Lab Results   Component Value Date    WBC 8.6 07/29/2024    HGB 11.7 (L) 07/29/2024    HCT 34.1 (L) 07/29/2024    .0 07/29/2024     07/29/2024    K 3.6 07/29/2024     07/29/2024    CO2 24.0 07/29/2024    CREATSERUM 0.88 07/29/2024    BUN 17 07/29/2024     (H) 07/29/2024    CA 8.9 07/29/2024    ALB 3.6 07/29/2024    ALKPHO 75 07/29/2024    BILT 0.5 07/29/2024    TP 6.0 07/29/2024    AST 29 07/29/2024    ALT 7 (L) 07/29/2024    PTT 24.8 07/27/2024    INR 0.98 07/27/2024    MG 1.6 07/29/2024    PHOS 3.0 07/29/2024     No results found for this visit on 07/27/24.    CT BRAIN OR HEAD (CPT=70450)    Result Date: 7/28/2024  CONCLUSION:  1. Stable 4 mm mildly dense lesion in the right subinsular region, which could represent a small subacute focus of hemorrhage.  Continued imaging follow-up is suggested. 2. Stable chronic left occipital lobe cortical infarction. 3. Stable chronic left thalamic lacunar infarction. 4. Stable mild generalized atrophy and severe chronic microangiopathic ischemic changes with large vessel calcific atherosclerosis.    Dictated by (CST): Sami Cabrera MD on 7/28/2024 at 8:27 PM     Finalized by (CST): Sami Cabrera MD on 7/28/2024 at 8:31 PM                       JOSE ARMANDO RAM MD  7/29/2024

## 2024-07-30 NOTE — PLAN OF CARE
Post bloody BM; CTA abd (-) for GI bleed.  Hgb 7.8. NPO      Problem: Safety Risk - Non-Violent Restraints  Goal: Patient will remain free from self-harm  Description: INTERVENTIONS:  - Apply the least restrictive restraint to prevent harm  - Notify patient and family of reasons restraints applied  - Assess for any contributing factors to confusion (electrolyte disturbances, delirium, medications)  - Discontinue any unnecessary medical devices as soon as possible  - Assess the patient's physical comfort, circulation, skin condition, hydration, nutrition and elimination needs   - Reorient and redirection as needed  - Assess for the need to continue restraints  Outcome: Not Progressing     Problem: GASTROINTESTINAL - ADULT  Goal: Minimal or absence of nausea and vomiting  Description: INTERVENTIONS:  - Maintain adequate hydration with IV or PO as ordered and tolerated  - Nasogastric tube to low intermittent suction as ordered  - Evaluate effectiveness of ordered antiemetic medications  - Provide nonpharmacologic comfort measures as appropriate  - Advance diet as tolerated, if ordered  - Obtain nutritional consult as needed  - Evaluate fluid balance  Outcome: Progressing  Goal: Maintains or returns to baseline bowel function  Description: INTERVENTIONS:  - Assess bowel function  - Maintain adequate hydration with IV or PO as ordered and tolerated  - Evaluate effectiveness of GI medications  - Encourage mobilization and activity  - Obtain nutritional consult as needed  - Establish a toileting routine/schedule  - Consider collaborating with pharmacy to review patient's medication profile  Outcome: Progressing     Problem: HEMATOLOGIC - ADULT  Goal: Maintains hematologic stability  Description: INTERVENTIONS  - Assess for signs and symptoms of bleeding or hemorrhage  - Monitor labs and vital signs for trends  - Administer supportive blood products/factors, fluids and medications as ordered and appropriate  - Administer  supportive blood products/factors as ordered and appropriate  Outcome: Progressing  Goal: Free from bleeding injury  Description: (Example usage: patient with low platelets)  INTERVENTIONS:  - Avoid intramuscular injections, enemas and rectal medication administration  - Ensure safe mobilization of patient  - Hold pressure on venipuncture sites to achieve adequate hemostasis  - Assess for signs and symptoms of internal bleeding  - Monitor lab trends  - Patient is to report abnormal signs of bleeding to staff  - Avoid use of toothpicks and dental floss  - Use electric shaver for shaving  - Use soft bristle tooth brush  - Limit straining and forceful nose blowing  Outcome: Progressing

## 2024-07-30 NOTE — PROGRESS NOTES
PCCU  Critical Care APRN Progress Note    NAME: Nacho Mari - ROOM: 237/237-A - MRN: [id number] - Age: 89 year old - :10/14/1933    Xray at bedside for NGT placement verification. Per my read, NG appearing in right lung. VSS and saturating well. NG removed, replaced in Left nare to 60 cm. Pt tolerated well.   Will re-image and verify placement.         OBJECTIVE  Vitals:  /68 (BP Location: Right arm)   Pulse 93   Temp 98.4 °F (36.9 °C) (Temporal)   Resp 18   Wt 109 lb 9.1 oz (49.7 kg)   SpO2 100%   BMI 20.04 kg/m²                        Assessment/Plan:    Suspected LGI bleed- CTA without active bleed  ABLA- will recheck labs this am along with repeat Type and screen   Verify NGT re-placement for GI prep  Borderline B/P likely in setting of anemia, will hold am antihypertensive agents- reassess prn      Plan of care discussed with Rupinder AKERS, will updated PCP as well via perfect serve    A total of 35 minutes of critical care time (exclusive of billable procedures) was administered. This involved direct patient intervention, complex decision making, and/or extensive discussions (>50% face to face time) with the patient, family, and clinical staff.      Jessica Israel APRN/CNP  Critical Care  2024   8:13 AM

## 2024-07-30 NOTE — PLAN OF CARE
Restraints continued for patient safety.       Problem: Safety Risk - Non-Violent Restraints  Goal: Patient will remain free from self-harm  Description: INTERVENTIONS:  - Apply the least restrictive restraint to prevent harm  - Notify patient and family of reasons restraints applied  - Assess for any contributing factors to confusion (electrolyte disturbances, delirium, medications)  - Discontinue any unnecessary medical devices as soon as possible  - Assess the patient's physical comfort, circulation, skin condition, hydration, nutrition and elimination needs   - Reorient and redirection as needed  - Assess for the need to continue restraints  Outcome: Not Progressing

## 2024-07-30 NOTE — PAYOR COMM NOTE
--------------  ADMISSION REVIEW     Payor: [employer]  Subscriber #:  [id number]  Authorization Number: [id number]    Admit date: 7/27/24  Admit time:  3:41 PM       History   HPI  89 year old male with history of paroxysmal A-fib, hypertension, COPD, dementia presents from nursing facility.  Nurses there had called this patient after pixels noted to be slumped over in the chair, EMS found the patient sitting in a pool of blood in his depends.  Patient is nonspeaking here.    Per review of his medication list, he is not on medicines.  Per chart review, patient has a prior history of rectosigmoid adenocarcinoma  . Rectosigmoid:  -Adenocarcinoma, grade 2, 4.5 cm, invading pericolonic adipose tissue.  -Metastatic carcinoma to one of fifteen (1/15) pericolonic lymph nodes.  -Tubular adenoma.  -Diverticulosis coli.  -Resection margins negative for malignancy. (See comment)     ED Triage Vitals [07/27/24 1015]   /63   Pulse 75   Resp 18   Temp 97.9 °F (36.6 °C)   Temp src Temporal   SpO2 (!) 88 %   O2 Device None (Room air)   Physical Exam  Constitutional:       Comments: Will pull away and try swinging his arms to physical stimuli, will not respond to verbal stimuli   Eyes:      Extraocular Movements: Extraocular movements intact.   Cardiovascular:      Rate and Rhythm: Tachycardia present.      Pulses: Normal pulses.   Pulmonary:      Effort: Pulmonary effort is normal. No respiratory distress.   Abdominal:      General: Abdomen is flat. There is no distension.      Tenderness: There is no abdominal tenderness. There is no guarding.   Genitourinary:     Comments: Dried bright blood along the anus, no hemorrhoids or fissures noted  Musculoskeletal:      Cervical back: Normal range of motion.   Skin:     General: Skin is warm and dry.   Neurological:      General: No focal deficit present.     Labs Reviewed   COMP METABOLIC PANEL (14) - Abnormal; Notable for the following components:       Result  Value    Glucose 152 (*)     eGFR-Cr 53 (*)     All other components within normal limits   REDRAW CMP (P) - Abnormal; Notable for the following components:    ALT <7 (*)     All other components within normal limits   POCT GLUCOSE - Abnormal; Notable for the following components:    POC Glucose  136 (*)     All other components within normal limits   CBC W/ DIFFERENTIAL - Abnormal; Notable for the following components:    HGB 12.8 (*)     HCT 37.8 (*)     RDW-SD 46.4 (*)    CT ABDOMEN+PELVIS(CONTRAST ONLY)(CPT=74177)  Result Date: 7/27/2024  CONCLUSION:  1.  Post distal sigmoid colon with colorectal anastomosis.  No obstruction at the surgical site however the bowel at and distal to anastomosis to the lower rectum has a large volume of fecal material within and there is also nonuniform wall thickening in  the same distribution.  There is perirectal and presacral fat stranding.  Findings suggest a nonspecific proctocolitis and/or stercoral colitis. 2.  Post left hip arthroplasty. 3.  Renal cysts. 4.  Coronary atherosclerosis.   Dictated by (CST): Jonnie Nicholas MD on 7/27/2024 at 12:30 PM     Finalized by (CST): Jonnie Nicholas MD on 7/27/2024 at 12:35 PM            Disposition and Plan   Clinical Impression:  1. Gastrointestinal hemorrhage, unspecified gastrointestinal hemorrhage type    2. BRBPR (bright red blood per rectum)    3. Adenocarcinoma of rectosigmoid junction (HCC)    4. Stercoral colitis    Disposition:  Admit        H&P  Reason for Admission:   GI bleed possible intra parenchymal bleed. possible hypertensive emergency.     History of Present Illness:   Patient is a 89 year old male who was admitted to the hospital for Gastrointestinal hemorrhage, unspecified gastrointestinal hemorrhage type: Patient is unable to provide history most of the history was obtained from review of chart and multiple visits in different hospitals.  Patient presented this time from nursing facility after having a fall and  sustaining bruises on the left side of face.  Baseline patient is alert and oriented x 2 and is conversant with family and nursing home staff.  But has most likely a lot of generalized weakness but appears to have been mobile.  Patient sustained a fall and was noted to have worsening of his baseline mental status and hence brought to the ER in the emergency room patient was noted to have elevated blood pressure patient also had GI bleed which was a presentation complaint from the nursing home where the nurse noted blood in his pants.  Further evaluation and workup was attempted and hence patient was admitted during the course of his admission up on the medical floor patient was noted to have altered mental status which was slightly worse than his baseline patient also had very elevated blood pressure.  And hence a CTA of the neck and the brain was performed which showed questionable insular bleed.   Ears: normal TM's and external ear canals both ears  Nose: Nares normal. Septum midline. Mucosa normal. No drainage or sinus tenderness.  Throat: lips, mucosa, and tongue normal; teeth and gums normal  Neck: no adenopathy, no carotid bruit, no JVD, supple, symmetrical, trachea midline, and thyroid not enlarged, symmetric, no tenderness/mass/nodules  Pulmonary: clear to auscultation bilaterally  Cardiovascular: S1, S2 normal, no murmur, click, rub or gallop, regular rate and rhythm  Abdominal: soft, non-tender; bowel sounds normal; no masses,  no organomegaly  Extremities: extremities normal, atraumatic, no cyanosis or edema  Pulses: 2+ and symmetric  Skin: Skin color, texture, turgor normal. No rashes or lesions  Neurologic: Unable to perform as patient does not follow commands.  No signs of cerebral irritation no neck rigidity.  No focal deficit (patient moves limbs spontaneously).  Genital Exam: defer exam     Results:      Laboratory Data:        Lab Results   Component Value Date     WBC 7.1 07/27/2024     HGB 12.8  (L) 07/27/2024     HCT 37.8 (L) 07/27/2024     .0 07/27/2024      07/27/2024     K 3.6 07/27/2024      07/27/2024     CO2 26.0 07/27/2024     CREATSERUM 1.29 07/27/2024     BUN 18 07/27/2024      (H) 07/27/2024     CA 9.6 07/27/2024     ALB 4.0 07/27/2024     ALKPHO 66 07/27/2024     BILT 0.8 07/27/2024     TP 6.7 07/27/2024     AST 25 07/27/2024     ALT <7 (L) 07/27/2024     PTT 24.8 07/27/2024     INR 0.98 07/27/2024     MG 1.8 07/27/2024    CTA BRAIN + CTA CAROTIDS (CPT=70496/36441)   Result Date: 7/27/2024  CONCLUSION:         1. No major vessel occlusion, hemodynamically significant stenosis, dissection, AVM. 2. 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 3. No intracranial aneurysm. 4. New 4 mm focus of hyperattenuation in right subinsular region suspicious for a small focus of hemorrhage. 5. 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 6. Old left thalamic lacunar infarct. 7. Advanced changes of chronic small vessel disease in cerebral white matter.  8. 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  Advanced emphysema.      Dictated by (CST): Sohail Weathers MD on 7/27/2024 at 8:23 PM     Finalized by (CST): Sohail Weathers MD on 7/27/2024 at 8:41 PM           CT ABDOMEN+PELVIS(CONTRAST ONLY)(CPT=74177)   Result Date: 7/27/2024  CONCLUSION:         1.  Post distal sigmoid colon with colorectal anastomosis.  No obstruction at the surgical site however the bowel at and distal to anastomosis to the lower rectum has a large volume of fecal material within and there is also nonuniform wall thickening in  the same distribution.  There is perirectal and presacral fat stranding.  Findings suggest a nonspecific proctocolitis and/or stercoral colitis. 2.  Post left hip arthroplasty. 3.  Renal cysts. 4.  Coronary atherosclerosis.   Dictated by (CST): Jonnie Nicholas MD on 7/27/2024 at 12:30 PM     Finalized by (CST): Jonnie Nicholas MD on 7/27/2024 at 12:35  PM              Impression:     Gastrointestinal hemorrhage, unspecified gastrointestinal hemorrhage type          BRBPR (bright red blood per rectum)    Adenocarcinoma of rectosigmoid junction (HCC)    Stercoral colitis    Cerebral amyloid angiopathy (HCC)    Fibromuscular dysplasia (HCC)    Aphasia    History of dementia    Nontraumatic subcortical hemorrhage of right cerebral hemisphere (HCC)     Assessment and plan  Problem 1 altered mental status patient has altered mental status in the setting of elevated blood pressure progressively worsening baseline and history of falls and also history of questionable insular bleed patient will be transferred to ICU patient's care was discussed with the primary RN and also neurologist.  In addition patient also has history of dementia.  I will make attempts to speak with the patient's family and get a better understanding of family's wishes and also of his baseline mental status.     Problem #2 bright red blood per rectum this will be evaluated GI will be consulted patient will be off aspirin patient does not seem to have acute hematochezia or BRBPR.  Will avoid heparin for DVT prophylaxis.     Problem #3 atrial fibrillation: Patient is not a candidate for anticoagulation and hence will even discontinue aspirin in light of current GI bleed and questionable insular cortex bleed.     Problem #4 adenocarcinoma of the colon: Patient currently does not have any obstructive features but surgical oncology has been consulted.  Further input and likely nonoperative process will be followed given patient's advanced age.     Problem #5 dementia: Supportive care will be provided patient is not on memantine will consider putting patient on donepezil and memantine further input for from neurology is awaited.     Problem #6 DVT prophylaxis: Patient will be on SCD boots.     Problem #7 GI prophylaxis: Patient will be on Protonix.          7/28/24  Lab Results   Component Value Date      WBC 10.5 07/28/2024     HGB 10.8 (L) 07/28/2024     HCT 32.4 (L) 07/28/2024     .0 07/28/2024      07/28/2024     K 4.4 07/28/2024      07/28/2024     CO2 30.0 07/28/2024     CREATSERUM 0.94 07/28/2024     BUN 18 07/28/2024     GLU 80 07/28/2024     CA 9.0 07/28/2024     ALB 3.5 07/28/2024     ALKPHO 66 07/28/2024     BILT 0.8 07/28/2024     TP 5.6 (L) 07/28/2024     AST 25 07/28/2024     ALT <7 (L) 07/28/2024       Assessment and plan  Problem 1 altered mental status patient has altered mental status in the setting of elevated blood pressure progressively worsening baseline and history of falls and also history of questionable insular bleed patient will be transferred to ICU patient's care was discussed with the primary RN and also neurologist.  In addition patient also has history of dementia.  I will make attempts to speak with the patient's family and get a better understanding of family's wishes and also of his baseline mental status.     Problem #2 bright red blood per rectum this will be evaluated GI will be consulted patient will be off aspirin patient does not seem to have acute hematochezia or BRBPR.  Will avoid heparin for DVT prophylaxis.     Problem #3 atrial fibrillation: Patient is not a candidate for anticoagulation and hence will even discontinue aspirin in light of current GI bleed and questionable insular cortex bleed.     Problem #4 adenocarcinoma of the colon: Patient currently does not have any obstructive features but surgical oncology has been consulted.  Further input and likely nonoperative process will be followed given patient's advanced age.     Problem #5 dementia: Supportive care will be provided patient is not on memantine will consider putting patient on donepezil and memantine further input for from neurology is awaited.     Problem #6 DVT prophylaxis: Patient will be on SCD boots.     Problem #7 GI prophylaxis: Patient will be on Protonix.     July 28,  2024  Patient is more alert neuro logic exam is unchanged.  Patient is in ICU appears stable.  Appreciate general surgery evaluation and neurologic evaluation.  Patient has some sundowning and started on haloperidol at bedtime.  Will advance diet as no surgical intervention is planned.  Transfer out of the ICU once stable.         NEUROLOGY  Assessment and Plan:   Nacho Mari is a 89 year old   male w/ a pmhx sig. for cerebral amyloid angiopathy, atrial fibrillation not on anticoagulation,?  Prior left occipital infarct, Hx of intracranial hemorrhage, Hx intra pontine hemorrhage, HTN, Prior history of smoking,COPD, fibromuscular dysplasia (involving bilateral internal carotid arteries and right V2 V3 segments without dissection),dementia with behavioral disturbances, rectosigmoid adenocarcinoma s/p resection in 2021, and hip fracture in January 2024 who is currently admitted for GI bleed seen in follow-up for encephalopathy in setting of hypertension and a right temporal hyperdensity on head CT suspicious for hemorrhage.  Hospital course has been complicated by agitated delirium related to dementia.  Patient has a stat MRI that is still pending.  Repeat head CT is stable.       His blood pressures have been slightly outside of the goal of less than 150.  Also patient is refusing oral agents.  Started him on losartan and hydrochlorothiazide.  Although he has a diagnosis of hypertension he is not on any antihypertensives.  Patient previously was on Precedex for agitation but became bradycardic and hypotensive.  He will have to remain in the unit until he is started taking oral agents again.  MRI of the brain is ordered essentially to confirm whether or not the patient has a small ICH.  Most likely this is due to cerebral amyloid angiopathy but could be hypertensive.  If his blood pressure is stable and he is willing to take his p.o. antihypertensives that he can be transferred out of the unit.     Right  temporal hemorrhage in the setting of known cerebral amyloid angiopathy, Hx intracranial hemorrhage, Hx ischemic stroke and fibromuscular dysplasia  Differential Diagnosis for stroke/TIA mechanism:  Hemorrhage due to cerebral amyloid angiopathy  Hypertensive hemorrhage  Artifact on head CT; doubt     Plan    Diagnostics:  Imaging: MRI brain WO and CT brain WO   Cardiac imaging: Telemetry   Labs:   Therapeutics:  Blood pressure goal: Less than 150 systolic.  Add losartan/hydrochlorothiazide.  Please avoid blood pressure variability. wide fluctuations in BP can lead to stroke expansion.    PRN hydralazine and labetalol for sustained pressures outside of goal  Neuro checks Q2.  Telemetry.NIH stroke scale per protocol.  Blood glucose goal: .  Accuchecks Q6 if patient has DM  closely monitor to prevent hypoglycemia in patients with AIS.  Antithrombotics: None.  Contraindicated.    - ICH Volume < 30 ml, ICH Score 1         - CT Head as above, repeat in am; stat mri now to confirm if ICH         - transfer to ICU         - precedex drip for agitation  precedex ordered so he can get the mri of the brain.          - Coags  will be ordered           - Goal Plt > 100, INR < 1.5          - SBP goal < 150         - Nsg c/s placed,          - PT/OT/SLP consulted     Avoid hypotonic fluids as this can worsen cerebral edema.  Physical therapy, Occupational Therapy, speech therapy evaluations ordered.  maintain oxygen saturation >94%. No supplemental oxygen  in nonhypoxic patients with acute ischemic stroke (AIS).  Tx hyperthermia (temperature >38°C) and identify source  STAT head CT and page neurology if any change in neurological exam or focal deficits.           INTERVAL EVENTS  07/28/24: could not tolerate precedex; Bp slightly elevated; repeat ct stable.      SUBJECTIVE:      Patient is incoherent.  He has an agitated delirium.  He keeps asking for water.  When this author brings the cup and straw close to him he says  \"not water it.  I want you to water the water.\"  Does not appear to be in any pain.  Denies being in pain when asked.  Otherwise does not respond appropriately.  Does follow pantomime commands.          7/29/24  Lab Results   Component Value Date     WBC 8.6 07/29/2024     HGB 11.7 (L) 07/29/2024     HCT 34.1 (L) 07/29/2024     .0 07/29/2024      07/29/2024     K 3.6 07/29/2024      07/29/2024     CO2 24.0 07/29/2024     CREATSERUM 0.88 07/29/2024     BUN 17 07/29/2024      (H) 07/29/2024     CA 8.9 07/29/2024     ALB 3.6 07/29/2024     ALKPHO 75 07/29/2024     BILT 0.5 07/29/2024     TP 6.0 07/29/2024     AST 29 07/29/2024     ALT 7 (L) 07/29/2024     Assessment and plan  Problem 1 altered mental status patient has altered mental status in the setting of elevated blood pressure progressively worsening baseline and history of falls and also history of questionable insular bleed patient will be transferred to ICU patient's care was discussed with the primary RN and also neurologist.  In addition patient also has history of dementia.  I will make attempts to speak with the patient's family and get a better understanding of family's wishes and also of his baseline mental status.     Problem #2 bright red blood per rectum this will be evaluated GI will be consulted patient will be off aspirin patient does not seem to have acute hematochezia or BRBPR.  Will avoid heparin for DVT prophylaxis.     Problem #3 atrial fibrillation: Patient is not a candidate for anticoagulation and hence will even discontinue aspirin in light of current GI bleed and questionable insular cortex bleed.     Problem #4 adenocarcinoma of the colon: Patient currently does not have any obstructive features but surgical oncology has been consulted.  Further input and likely nonoperative process will be followed given patient's advanced age.     Problem #5 dementia: Supportive care will be provided patient is not on memantine  will consider putting patient on donepezil and memantine further input for from neurology is awaited.     Problem #6 DVT prophylaxis: Patient will be on SCD boots.     Problem #7 GI prophylaxis: Patient will be on Protonix.        7/30/24  On call Nocturnist 07/30/24  Patient began having copious maroon liquid stool.  He denies abd pain.  He is sitting comfortably in bed. Recent Ct revealed colitis/stercolitis on 7/27.  Will get stat labs and obtain CTA.     CRITICAL CARE  Xray at bedside for NGT placement verification. Per my read, NG appearing in right lung. VSS and saturating well. NG removed, replaced in Left nare to 60 cm. Pt tolerated well.   Will re-image and verify placement.       Assessment/Plan:   Suspected LGI bleed- CTA without active bleed  ABLA- will recheck labs this am along with repeat Type and screen   Verify NGT re-placement for GI prep  Borderline B/P likely in setting of anemia, will hold am antihypertensive agents- reassess prn       MEDICATIONS ADMINISTERED IN LAST 1 DAY:  budesonide (Pulmicort) 0.5 MG/2ML nebulizer suspension 0.5 mg       Date Action Dose Route User    7/30/2024 0927 Given 0.5 mg Nebulization Diana Boyd, CLARISSA          fluticasone propionate (Flonase) 50 MCG/ACT nasal suspension 2 spray       Date Action Dose Route User    7/30/2024 0832 Given 2 spray Each Nare Rupinder Hylton RN          hydrALAzine (Apresoline) 20 mg/mL injection 10 mg       Date Action Dose Route User    7/29/2024 1607 Given 10 mg Intravenous Veronika Aranda RN     ipratropium-albuterol (Duoneb) 0.5-2.5 (3) MG/3ML inhalation solution 3 mL       Date Action Dose Route User    7/30/2024 0854 Given 3 mL Nebulization Marshall Moncada          lamoTRIgine (LaMICtal) tab 25 mg       Date Action Dose Route User    7/29/2024 2030 Given 25 mg Oral Natividad Barakat RN          melatonin tab 3 mg       Date Action Dose Route User    7/29/2024 2030 Given 3 mg Oral Natividad Barakat, DELPHINE          OLANZapine  (ZyPREXA) tab 5 mg       Date Action Dose Route User    7/29/2024 1808 Given 5 mg Oral Veronika Aranda RN          potassium chloride 40 mEq in 250mL sodium chloride 0.9% IVPB premix       Date Action Dose Route User    7/29/2024 2145 New Bag 40 mEq Intravenous Natividad Barakat RN          sodium chloride 0.9% infusion       Date Action Dose Route User    7/30/2024 0230 New Bag (none) Intravenous Lety Lovelace RN          sodium chloride 0.9 % IV bolus 1,000 mL       Date Action Dose Route User    7/30/2024 0130 New Bag 1,000 mL Intravenous Natividad Barakat RN          traZODone (Desyrel) tab 25 mg       Date Action Dose Route User    7/29/2024 2029 Given 25 mg Oral Natividad Barakat RN            Vitals (last day)       Date/Time Temp Pulse Resp BP SpO2 Weight O2 Device O2 Flow Rate (L/min) [facility]    07/30/24 0800 97.4 °F (36.3 °C) 110 21 106/85 96 % -- Nasal cannula 4 L/min     07/30/24 0400 98.4 °F (36.9 °C) 89 16 130/62 100 % 109 lb 9.1 oz (49.7 kg) Nasal cannula 6 L/min     07/30/24 0125 -- 69 14 108/71 -- -- -- --     07/30/24 0120 -- 71 16 117/51 84 % -- None (Room air) 6 L/min     07/30/24 0000 98.6 °F (37 °C) 80 18 139/82 92 % -- None (Room air) --     07/29/24 2000 99.4 °F (37.4 °C) 87 16 135/94 95 % -- None (Room air) --     07/29/24 1600 97.4 °F (36.3 °C) 58 14 167/66 98 % -- None (Room air) -- [place]    07/29/24 1400 -- 58 13 139/56 97 % -- None (Room air) -- [place]    07/29/24 1200 97.8 °F (36.6 °C) 81 17 122/70 96 % -- None (Room air) -- [place]    07/29/24 0400 98.6 °F (37 °C) 90 12 121/66 93 % -- None (Room air) --     07/29/24 0000 99.3 °F (37.4 °C) 75 20 142/86 95 % -- None (Room air) --

## 2024-07-30 NOTE — PROGRESS NOTES
[facility]     Gastroenterology Progress Note    Nacho Mari Patient Status:  Inpatient    10/14/1933 MRN [id number]   Location [facility] 2W/SW Attending Eric Fierro MD   Hosp Day # 3 PCP Oscar (Erika Rios MD       Subjective:   Overnight had large maroon stool.   Hemoglobin drop to 7.8. CTA without acute bleeding.     Patient sitting comfortably in bed now.   He is awake. Answers questions.     Denies abdominal pain.   He feels as though his breathing is normal.   No vomiting      Objective:   Blood pressure 98/67, pulse 108, temperature 97.4 °F (36.3 °C), temperature source Temporal, resp. rate 18, weight 109 lb 9.1 oz (49.7 kg), SpO2 99%. Body mass index is 20.04 kg/m².    Gen: awake, alert patient, ill appearing  HEENT: EOMI, the sclera appears anicteric, oropharynx clear, mucus membranes appear moist  CV: RRR  Lung: no conversational dyspnea   Abdomen: soft NTND abdomen with NABS appreciated   Skin: dry, warm, no jaundice  Ext: no LE edema is evident  Neuro: Alert   Psych: calm    Assessment and Plan:   89 yo M with dementia, MMP presenting with some rectal bleeding. Hx of CRC and resection noted. CT reviewed showing stercoral proctitis.     Due to recurrent bleed and drop of hemoglobin 3 gram, consider lower GIB or possible brisk upper GIB. Due to poor mentation. NG placed and prep will be given through NG. Plan for EGD and colonoscopy. Called and reviewed plan with POA. He is agreeable.      Recommend:  -NG placed  -golytely  -EGD/cln today     Case discussed with Davida Doty MD and Rupinder AKERS.    Senia Saldaña PA-C  [facility] Gastroenterology  2024      Results:     Lab Results   Component Value Date    WBC 11.2 (H) 2024    HGB 8.4 (L) 2024    HCT 25.6 (L) 2024    .0 2024    CREATSERUM 0.90 2024    BUN 20 2024     2024    K 3.9 2024     (H) 2024    CO2 23.0  07/30/2024     (H) 07/30/2024    CA 8.3 (L) 07/30/2024    ALB 3.6 07/29/2024    ALKPHO 75 07/29/2024    BILT 0.5 07/29/2024    TP 6.0 07/29/2024    AST 29 07/29/2024    ALT 7 (L) 07/29/2024    PTT 27.3 07/30/2024    INR 1.13 07/30/2024    MG 1.6 07/29/2024    PHOS 3.0 07/29/2024       XR CHEST AP/PA (1 VIEW) (CPT=71045)    Result Date: 7/30/2024  CONCLUSION:   Well-positioned enteric tube.  No significant change in the 2.5 cm right perihilar pulmonary nodule.  No new focal opacity, pleural effusion, or pneumothorax.     Dictated by (CST): Feliciano Hameed MD on 7/30/2024 at 9:09 AM     Finalized by (CST): Feliciano Hameed MD on 7/30/2024 at 9:15 AM          XR CHEST AP PORTABLE  (CPT=71045)    Result Date: 7/30/2024  CONCLUSION:  1. NG tube traverses right mainstem bronchus and has its tip in the right lower lobe.  Findings were called to the patient's nurse deaf in the at 8:05 a.m. 2. Right perihilar upper lobe 2.5 cm pulmonary nodule. 3. Emphysema. 4. Left perihilar scarring. 5. Atherosclerotic calcification aorta.    Dictated by (CST): Sohail Weathers MD on 7/30/2024 at 8:05 AM     Finalized by (CST): Sohail Weathers MD on 7/30/2024 at 8:13 AM          CT BRAIN OR HEAD (CPT=70450)    Result Date: 7/28/2024  CONCLUSION:  1. Stable 4 mm mildly dense lesion in the right subinsular region, which could represent a small subacute focus of hemorrhage.  Continued imaging follow-up is suggested. 2. Stable chronic left occipital lobe cortical infarction. 3. Stable chronic left thalamic lacunar infarction. 4. Stable mild generalized atrophy and severe chronic microangiopathic ischemic changes with large vessel calcific atherosclerosis.    Dictated by (CST): Sami Cabrera MD on 7/28/2024 at 8:27 PM     Finalized by (CST): Sami Cabrera MD on 7/28/2024 at 8:31 PM

## 2024-07-30 NOTE — PROGRESS NOTES
Brief GI note: Called and talked with TYESHA Rivers. Reviewed with him plan from today and patient's non compliance. Discussed plan for palliative consult and GOC discussion for his uncle. He is agreeable. He notes he cannot come in person, but would like to be contacted via phone call for discussions.     Senia Saldaña PA-C     Rehab Medicine

## 2024-07-30 NOTE — PROGRESS NOTES
On call Nocturnist 07/30/24  Patient began having copious maroon liquid stool.  He denies abd pain.  He is sitting comfortably in bed. Recent Ct revealed colitis/stercolitis on 7/27.  Will get stat labs and obtain CTA.    /71   Pulse 72   Temp 98.6 °F (37 °C) (Temporal)   Resp 15   Wt 98 lb 5.2 oz (44.6 kg)   SpO2 100%   BMI 17.98 kg/m²

## 2024-07-31 PROBLEM — Z71.89 GOALS OF CARE, COUNSELING/DISCUSSION: Status: ACTIVE | Noted: 2024-07-31

## 2024-07-31 PROBLEM — Z51.5 PALLIATIVE CARE ENCOUNTER: Status: ACTIVE | Noted: 2024-07-31

## 2024-07-31 LAB
GLUCOSE BLDC GLUCOMTR-MCNC: 103 MG/DL (ref 70–99)
GLUCOSE BLDC GLUCOMTR-MCNC: 87 MG/DL (ref 70–99)
GLUCOSE BLDC GLUCOMTR-MCNC: 99 MG/DL (ref 70–99)

## 2024-07-31 PROCEDURE — 99233 SBSQ HOSP IP/OBS HIGH 50: CPT | Performed by: INTERNAL MEDICINE

## 2024-07-31 PROCEDURE — 99231 SBSQ HOSP IP/OBS SF/LOW 25: CPT | Performed by: OTHER

## 2024-07-31 PROCEDURE — 99233 SBSQ HOSP IP/OBS HIGH 50: CPT | Performed by: OTHER

## 2024-07-31 PROCEDURE — 99222 1ST HOSP IP/OBS MODERATE 55: CPT | Performed by: NURSE PRACTITIONER

## 2024-07-31 RX ORDER — BUDESONIDE 0.5 MG/2ML
INHALANT ORAL
Status: DISPENSED
Start: 2024-07-31 | End: 2024-07-31

## 2024-07-31 RX ORDER — PANTOPRAZOLE SODIUM 40 MG/1
40 TABLET, DELAYED RELEASE ORAL
Status: DISCONTINUED | OUTPATIENT
Start: 2024-08-01 | End: 2024-08-01

## 2024-07-31 NOTE — CONSULTS
[facility]  part of [facility]  Palliative Care Initial Consult Note    Nacho Mari Patient Status:  Inpatient    10/14/1933 MRN [id number]   Location [facility] 2W/SW Attending Eric Fierro MD   Hosp Day # 4 PCP Oscar Rios MD ([place])     Date of Consult: 2024  Patient seen at: [facility] In[facility]    The  Cures Act makes medical notes like these available to patients in the interest of transparency. Please be advised this is a medical document. Medical documents are intended to carry relevant information, facts as evident, and the clinical opinion of the practitioner. The medical note is intended as peer to peer communication and may appear blunt or direct. It is written in medical language and may contain abbreviations or verbiage that are unfamiliar.     Reason for Consultation: Consult ordered by:: Tracy SUERO for evaluation of Palliative Care needs and goals of care discussion.    Subjective     History of Present Illness: Nacho Mari is a 89 year old male with history of Dementia, COPD, Afib, metastatic rectal cancer-not a candidate for chemo, depression, stoke 202, left hip arthroplasty, frequent falls and severe malnutrition who was admitted on 2024 from the [facility] for rectal bleeding, agitation and weakness. 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  History was obtained from [facility] and pts nephew Silvestre over the phone.      This is the 2nd hospitalization in the past 6 months.    Today is day #4 of hospitalization.     When I entered the room, the patient was in the bed he is awake oriented to self and his birth date, on exam he is pleasantly confused. + bilateral wrist restraints on. RN reports pt has pulled in NG out multiple times. Pt tries to follow simple commands. There are no family present at the bedside.     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     Past Medical History/Past Surgical History: obtained from [facility]  Past Medical History:    Abnormal electrocardiogram (ECG) (EKG)    MICHAEL (acute kidney injury) (HCC)    Aneurysm of ascending aorta without rupture (HCC)    Aortic valve stenosis    APC (atrial premature contractions)    Cerebral amyloid angiopathy (HCC)    Chronic atrial fibrillation (HCC)    Coronary artery disease involving native coronary artery of native heart    Delirium    Dementia (HCC)    Dyspnea on exertion    Essential hypertension    Fibromuscular dysplasia (HCC)    Formatting of this note might be different from the original.    bilateral ICAs and R V2-3 without dissection      First degree AV block    Hypernatremia    Hypokalemia    Incomplete right bundle branch block    Intraparenchymal hemorrhage of brain (HCC)    Lactic acidosis    Malignant neoplasm of sigmoid colon (HCC)    Memory difficulties    Mild cognitive impairment    Mitral valve disease    Other emphysema (HCC)    Paroxysmal atrial fibrillation (HCC)    Peptic ulcer disease    Pontine hemorrhage (HCC)    Sepsis (HCC)    Sinus tachycardia    SVT (supraventricular tachycardia) (HCC)    Throat pain     History reviewed. No pertinent surgical history.    Nutritional Status:  BMI:19 Weight: 105lbs  Weight changes: SETH  Current Appetite: Silvestre states as far as he knows his uncle has been eating  well.  Dysphagia: SETH    Family History: obtained from [facility]  No family history on file.    Allergies:  No Known Allergies    Medications:     Current Facility-Administered Medications:     budesonide (Pulmicort) 0.5 MG/2ML nebulizer suspension, , ,     tamsulosin (Flomax) cap 0.4 mg, 0.4 mg, Oral, Daily    traZODone (Desyrel) tab 25 mg, 25 mg, Oral, Nightly    OLANZapine (ZyPREXA) tab 5 mg, 5 mg, Oral, QPM    lamoTRIgine (LaMICtal) tab 25 mg, 25 mg, Oral, BID    budesonide (Pulmicort) 0.5 MG/2ML nebulizer suspension 0.5 mg, 0.5 mg, Nebulization, Daily    ipratropium-albuterol (Duoneb) 0.5-2.5 (3) MG/3ML inhalation solution 3 mL, 3 mL, Nebulization, Daily    melatonin tab 3 mg, 3 mg, Oral, Nightly    [Held by provider] losartan (Cozaar) 50 mg, hydroCHLOROthiazide 12.5 mg for Benicar HCT 20/12.5 (EEH only), , Oral, Daily    polyethylene glycol (PEG 3350) (Miralax) 17 g oral packet 17 g, 17 g, Oral, BID    fluticasone propionate (Flonase) 50 MCG/ACT nasal suspension 2 spray, 2 spray, Each Nare, Daily    magnesium oxide (Mag-Ox) tab 400 mg, 400 mg, Oral, Daily    pantoprazole (Protonix) DR tab 40 mg, 40 mg, Oral, QAM AC    labetalol (Trandate) 5 mg/mL injection 10 mg, 10 mg, Intravenous, Q4H PRN    magnesium sulfate in sterile water for injection 2 g/50mL IVPB premix 2 g, 2 g, Intravenous, Once    sodium chloride 0.9% infusion, , Intravenous, Continuous    acetaminophen (Tylenol) tab 650 mg, 650 mg, Oral, Q4H PRN **OR** acetaminophen (Tylenol) rectal suppository 650 mg, 650 mg, Rectal, Q4H PRN    labetalol (Trandate) 5 mg/mL injection 10 mg, 10 mg, Intravenous, Q10 Min PRN    hydrALAzine (Apresoline) 20 mg/mL injection 10 mg, 10 mg, Intravenous, Q2H PRN    Functional Status History:  Falls: Yes  ADLs: Roberto is [facility], his nephew states he spends most of his time in W/C, he requires full assistance with all ald's. At baseline he does experience some confusion. Silvestre states his uncle likes to  joke around alot    Palliative Performance Scale:   Prior to admission: (pt/family reported) 50 %  Observed during hospitalization: 20 %  % Ambulation Activity Level Self-Care Intake Consciousness   100 Full  Normal  No Disease Full Normal Full   90 Full  Normal  Some Disease Full Normal Full   80 Full  Normal w/effort  Some Disease Full Normal or reduced Full   70 Reduced  Can't Perform Job  Some Disease Full Normal or reduced Full   60 Reduced  Can't Perform Hobby   Significant Disease Occ Assist Normal or reduced Full or confused   50 Mainly sit/lie Can't do any work  Extensive Disease Partial Assist Normal or reduced Full or confused   40 Mainly in bed Can't do any work  Extensive Disease Mainly Assist Normal or reduced Full or confused   30 Bed Bound Can't do any work  Extensive Disease Max Assist  Total Care Reduced  Drowsy/confused   20 Bed Bound Can't do any work  Extensive Disease Max Assist  Total Care Minimal  Drowsy/confused   10 Bed Bound Can't do any work  Extensive Disease Max Assist  Total Care Mouth Care  Drowsy/confused   0 Death        Objective      Vital Signs:  Blood pressure (!) 147/91, pulse 94, temperature 97.7 °F (36.5 °C), temperature source Axillary, resp. rate 19, weight 105 lb 9.6 oz (47.9 kg), SpO2 98%.  Body mass index is 19.31 kg/m².  Present Level of pain: denies pain   Non-verbal signs of pain present: No    General: Roberto is in the bed he is awake oriented to person and birthday, pleasantly confused. Tries to follow simple commands. He appears cachetic and in no apparent distress. + bilateral wrist restraints.  HEENT: dry oral MM  Cardiac: ST regular rate and rhythm, S1, S2 normal, no murmur, rub or gallop.  Lungs: Clear without wheezes, rales, rhonchi or dullness.  Normal excursions and effort. On 4 liters NC  Abdomen: flat soft, hypoactive bowel sounds, no rebound or guarding  Extremities: Without clubbing, cyanosis. BLE edema not present  Neurologic: Alert to self, on exam he is  pleasantly confused.   Skin: Warm and dry, multiple bruising    Hematology:  Lab Results   Component Value Date    WBC 11.2 (H) 07/30/2024    HGB 8.6 (L) 07/30/2024    HCT 25.6 (L) 07/30/2024    .0 07/30/2024       Coags:  Lab Results   Component Value Date    INR 1.13 07/30/2024    PTT 27.3 07/30/2024       Chemistry:  Lab Results   Component Value Date    CREATSERUM 0.90 07/30/2024    BUN 20 07/30/2024     07/30/2024    K 3.9 07/30/2024     (H) 07/30/2024    CO2 23.0 07/30/2024     (H) 07/30/2024    CA 8.3 (L) 07/30/2024    ALB 3.6 07/29/2024    ALKPHO 75 07/29/2024    BILT 0.5 07/29/2024    TP 6.0 07/29/2024    AST 29 07/29/2024    ALT 7 (L) 07/29/2024    MG 1.6 07/29/2024    PHOS 3.0 07/29/2024       Imaging:  CTA ABD/PEL (CPT=74174)    Result Date: 7/30/2024  CONCLUSION:   Infrarenal abdominal aortic aneurysm.  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  No intra-abdominal or intrapelvic hematoma.  No active contrast extravasation.  Persistent proctitis.  Findings may be secondary to residual stercoral colitis however the previously seen stool ball is no longer visualized.  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  No complete occlusion.  Multiple other incidental findings as described in the body of the report.  Preliminary report was submitted by the [facility] teleradiologist and there are no significant discrepancies.  [email or web address]     Dictated by (CST): Karlo Llanes MD on 7/30/2024 at 10:27 AM     Finalized by (CST): Karlo Llanes MD on 7/30/2024 at 10:40 AM          XR CHEST AP/PA (1 VIEW) (CPT=71045)    Result Date: 7/30/2024  CONCLUSION:   Well-positioned enteric tube.  No significant change in the 2.5 cm right perihilar pulmonary nodule.  No new focal opacity, pleural effusion, or pneumothorax.     Dictated by (CST): Feliciano Hameed MD on 7/30/2024 at 9:09 AM     Finalized by (CST):  Feliciano Hameed MD on 7/30/2024 at 9:15 AM          XR CHEST AP PORTABLE  (CPT=71045)    Result Date: 7/30/2024  CONCLUSION:  1. NG tube traverses right mainstem bronchus and has its tip in the right lower lobe.  Findings were called to the patient's nurse deaf in the at 8:05 a.m. 2. Right perihilar upper lobe 2.5 cm pulmonary nodule. 3. Emphysema. 4. Left perihilar scarring. 5. Atherosclerotic calcification aorta.    Dictated by (CST): Sohail Weathers MD on 7/30/2024 at 8:05 AM     Finalized by (CST): Sohail Weathers MD on 7/30/2024 at 8:13 AM             Summary of Discussion      I discussed reason for palliative care consultation with pts nephew Silvestre over the phone who is HCPOA.    I differentiated the palliative treatment-focus model versus the hospice comfort-focused philosophy of care. I informed the patient/family that having palliative care support does not limit medical treatment options or decisions to those who wish to continue curative or restorative medical therapies. I discussed the benefits of palliative care to include assistance with arising symptom management needs, an extra layer of support, to ensure GOC are respected throughout healthcare continuum, and assist with transition to hospice care when appropriate.      Outpatient/Community Palliative Care Services:  Usually visit once per 4 weeks  Focus on GOC and symptom management   Palliative Care criteria:  Not altered by prognosis   Does not limit curative or restorative therapies      Outpatient Hospice services:  24/7 phone triage services   RN visit one or more times per week depending on need  Home health aid to assist in ADLs/hygiene   Hospice criteria:  Less than six-month prognosis   Must forego most life-prolonging  measures/treatments   Focus solely on comfort   Must sign onto hospice benefit with agency     Prognostic awareness/understanding:   Silvestre states his uncle becomes more confused with environment changes. 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 Due to past trauma Roberto doesn't trust people he is not familiar with and does not do well with changes.  We discussed the disease trajectory of Dementia, sever malnutrition, metastatic rectal carcinoma pt is not candidate for chemotherapy, GI bleed, frequent falls, h/o stroke, intracranial hemorrhages along with overall decline and advanced age with associated symptoms and continued decline over time.   Discussed with Silvestre his uncle has pulled out NG tube multiple times despite being on bilateral wrist restraints.   Silvestre stated his uncle would want to remain comfortable and his QOL is important to him.  Discussed with Silvestre CPC vs Hospice. Discussed not re hospitalizing his uncle to prevent any further aggressive treatments and suffering.   Silvestre agreed he wants his uncle to be able to do the things that bring him peace and comfort and QOL.    Hopes/goals:   Silvestre expressed he wants his uncle to return to the [facility] where he is familiar with the environment and the staff on comfort measures.     Fears/concerns:   Silvestre does not want his uncle to suffer.  Provided emotional support to the pt and his nephew Silvestre.    Advance Care Planning counseling and discussion:   I confirmed that patient's HCPOA is his nephew Silvestre. I discussed the risks vs benefits of life sustaining treatments in the setting of advanced age and health issues. Education provided on what DNAR entails and encouraged setting limits. Silvestre expressed wishes for DNAR/comfort measures going forward.     Assessment and Recommendations         Gastrointestinal hemorrhage, unspecified gastrointestinal hemorrhage type     Adenocarcinoma of rectosigmoid junction     Stercoral colitis    Cerebral amyloid angiopathy     Fibromuscular dysplasia     Aphasia    History of dementia    Nontraumatic subcortical hemorrhage of right cerebral hemisphere     Delirium superimposed on dementia     Episodic mood disorder     Frequent falls    Severe malnutrition    Afib        Goals of care counseling  -see above for details  -Pt is DNAR/comfort measures  -Agreeable to palliative care following  -Dispo: Silvestre expresses he wants his uncle to return to the TV on hospice. SW to help with dc planning.   -declined  at this time  -hospice to reach out and speak with Silvestre for informational session  -Provided emotional support to pt/family who are coping adequately    Advance care planning  -see above for details  -Pt's nephew Silvestre Hammond is HCPOA [phone], kamla Huff is successor [phone]    Palliative Performance Scale 20%    Discussed today's visit with Dr Doty, Arelis Metcalf CM, Gianna AKERS, Dr. Fierro     Palliative Care Follow Up: Palliative care team will continue to follow until a plan is in place. Feel free to contact our team with any questions or concerns.    Thank you for allowing Palliative Care services to participate in the care of Nacho Mari.    A total of 55 minutes were spent on this consult, which included all of the following: chart review, direct face to face contact, history taking, physical examination, counseling and coordinating care, and documentation.     Samra Garner, WU [id number]  7/31/2024  10:19am  Palliative Care Services

## 2024-07-31 NOTE — PROGRESS NOTES
Patient is a 89 year old , , male with past medical history of afib, hypertension, COPD, dementia who was admitted to the hospital for Gastrointestinal hemorrhage, unspecified gastrointestinal hemorrhage type: The patient has been demonstrating increased confusion, restlessness.  Patient indicated for psych consult for evaluation and advise.    Consult Duration      The patient seen for over 50-minute, follow-up evaluation, over 50% counseling and coordinating care addressing increased confusion, restlessness, agitation .  Record reviewed, communication with attending, communication with RN and patient seen face to face evaluation    History of Present Illness:   7/29 patient was seen for initial consult demonstrating confusion and disorientation to place, date and condition, but no signs of agitation.    Per chart review and the care team, the patient was not given lamotrigine 25mg, melatonin, zypraxa, or trazodone within th past 24 hours.     By  that nephew want patient with reserved integrity and palliative hospice.    The patient seen today in his room laying down in his bed with soft wrist restraint.  Patient was noticeably confused with difficulty expressing not demonstrreporting that he is things around his hand to be cut.    The patient today is disoriented to place, date, and condition.  Patient denied that he is at hospital stating he is at home in his residence. Patient otherwise demonstrating attempt to engage but noticeable distractibility.    Patient today has not been demonstrating agitation, stating that he was able to sleep fine last night and denies having bad dreams.     The patient today denying any history of depression, anxiety or suicidal ideation.  He denying any auditory or visual hallucination.  Patient denied history of substance abuse.    The patient has been demonstrating delirium episode with alternation in mood and cognition with episodes of  increased  confusion, restlessness, agitation and response to internal stimuli.   Dementia, mood disorder    Labs and imaging reviewed: glucose 153, sodium 145, wbc 11.2. hemoglobin 8.6      Past Psychiatric/Medication History:  1. Prior diagnoses: Dementia, mood disorder  2. Past psychiatric inpatient: Unknown  3. Past outpatient history: Trazodone  4. Past suicide history: None  5. Medication history: Trazodone and Seroquel    Social History:     male.  Unknown history of alcohol or substance abuse    Family History:  Unknown family history  Medical History:   Past Medical History  Past Medical History:    Abnormal electrocardiogram (ECG) (EKG)    MICHAEL (acute kidney injury) (HCC)    Aneurysm of ascending aorta without rupture (HCC)    Aortic valve stenosis    APC (atrial premature contractions)    Cerebral amyloid angiopathy (HCC)    Chronic atrial fibrillation (HCC)    Coronary artery disease involving native coronary artery of native heart    Delirium    Dementia (HCC)    Dyspnea on exertion    Essential hypertension    Fibromuscular dysplasia (HCC)    Formatting of this note might be different from the original.    bilateral ICAs and R V2-3 without dissection      First degree AV block    Hypernatremia    Hypokalemia    Incomplete right bundle branch block    Intraparenchymal hemorrhage of brain (HCC)    Lactic acidosis    Malignant neoplasm of sigmoid colon (HCC)    Memory difficulties    Mild cognitive impairment    Mitral valve disease    Other emphysema (HCC)    Paroxysmal atrial fibrillation (HCC)    Peptic ulcer disease    Pontine hemorrhage (HCC)    Sepsis (HCC)    Sinus tachycardia    SVT (supraventricular tachycardia) (HCC)    Throat pain       Past Surgical History  History reviewed. No pertinent surgical history.    Family History  No family history on file.    Social History  Social History     Socioeconomic History    Marital status:    Tobacco Use    Smoking status: Former     Types:  Cigarettes    Smokeless tobacco: Never     Social Determinants of Health     Financial Resource Strain: Low Risk  (5/20/2023)    Received from [facility]    Financial Resource Strain     In the past year, have you or any family members you live with been unable to get any of the following when it was really needed? Check all that apply.: None   Food Insecurity: Unknown (7/27/2024)    Food Insecurity     Food Insecurity: Patient unable to answer   Transportation Needs: Unknown (7/27/2024)    Transportation Needs     Lack of Transportation: Patient unable to answer   Physical Activity: Inactive (3/3/2021)    Received from [facility]    Exercise Vital Sign     Days of Exercise per Week: 0 days     Minutes of Exercise per Session: 0 min    Received from [facility]    Stress   Social Connections: High Risk (5/20/2023)    [facility]    Social Connections     How often do you see or talk to people that you care about and feel close to? (For example: talking to friends on the phone, visiting friends or family, going to [employer] or club meetings): Less than once a week   Housing Stability: Unknown (7/27/2024)    Housing Stability     Housing Instability: Patient unable to answer           Current Medications:  Current Facility-Administered Medications   Medication Dose Route Frequency    budesonide (Pulmicort) 0.5 MG/2ML nebulizer suspension        tamsulosin (Flomax) cap 0.4 mg  0.4 mg Oral Daily    traZODone (Desyrel) tab 25 mg  25 mg Oral Nightly    OLANZapine (ZyPREXA) tab 5 mg  5 mg Oral QPM    lamoTRIgine (LaMICtal) tab 25 mg  25 mg Oral BID    budesonide (Pulmicort) 0.5 MG/2ML nebulizer suspension 0.5 mg  0.5 mg Nebulization Daily    ipratropium-albuterol (Duoneb) 0.5-2.5 (3) MG/3ML inhalation solution 3 mL  3 mL Nebulization Daily    melatonin tab 3 mg  3 mg Oral  Nightly    [Held by provider] losartan (Cozaar) 50 mg, hydroCHLOROthiazide 12.5 mg for Benicar HCT 20/12.5 ([facility] only)   Oral Daily    polyethylene glycol (PEG 3350) (Miralax) 17 g oral packet 17 g  17 g Oral BID    fluticasone propionate (Flonase) 50 MCG/ACT nasal suspension 2 spray  2 spray Each Nare Daily    magnesium oxide (Mag-Ox) tab 400 mg  400 mg Oral Daily    pantoprazole (Protonix) DR tab 40 mg  40 mg Oral QAM AC    labetalol (Trandate) 5 mg/mL injection 10 mg  10 mg Intravenous Q4H PRN    magnesium sulfate in sterile water for injection 2 g/50mL IVPB premix 2 g  2 g Intravenous Once    sodium chloride 0.9% infusion   Intravenous Continuous    acetaminophen (Tylenol) tab 650 mg  650 mg Oral Q4H PRN    Or    acetaminophen (Tylenol) rectal suppository 650 mg  650 mg Rectal Q4H PRN    labetalol (Trandate) 5 mg/mL injection 10 mg  10 mg Intravenous Q10 Min PRN    hydrALAzine (Apresoline) 20 mg/mL injection 10 mg  10 mg Intravenous Q2H PRN     Medications Prior to Admission   Medication Sig    acetaminophen 325 MG Oral Tab Take 1 tablet (325 mg total) by mouth every 6 (six) hours as needed for Pain.    magnesium oxide 400 MG Oral Tab Take 1 tablet (400 mg total) by mouth daily.    Melatonin 3 MG Oral Cap Take 1 capsule (3 mg total) by mouth daily.    pantoprazole 40 MG Oral Tab EC Take 1 tablet (40 mg total) by mouth every morning before breakfast.    fluticasone propionate 50 MCG/ACT Nasal Suspension 2 sprays by Each Nare route daily.    Polyethylene Glycol 3350 17 g Oral Powd Pack Take 17 g by mouth as needed.    QUEtiapine 50 MG Oral Tab Take 1 tablet (50 mg total) by mouth nightly.    tamsulosin 0.4 MG Oral Cap Take 1 capsule (0.4 mg total) by mouth daily.    traZODone 50 MG Oral Tab Take 1 tablet (50 mg total) by mouth nightly.    fluticasone-umeclidin-vilant (TRELEGY ELLIPTA) 100-62.5-25 MCG/ACT Inhalation Aerosol Powder, Breath Activated Inhale 1 puff into the lungs daily.       Allergies  No Known  Allergies    Review of Systems:   As by Admitting/Attending    Results:   Laboratory Data:  Lab Results   Component Value Date    WBC 11.2 (H) 07/30/2024    HGB 8.6 (L) 07/30/2024    HCT 25.6 (L) 07/30/2024    .0 07/30/2024    CREATSERUM 0.90 07/30/2024    BUN 20 07/30/2024     07/30/2024    K 3.9 07/30/2024     (H) 07/30/2024    CO2 23.0 07/30/2024     (H) 07/30/2024    CA 8.3 (L) 07/30/2024    ALB 3.6 07/29/2024    ALKPHO 75 07/29/2024    TP 6.0 07/29/2024    AST 29 07/29/2024    ALT 7 (L) 07/29/2024    PTT 27.3 07/30/2024    INR 1.13 07/30/2024    PTP 15.3 (H) 07/30/2024    MG 1.6 07/29/2024    PHOS 3.0 07/29/2024         Imaging:  CTA ABD/PEL (CPT=74174)    Result Date: 7/30/2024  CONCLUSION:   Infrarenal abdominal aortic aneurysm.  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  No intra-abdominal or intrapelvic hematoma.  No active contrast extravasation.  Persistent proctitis.  Findings may be secondary to residual stercoral colitis however the previously seen stool ball is no longer visualized.  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  No complete occlusion.  Multiple other incidental findings as described in the body of the report.  Preliminary report was submitted by the [employer] teleradiologist and there are no significant discrepancies.  elm-[email or web address]     Dictated by (CST): Karlo Llanes MD on 7/30/2024 at 10:27 AM     Finalized by (CST): Karlo Llanes MD on 7/30/2024 at 10:40 AM          XR CHEST AP/PA (1 VIEW) (CPT=71045)    Result Date: 7/30/2024  CONCLUSION:   Well-positioned enteric tube.  No significant change in the 2.5 cm right perihilar pulmonary nodule.  No new focal opacity, pleural effusion, or pneumothorax.     Dictated by (CST): Feliciano Hameed MD on 7/30/2024 at 9:09 AM     Finalized by (CST): Feliciano Hameed MD on 7/30/2024 at 9:15 AM          XR CHEST AP PORTABLE   (CPT=71045)    Result Date: 7/30/2024  CONCLUSION:  1. NG tube traverses right mainstem bronchus and has its tip in the right lower lobe.  Findings were called to the patient's nurse deaf in the at 8:05 a.m. 2. Right perihilar upper lobe 2.5 cm pulmonary nodule. 3. Emphysema. 4. Left perihilar scarring. 5. Atherosclerotic calcification aorta.    Dictated by (CST): Sohail Weathers MD on 7/30/2024 at 8:05 AM     Finalized by (CST): Sohail Weathers MD on 7/30/2024 at 8:13 AM          CT BRAIN OR HEAD (CPT=70450)    Result Date: 7/28/2024  CONCLUSION:  1. Stable 4 mm mildly dense lesion in the right subinsular region, which could represent a small subacute focus of hemorrhage.  Continued imaging follow-up is suggested. 2. Stable chronic left occipital lobe cortical infarction. 3. Stable chronic left thalamic lacunar infarction. 4. Stable mild generalized atrophy and severe chronic microangiopathic ischemic changes with large vessel calcific atherosclerosis.    Dictated by (CST): Sami Cabrera MD on 7/28/2024 at 8:27 PM     Finalized by (CST): Sami Cabrera MD on 7/28/2024 at 8:31 PM           Vital Signs:   Blood pressure 158/80, pulse 80, temperature 97.7 °F (36.5 °C), temperature source Axillary, resp. rate 19, weight 47.9 kg (105 lb 9.6 oz), SpO2 92%.    Mental Status Exam:   Appearance: Stated age male, in hospital gown, laying down in hospital bed. With soft wrist restraints.   Psychomotor: Patient has been demonstrating restlessness and agitation.  No agitation during evaluation today.  Orientation: Alert and oriented to person. Patient confused  Gait: Not evaluated.  Attitude/Coorperation: limited cooperation due to confusion, restlessness, and distractibility  Behavior: episodes of restlessness and agitation.  Speech: Regular rate and rhythm speech.  No dysarthria  Mood: Reporting feeling fine.  Affect: Apathetic and restricted affect..  Thought process: Confused, disorganized  Thought content: No  reports of  suicidal or homicidal ideation.  Perceptions: Patient has been demonstrating response to internal stimuli.  Concentration: Grossly impaired  Memory: Grossly impaired  Intellect: Average.  Judgment and Insight: Questionable.     Impression:     Delirium superimposed on dementia.  Vascular dementia with behavioral disturbance.  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    The patient has been demonstrating delirium episode with alternation in mood and cognition with episodes of  increased confusion, restlessness, agitation and response to internal stimuli.     7/31/2024: The patient continues treating disorientation and confusion.  Family wanted him on palliative hospice.  Sign off the case.    Discussed risk and benefit, acknowledging the current symptom and severity.  At this point, I would recommend the following approach:     Focus on safety  Focus on education and support.  Focus on insight orientation helping the patient understand diagnosis and treatment plan.  Continue Zyprexa 5 mg in the evening.  Continue trazodone to 25 mg nightly.  Continue melatonin 3 mg nightly.  Continue lamotrigine 25 mg twice daily  Coordinate plan with team.  Sign off the case    Orders This Visit:  Orders Placed This Encounter   Procedures    CBC With Differential With Platelet    Comp  Metabolic Panel (14)    Prothrombin Time (PT)    PTT, Activated    REDRAW CMP (P)    Magnesium    Potassium    Magnesium    CBC, Platelet; No Differential    Comp Metabolic Panel (14)    CBC With Differential With Platelet    Comp Metabolic Panel (14)    Magnesium    Phosphorus    Prothrombin Time (PT)    PTT, Activated    Hemoglobin & Hematocrit    CBC, Platelet; No Differential    Basic Metabolic Panel (8)    Hemoglobin & Hematocrit    Type and screen    ABORH (Blood Type)    Antibody Screen    Rapid SARS-CoV-2 by PCR       Meds This Visit:  Requested Prescriptions      No prescriptions requested or ordered in this encounter       Tylor Dang MD  7/31/2024    Note to Patient: The 21st Century Cures Act makes medical notes like these available to patients in the interest of transparency. However, be advised this is a medical document. It is intended as peer to peer communication. It is written in medical language and may contain abbreviations or verbiage that are unfamiliar. It may appear blunt or direct. Medical documents are intended to carry relevant information, facts as evident, and the clinical opinion of the practitioner. This note may have been transcribed using a voice dictation system. Voice recognition errors may occur. This should not be taken to alter the content or meaning of this note.

## 2024-07-31 NOTE — PHYSICAL THERAPY NOTE
Per CM note, pt will be discharging back to [facility] with hospice services. PT to sign off at this time. Please reach out with new orders if DC plans change.

## 2024-07-31 NOTE — PLAN OF CARE
Restraints continued for patient safety.     No bowel movements overnight.  Plan for palliative meeting to address GOC.       Problem: Safety Risk - Non-Violent Restraints  Goal: Patient will remain free from self-harm  Description: INTERVENTIONS:  - Apply the least restrictive restraint to prevent harm  - Notify patient and family of reasons restraints applied  - Assess for any contributing factors to confusion (electrolyte disturbances, delirium, medications)  - Discontinue any unnecessary medical devices as soon as possible  - Assess the patient's physical comfort, circulation, skin condition, hydration, nutrition and elimination needs   - Reorient and redirection as needed  - Assess for the need to continue restraints  7/31/2024 0141 by Nba Castillo, RN  Outcome: Not Progressing  7/30/2024 2357 by Nba Castillo, RN  Outcome: Not Progressing     Problem: GASTROINTESTINAL - ADULT  Goal: Minimal or absence of nausea and vomiting  Description: INTERVENTIONS:  - Maintain adequate hydration with IV or PO as ordered and tolerated  - Nasogastric tube to low intermittent suction as ordered  - Evaluate effectiveness of ordered antiemetic medications  - Provide nonpharmacologic comfort measures as appropriate  - Advance diet as tolerated, if ordered  - Obtain nutritional consult as needed  - Evaluate fluid balance  Outcome: Progressing  Goal: Maintains or returns to baseline bowel function  Description: INTERVENTIONS:  - Assess bowel function  - Maintain adequate hydration with IV or PO as ordered and tolerated  - Evaluate effectiveness of GI medications  - Encourage mobilization and activity  - Obtain nutritional consult as needed  - Establish a toileting routine/schedule  - Consider collaborating with pharmacy to review patient's medication profile  Outcome: Progressing     Problem: HEMATOLOGIC - ADULT  Goal: Maintains hematologic stability  Description: INTERVENTIONS  - Assess for signs and symptoms of bleeding or  hemorrhage  - Monitor labs and vital signs for trends  - Administer supportive blood products/factors, fluids and medications as ordered and appropriate  - Administer supportive blood products/factors as ordered and appropriate  Outcome: Progressing  Goal: Free from bleeding injury  Description: (Example usage: patient with low platelets)  INTERVENTIONS:  - Avoid intramuscular injections, enemas and rectal medication administration  - Ensure safe mobilization of patient  - Hold pressure on venipuncture sites to achieve adequate hemostasis  - Assess for signs and symptoms of internal bleeding  - Monitor lab trends  - Patient is to report abnormal signs of bleeding to staff  - Avoid use of toothpicks and dental floss  - Use electric shaver for shaving  - Use soft bristle tooth brush  - Limit straining and forceful nose blowing  Outcome: Progressing

## 2024-07-31 NOTE — HOSPICE RN NOTE
[facility] received referral. Will follow up with family via telephone.    1500: [facility] RN spoke with the patient's nephew/POA, Silvestre Hammond, via telephone to discuss comfort care. Informational meeting complete. Hospice philosophy, Medicare benefit, and levels of care discussed. All questions answered. Silvestre will sign consents via [employer] due to the patient's disorientation. [facility] will follow to support the patient and family.    1930: Patient's nephew/POA, Silvestre Hammond, returned consents via [employer]. Discharge planning for 8/1 in process.    ROSEMARY KimN, RN, PN  [facility] RN Liaison  [phone] (After-hours)

## 2024-07-31 NOTE — SLP NOTE
SPEECH DAILY NOTE - INPATIENT    ASSESSMENT & PLAN   ASSESSMENT  PPE REQUIRED. THIS THERAPIST WORE GLOVES AND MASK FOR DURATION OF EVALUATION. HANDS WASHED UPON ENTRANCE/EXIT.    SLP f/u for ongoing dysphagia tx/meal assessment per recommendations of puree/thin liquids per BSE. RN reports pt tolerates diet and medication well with no overt clinical s/s aspiration. Pt denies any swallowing challenges.     Pt positioned upright in bed, alert/cooperative/confused/restraints. Pt afebrile, tolerating room air with oxygen status 95% prior to the start of oral trials. SLP reviewed aspiration precautions and safe swallowing compensatory strategies with the patient. Safe swallow guidelines remain written on the white board in purple. Patient v/u, no family present. Provided 1:1 assistance, pt tolerates puree and thin liquids via cup with no overt clinical signs/symptoms of aspiration. Pt presented with trials of hard solids. Pt with adequate oral acceptance and bilabial seal. Pt with intact bite, reduced/prolonged mastication, and delayed A/P transfer. Pharyngeal swallow response appears delayed with reduced laryngeal elevation/excursion. No clinical signs of aspiration (e.g., immediate/delayed throat clear, immediate/delayed cough, wet vocal quality, increased O2 effort) observed across all trials. Oxygen status remained stable t/o the entire session. Recommend upgrade to minced & moist consistency, remain on thin liquids with adherence to aspiration precautions and swallow strategies.     SLP to f/u with meal assessment x1-2, monitor  CXR, and VFSS if any overt CSA and/or decline in CXR. RN alerted with results and recommendations.     MOST RECENT CXR 7/30  CONCLUSION:   Well-positioned enteric tube.   No significant change in the 2.5 cm right perihilar pulmonary nodule.   No new focal opacity, pleural effusion, or pneumothorax.           Diet Recommendations - Solids: Mechanical soft ground/ Minced & Moist  Diet  Recommendations - Liquids: Thin Liquids    Compensatory Strategies Recommended: Slow rate;No straws;Small bites and sips  Aspiration Precautions: Slow rate;Upright position;Small bites and sips;No straw  Medication Administration Recommendations:  (as tolerated)    Patient Experiencing Pain: No                Treatment Plan  Treatment Plan/Recommendations: Aspiration precautions    Interdisciplinary Communication: Discussed with RN            GOALS  Goal #1 The patient will tolerate minced & moist consistency and thin liquids without overt signs or symptoms of aspiration with 100 % accuracy over 2 session(s).  Revised 7/31   Goal #2 The patient/family/caregiver will demonstrate understanding and implementation of aspiration precautions and swallow strategies independently over 2 session(s).    In Progress   Goal #3 The patient will tolerate trial upgrade of mech soft bite sized solid consistency and thin liquids without overt signs or symptoms of aspiration with 100 % accuracy over 2 session(s).  In Progress     FOLLOW UP  Follow Up Needed (Documentation Required): Yes  SLP Follow-up Date: 08/01/24  Number of Visits to Meet Established Goals: 2    Session: 1    If you have any questions, please contact ИВАН Szymanski M.S. CCC-SLP  Speech Language Pathologist  Phone Number [phone]

## 2024-07-31 NOTE — PROGRESS NOTES
[facility]  [address], [address]  [facility] [address]  [phone]          INPATIENT STROKE NEUROLOGY   FOLLOW UP PROGRESS NOTE  [facility]  part of [facility]    Nacho Mari Patient Status:  Inpatient     10/14/1933 MRN [id number]    Location [facility] 2W/SW Attending Eric Fierro MD    Hosp Day # 4 PCP Oscar Rios MD ([place])    Date of Admission:  2024  Date of Consult Follow Up: 24       Assessment and Plan:   Nacho Mari is a 89 year old   male w/ a pmhx sig. for cerebral amyloid angiopathy, atrial fibrillation not on anticoagulation,?  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  Hospital course has been complicated by agitated delirium related to dementia.  Patient has a stat MRI that is still pending.  Repeat head CT is stable.      His blood pressures have been slightly outside of the goal of less than 150.  Also patient is refusing oral agents.  Started him on losartan and hydrochlorothiazide.  Although he has a diagnosis of hypertension he is not on any antihypertensives.  Patient previously was on Precedex for agitation but became bradycardic and hypotensive.  He will have to remain in the unit until he is started taking oral agents again.  MRI of the brain is ordered essentially to confirm whether or not the patient has a small ICH.  Most likely this is due to cerebral amyloid angiopathy but could be  hypertensive.  If his blood pressure is stable and he is willing to take his p.o. antihypertensives that he can be transferred out of the unit.    On 07/29/24 he is stable to transfer to the floor.   He is now in palliative care.    Right temporal hemorrhage in the setting of known cerebral amyloid angiopathy, Hx intracranial hemorrhage, Hx ischemic stroke and fibromuscular dysplasia  Differential Diagnosis for stroke/TIA mechanism:  Hemorrhage due to cerebral amyloid angiopathy     Plan    Diagnostics:  none  Therapeutics:  Pt is hospice          INTERVAL EVENTS  07/28/24: could not tolerate precedex; Bp slightly elevated; repeat ct stable.   07/29/24: taking PO agents. Stable for the floor.         SUBJECTIVE:     Pt was seen in his exam room. He is still disoriented.       Pertinent positive and negatives per HPI.  All others were reviewed and negative.       Objective   OBJECTIVE:   Last vitals and weight :  Blood pressure 158/80, pulse 80, temperature 97.7 °F (36.5 °C), temperature source Axillary, resp. rate 19, weight 105 lb 9.6 oz (47.9 kg), SpO2 92%.   Vitals:    07/31/24 1000 07/31/24 1015 07/31/24 1100 07/31/24 1200   BP: 146/74  (!) 161/94 158/80   BP Location: Right arm  Right arm Right arm   Pulse: 93 83 79 80   Resp: 22 25 26 19   Temp:       TempSrc:       SpO2: 99% 94% 95% 92%   Weight:           Exam:  - General: appears older than stated age, fatigued, no distress, and uncooperative  - CV: symmetric pulses     - Pulmonary: no signs of respiratory distress. Normal excursion of the chest.   Neurologic Exam  - Mental Status:  Intermittently lifts his head. He is incoherent. Trying to get out of bed.  - Cranial Nerves: .No pathological facial asymmetry.  EOM. Tongue midline.    - Motor:   Diffusely decreased bulk.  Spontaneously moving his arms and  Legs.     - Plantar response: extensor bilaterally    Medications:   budesonide        tamsulosin  0.4 mg Oral Daily    traZODone  25 mg Oral Nightly     OLANZapine  5 mg Oral QPM    lamoTRIgine  25 mg Oral BID    budesonide  0.5 mg Nebulization Daily    ipratropium-albuterol  3 mL Nebulization Daily    melatonin  3 mg Oral Nightly    [Held by provider] losartan (Cozaar) 50 mg, hydroCHLOROthiazide 12.5 mg for Benicar HCT 20/12.5 ([facility] only)   Oral Daily    polyethylene glycol (PEG 3350)  17 g Oral BID    fluticasone propionate  2 spray Each Nare Daily    magnesium oxide  400 mg Oral Daily    pantoprazole  40 mg Oral QAM AC    magnesium sulfate  2 g Intravenous Once       PRNS:   budesonide    labetalol    acetaminophen **OR** acetaminophen    labetalol    hydrALAzine    Infusions:    sodium chloride 75 mL/hr at 07/30/24 1507          Results:   Laboratory Data:  Lab Results   Component Value Date    WBC 11.2 (H) 07/30/2024    HGB 8.6 (L) 07/30/2024    HCT 25.6 (L) 07/30/2024    .0 07/30/2024    CREATSERUM 0.90 07/30/2024    BUN 20 07/30/2024     07/30/2024    K 3.9 07/30/2024     (H) 07/30/2024    CO2 23.0 07/30/2024     (H) 07/30/2024    CA 8.3 (L) 07/30/2024    ALB 3.6 07/29/2024    ALKPHO 75 07/29/2024    TP 6.0 07/29/2024    AST 29 07/29/2024    ALT 7 (L) 07/29/2024    PTT 27.3 07/30/2024    INR 1.13 07/30/2024    PTP 15.3 (H) 07/30/2024    MG 1.6 07/29/2024    PHOS 3.0 07/29/2024     Recent Results (from the past 72 hour(s))   POCT Glucose    Collection Time: 07/28/24  5:35 PM   Result Value Ref Range    POC Glucose  107 (H) 70 - 99 mg/dL   POCT Glucose    Collection Time: 07/28/24 11:19 PM   Result Value Ref Range    POC Glucose  108 (H) 70 - 99 mg/dL   POCT Glucose    Collection Time: 07/29/24  5:25 AM   Result Value Ref Range    POC Glucose  114 (H) 70 - 99 mg/dL   POCT Glucose    Collection Time: 07/29/24 11:52 AM   Result Value Ref Range    POC Glucose  146 (H) 70 - 99 mg/dL   POCT Glucose    Collection Time: 07/29/24  5:44 PM   Result Value Ref Range    POC Glucose  130 (H) 70 - 99 mg/dL   Comp Metabolic Panel (14)    Collection  Time: 07/29/24  7:55 PM   Result Value Ref Range    Glucose 148 (H) 70 - 99 mg/dL    Sodium 143 136 - 145 mmol/L    Potassium 3.6 3.5 - 5.1 mmol/L    Chloride 110 98 - 112 mmol/L    CO2 24.0 21.0 - 32.0 mmol/L    Anion Gap 9 0 - 18 mmol/L    BUN 17 9 - 23 mg/dL    Creatinine 0.88 0.70 - 1.30 mg/dL    BUN/CREA Ratio 19.3 10.0 - 20.0    Calcium, Total 8.9 8.7 - 10.4 mg/dL    Calculated Osmolality 300 (H) 275 - 295 mOsm/kg    eGFR-Cr 82 >=60 mL/min/1.73m2    ALT 7 (L) 10 - 49 U/L    AST 29 <34 U/L    Alkaline Phosphatase 75 45 - 117 U/L    Bilirubin, Total 0.5 0.2 - 0.9 mg/dL    Total Protein 6.0 5.7 - 8.2 g/dL    Albumin 3.6 3.2 - 4.8 g/dL    Globulin  2.4 2.0 - 3.5 g/dL    A/G Ratio 1.5 1.0 - 2.0   Magnesium    Collection Time: 07/29/24  7:55 PM   Result Value Ref Range    Magnesium 1.6 1.6 - 2.6 mg/dL   Phosphorus    Collection Time: 07/29/24  7:55 PM   Result Value Ref Range    Phosphorus 3.0 2.4 - 5.1 mg/dL   CBC W/ DIFFERENTIAL    Collection Time: 07/29/24  7:55 PM   Result Value Ref Range    WBC 8.6 4.0 - 11.0 x10(3) uL    RBC 3.71 (L) 3.80 - 5.80 x10(6)uL    HGB 11.7 (L) 13.0 - 17.5 g/dL    HCT 34.1 (L) 39.0 - 53.0 %    MCV 91.9 80.0 - 100.0 fL    MCH 31.5 26.0 - 34.0 pg    MCHC 34.3 31.0 - 37.0 g/dL    RDW-SD 45.6 35.1 - 46.3 fL    RDW 13.5 11.0 - 15.0 %    .0 150.0 - 450.0 10(3)uL    Neutrophil Absolute Prelim 7.16 1.50 - 7.70 x10 (3) uL    Neutrophil Absolute 7.16 1.50 - 7.70 x10(3) uL    Lymphocyte Absolute 0.54 (L) 1.00 - 4.00 x10(3) uL    Monocyte Absolute 0.70 0.10 - 1.00 x10(3) uL    Eosinophil Absolute 0.15 0.00 - 0.70 x10(3) uL    Basophil Absolute 0.04 0.00 - 0.20 x10(3) uL    Immature Granulocyte Absolute 0.02 0.00 - 1.00 x10(3) uL    Neutrophil % 83.2 %    Lymphocyte % 6.3 %    Monocyte % 8.1 %    Eosinophil % 1.7 %    Basophil % 0.5 %    Immature Granulocyte % 0.2 %   POCT Glucose    Collection Time: 07/29/24 11:30 PM   Result Value Ref Range    POC Glucose  130 (H) 70 - 99 mg/dL    Prothrombin Time (PT)    Collection Time: 07/30/24  1:21 AM   Result Value Ref Range    PT 15.3 (H) 11.6 - 14.8 seconds    INR 1.13 0.80 - 1.20   PTT, Activated    Collection Time: 07/30/24  1:21 AM   Result Value Ref Range    PTT 27.3 23.0 - 36.0 seconds   RAINBOW DRAW GOLD    Collection Time: 07/30/24  1:24 AM   Result Value Ref Range    Hold Gold Auto Resulted    Hemoglobin & Hematocrit    Collection Time: 07/30/24  3:32 AM   Result Value Ref Range    HGB 7.8 (L) 13.0 - 17.5 g/dL    HCT 23.1 (L) 39.0 - 53.0 %   POCT Glucose    Collection Time: 07/30/24  6:08 AM   Result Value Ref Range    POC Glucose  148 (H) 70 - 99 mg/dL   CBC, Platelet; No Differential    Collection Time: 07/30/24  8:51 AM   Result Value Ref Range    WBC 11.2 (H) 4.0 - 11.0 x10(3) uL    RBC 2.68 (L) 3.80 - 5.80 x10(6)uL    HGB 8.4 (L) 13.0 - 17.5 g/dL    HCT 25.6 (L) 39.0 - 53.0 %    MCV 95.5 80.0 - 100.0 fL    MCH 31.3 26.0 - 34.0 pg    MCHC 32.8 31.0 - 37.0 g/dL    RDW 13.8 11.0 - 15.0 %    RDW-SD 48.3 (H) 35.1 - 46.3 fL    .0 150.0 - 450.0 10(3)uL   Basic Metabolic Panel (8)    Collection Time: 07/30/24  8:51 AM   Result Value Ref Range    Glucose 153 (H) 70 - 99 mg/dL    Sodium 145 136 - 145 mmol/L    Potassium 3.9 3.5 - 5.1 mmol/L    Chloride 115 (H) 98 - 112 mmol/L    CO2 23.0 21.0 - 32.0 mmol/L    Anion Gap 7 0 - 18 mmol/L    BUN 20 9 - 23 mg/dL    Creatinine 0.90 0.70 - 1.30 mg/dL    BUN/CREA Ratio 22.2 (H) 10.0 - 20.0    Calcium, Total 8.3 (L) 8.7 - 10.4 mg/dL    Calculated Osmolality 306 (H) 275 - 295 mOsm/kg    eGFR-Cr 81 >=60 mL/min/1.73m2   POCT Glucose    Collection Time: 07/30/24 12:10 PM   Result Value Ref Range    POC Glucose  159 (H) 70 - 99 mg/dL   Rapid SARS-CoV-2 by PCR    Collection Time: 07/30/24 12:39 PM    Specimen: Nares; Other   Result Value Ref Range    Rapid SARS-CoV-2 by PCR Not Detected Not Detected   Hemoglobin & Hematocrit    Collection Time: 07/30/24  5:36 PM   Result Value Ref Range    HGB 8.6 (L)  13.0 - 17.5 g/dL    HCT 25.6 (L) 39.0 - 53.0 %   POCT Glucose    Collection Time: 07/30/24  6:15 PM   Result Value Ref Range    POC Glucose  132 (H) 70 - 99 mg/dL   POCT Glucose    Collection Time: 07/30/24 11:51 PM   Result Value Ref Range    POC Glucose  99 70 - 99 mg/dL   POCT Glucose    Collection Time: 07/31/24  5:55 AM   Result Value Ref Range    POC Glucose  103 (H) 70 - 99 mg/dL            Last A1c was done on  .       Test results/Imaging:   CTA ABD/PEL (CPT=74174)    Result Date: 7/30/2024  CONCLUSION:   Infrarenal abdominal aortic aneurysm.  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  No intra-abdominal or intrapelvic hematoma.  No active contrast extravasation.  Persistent proctitis.  Findings may be secondary to residual stercoral colitis however the previously seen stool ball is no longer visualized.  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  No complete occlusion.  Multiple other incidental findings as described in the body of the report.  Preliminary report was submitted by the [email or web address] teleradiologist and there are no significant discrepancies.  elm-[email or web address]     Dictated by (CST): Karlo Llanes MD on 7/30/2024 at 10:27 AM     Finalized by (CST): Karlo Llanes MD on 7/30/2024 at 10:40 AM          XR CHEST AP/PA (1 VIEW) (CPT=71045)    Result Date: 7/30/2024  CONCLUSION:   Well-positioned enteric tube.  No significant change in the 2.5 cm right perihilar pulmonary nodule.  No new focal opacity, pleural effusion, or pneumothorax.     Dictated by (CST): Feliciano Hameed MD on 7/30/2024 at 9:09 AM     Finalized by (CST): Feliciano Hameed MD on 7/30/2024 at 9:15 AM          XR CHEST AP PORTABLE  (CPT=71045)    Result Date: 7/30/2024  CONCLUSION:  1. NG tube traverses right mainstem bronchus and has its tip in the right lower lobe.  Findings were called to the patient's nurse deaf in the at 8:05 a.m. 2. Right perihilar  upper lobe 2.5 cm pulmonary nodule. 3. Emphysema. 4. Left perihilar scarring. 5. Atherosclerotic calcification aorta.    Dictated by (CST): Sohail Weathers MD on 7/30/2024 at 8:05 AM     Finalized by (CST): Sohail Weathers MD on 7/30/2024 at 8:13 AM               CT BRAIN OR HEAD (CPT=70450)    Result Date: 7/28/2024  CONCLUSION:  1. Stable 4 mm mildly dense lesion in the right subinsular region, which could represent a small subacute focus of hemorrhage.  Continued imaging follow-up is suggested. 2. Stable chronic left occipital lobe cortical infarction. 3. Stable chronic left thalamic lacunar infarction. 4. Stable mild generalized atrophy and severe chronic microangiopathic ischemic changes with large vessel calcific atherosclerosis.    Dictated by (CST): Sami Cabrera MD on 7/28/2024 at 8:27 PM     Finalized by (CST): Sami Cabrera MD on 7/28/2024 at 8:31 PM          CTA BRAIN + CTA CAROTIDS (CPT=70496/72917)    Result Date: 7/27/2024  CONCLUSION:  1. No major vessel occlusion, hemodynamically significant stenosis, dissection, AVM. 2. 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 3. No intracranial aneurysm. 4. New 4 mm focus of hyperattenuation in right subinsular region suspicious for a small focus of hemorrhage. 5. 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 6. Old left thalamic lacunar infarct. 7. Advanced changes of chronic small vessel disease in cerebral white matter.  8. 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  Advanced emphysema.      Dictated by (CST): Sohail Weathers MD on 7/27/2024 at 8:23 PM     Finalized by (CST): Sohail Weathers MD on 7/27/2024 at 8:41 PM           Performed an independent visualization of head CT from 7/28/2024,  Imaging revealed: Agree with read.    Disclaimer:   This record was dictated using Dragon software. There may be errors due to voice recognition problems that were not realized and corrected during the completion of  the note.      This document is not intended to support charting by exception.  Sections left blank in a completed note should be presumed not to have been done.       Thank you.  Kirit Lee D.O.   Vascular & General Neurology       07/31/24

## 2024-07-31 NOTE — PROGRESS NOTES
[facility]     Gastroenterology Progress Note    Nacho Mari Patient Status:  Inpatient    10/14/1933 MRN [id number]   Location [facility] 2W/SW Attending Eric Fierro MD   Hosp Day # 4 PCP Oscar (Erika Rios MD       Subjective:   No reported bm overnight. Patient confused this morning. He denies abd pain, n/v.     Objective:   Blood pressure (!) 147/91, pulse 94, temperature 97.7 °F (36.5 °C), temperature source Axillary, resp. rate 19, weight 105 lb 9.6 oz (47.9 kg), SpO2 98%. Body mass index is 19.31 kg/m².    Gen: awake, alert patient, ill appearing  HEENT: EOMI, the sclera appears anicteric, oropharynx clear, mucus membranes appear moist  CV: RRR  Lung: no conversational dyspnea   Abdomen: soft NTND abdomen with NABS appreciated   Skin: dry, warm, no jaundice  Ext: no LE edema is evident  Neuro: Alert   Psych: calm    Assessment and Plan:   89 yo M with dementia, MMP presenting with some rectal bleeding. Hx of CRC and resection noted. CT reviewed showing stercoral proctitis.     Episode of rectal bleeding/hematochezia  overnight with drop in hgb to 8's, no transfusion needed yet. No further bm since then. CTA negative for extravasation. Possible diverticular bleed vs stercoral ulcer. Colonoscopy deferred as patient unable to tolerate prep. At this time given advanced age and MMP, recommend palliative consult for goals of care.      Davida Doty MD      Results:     Lab Results   Component Value Date    WBC 11.2 (H) 2024    HGB 8.6 (L) 2024    HCT 25.6 (L) 2024    .0 2024    CREATSERUM 0.90 2024    BUN 20 2024     2024    K 3.9 2024     (H) 2024    CO2 23.0 2024     (H) 2024    CA 8.3 (L) 2024    ALB 3.6 2024    ALKPHO 75 2024    BILT 0.5 2024    TP 6.0 2024    AST 29 2024    ALT 7 (L) 2024    PTT 27.3 2024    INR 1.13  07/30/2024    MG 1.6 07/29/2024    PHOS 3.0 07/29/2024       CTA ABD/PEL (CPT=74174)    Result Date: 7/30/2024  CONCLUSION:   Infrarenal abdominal aortic aneurysm.  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  No intra-abdominal or intrapelvic hematoma.  No active contrast extravasation.  Persistent proctitis.  Findings may be secondary to residual stercoral colitis however the previously seen stool ball is no longer visualized.  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  No complete occlusion.  Multiple other incidental findings as described in the body of the report.  Preliminary report was submitted by the [employer] teleradiologist and there are no significant discrepancies.  elm-remote     Dictated by (CST): Karlo Llanes MD on 7/30/2024 at 10:27 AM     Finalized by (CST): Karlo Llanes MD on 7/30/2024 at 10:40 AM          XR CHEST AP/PA (1 VIEW) (CPT=71045)    Result Date: 7/30/2024  CONCLUSION:   Well-positioned enteric tube.  No significant change in the 2.5 cm right perihilar pulmonary nodule.  No new focal opacity, pleural effusion, or pneumothorax.     Dictated by (CST): Feliciano Hameed MD on 7/30/2024 at 9:09 AM     Finalized by (CST): Feliciano Hameed MD on 7/30/2024 at 9:15 AM          XR CHEST AP PORTABLE  (CPT=71045)    Result Date: 7/30/2024  CONCLUSION:  1. NG tube traverses right mainstem bronchus and has its tip in the right lower lobe.  Findings were called to the patient's nurse deaf in the at 8:05 a.m. 2. Right perihilar upper lobe 2.5 cm pulmonary nodule. 3. Emphysema. 4. Left perihilar scarring. 5. Atherosclerotic calcification aorta.    Dictated by (CST): Sohail Weathers MD on 7/30/2024 at 8:05 AM     Finalized by (CST): Sohail Weathers MD on 7/30/2024 at 8:13 AM

## 2024-07-31 NOTE — PAYOR COMM NOTE
--------------  CONTINUED STAY REVIEW    Payor: [facility] MEDICARE  Subscriber #:  [id number]  Authorization Number: [id number]    Admit date: 7/27/24  Admit time:  3:41 PM    7/31  NEURO  89 year old   male w/ a pmhx sig. for cerebral amyloid angiopathy, atrial fibrillation not on anticoagulation,?  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  Hospital course has been complicated by agitated delirium related to dementia.  Patient has a stat MRI that is still pending.  Repeat head CT is stable.       His blood pressures have been slightly outside of the goal of less than 150.  Also patient is refusing oral agents.  Started him on losartan and hydrochlorothiazide.  Although he has a diagnosis of hypertension he is not on any antihypertensives.  Patient previously was on Precedex for agitation but became bradycardic and hypotensive.  He will have to remain in the unit until he is started taking oral agents again.  MRI of the brain is ordered essentially to confirm whether or not the patient has a small ICH.  Most likely this is due to cerebral amyloid angiopathy but could be hypertensive.  If his blood pressure is stable and he is willing to take his p.o. antihypertensives that he can be transferred out of the unit.     On 07/29/24 he is stable to transfer to the floor.   He is now in palliative care.     Right temporal hemorrhage in the setting of known cerebral amyloid angiopathy, Hx intracranial hemorrhage, Hx  ischemic stroke and fibromuscular dysplasia  Differential Diagnosis for stroke/TIA mechanism:  Hemorrhage due to cerebral amyloid angiopathy          MEDICATIONS ADMINISTERED IN LAST 1 DAY:  budesonide (Pulmicort) 0.5 MG/2ML nebulizer suspension 0.5 mg       Date Action Dose Route User    7/31/2024 0842 Given 0.5 mg Nebulization Vasi, Shaafi          ipratropium-albuterol (Duoneb) 0.5-2.5 (3) MG/3ML inhalation solution 3 mL       Date Action Dose Route User    7/31/2024 0846 Given 3 mL Nebulization Vasi, Shaafi          labetalol (Trandate) 5 mg/mL injection 10 mg       Date Action Dose Route User    7/30/2024 1510 Given 10 mg Intravenous Rupinder Hylton, RN          sodium chloride 0.9% infusion       Date Action Dose Route User    7/30/2024 1507 New Bag (none) Intravenous Rupinder Hylton RN            Vitals (last day)       Date/Time Temp Pulse Resp BP SpO2 Weight O2 Device O2 Flow Rate (L/min) [facility]    07/31/24 1400 -- 92 22 161/84 99 % -- -- -- MT    07/31/24 1200 -- 80 19 158/80 92 % -- None (Room air) -- MT    07/31/24 1100 -- 79 26 161/94 95 % -- None (Room air) -- MT    07/31/24 1015 -- 83 25 -- 94 % -- -- -- MT    07/31/24 1000 -- 93 22 146/74 99 % -- None (Room air) -- MT    07/31/24 0900 -- -- -- -- -- -- None (Room air) -- MT    07/31/24 0845 -- 109 25 149/72 95 % -- None (Room air) -- MT    07/31/24 0800 97.7 °F (36.5 °C) -- -- -- -- -- -- -- MT    07/31/24 0700 -- 94 19 147/91 98 % -- -- --     07/31/24 0700 -- -- -- -- -- -- None (Room air) -- MT    07/31/24 0615 -- 98 17 145/85 98 % 105 lb 9.6 oz (47.9 kg) None (Room air) -- DK 07/31/24 0500 -- 111 23 145/97 97 % -- None (Room air) -- DK 07/31/24 0400 97.8 °F (36.6 °C) 102 24 155/80 90 % -- None (Room air) -- DK 07/31/24 0300 -- 120 23 135/105 -- -- None (Room air) -- DK 07/31/24 0200 -- 107 19 148/74 -- -- None (Room air) -- DK 07/31/24 0100 -- 95 15 148/73 -- -- None (Room air) -- DK 07/31/24 0000 96.6 °F (35.9 °C) 100  16 150/83 -- -- None (Room air) --     07/30/24 2300 -- 115 23 136/73 98 % -- None (Room air) -- DK 07/30/24 2203 -- 114 22 152/91 99 % -- None (Room air) -- DK 07/30/24 2100 -- 103 22 158/87 99 % -- None (Room air) -- DK 07/30/24 2000 97.1 °F (36.2 °C) 109 21 132/103 100 % -- None (Room air) -- DK 07/30/24 1900 -- 114 19 148/77 92 % -- -- -- DK 07/30/24 1600 97.7 °F (36.5 °C) 101 22 157/122 -- -- None (Room air) --     07/30/24 1510 -- 119 17 161/82 95 % -- -- --     07/30/24 1500 -- 131 29 132/91 -- -- -- --     07/30/24 1200 97.4 °F (36.3 °C) 116 16 149/69 93 % -- None (Room air) --     07/30/24 1000 -- 108 18 98/67 99 % -- -- --     07/30/24 0900 -- 92 12 -- 100 % -- None (Room air) --     07/30/24 0800 97.4 °F (36.3 °C) 110 21 106/85 96 % -- Nasal cannula 2 L/min     07/30/24 0600 -- 93 18 116/68 100 % -- Nasal cannula 4 L/min     07/30/24 0500 -- 90 19 101/62 100 % -- Nasal cannula 6 L/min     07/30/24 0400 98.4 °F (36.9 °C) 89 16 130/62 100 % 109 lb 9.1 oz (49.7 kg) Nasal cannula 6 L/min     07/30/24 0300 -- 87 17 132/72 100 % -- Nasal cannula 6 L/min     07/30/24 0200 -- 85 20 152/81 100 % -- Nasal cannula 6 L/min     07/30/24 0127 -- 72 15 -- 100 % -- Nasal cannula 6 L/min MJ    07/30/24 0125 -- 69 14 108/71 -- -- -- -- MJ    07/30/24 0120 -- 71 16 117/51 84 % -- None (Room air) 6 L/min     07/30/24 0000 98.6 °F (37 °C) 80 18 139/82 92 % -- None (Room air) --           CIWA Scores (since admission)       None

## 2024-07-31 NOTE — CM/SW NOTE
CULLEN spoke with Samra WASHBURN regarding hospice recommendations.    CULLEN contacted Silvestre for contact at [facility].  Contact provided as Evita Jolley [phone], ext [phone].  CULLEN called [facility] is a contracted hospice provided.    Mariano notified via [facility].  Mariano was requested to call POA for consent.    PLAN:  Return to [facility] with hospice once arrangements have been finalized.    Arelis Metcalf MBA BSN RN CRRN   RN Case Manager  [phone]

## 2024-07-31 NOTE — PROGRESS NOTES
[facility]  part of [facility]    Progress Note    Nacho Mari Patient Status:  Inpatient    10/14/1933 MRN [id number]   Location [facility] 2W/SW Attending Eric Fierro MD   Hosp Day # 3 PCP Oscar Rios MD ([place])       Subjective:   Nacho Mari is a(n) 89 year old male GI bleed.  Altered mental status.    Objective:     Vitals:    24 2100   BP: 158/87   Pulse: 103   Resp: 22   Temp:        Intake/Output Summary (Last 24 hours) at 2024 2241  Last data filed at 2024 1100  Gross per 24 hour   Intake 500 ml   Output --   Net 500 ml     Wt Readings from Last 2 Encounters:   24 109 lb 9.1 oz (49.7 kg)   24 95 lb (43.1 kg)       General appearance:  alert, appears stated age, and cooperative  Head: Normocephalic, without obvious abnormality, atraumatic  Eyes: conjunctivae/corneas clear. PERRL, EOM's intact. Fundi benign.  Neck: no adenopathy, no carotid bruit, no JVD, supple, symmetrical, trachea midline, and thyroid not enlarged, symmetric, no tenderness/mass/nodules  Pulmonary: clear to auscultation bilaterally  Cardiovascular: S1, S2 normal, no murmur, click, rub or gallop, regular rate and rhythm  Abdominal: soft, non-tender; bowel sounds normal; no masses,  no organomegaly  Extremities: extremities normal, atraumatic, no cyanosis or edema  Pulses: 2+ and symmetric  Neurologic: Grossly normal  Genital Exam: defer exam    Current Medications:    Current Facility-Administered Medications:     tamsulosin (Flomax) cap 0.4 mg, 0.4 mg, Oral, Daily    traZODone (Desyrel) tab 25 mg, 25 mg, Oral, Nightly    OLANZapine (ZyPREXA) tab 5 mg, 5 mg, Oral, QPM    lamoTRIgine (LaMICtal) tab 25 mg, 25 mg, Oral, BID    budesonide (Pulmicort) 0.5 MG/2ML nebulizer suspension 0.5 mg, 0.5 mg, Nebulization, Daily    ipratropium-albuterol (Duoneb) 0.5-2.5 (3) MG/3ML inhalation solution 3 mL, 3 mL, Nebulization, Daily    melatonin tab 3 mg, 3 mg, Oral,  Nightly    [Held by provider] losartan (Cozaar) 50 mg, hydroCHLOROthiazide 12.5 mg for Benicar HCT 20/12.5 (EEH only), , Oral, Daily    polyethylene glycol (PEG 3350) (Miralax) 17 g oral packet 17 g, 17 g, Oral, BID    fluticasone propionate (Flonase) 50 MCG/ACT nasal suspension 2 spray, 2 spray, Each Nare, Daily    magnesium oxide (Mag-Ox) tab 400 mg, 400 mg, Oral, Daily    pantoprazole (Protonix) DR tab 40 mg, 40 mg, Oral, QAM AC    labetalol (Trandate) 5 mg/mL injection 10 mg, 10 mg, Intravenous, Q4H PRN    magnesium sulfate in sterile water for injection 2 g/50mL IVPB premix 2 g, 2 g, Intravenous, Once    sodium chloride 0.9% infusion, , Intravenous, Continuous    acetaminophen (Tylenol) tab 650 mg, 650 mg, Oral, Q4H PRN **OR** acetaminophen (Tylenol) rectal suppository 650 mg, 650 mg, Rectal, Q4H PRN    labetalol (Trandate) 5 mg/mL injection 10 mg, 10 mg, Intravenous, Q10 Min PRN    hydrALAzine (Apresoline) 20 mg/mL injection 10 mg, 10 mg, Intravenous, Q2H PRN    Allergies  No Known Allergies    Assessment and Plan:       Gastrointestinal hemorrhage, unspecified gastrointestinal hemorrhage type          BRBPR (bright red blood per rectum)          Adenocarcinoma of rectosigmoid junction (HCC)          Stercoral colitis          Cerebral amyloid angiopathy (HCC)          Fibromuscular dysplasia (HCC)          Aphasia          History of dementia          Nontraumatic subcortical hemorrhage of right cerebral hemisphere (HCC)     Assessment and plan  Problem 1 altered mental status patient has altered mental status in the setting of elevated blood pressure progressively worsening baseline and history of falls and also history of questionable insular bleed patient will be transferred to ICU patient's care was discussed with the primary RN and also neurologist.  In addition patient also has history of dementia.  I will make attempts to speak with the patient's family and get a better understanding of family's wishes  and also of his baseline mental status.     Problem #2 bright red blood per rectum this will be evaluated GI will be consulted patient will be off aspirin patient does not seem to have acute hematochezia or BRBPR.  Will avoid heparin for DVT prophylaxis.     Problem #3 atrial fibrillation: Patient is not a candidate for anticoagulation and hence will even discontinue aspirin in light of current GI bleed and questionable insular cortex bleed.     Problem #4 adenocarcinoma of the colon: Patient currently does not have any obstructive features but surgical oncology has been consulted.  Further input and likely nonoperative process will be followed given patient's advanced age.     Problem #5 dementia: Supportive care will be provided patient is not on memantine will consider putting patient on donepezil and memantine further input for from neurology is awaited.     Problem #6 DVT prophylaxis: Patient will be on SCD boots.     Problem #7 GI prophylaxis: Patient will be on Protonix.     July 28, 2024  Patient is more alert neuro logic exam is unchanged.  Patient is in ICU appears stable.  Appreciate general surgery evaluation and neurologic evaluation.  Patient has some sundowning and started on haloperidol at bedtime.  Psych consult has been requested.  Will advance diet as no surgical intervention is planned.  Transfer out of the ICU once stable.    July 29, 2024  Patient has fluctuating mental status.  Requiring soft restraints.  No neurosurgical intervention planned.  Patient is not directable to perform physical therapy.  Dementia and delirium with multifactorial causes for delirium.  Per neurology patient is contraindicated for antithrombotics.  GI bleed currently seems to be stable but is still present.  Differential diagnosis includes stercoral colitis versus hemorrhoidal bleeding.  Patient is not cooperating with colon prep.      July 30, 2024  Discussed with the patient's nurse and Dr. Davida Hills  In light of  patient's fluctuating mood and mental status patient is not amenable to be convinced or reliably be prepped for colonoscopy.  Will request psychiatry to evaluate for competence.  Patient has a nephew but is not completely understanding of patient's medical problems.  For now endoscopy procedures have been put on hold.  Hemoglobin has stayed stable.  Continue holding aspirin.  Patient is still confused and requiring soft restraints to prevent pulling of medical equipment.  Await further directive in regards to competence and planned procedures electively once a POA is instituted.  During an emergent procedure patient will likely undergo emergent colonoscopy and endoscopy if needed.    Results:     Lab Results   Component Value Date    WBC 11.2 (H) 07/30/2024    HGB 8.6 (L) 07/30/2024    HCT 25.6 (L) 07/30/2024    .0 07/30/2024     07/30/2024    K 3.9 07/30/2024     (H) 07/30/2024    CO2 23.0 07/30/2024    CREATSERUM 0.90 07/30/2024    BUN 20 07/30/2024     (H) 07/30/2024    CA 8.3 (L) 07/30/2024    ALB 3.6 07/29/2024    ALKPHO 75 07/29/2024    BILT 0.5 07/29/2024    TP 6.0 07/29/2024    AST 29 07/29/2024    ALT 7 (L) 07/29/2024    PTT 27.3 07/30/2024    INR 1.13 07/30/2024    MG 1.6 07/29/2024    PHOS 3.0 07/29/2024     No results found for this visit on 07/27/24.    CTA ABD/PEL (CPT=74174)    Result Date: 7/30/2024  CONCLUSION:   Infrarenal abdominal aortic aneurysm.  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  No intra-abdominal or intrapelvic hematoma.  No active contrast extravasation.  Persistent proctitis.  Findings may be secondary to residual stercoral colitis however the previously seen stool ball is no longer visualized.  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  No complete occlusion.  Multiple other incidental findings as described in the body of the report.  Preliminary report was  submitted by the [facility] teleradiologist and there are no significant discrepancies.  elm-remote     Dictated by (CST): Karlo Llanes MD on 7/30/2024 at 10:27 AM     Finalized by (CST): Karlo Llanes MD on 7/30/2024 at 10:40 AM          XR CHEST AP/PA (1 VIEW) (CPT=71045)    Result Date: 7/30/2024  CONCLUSION:   Well-positioned enteric tube.  No significant change in the 2.5 cm right perihilar pulmonary nodule.  No new focal opacity, pleural effusion, or pneumothorax.     Dictated by (CST): Feliciano Hameed MD on 7/30/2024 at 9:09 AM     Finalized by (CST): Feliciano Hameed MD on 7/30/2024 at 9:15 AM          XR CHEST AP PORTABLE  (CPT=71045)    Result Date: 7/30/2024  CONCLUSION:  1. NG tube traverses right mainstem bronchus and has its tip in the right lower lobe.  Findings were called to the patient's nurse deaf in the at 8:05 a.m. 2. Right perihilar upper lobe 2.5 cm pulmonary nodule. 3. Emphysema. 4. Left perihilar scarring. 5. Atherosclerotic calcification aorta.    Dictated by (CST): Sohail Weathers MD on 7/30/2024 at 8:05 AM     Finalized by (CST): Sohail Weathers MD on 7/30/2024 at 8:13 AM                       JOSE ARMANDO RAM MD  7/30/2024 would be like

## 2024-08-01 VITALS
TEMPERATURE: 97 F | OXYGEN SATURATION: 100 % | HEART RATE: 88 BPM | SYSTOLIC BLOOD PRESSURE: 142 MMHG | WEIGHT: 105.63 LBS | BODY MASS INDEX: 19 KG/M2 | RESPIRATION RATE: 20 BRPM | DIASTOLIC BLOOD PRESSURE: 79 MMHG

## 2024-08-01 PROCEDURE — 99231 SBSQ HOSP IP/OBS SF/LOW 25: CPT | Performed by: NURSE PRACTITIONER

## 2024-08-01 RX ORDER — IPRATROPIUM BROMIDE AND ALBUTEROL SULFATE 2.5; .5 MG/3ML; MG/3ML
3 SOLUTION RESPIRATORY (INHALATION)
Qty: 60 EACH | Refills: 0 | Status: SHIPPED | OUTPATIENT
Start: 2024-08-02

## 2024-08-01 RX ORDER — OLANZAPINE 5 MG/1
5 TABLET ORAL EVERY EVENING
Qty: 30 TABLET | Refills: 0 | Status: SHIPPED | OUTPATIENT
Start: 2024-08-01

## 2024-08-01 RX ORDER — LAMOTRIGINE 25 MG/1
25 TABLET ORAL 2 TIMES DAILY
Qty: 60 TABLET | Refills: 0 | Status: SHIPPED | OUTPATIENT
Start: 2024-08-01

## 2024-08-01 RX ORDER — BUDESONIDE 0.5 MG/2ML
0.5 INHALANT ORAL
Qty: 60 EACH | Refills: 0 | Status: SHIPPED | OUTPATIENT
Start: 2024-08-02

## 2024-08-01 RX ORDER — PANTOPRAZOLE SODIUM 40 MG/1
40 TABLET, DELAYED RELEASE ORAL
Qty: 30 TABLET | Refills: 0 | Status: SHIPPED | OUTPATIENT
Start: 2024-08-02

## 2024-08-01 NOTE — PAYOR COMM NOTE
--------------  DISCHARGE REVIEW    Payor: [employer]  Subscriber #:  [id number]  Authorization Number: [id number]    Admit date: 7/27/24  Admit time:   3:41 PM  Discharge Date: 8/1/2024 11:55 AM     Admitting Physician: Eric Fierro MD  Attending Physician:  Rose Marie att. providers found  Primary Care Physician: Katie Rios MD      REVIEWER COMMENTS    FOR FINAL REVIEW/APPROVAL OF ALL INPT DAYS

## 2024-08-01 NOTE — PALLIATIVE CARE NOTE
[facility]  part of [facility]  Palliative Care Progress Note    Nacho Mari Patient Status:  Inpatient    10/14/1933 MRN [id number]   Location [facility] 2W/SW Attending Eric Fierro MD   Hosp Day # 5 PCP Oscar (Katie) MD Gabriel     The  Cures Act makes medical notes like these available to patients in the interest of transparency. Please be advised this is a medical document. Medical documents are intended to carry relevant information, facts as evident, and the clinical opinion of the practitioner. The medical note is intended as peer to peer communication and may appear blunt or direct. It is written in medical language and may contain abbreviations or verbiage that are unfamiliar.     Subjective     When I entered the room, the patient was resting comfortably in the bed. RN stated Roberto had not been sleeping well and he is finally resting comfortably.     Review of pertinent medication requirements in past 24 hours:   No medications    Allergies:  No Known Allergies    Medications:     Current Facility-Administered Medications:     pantoprazole (Protonix) 40 mg in sodium chloride 0.9% PF 10 mL IV push, 40 mg, Intravenous, QAM AC **OR** pantoprazole (Protonix) DR tab 40 mg, 40 mg, Oral, QAM AC    tamsulosin (Flomax) cap 0.4 mg, 0.4 mg, Oral, Daily    traZODone (Desyrel) tab 25 mg, 25 mg, Oral, Nightly    OLANZapine (ZyPREXA) tab 5 mg, 5 mg, Oral, QPM    lamoTRIgine (LaMICtal) tab 25 mg, 25 mg, Oral, BID    budesonide (Pulmicort) 0.5 MG/2ML nebulizer suspension 0.5 mg, 0.5 mg, Nebulization, Daily    ipratropium-albuterol (Duoneb) 0.5-2.5 (3) MG/3ML inhalation solution 3 mL, 3 mL, Nebulization, Daily    melatonin tab 3 mg, 3 mg, Oral, Nightly    [Held by provider] losartan (Cozaar) 50 mg, hydroCHLOROthiazide 12.5 mg for Benicar HCT 20/12.5 (EEH only), , Oral, Daily    polyethylene glycol (PEG 3350) (Miralax) 17 g oral packet 17 g, 17 g, Oral, BID     fluticasone propionate (Flonase) 50 MCG/ACT nasal suspension 2 spray, 2 spray, Each Nare, Daily    magnesium oxide (Mag-Ox) tab 400 mg, 400 mg, Oral, Daily    labetalol (Trandate) 5 mg/mL injection 10 mg, 10 mg, Intravenous, Q4H PRN    magnesium sulfate in sterile water for injection 2 g/50mL IVPB premix 2 g, 2 g, Intravenous, Once    sodium chloride 0.9% infusion, , Intravenous, Continuous    acetaminophen (Tylenol) tab 650 mg, 650 mg, Oral, Q4H PRN **OR** acetaminophen (Tylenol) rectal suppository 650 mg, 650 mg, Rectal, Q4H PRN    labetalol (Trandate) 5 mg/mL injection 10 mg, 10 mg, Intravenous, Q10 Min PRN    hydrALAzine (Apresoline) 20 mg/mL injection 10 mg, 10 mg, Intravenous, Q2H PRN    Objective     Vital Signs:  Blood pressure 142/79, pulse 88, temperature 97.4 °F (36.3 °C), temperature source Temporal, resp. rate 20, weight 105 lb 9.6 oz (47.9 kg), SpO2 100%.  Body mass index is 19.31 kg/m².  Present Level of pain: no pain  Non-verbal signs of pain present: No    Physical Exam:  General: Roberto is in the bed he is resting comfortably. He appears cachetic and in no apparent distress.   HEENT: dry oral MM  Cardiac: regular rate and rhythm, S1, S2 normal, no murmur, rub or gallop.  Lungs: Clear without wheezes, rales, rhonchi or dullness.  Normal excursions and effort. On RA  Abdomen: flat soft, hypoactive bowel sounds, no rebound or guarding. + Bms documented  Extremities: Without clubbing, cyanosis. BLE edema not present  Neurologic: resting comfortably    Skin: Warm and dry, multiple bruising      Prior to admission Palliative performance scale PPSv2 (%): 50    Hematology:  Lab Results   Component Value Date    WBC 11.2 (H) 07/30/2024    HGB 8.6 (L) 07/30/2024    HCT 25.6 (L) 07/30/2024    .0 07/30/2024       Coags:  Lab Results   Component Value Date    INR 1.13 07/30/2024    PTT 27.3 07/30/2024       Chemistry:  Lab Results   Component Value Date    CREATSERUM 0.90 07/30/2024    BUN 20 07/30/2024      07/30/2024    K 3.9 07/30/2024     (H) 07/30/2024    CO2 23.0 07/30/2024     (H) 07/30/2024    CA 8.3 (L) 07/30/2024    ALB 3.6 07/29/2024    ALKPHO 75 07/29/2024    BILT 0.5 07/29/2024    TP 6.0 07/29/2024    AST 29 07/29/2024    ALT 7 (L) 07/29/2024    MG 1.6 07/29/2024    PHOS 3.0 07/29/2024       Imaging:  No results found.      Summary of Discussion    8/1/24 pts nephew spoke with the hospice team over the phone. [facility].      Assessment and Recommendation      Gastrointestinal hemorrhage, unspecified gastrointestinal hemorrhage type     Adenocarcinoma of rectosigmoid junction     Stercoral colitis    Cerebral amyloid angiopathy     Fibromuscular dysplasia     Aphasia    History of dementia    Nontraumatic subcortical hemorrhage of right cerebral hemisphere     Delirium superimposed on dementia    Episodic mood disorder     Frequent falls    Severe malnutrition    Afib        Goals of care counseling  -see above for details  -Pt is DNAR/comfort measures  -Agreeable to palliative care following  -Dispo: Silvestre expresses he wants his uncle to return to the [facility]. SW to help with dc planning.   -declined  at this time  -hospice spoke with pts Nephew over the phone, plan is to return to [facility] hosp[facility].   -Provided emotional support to pt/family who are coping adequately     Advance care planning  -see above for details  -Pt's [facility] [phone], kamla Huff is successor [phone]     Palliative Performance Scale 20%      Palliative Care Follow Up: Palliative care team will follow peripherally for now. Feel free to contact our team with any questions or concerns.    Thank you for allowing Palliative Care services to participate in the care of Nacho ALMANZAR Yinaon.    A total of 25 minutes were spent on this follow-up, which included all of the following: chart review, direct face to face  contact, history taking, physical examination, counseling and coordinating care, and documentation.     Samra Garner, [id number]  8/1/2024  9:08AM  Palliative Care Services

## 2024-08-01 NOTE — CM/SW NOTE
08/01/24 0925   Discharge disposition   Expected discharge disposition Hospice/Home   Post Acute Care Provider Other  ([facility])   Additional Home Care/Hospice Provider [facility]   Discharge transportation [facility]     The patient received a MDO for discharge.    The patient will be transported home to [facility] via [facility] at 10:45am.  PCS complete.  The patient is reserved with [facility].    SW/CM to remain available for support and/or discharge planning.     Emily Erickson MSW, LSW  Discharge Planner [id number]

## 2024-08-01 NOTE — PROGRESS NOTES
[facility]  part of [facility]    Progress Note    Nacho Mari Patient Status:  Inpatient    10/14/1933 MRN [id number]   Location [facility] 2W/SW Attending Eric Fierro MD   Hosp Day # 4 PCP Oscar Rios MD ([place])       Subjective:   Nacho Mari is a(n) 89 year old male AMS, GI Bleed.    Objective:     Vitals:    24   BP:    Pulse:    Resp:    Temp: 97.5 °F (36.4 °C)       Intake/Output Summary (Last 24 hours) at 2024 2332  Last data filed at 2024  Gross per 24 hour   Intake 1767.5 ml   Output 1775 ml   Net -7.5 ml     Wt Readings from Last 2 Encounters:   24 105 lb 9.6 oz (47.9 kg)   24 95 lb (43.1 kg)       General appearance:  alert, appears stated age, and cooperative  Head: Normocephalic, without obvious abnormality, atraumatic  Eyes: conjunctivae/corneas clear. PERRL, EOM's intact. Fundi benign.  Neck: no adenopathy, no carotid bruit, no JVD, supple, symmetrical, trachea midline, and thyroid not enlarged, symmetric, no tenderness/mass/nodules  Pulmonary: clear to auscultation bilaterally  Cardiovascular: S1, S2 normal, no murmur, click, rub or gallop, regular rate and rhythm  Abdominal: soft, non-tender; bowel sounds normal; no masses,  no organomegaly  Extremities: extremities normal, atraumatic, no cyanosis or edema  Pulses: 2+ and symmetric  Neurologic: Grossly normal  Genital Exam: defer exam    Current Medications:    Current Facility-Administered Medications:     [START ON 2024] pantoprazole (Protonix) 40 mg in sodium chloride 0.9% PF 10 mL IV push, 40 mg, Intravenous, QAM AC **OR** [START ON 2024] pantoprazole (Protonix) DR tab 40 mg, 40 mg, Oral, QAM AC    tamsulosin (Flomax) cap 0.4 mg, 0.4 mg, Oral, Daily    traZODone (Desyrel) tab 25 mg, 25 mg, Oral, Nightly    OLANZapine (ZyPREXA) tab 5 mg, 5 mg, Oral, QPM    lamoTRIgine (LaMICtal) tab 25 mg, 25 mg, Oral, BID    budesonide (Pulmicort) 0.5  MG/2ML nebulizer suspension 0.5 mg, 0.5 mg, Nebulization, Daily    ipratropium-albuterol (Duoneb) 0.5-2.5 (3) MG/3ML inhalation solution 3 mL, 3 mL, Nebulization, Daily    melatonin tab 3 mg, 3 mg, Oral, Nightly    [Held by provider] losartan (Cozaar) 50 mg, hydroCHLOROthiazide 12.5 mg for Benicar HCT 20/12.5 (EEH only), , Oral, Daily    polyethylene glycol (PEG 3350) (Miralax) 17 g oral packet 17 g, 17 g, Oral, BID    fluticasone propionate (Flonase) 50 MCG/ACT nasal suspension 2 spray, 2 spray, Each Nare, Daily    magnesium oxide (Mag-Ox) tab 400 mg, 400 mg, Oral, Daily    labetalol (Trandate) 5 mg/mL injection 10 mg, 10 mg, Intravenous, Q4H PRN    magnesium sulfate in sterile water for injection 2 g/50mL IVPB premix 2 g, 2 g, Intravenous, Once    sodium chloride 0.9% infusion, , Intravenous, Continuous    acetaminophen (Tylenol) tab 650 mg, 650 mg, Oral, Q4H PRN **OR** acetaminophen (Tylenol) rectal suppository 650 mg, 650 mg, Rectal, Q4H PRN    labetalol (Trandate) 5 mg/mL injection 10 mg, 10 mg, Intravenous, Q10 Min PRN    hydrALAzine (Apresoline) 20 mg/mL injection 10 mg, 10 mg, Intravenous, Q2H PRN    Allergies  No Known Allergies    Assessment and Plan:      Gastrointestinal hemorrhage, unspecified gastrointestinal hemorrhage type          BRBPR (bright red blood per rectum)          Adenocarcinoma of rectosigmoid junction (HCC)          Stercoral colitis          Cerebral amyloid angiopathy (HCC)          Fibromuscular dysplasia (HCC)          Aphasia          History of dementia          Nontraumatic subcortical hemorrhage of right cerebral hemisphere (HCC)     Assessment and plan  Problem 1 altered mental status patient has altered mental status in the setting of elevated blood pressure progressively worsening baseline and history of falls and also history of questionable insular bleed patient will be transferred to ICU patient's care was discussed with the primary RN and also neurologist.  In addition  patient also has history of dementia.  I will make attempts to speak with the patient's family and get a better understanding of family's wishes and also of his baseline mental status.     Problem #2 bright red blood per rectum this will be evaluated GI will be consulted patient will be off aspirin patient does not seem to have acute hematochezia or BRBPR.  Will avoid heparin for DVT prophylaxis.     Problem #3 atrial fibrillation: Patient is not a candidate for anticoagulation and hence will even discontinue aspirin in light of current GI bleed and questionable insular cortex bleed.     Problem #4 adenocarcinoma of the colon: Patient currently does not have any obstructive features but surgical oncology has been consulted.  Further input and likely nonoperative process will be followed given patient's advanced age.     Problem #5 dementia: Supportive care will be provided patient is not on memantine will consider putting patient on donepezil and memantine further input for from neurology is awaited.     Problem #6 DVT prophylaxis: Patient will be on SCD boots.     Problem #7 GI prophylaxis: Patient will be on Protonix.     July 28, 2024  Patient is more alert neuro logic exam is unchanged.  Patient is in ICU appears stable.  Appreciate general surgery evaluation and neurologic evaluation.  Patient has some sundowning and started on haloperidol at bedtime.  Psych consult has been requested.  Will advance diet as no surgical intervention is planned.  Transfer out of the ICU once stable.     July 29, 2024  Patient has fluctuating mental status.  Requiring soft restraints.  No neurosurgical intervention planned.  Patient is not directable to perform physical therapy.  Dementia and delirium with multifactorial causes for delirium.  Per neurology patient is contraindicated for antithrombotics.  GI bleed currently seems to be stable but is still present.  Differential diagnosis includes stercoral colitis versus  hemorrhoidal bleeding.  Patient is not cooperating with colon prep.       July 30, 2024  Discussed with the patient's nurse and Dr. Davida Doty.  In light of patient's fluctuating mood and mental status patient is not amenable to be convinced or reliably be prepped for colonoscopy.  Will request psychiatry to evaluate for competence.  Patient has a nephew but is not completely understanding of patient's medical problems.  For now endoscopy procedures have been put on hold.  Hemoglobin has stayed stable.  Continue holding aspirin.  Patient is still confused and requiring soft restraints to prevent pulling of medical equipment.  Await further directive in regards to competence and planned procedures electively once a POA is instituted.  During an emergent procedure patient will likely undergo emergent colonoscopy and endoscopy if needed.    07/31/2024  Patient still in soft restraints. The nephew  Silvestre Hammond ( [place]) had a discussion with palliative care and based on his being the POA and with his belief that patient would not live in this condition with restraints, they decided to place him back in [facility] in hospice.    Results:     Lab Results   Component Value Date    WBC 11.2 (H) 07/30/2024    HGB 8.6 (L) 07/30/2024    HCT 25.6 (L) 07/30/2024    .0 07/30/2024     07/30/2024    K 3.9 07/30/2024     (H) 07/30/2024    CO2 23.0 07/30/2024    CREATSERUM 0.90 07/30/2024    BUN 20 07/30/2024     (H) 07/30/2024    CA 8.3 (L) 07/30/2024    ALB 3.6 07/29/2024    ALKPHO 75 07/29/2024    BILT 0.5 07/29/2024    TP 6.0 07/29/2024    AST 29 07/29/2024    ALT 7 (L) 07/29/2024    PTT 27.3 07/30/2024    INR 1.13 07/30/2024    MG 1.6 07/29/2024    PHOS 3.0 07/29/2024     No results found for this visit on 07/27/24.    CTA ABD/PEL (CPT=74174)    Result Date: 7/30/2024  CONCLUSION:   Infrarenal abdominal aortic aneurysm.  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  No intra-abdominal or  intrapelvic hematoma.  No active contrast extravasation.  Persistent proctitis.  Findings may be secondary to residual stercoral colitis however the previously seen stool ball is no longer visualized.  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  No complete occlusion.  Multiple other incidental findings as described in the body of the report.  Preliminary report was submitted by the [facility] teleradiologist and there are no significant discrepancies.  elm-remote     Dictated by (CST): Karlo Llanes MD on 7/30/2024 at 10:27 AM     Finalized by (CST): Karlo Llanes MD on 7/30/2024 at 10:40 AM          XR CHEST AP/PA (1 VIEW) (CPT=71045)    Result Date: 7/30/2024  CONCLUSION:   Well-positioned enteric tube.  No significant change in the 2.5 cm right perihilar pulmonary nodule.  No new focal opacity, pleural effusion, or pneumothorax.     Dictated by (CST): Feliciano Hameed MD on 7/30/2024 at 9:09 AM     Finalized by (CST): Feliciano Hameed MD on 7/30/2024 at 9:15 AM          XR CHEST AP PORTABLE  (CPT=71045)    Result Date: 7/30/2024  CONCLUSION:  1. NG tube traverses right mainstem bronchus and has its tip in the right lower lobe.  Findings were called to the patient's nurse deaf in the at 8:05 a.m. 2. Right perihilar upper lobe 2.5 cm pulmonary nodule. 3. Emphysema. 4. Left perihilar scarring. 5. Atherosclerotic calcification aorta.    Dictated by (CST): Sohail Weathers MD on 7/30/2024 at 8:05 AM     Finalized by (CST): Sohail Weathers MD on 7/30/2024 at 8:13 AM                       JOSE ARMANDO RAM MD  7/31/2024

## 2024-08-01 NOTE — PLAN OF CARE
Problem: Safety Risk - Non-Violent Restraints  Goal: Patient will remain free from self-harm  Description: INTERVENTIONS:  - Apply the least restrictive restraint to prevent harm  - Notify patient and family of reasons restraints applied  - Assess for any contributing factors to confusion (electrolyte disturbances, delirium, medications)  - Discontinue any unnecessary medical devices as soon as possible  - Assess the patient's physical comfort, circulation, skin condition, hydration, nutrition and elimination needs   - Reorient and redirection as needed  - Assess for the need to continue restraints  Outcome: Not Progressing     Problem: GASTROINTESTINAL - ADULT  Goal: Minimal or absence of nausea and vomiting  Description: INTERVENTIONS:  - Maintain adequate hydration with IV or PO as ordered and tolerated  - Nasogastric tube to low intermittent suction as ordered  - Evaluate effectiveness of ordered antiemetic medications  - Provide nonpharmacologic comfort measures as appropriate  - Advance diet as tolerated, if ordered  - Obtain nutritional consult as needed  - Evaluate fluid balance  Outcome: Progressing  Goal: Maintains or returns to baseline bowel function  Description: INTERVENTIONS:  - Assess bowel function  - Maintain adequate hydration with IV or PO as ordered and tolerated  - Evaluate effectiveness of GI medications  - Encourage mobilization and activity  - Obtain nutritional consult as needed  - Establish a toileting routine/schedule  - Consider collaborating with pharmacy to review patient's medication profile  Outcome: Progressing     Problem: HEMATOLOGIC - ADULT  Goal: Maintains hematologic stability  Description: INTERVENTIONS  - Assess for signs and symptoms of bleeding or hemorrhage  - Monitor labs and vital signs for trends  - Administer supportive blood products/factors, fluids and medications as ordered and appropriate  - Administer supportive blood products/factors as ordered and  appropriate  Outcome: Progressing  Goal: Free from bleeding injury  Description: (Example usage: patient with low platelets)  INTERVENTIONS:  - Avoid intramuscular injections, enemas and rectal medication administration  - Ensure safe mobilization of patient  - Hold pressure on venipuncture sites to achieve adequate hemostasis  - Assess for signs and symptoms of internal bleeding  - Monitor lab trends  - Patient is to report abnormal signs of bleeding to staff  - Avoid use of toothpicks and dental floss  - Use electric shaver for shaving  - Use soft bristle tooth brush  - Limit straining and forceful nose blowing  Outcome: Progressing     Problem: NEUROLOGICAL - ADULT  Goal: Achieves stable or improved neurological status  Description: INTERVENTIONS  - Assess for and report changes in neurological status  - Initiate measures to prevent increased intracranial pressure  - Maintain blood pressure and fluid volume within ordered parameters to optimize cerebral perfusion and minimize risk of hemorrhage  - Monitor temperature, glucose, and sodium. Initiate appropriate interventions as ordered  Outcome: Progressing  Goal: Achieves maximal functionality and self care  Description: INTERVENTIONS  - Monitor swallowing and airway patency with patient fatigue and changes in neurological status  - Encourage and assist patient to increase activity and self care with guidance from PT/OT  - Encourage visually impaired, hearing impaired and aphasic patients to use assistive/communication devices  Outcome: Progressing

## 2024-08-05 NOTE — CDS QUERY
Dear Sri Newsome, .      Please provide additional documentation in the patient's medical record supportive of documented diagnosis of  Severe Malnutrition as documented by Palliative Care.       (  )   Severe Malnutrition  remains a known or suspected condition for this patient and is further supported by ( provide additional documentation ):__________         (   )     Severe Malnutrition was ruled out             (   )     Other (please specify)___________________________  ______________________________________________________________________________   Clinical Indicators:     Patient transitioned to residential hospice when discharged.   No dietary consult.    Palliative care consult:     Nutritional Status:  BMI:19 Weight: 105lbs  Weight changes: SETH  Current Appetite: eating well.  Dysphagia: SETH  Assessment:  Severe malnutrition     Gastrointestinal hemorrhage  Adenocarcinoma of rectosigmoid junction   Stercoral colitis  Delirium superimposed on dementia  H&P: Gastrointestinal hemorrhage, unspecified gastrointestinal hemorrhage type   BRBPR (bright red blood per rectum)   Adenocarcinoma of rectosigmoid junction    Stercoral colitis  7-27 Total Protein 6.7    Albumin 4.0    Globulin 2.7    A/G ratio  1.5  Treatment: Low fiber/soft diet. Thin liquids, pureed  Risk factors: GI bleed, Adenocarcinoma of rectosigmoid junction , Stercoral colitis, dementia    If you have any questions, please contact Clinical :  Antonia Bishop RN CCDS  at  [phone]   Thank You!     THIS FORM IS A PERMANENT PART OF THE MEDICAL RECORD

## 2024-10-08 NOTE — PROGRESS NOTES
[facility]    Progress Note    Nacho Mari Patient Status:  Inpatient    10/14/1933 MRN [id number]   Location [facility] 4W/SW/SE Attending Thuy Acevedo MD   Hosp Day # 5 PCP Oscar (AmairaniPraneeth) MD Gabriel        Subjective:   Nacho Mari is a(n) 89 year old male now s/p L hip hemiarthroplasty for femoral neck fracture. He is pleasantly confused at baseline. He is in bed right now and says he doesn't have pain      Objective:   Vital Signs:  Blood pressure (!) 176/75, pulse 85, temperature 97.5 °F (36.4 °C), temperature source Axillary, resp. rate 18, height 5' 2\" (1.575 m), weight 95 lb (43.1 kg), SpO2 93%.     Physical Exam:   General: Alert, orientated x1 (person)  Vital Signs:  Blood pressure (!) 176/75, pulse 85, temperature 97.5 °F (36.4 °C), temperature source Axillary, resp. rate 18, height 5' 2\" (1.575 m), weight 95 lb (43.1 kg), SpO2 93%.    Extremities:   Left leg  Dressing about the hip CDI  No erythema  No pain with log roll  Wiggles toes but does not follow commands. 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 He does not have any known history of lymphoproliferative disorders. I did speak with the primary medicine team regarding this and we will have oncology provide input regarding these findings. Reviewing the literature, there does appear to be a less than 2% risk in all comers of having a b-cell lymphoproliferative disorder in femoral heads sectioned after elective total hip  replacements, however, given this finding during his hemiarthroplasty, oncology input is appreciated.     WBAT with posterior hip precautions  Posterior hip precautions x 12 weeks  Dont bend you hip past 90 degrees  Dont cross your legs  Dont twist your hip inwards-keep knees and toes pointed outwards     Abduction pillow while in bed  Cefadroxil 500mg BID for 7 days or Cephalexin 1000mg TID for 7 days post op (ordered)  ASA 81mg BID for 35 days minimium (or other DVT ppx per primary discretion)    SW and CM for discharge planning and coordination    Oncology input regarding femoral head findings     Outpatient follow up in 2 weeks with Dr. Kelvin Farrell [phone]       Detail Level: Zone Render In Strict Bullet Format?: No Initiate Treatment: Clindamycin 1% lotion QAM after BPO wash\\nTretinoin 0.025% cream nightly - start 2 nights a week and slowly increase as tolerated

## 2025-04-08 NOTE — OCCUPATIONAL THERAPY NOTE
OCCUPATIONAL THERAPY TREATMENT NOTE - INPATIENT        Room Number: 420/420-A     Presenting Problem: s/p LT JEAN-CLAUDE    Problem List  Principal Problem:    Closed fracture of left hip, initial encounter ([facility])  Active Problems:    Hip fracture ([facility])      OCCUPATIONAL THERAPY ASSESSMENT   Per RN, pt ok to tx. Co-treated with PT. No visitors present. Introduced self and role of OT to pt. Pt verbalized understanding and was agreeable to session. Pt received in bed but unable to rate pain; grimacing when rolling. Linens were soiled and gown was doffed. Total A for hygiene, rolling, and linen and gown change. Total A for ADLs. Pt unable to state last name but does not resist movement. Pt left in bed and alarm activated. Call light and all needs in reach. Handoff given to RN.     The patient's Approx Degree of Impairment: 92.44% has been calculated based on documentation in the [facility] '6 clicks' Inpatient Daily Activity Short Form.  Research supports that patients with this level of impairment may benefit from LTAC.    DISCHARGE RECOMMENDATIONS  OT Discharge Recommendations: Sub-acute rehabilitation     PLAN  OT Treatment Plan: ADL training;Functional transfer training    SUBJECTIVE  OK    OBJECTIVE  Precautions: JEAN-CLAUDE - posterior;Hip abduction pillow;Bed/chair alarm    WEIGHT BEARING RESTRICTION  L Lower Extremity: Weight Bearing as Tolerated    PAIN ASSESSMENT  Rating: Non-verbal signs of pain/discomfort observed       ACTIVITIES OF DAILY LIVING ASSESSMENT  AM-PAC ‘6-Clicks’ Inpatient Daily Activity Short Form  How much help from another person does the patient currently need…  -   Putting on and taking off regular lower body clothing?: Total  -   Bathing (including washing, rinsing, drying)?: Total  -   Toileting, which includes using toilet, bedpan or urinal? : Total  -   Putting on and taking off regular upper body clothing?: Total  -   Taking care of personal grooming such as brushing teeth?: Total  -   Eating meals?: A  The patient's Son Darryn phoned asking for information on how to send back his Mother's "[employer] home monitor. Please advise. Thank you   Lot    AM-PAC Score:  Score: 7  Approx Degree of Impairment: 92.44%  Standardized Score (AM-PAC Scale): 20.13  CMS Modifier (G-Code): CM         BED MOBILITY  Rolling: Dependent          OT Goals:  Patient self-stated goal is: pt unable to state    Patient will perform functional xfers with CGA  Comment: NA   Pt will perform bed mobility with supervision for pressure relief and supine self care  Comment: NA   Pt will tolerate 3 minutes in supported standing with supervision for participation in self care  Comment: NA     Comment:         Goals  on:24  Frequency:3x/week    OT Session Time: 15 minutes  Self-Care Home Management: 15 minutes      Karen Brooke OT  [facility]  Inpatient Rehabilitation  Occupational Therapy  [phone]

## (undated) DEVICE — MEDI-VAC NON-CONDUCTIVE SUCTION TUBING: Brand: CARDINAL HEALTH

## (undated) DEVICE — MEDI-VAC NON-CONDUCTIVE SUCTION TUBING 6MM X 1.8M (6FT.) L: Brand: CARDINAL HEALTH

## (undated) DEVICE — FEMORAL CANAL PRESSURIZER WITHOUT HUB, MEDIUM

## (undated) DEVICE — KIT ENDO ORCAPOD 160/180/190

## (undated) DEVICE — Device

## (undated) DEVICE — SUTURE ETHBND EXCEL SZ 2 L30IN NONABSORBABLE MX69G

## (undated) DEVICE — SYRINGE, LUER SLIP, STERILE, 60ML: Brand: MEDLINE

## (undated) DEVICE — SUTURE MCRYL SZ 3-0 L27IN ABSRB UD L24MM PS-1

## (undated) DEVICE — ADHESIVE SKIN TOP FOR WND CLSR DERMBND ADV

## (undated) DEVICE — KIT CLEAN ENDOKIT 1.1OZ GOWNX2

## (undated) DEVICE — YANKAUER,BULB TIP,W/O VENT,RIGID,STERILE: Brand: MEDLINE

## (undated) DEVICE — DRESSING ANTIMIC 3.5 X 12 IN AQCEL AG ADVNTG

## (undated) DEVICE — 450 ML BOTTLE OF 0.05% CHLORHEXIDINE GLUCONATE IN 99.95% STERILE WATER FOR IRRIGATION, USP AND APPLICATOR.: Brand: IRRISEPT ANTIMICROBIAL WOUND LAVAGE

## (undated) DEVICE — CO2 CANNULA,SSOFT,ADLT,7O2,4CO2,FEMALE: Brand: MEDLINE

## (undated) DEVICE — DEV STRATAFIX PDS + SUTR 0 CTX

## (undated) DEVICE — CONMED SCOPE SAVER BITE BLOCK, 20X27 MM: Brand: SCOPE SAVER

## (undated) DEVICE — SUTURE VCRL SZ 2-0 L36IN ABSRB UD L36MM CT-1

## (undated) DEVICE — FEMORAL CANAL BRUSH, IRRIGATION/SUCTION

## (undated) NOTE — LETTER
No information on file.  Authorization for Imaging Procedure  Date of Procedure:     I hereby authorize Dr. Doty, my physician and his/her assistants (if applicable), which may include medical students, residents, and/or fellows, to perform the following procedure and administer such anesthesia as may be determined necessary by my physician: Esophagogastroduodenoscopy (EGD) and Colonoscopy on Nacho Mari.   2.  I recognize that during the procedure, unforeseen conditions may necessitate additional or different procedures than those listed above. I, therefore, further authorize and request that the above-named physician, assistants, or designees perform such procedures as are, in their judgment, necessary and desirable.    3.  My physician has discussed prior to my procedure the potential benefits, risks and side effects of this procedure; the likelihood of achieving goals; and potential problems that might occur during recuperation. They also discussed reasonable alternatives to the procedure, including risks, benefits, and side effects related to the alternatives and risks related to not receiving this procedure. I have had all my questions answered and I acknowledge that no guarantee has been made as to the result that may be obtained.    4.  Should the need arise during my procedure, which includes change of level of care prior to discharge, I also consent to the administration of blood and/or blood products. Further, I understand that despite careful testing and screening of blood or blood products by collecting agencies, I may still be subject to ill effects as a result of receiving a blood transfusion and/or blood products. The following are some, but not all, of the potential risks that can occur: fever and allergic reactions, hemolytic reactions, transmission of diseases such as Hepatitis, AIDS and Cytomegalovirus (CMV) and fluid overload. In the event that I wish to have an autologous transfusion of  my own blood, or a directed donor transfusion, I will discuss this with my physician.  Check only if Refusing Blood or Blood Products  I understand refusal of blood or blood products as deemed necessary by my physician may have serious consequences to my condition to include possible death. I hereby assume responsibility for my refusal and release the hospital, its personnel, and my physicians from any responsibility for the consequences of my refusal.   [  ] Patient Refuses Blood      5.  I authorize the use of any specimen, organs, tissues, body parts or foreign objects that may be removed from my body during the procedure for diagnosis, research or teaching purposes and their subsequent disposal by hospital authorities. I also authorize the release of specimen test results and/or written reports to my treating physician on the hospital medical staff or other referring or consulting physicians involved in my care, at the discretion of the Pathologist or my treating physician.    6.  I consent to the photographing or videotaping of the procedures to be performed, including appropriate portions of my body for medical, scientific, or educational purposes, provided my identity is not revealed by the pictures or by descriptive texts accompanying them. If the procedure has been photographed/videotaped, the physician will obtain the original picture, image, videotape or CD. The hospital will not be responsible for storage, release or maintenance of the picture, image, tape or CD.   7.  I consent to the presence of a  or observers in the operating room as deemed necessary by my physician or their designees.    8.  I recognize that in the event my procedure results in extended X-Ray/fluoroscopy time, I may develop a skin reaction.    9.  If I have a Do Not Attempt Resuscitation (DNAR) order in place, that status will be suspended while in the operating room, procedural suite, and during the recovery  period unless otherwise explicitly stated by me (or a person authorized to consent on my behalf). The performing physician or my attending physician will determine when the applicable recovery period ends for purposes of reinstating the DNAR order.  10.  I acknowledge that my physician has explained sedation/analgesia administration to me including the risk and benefits I consent to the administration of sedation/analgesia as may be necessary or desirable in the judgment of my physician.      I CERTIFY THAT I HAVE READ AND FULLY UNDERSTAND THE ABOVE CONSENT FOR THE PROCEDURE.   Signature of Patient: _____________________________________________________________  Responsible person in case of minor, unconscious: ____________________________________  Relationship to patient:  __________________________________________________________  Signature of Witness: _______________________________Date: _________Time: __________    Statement of Physician: My signature below affirms that prior to the time of the procedure, I have explained to the patient and/or his guardian, the risks and benefits involved in the proposed treatment and any reasonable alternative to the proposed treatment. I have also explained the risks and benefits involved in the refusal of the proposed treatment and have answered the patient's questions. If I have a significant financial interest in a co-management agreement or a significant financial interest in any product or implant, or other significant relationship used in the procedure/surgery, I have disclosed this and had a discussion with my patient.  Signature of Physician:   _________________________________Date:_____________Time:________    Patient Name: Nacho Mari : 10/14/1933  Printed: 2024   Medical Record #: [id number]

## (undated) NOTE — LETTER
[facility]  [address] [facility] [address]  [phone]    Blood Transfusion Consent    In the course of your treatment, it may become necessary to administer a transfusion of blood or blood components. This form provides basic information concerning this procedure and, if signed by you, authorizes its administration. By signing this form, you agree that all of your questions about the administration of blood or blood products have been answered by the ordering medical professional or designee.    Description of Procedure  Blood is introduced into one of your veins, commonly in the arm, using a sterilized disposable needle. The amount of blood transfused, and whether the transfusion will be of blood or blood components is a judgement the physician will make based on your particular needs.    Risks  The transfusion is a common procedure of low risk.  MINOR AND TEMPORARY REACTIONS ARE NOT UNCOMMON, including a slight bruise, swelling or local reaction in the area where the needle pierces your skin, or a nonserious reaction to the transfused material itself, including headache, fever or mild skin reaction, such as rash.  Serious reactions are possible, though very unlikely, and include severe allergic reaction (shock) and destruction (hemolysis) of transfused blood cells.  Infectious diseases which are known to be transmitted by blood transfusion include certain types of viral Hepatitis(liver infection from a virus), Human Immunodeficiency Virus (HIV-1,2) infection, a viral infection known to cause Acquired Immunodeficiency Syndrome (AIDS), as well as certain other bacterial, viral, and parasitic diseases. While a minimal risk of acquiring an infectious disease from transfused blood exists, in accordance with the Federal and State law, all due care has been taken in donor selection and testing to avoid transmission of disease.    Alternatives  If loss of blood poses serious threats during your  treatment, THERE IS NO EFFECTIVE ALTERNATIVE TO BLOOD TRANSFUSION. However, if you have any further questions on this matter, your provider will fully explain the alternatives to you if it has not already been done.    I, ______________________________, have read/had read to me the above. I understand the matters bearing on the decision whether or not to authorize a transfusion of blood or blood components. I have no questions which have not been answered to my full satisfaction. I hereby consent to such transfusion as my physician may deem necessary or advisable in the course of my treatment.    ______________________________________________                    ___________________________  (Signature of Patient or Responsible party in case of minor,                 (Printed Name of Patient or incompetent, or unconscious patient)              Responsible Party)    ___________________________               _____________________  (Relationship to Patient if not self)                                    (Date and Time)    __________________________                                                           ______________________              (Signature of Witness)               (Printed Name of Witness)     Language line ()    Telephone/Verbal/Video Consent    __________________________                     ____________________  (Signature of 2nd Witness           (Printed Name of 2nd  Telephone/Verbal/Video Consent)           Witness)    Patient Name: Nacho Mari     : 10/14/1933                 Printed: 2024     Medical Record #: [id number]      Rev: 2023

## (undated) NOTE — IP AVS SNAPSHOT
Patient Demographics     Address  [address] [facility] [address] Phone  [phone] (Home) *Preferred*  [phone] (Mobile)      Patient Contacts     Name Relation Home Work Mobile    Silvestre Hammond OTHER   [phone]    Daria Hammond OTHER   [phone]    Pricila Mari Sister   [phone]      Allergies as of 1/31/2024  Review status set to In Progress on 1/25/2024   No Known Allergies     Code Status Information     Code Status    Full Code        Patient Instructions       Posterior hip precautions x 12 weeks  Dont bend you hip past 90 degrees  Dont cross your legs  Dont twist your hip inwards-keep knees and toes pointed outwards     Abduction pillow while in bed    Cephalexin 1000mg TID for 7 days post op (ordered)     Follow-up Information     Kelvin Farrell MD. Schedule an appointment as soon as possible for a visit in 2 week(s).    Specialties: Surgery, Orthopaedic, SURGERY, ORTHOPEDIC  Contact information:  [address] [address]  [phone]             Thuy Acevedo MD Follow up in 1 week(s).    Specialty: Internal Medicine  Contact information:  [address] [address] [phone]             Aj Rene MD Follow up in 2 week(s).    Specialties: Hematology and Oncology, ONCOLOGY, HEMATOLOGY  Contact information:  [address] [facility] [id number] [phone]                        Your Home Meds List      TAKE these medications       Instructions Authorizing Provider Morning Afternoon Evening As Needed   acetaminophen 325 MG Tabs  Commonly known as: Tylenol      Take 1 tablet (325 mg total) by mouth every 6 (six) hours as needed for Pain.          aspirin 81 MG Tbec      Take 1 tablet (81 mg total) by mouth in the morning and 1 tablet (81 mg total) before bedtime.  Stop taking on: March 4, 2024   Thuy Acevedo         cephalexin 500 MG Caps  Commonly known as: Keflex      Take 2 capsules (1,000 mg total) by  mouth every 8 (eight) hours for 7 days.  Stop taking on: February 5, 2024   Thuy Acevedo         fluticasone propionate 50 MCG/ACT Susp  Commonly known as: Flonase  Next dose due: tomorrow      2 sprays by Each Nare route daily.          magnesium oxide 400 MG Tabs  Commonly known as: Mag-Ox  Next dose due: tomorrow      Take 1 tablet (400 mg total) by mouth daily.          Melatonin 3 MG Caps  Next dose due: tomorrow      Take 1 capsule (3 mg total) by mouth daily.          pantoprazole 40 MG Tbec  Commonly known as: Protonix  Next dose due: Tomorrow morning      Take 1 tablet (40 mg total) by mouth every morning before breakfast.          Polyethylene Glycol 3350 17 g Pack  Commonly known as: MIRALAX      Take 17 g by mouth as needed.          QUEtiapine 50 MG Tabs  Commonly known as: SEROquel  Next dose due: tonight      Take 1 tablet (50 mg total) by mouth nightly.          tamsulosin 0.4 MG Caps  Commonly known as: Flomax  Next dose due: tomorrow      Take 1 capsule (0.4 mg total) by mouth daily.          traZODone 50 MG Tabs  Commonly known as: Desyrel  Next dose due: tonight      Take 1 tablet (50 mg total) by mouth nightly.          Trelegy Ellipta 100-62.5-25 MCG/ACT Aepb  Generic drug: fluticasone-umeclidin-vilant  Next dose due: tomorrow      Inhale 1 puff into the lungs daily.                Where to Get Your Medications      Please  your prescriptions at the location directed by your doctor or nurse    Bring a paper prescription for each of these medications  aspirin 81 MG Tbec  cephalexin 500 MG Caps           [id number] - MAR ACTION REPORT  (last 48 hrs)    ** SITE UNKNOWN **     Order ID Medication Name Action Time Action Reason Comments    [id number] QUEtiapine (SEROquel) tab 50 mg 01/29/24 2158 Given      [id number] QUEtiapine (SEROquel) tab 50 mg 01/30/24 2217 Given      [id number] aspirin DR tab 81 mg 01/29/24 2158 Given      [id number] aspirin DR tab 81 mg 01/30/24 0859 Given      [id number]  aspirin DR tab 81 mg 01/30/24 2217 Given      [id number] aspirin DR tab 81 mg 01/31/24 1039 Given      [id number] cephalexin (Keflex) cap 500 mg 01/29/24 1450 Given      [id number] cephalexin (Keflex) cap 500 mg 01/29/24 2158 Given      [id number] cephalexin (Keflex) cap 500 mg 01/30/24 0614 Given      [id number] cephalexin (Keflex) cap 500 mg 01/30/24 1423 Given      [id number] cephalexin (Keflex) cap 500 mg 01/30/24 2217 Given      [id number] docusate sodium (Colace) cap 100 mg 01/29/24 2158 Given      [id number] docusate sodium (Colace) cap 100 mg 01/30/24 0859 Given      [id number] docusate sodium (Colace) cap 100 mg 01/30/24 2217 Given      [id number] docusate sodium (Colace) cap 100 mg 01/31/24 1039 Given      [id number] fluticasone propionate (Flonase) 50 MCG/ACT nasal suspension 2 spray 01/30/24 0915 Given      [id number] fluticasone propionate (Flonase) 50 MCG/ACT nasal suspension 2 spray 01/31/24 1036 Given      [id number] fluticasone-umeclidin-vilant (Trelegy Ellipta) 100-62.5-25 MCG/ACT inhaler 1 puff 01/30/24 0915 Given      [id number] fluticasone-umeclidin-vilant (Trelegy Ellipta) 100-62.5-25 MCG/ACT inhaler 1 puff 01/31/24 1038 Given      [id number] magnesium hydroxide (Milk of Magnesia) 400 MG/5ML oral suspension 30 mL 01/30/24 1817 Given      [id number] magnesium oxide (Mag-Ox) tab 400 mg 01/30/24 0859 Given      [id number] magnesium oxide (Mag-Ox) tab 400 mg 01/31/24 1039 Given      [id number] pantoprazole (Protonix) DR tab 40 mg 01/30/24 0614 Given      [id number] potassium chloride (K-Dur) tab 40 mEq 01/30/24 1423 Given      [id number] tamsulosin (Flomax) cap 0.4 mg 01/30/24 0859 Given      [id number] tamsulosin (Flomax) cap 0.4 mg 01/31/24 1039 Given      [id number] traZODone (Desyrel) tab 50 mg 01/29/24 2158 Given      [id number] traZODone (Desyrel) tab 50 mg 01/30/24 2217 Given            LEFT LOWER ABDOMEN     Order ID Medication Name Action Time Action Reason Comments    [id number] heparin (Porcine) 5000 UNIT/ML  injection 5,000 Units 01/29/24 1450 Given      [id number] heparin (Porcine) 5000 UNIT/ML injection 5,000 Units 01/30/24 0614 Given            LEFT UPPER ABDOMEN     Order ID Medication Name Action Time Action Reason Comments    [id number] heparin (Porcine) 5000 UNIT/ML injection 5,000 Units 01/29/24 2159 Given              Recent Vital Signs    Flowsheet Row Most Recent Value   /76 Filed at 01/31/2024 0812   Pulse 67 Filed at 01/31/2024 0812   Resp 18 Filed at 01/31/2024 0812   Temp 98.1 °F (36.7 °C) Filed at 01/31/2024 0812   SpO2 100 % Filed at 01/31/2024 0812      Patient's Most Recent Weight    Flowsheet Row Most Recent Value   Patient Weight 43.1 kg (95 lb)         Lab Results Last 24 Hours      CBC With Differential With Platelet [[id number]] (Abnormal)  Resulted: 01/31/24 0612, Result status: Final result   Ordering provider: Thuy Acevedo MD  01/30/24 2300 Resulting lab: [facility] ([facility])   Narrative:  The following orders were created for panel order CBC With Differential With Platelet.  Procedure                               Abnormality         Status                     ---------                               -----------         ------                     CBC W/ DIFFERENTIAL[[id number]]          Abnormal            Final result                 Please view results for these tests on the individual orders.    Specimen Information    Type Source Collected On   Blood — 01/31/24 0601            Potassium [[id number]] (Normal)  Resulted: 01/31/24 0508, Result status: Final result   Ordering provider: Thuy Acevedo MD  01/30/24 2300 Resulting lab: [facility] ([facility])    Specimen Information    Type Source Collected On   Blood — 01/31/24 0344          Components    Component Value Reference Range Flag Lab   Potassium 4.6 3.5 - 5.1 mmol/L — [facility] ([facility])            Basic Metabolic Panel (8) [[id number]] (Normal)  Resulted: 01/31/24 0508, Result  status: Final result   Ordering provider: Thuy Acevedo MD  24 2300 Resulting lab: [facility] ([facility])    Specimen Information    Type Source Collected On   Blood — 24 0344          Components    Component Value Reference Range Flag Lab   Glucose 91 70 - 99 mg/dL — [facility] ([facility])   Sodium 140 136 - 145 mmol/L — [facility])   Potassium 4.6 3.5 - 5.1 mmol/L — [facility])   Chloride 107 98 - 112 mmol/L — [facility])   CO2 26.0 21.0 - 32.0 mmol/L — [facility])   Anion Gap 7 0 - 18 mmol/L — [facility])   BUN 14 9 - 23 mg/dL — [facility])   Creatinine 0.75 0.70 - 1.30 mg/dL — [facility])   BUN/CREA Ratio 18.7 10.0 - 20.0 — [facility])   Calcium, Total 9.0 8.7 - 10.4 mg/dL — [facility])   Calculated Osmolality 290 275 - 295 mOsm/kg — [facility])   eGFR-Cr 86 >=60 mL/min/1.73m2 — [facility])            Testing Performed By     Lab - Abbreviation Name Director Address Valid Date Range    162 - [facility] ([facility]) [facility] ([facility]) Elan Cisse [facility] [address] 20 1442 - Present            Microbiology Results (All)     Procedure Component Value Units Date/Time    MRSA Screen by PCR [[id number]]  (Normal) Collected: 24 0828    Order Status: Completed Lab Status: Final result Updated: 24 1043    Specimen: Other from Nares      MRSA Screen By PCR Negative         H&P - H&P Note      H&P signed by Thuy Acevedo MD at 2024  5:05 PM  Version 1 of     Author: Thuy Acevedo MD Service: Internal Medicine Author Type: Physician    Filed: 2024  5:05 PM Date of Service: 2024  6:55 AM Status: Signed    : Thuy Acevedo MD (Physician)       [facility]    History & Physical    Nacho C Anunciacion Patient Status:  Inpatient    10/14/1933 MRN [id number]   Location [facility] 4W//SE Attending  Thuy Acevedo MD   Hosp Day # 0 PCP Oscar Rios MD ([facility])     Date:  1/25/2024  Date of Admission:  1/24/2024    History of Present Illness:  Nacho Mari is a(n) 89 year old male sent to ER from memory care unit to status post unwitnessed fall.  Patient complaining of left-sided hip pain.  Patient is unsure if he hit his head.  No reported chest pain, shortness of breath, nausea, vomiting      History:  History reviewed. No pertinent past medical history.  History reviewed. No pertinent surgical history.  No family history on file.   reports that he has never smoked. He has never used smokeless tobacco.    Allergies:  No Known Allergies    Home Medications:  Medications Prior to Admission   Medication Sig Dispense Refill Last Dose    acetaminophen 325 MG Oral Tab Take 1 tablet (325 mg total) by mouth every 6 (six) hours as needed for Pain.       magnesium oxide 400 MG Oral Tab Take 1 tablet (400 mg total) by mouth daily.       Melatonin 3 MG Oral Cap Take 1 capsule (3 mg total) by mouth daily.       pantoprazole 40 MG Oral Tab EC Take 1 tablet (40 mg total) by mouth every morning before breakfast.       fluticasone propionate 50 MCG/ACT Nasal Suspension 2 sprays by Each Nare route daily.       Polyethylene Glycol 3350 17 g Oral Powd Pack Take 17 g by mouth as needed.       QUEtiapine 50 MG Oral Tab Take 1 tablet (50 mg total) by mouth nightly.       tamsulosin 0.4 MG Oral Cap Take 1 capsule (0.4 mg total) by mouth daily.       traZODone 50 MG Oral Tab Take 1 tablet (50 mg total) by mouth nightly.       fluticasone-umeclidin-vilant (TRELEGY ELLIPTA) 100-62.5-25 MCG/ACT Inhalation Aerosol Powder, Breath Activated Inhale 1 puff into the lungs daily.          Review of Systems:  Constitutional: negative for fatigue and fevers  Eyes: negative  Ears, nose, mouth, throat, and face: negative  Respiratory: negative for cough, dyspnea on exertion, hemoptysis, sputum and wheezing  Cardiovascular: negative for chest  pain, dyspnea, orthopnea, palpitations and syncope  Gastrointestinal: negative for abdominal pain, change in bowel habits, constipation, diarrhea and vomiting.  Musculoskeletal: negative for back pain, neck pain and stiff joints  Neurological: negative for dizziness, headaches, tremors and weakness  Endocrine: negative  Allergic/Immunologic: negative    Physical Exam:  BP (!) 152/98 (BP Location: Right arm)   Pulse 90   Temp 97.8 °F (36.6 °C) (Oral)   Resp 20   Ht 5' 2\" (1.575 m)   Wt 93 lb 4.8 oz (42.3 kg)   SpO2 100%   BMI 17.06 kg/m²     General Appearance:  Alert, Awake, not in acute distress.  HEENT: PERRLA  Neck: Supple, no carotid bruit or JVD  Lungs: Clear to auscultation bilaterally, No wheezes, crackles  Heart: Regular rate and rhythm, S1 and S2 normal, no murmur, rub or gallop  Abdomen: Soft, non-tender, non distended, Bs+, no organomegaly   Extremities: No cyanosis, edema   Pulses: 2+ and symmetric all extremities  Neurologic: Intact motor and sensory. AAOX3   Back: Symmetric, Normal ROM, no CVA tenderness    Laboratory Data:   Lab Results   Component Value Date    WBC 14.7 01/24/2024    HGB 14.0 01/24/2024    HCT 40.4 01/24/2024    .0 01/24/2024    CREATSERUM 0.93 01/24/2024    BUN 18 01/24/2024     01/24/2024    K 3.8 01/24/2024     01/24/2024    CO2 27.0 01/24/2024     01/24/2024    CA 9.3 01/24/2024       Imaging:  Reviewed     Assessment:  Patient Active Problem List   Diagnosis    Hip fracture (HCC)    Closed fracture of left hip, initial encounter ([facility])     1.  Acute displaced angulated and impacted left femoral neck fracture  2.  Unwitnessed fall, CT head unremarkable for acute changes  3.  History of dementia  4.  BPH      Plan:  Patient admitted to medical floor  Pain control  Ortho consulted, plan for left hip hemiarthroplasty  N.p.o.  Nonweightbearing left lower extremity  DVT prophylaxis  Resume other home medication  CODE STATUS: Full code  Discussed with  nursing  Med rec done      **Certification      PHYSICIAN Certification of Need for Inpatient Hospitalization - Initial Certification    Patient will require inpatient services that will reasonably be expected to span two midnight's based on the clinical documentation in H+P.   Based on patients current state of illness, I anticipate that, after discharge, patient will require TBD.      Thuy Acevedo MD  2024  6:55 AM      Electronically signed by Thuy Acevedo MD on 2024  5:05 PM              Consults - MD Consult Notes      Consults signed by Kelvin Farrell MD at 2024  1:53 PM     Author: Kelvin Farrell MD Service: Orthopedics Author Type: Physician    Filed: 2024  1:53 PM Date of Service: 2024  1:35 PM Status: Signed    : Kelvin Farrell MD (Physician)     Consult Orders    1. ED Consult to Orthopedic Surgery [[id number]] ordered by Freedom Blackman MD at 24 [facility]             [facility]    Report of Consultation    Nacho YOHAN Mari Patient Status:  Inpatient    10/14/1933 MRN [id number]   Location [facility] PRE OP RECOVERY Attending Thuy Acevedo MD   Hosp Day # 0 PCP Oscar Rios MD ([facility])     Date of Admission:  2024  Date of Consult:  24   Reason for Consultation:   Left hip fracture    History of Present Illness:   Patient is a 89 year old male who was admitted to the hospital after a fall at a nursing home. He is AO x1 (only to person, not place or time) so history is obtained partially from his POA Silvestre ([phone]) and from the chart. The patient fell on his left side at his nursing home. It is uncertain how and when he fell but normally he does ambulate with a walker at his nursing home. The patient is cachetic and underweight but the POA does not know of any underlying medical issues. No medical conditions are listed in the chart either. On exam, the patient intermittently follows commands but states that only his left hip  hurts. He does not endorse any pain in the other extremities. He also does not describe any paresthesias.     Past Medical History  History reviewed. No pertinent past medical history.    Past Surgical History  History reviewed. No pertinent surgical history.    Family History  No family history on file.    Social History  Social History     Socioeconomic History    Marital status:    Tobacco Use    Smoking status: Never    Smokeless tobacco: Never     Social Determinants of Health     Food Insecurity: No Food Insecurity (1/25/2024)    Food Insecurity     Food Insecurity: Never true   Transportation Needs: No Transportation Needs (1/25/2024)    Transportation Needs     Lack of Transportation: No   Housing Stability: Low Risk  (1/25/2024)    Housing Stability     Housing Instability: No           Current Medications:  Current Facility-Administered Medications   Medication Dose Route Frequency    [MAR Hold] morphINE PF 2 MG/ML injection 2 mg  2 mg Intravenous Q4H PRN    sodium chloride 0.9% infusion   Intravenous Continuous    [MAR Hold] heparin (Porcine) 5000 UNIT/ML injection 5,000 Units  5,000 Units Subcutaneous Q8H ALLEN    [MAR Hold] acetaminophen (Tylenol Extra Strength) tab 500 mg  500 mg Oral Q4H PRN    [MAR Hold] acetaminophen (Tylenol) tab 650 mg  650 mg Oral Q4H PRN    Or    [MAR Hold] HYDROcodone-acetaminophen (Norco) 5-325 MG per tab 1 tablet  1 tablet Oral Q4H PRN    Or    [MAR Hold] HYDROcodone-acetaminophen (Norco) 5-325 MG per tab 2 tablet  2 tablet Oral Q4H PRN    [MAR Hold] morphINE PF 2 MG/ML injection 1 mg  1 mg Intravenous Q2H PRN    Or    [MAR Hold] morphINE PF 2 MG/ML injection 2 mg  2 mg Intravenous Q2H PRN    Or    [MAR Hold] morphINE PF 4 MG/ML injection 4 mg  4 mg Intravenous Q2H PRN    [MAR Hold] melatonin tab 3 mg  3 mg Oral Nightly PRN    [MAR Hold] ondansetron (Zofran) 4 MG/2ML injection 4 mg  4 mg Intravenous Q6H PRN    [MAR Hold] metoclopramide (Reglan) 5 mg/mL injection 5 mg  5  mg Intravenous Q8H PRN    [MAR Hold] fluticasone propionate (Flonase) 50 MCG/ACT nasal suspension 2 spray  2 spray Each Nare Daily    [MAR Hold] fluticasone-umeclidin-vilant (Trelegy Ellipta) 100-62.5-25 MCG/ACT inhaler 1 puff  1 puff Inhalation Daily    [MAR Hold] magnesium oxide (Mag-Ox) tab 400 mg  400 mg Oral Daily    [MAR Hold] pantoprazole (Protonix) DR tab 40 mg  40 mg Oral QAM AC    [MAR Hold] QUEtiapine (SEROquel) tab 50 mg  50 mg Oral Nightly    [MAR Hold] tamsulosin (Flomax) cap 0.4 mg  0.4 mg Oral Daily    [MAR Hold] traZODone (Desyrel) tab 50 mg  50 mg Oral Nightly    [MAR Hold] mupirocin (Bactroban) 2% nasal ointment 1 Application  1 Application Each Nare BID    hydrALAzine (Apresoline) 20 mg/mL injection 10 mg  10 mg Intravenous Q4H PRN    ceFAZolin (Ancef) 2 g in 20mL IV syringe premix  2 g Intravenous Once     Medications Prior to Admission   Medication Sig    acetaminophen 325 MG Oral Tab Take 1 tablet (325 mg total) by mouth every 6 (six) hours as needed for Pain.    magnesium oxide 400 MG Oral Tab Take 1 tablet (400 mg total) by mouth daily.    Melatonin 3 MG Oral Cap Take 1 capsule (3 mg total) by mouth daily.    pantoprazole 40 MG Oral Tab EC Take 1 tablet (40 mg total) by mouth every morning before breakfast.    fluticasone propionate 50 MCG/ACT Nasal Suspension 2 sprays by Each Nare route daily.    Polyethylene Glycol 3350 17 g Oral Powd Pack Take 17 g by mouth as needed.    QUEtiapine 50 MG Oral Tab Take 1 tablet (50 mg total) by mouth nightly.    tamsulosin 0.4 MG Oral Cap Take 1 capsule (0.4 mg total) by mouth daily.    traZODone 50 MG Oral Tab Take 1 tablet (50 mg total) by mouth nightly.    fluticasone-umeclidin-vilant (TRELEGY ELLIPTA) 100-62.5-25 MCG/ACT Inhalation Aerosol Powder, Breath Activated Inhale 1 puff into the lungs daily.       Allergies  No Known Allergies    Review of Systems:   Pertinent items are noted in HPI.    Physical Exam:   Vital Signs:  Blood pressure (!) 178/80,  pulse 88, temperature 99 °F (37.2 °C), temperature source Axillary, resp. rate 20, height 5' 2\" (1.575 m), weight 93 lb 4.8 oz (42.3 kg), SpO2 92%.     General: No acute distress. Alert and oriented x 1, only to person but also situation (knows his hip hurts).  Lungs: Nonlabored breathing    Musculoskeletal:   Left leg  Left leg is shortened and externally rotated  States pain with attempted log roll  Endorse SILT s/s/sp/dp/t  EHL/FHL intact, large bunion deformity at baseline on the left first toe  GS/TA unexaminable as does not follow commands at times  2+DP pulse  Thigh is soft and compressible  No wounds about the thigh or groin  NTTP in the knee and tibia    Right leg  No pain with log roll  NTTP femur, tibia, knee, ankle  Moves his right leg freely  Bunion deformity present on the first toe    Results:     Laboratory Data:  Lab Results   Component Value Date    WBC 14.7 (H) 01/24/2024    HGB 14.0 01/24/2024    HCT 40.4 01/24/2024    .0 01/24/2024    CREATSERUM 0.93 01/24/2024    BUN 18 01/24/2024     01/24/2024    K 3.8 01/24/2024     01/24/2024    CO2 27.0 01/24/2024     (H) 01/24/2024    CA 9.3 01/24/2024         Imaging:  Xray pelvis with left femoral neck fracture with shortening and comminution, no other fractures are noted  CT left hip with comminuted femoral neck fracture with varus angulation and shortening      XR CHEST AP PORTABLE  (CPT=71045)    Result Date: 1/25/2024  CONCLUSION:   Linear atelectasis or scarring in the left midlung.  No other focal airspace disease or significant pleural effusion.  Hyperinflation/emphysema.   A preliminary report was issued by the [facility] [facility] teleradiology service. There are no major discrepancies.  elm-remote  Dictated by (CST): Brent Cho MD on 1/25/2024 at 8:18 AM     Finalized by (CST): Brent Cho MD on 1/25/2024 at 8:19 AM          CT HIP(BONE) LEFT (CPT=73700)    Result Date: 1/25/2024  CONCLUSION:  1. Acute displaced,  angulated, and impacted left femoral neck fracture. 2. Small to moderate left hip joint effusion. 3. Chronic-appearing left sacroiliitis. 4. Partially imaged infrarenal abdominal aortic aneurysm, which measures at least 3.6 cm. 5. Colonic diverticulosis.  Suspected colonic anastomosis at the rectosigmoid colon. 6. Prostatomegaly. 7. Lesser incidental findings as above.   A preliminary report was issued by the [facility] teleradiology service. There are no major discrepancies.  elm-remote  Dictated by (CST): Brent Cho MD on 1/25/2024 at 7:47 AM     Finalized by (CST): Brent Cho MD on 1/25/2024 at 7:51 AM          XR HIP W OR WO PELVIS 2 OR 3 VIEWS, LEFT (CPT=73502)    Result Date: 1/25/2024  CONCLUSION:   Acute displaced, angulated, and impacted left femoral neck fracture.    A preliminary report was issued by the [employer] teleradiology service. There are no major discrepancies.  elm-remote  Dictated by (CST): Brent Cho MD on 1/25/2024 at 7:45 AM     Finalized by (CST): Brent Cho MD on 1/25/2024 at 7:47 AM          CT SPINE CERVICAL (CPT=72125)    Result Date: 1/25/2024  CONCLUSION:  1. No acute fracture or traumatic subluxation of the cervical spine. 2. Advanced multilevel cervical spine degenerative changes. 3. Mild levoscoliosis of the cervical spine. 4. Bilateral carotid bifurcation atherosclerosis.   A preliminary report was issued by the [facility] teleradiology service. There are no major discrepancies.  elm-remote  Dictated by (CST): Brent Cho MD on 1/25/2024 at 6:28 AM     Finalized by (CST): Brent Cho MD on 1/25/2024 at 6:31 AM          CT BRAIN OR HEAD (88728)    Result Date: 1/25/2024  CONCLUSION:  1. No acute intracranial process by noncontrast CT technique. 2. Encephalomalacia at the left occipital lobe with associated ex vacuo dilation of the left lateral ventricular occipital horn. 3. Nonspecific white matter changes involving both cerebral  hemispheres that most likely reflect sequelae of chronic microangiopathy. 4. Intracranial atherosclerosis.   A preliminary report was issued by the [facility] teleradiology service. There are no major discrepancies.  elm-remote  Dictated by (CST): Brent Cho MD on 1/25/2024 at 6:25 AM     Finalized by (CST): Brent Cho MD on 1/25/2024 at 6:28 AM              Impression:     89yo M, household ambulator with walker, Aox1 with baseline dementia presents with a left femoral neck fracture after an unwitnessed fall at his nursing home    -Plan for left hip hemiarthroplasty  -Keep NPO  -NWB LLE    -Consent obtained from TYESHA Silvestre Manish ([phone])    PARHEBER  I had a long discussion with the patient and his POA Silvestre. I discussed with them that the patient has a left femoral neck fracture. He is a household ambulator with a walker at his nursing home. I discussed with the patient and Silvestre both operative and nonoperative options. Operative intervention given the location of the fracture and his baseline dementia and household ambulation status with a walker would consist of a left hip hemiarthroplasty. I discussed with them both that the risks of this surgery include pain, infection, bleeding, scarring, damage to nearby structures, nonunion, instability, fracture, dislocation, leg length discrepancy, blood clots, pulmonary embolism, death and heart attack. I discussed the benefits of the surgery including pain control and mobilization. I also discussed nonoperative care which would consist of prolonged immobilization. I discussed the risks of prolonged immobilization including muscle atrophy and pneumonias. I also discussed the overall mortality and morbidity rates with geriatric hip fractures. The POA Silvestre and the patient both agreed to proceed with the surgery. The patient desired pain comfort and restoration of the ability to ambulate sooner. Silvestre and the patient both understood the risks  of the surgery and wished to proceed. Silvestre as the POA consented over the phone. All questions were answered. No guarantees were given.              Kelvin Farrell MD  1/25/2024     Electronically signed by Kelvin Farrell MD on 1/25/2024  1:53 PM           D/C Summary    No notes of this type exist for this encounter.        Physical Therapy Notes (last 72 hours)      Physical Therapy Note signed by Christina Rueda PT at 1/30/2024 11:54 AM  Version 1 of 1    Author: Christina Rueda PT Service: Rehab Author Type: Physical Therapist    Filed: 1/30/2024 11:54 AM Date of Service: 1/30/2024 11:37 AM Status: Signed    : Christina Rueda PT (Physical Therapist)       PHYSICAL THERAPY TREATMENT NOTE - INPATIENT     Room Number: 420/420-A       Presenting Problem: unwitnessed fall w/left hip pain and inability to ambulate s/p L hemiarthroplasty, WBAT, post hip precautions for 12 weeks  Co-Morbidities : COPD, malignant neoplasm of sigmoid colon    Problem List  Principal Problem:    Closed fracture of left hip, initial encounter ([facility])  Active Problems:    Hip fracture ([facility])      PHYSICAL THERAPY ASSESSMENT     RN approved participation with physical therapy. Pt was received resting in bed; introduced self and role; pt found to be incontinent of urine. Confused, oriented to self only. Minimally verbal this date but groans with movement. Max A for rolling L and R for linen change and don brief; able to maintain sidelying position up to 2 minutes bilaterally with mod A. Max A x 2 to scoot upward in bed. Pt refused further activity despite education provided. Pt was left resting in bed with needs within reach, bed alarm on, handoff to RN complete.    The patient's Approx Degree of Impairment: 68.66% has been calculated based on documentation in the [facility] '6 clicks' Inpatient Basic Mobility Short Form.  Research supports that patients with this level of impairment may benefit from [facility].    DISCHARGE RECOMMENDATIONS  PT Discharge  Recommendations: Sub-acute rehabilitation     PLAN  PT Treatment Plan: Bed mobility;Body mechanics;Coordination;Endurance;Patient education;Gait training;Strengthening;Transfer training;Balance training  Frequency (Obs): Daily    SUBJECTIVE  \"No\" when asked to sit at edge of bed    OBJECTIVE  Precautions: JEAN-CLAUDE - posterior;Hip abduction pillow;Bed/chair alarm    WEIGHT BEARING RESTRICTION  L Lower Extremity: Weight Bearing as Tolerated    PAIN ASSESSMENT   Rating: Unable to rate  Location: unable to state; groans with movement  Management Techniques: Activity promotion;Body mechanics;Breathing techniques;Repositioning    BALANCE  Static Sitting: Not tested  Dynamic Sitting: Not tested  Static Standing: Not tested  Dynamic Standing: Not tested    AM-PAC '6-Clicks' INPATIENT SHORT FORM - BASIC MOBILITY  How much difficulty does the patient currently have...  Patient Difficulty: Turning over in bed (including adjusting bedclothes, sheets and blankets)?: A Lot   Patient Difficulty: Sitting down on and standing up from a chair with arms (e.g., wheelchair, bedside commode, etc.): A Lot   Patient Difficulty: Moving from lying on back to sitting on the side of the bed?: A Lot   How much help from another person does the patient currently need...   Help from Another: Moving to and from a bed to a chair (including a wheelchair)?: A Lot   Help from Another: Need to walk in hospital room?: A Lot   Help from Another: Climbing 3-5 steps with a railing?: A Lot     AM-PAC Score:  Raw Score: 12   Approx Degree of Impairment: 68.66%   Standardized Score (AM-PAC Scale): 35.33   CMS Modifier (G-Code): CL    FUNCTIONAL ABILITY STATUS  Functional Mobility/Gait Assessment  Gait Assistance: Not tested  Distance (ft): refused  Assistive Device:  (-)  Pattern: Shuffle;L Decreased stance time    Patient End of Session: In bed;Needs met;Call light within reach;RN aware of session/findings;All patient questions and concerns addressed;Alarm  set    CURRENT GOALS   Goals to be met by: 2/8/24  Patient Goal Patient's self-stated goal is: not stated   Goal #1 Patient is able to demonstrate supine - sit EOB @ level: minimal assistance   Goal #1   Current Status NT - rolling and scooting only this date.    Goal #2 Patient is able to demonstrate transfers Sit to/from Stand at assistance level: minimal assistance      Goal #2  Current Status NT   Goal #3 Patient is able to ambulate 10 feet with assistive device at assistance level: moderate assistance   Goal #3   Current Status NT   Goal #4 Patient will negotiate bed to/from chair transfers with RW with minimal assistance   Goal #4   Current Status Pt declined   Goal #5 Patient verbalizes and/or demonstrates all precautions and safety concerns independently   Goal #5   Current Status IN PROGRESS   Goal #6 Patient independently performs home exercise program for ROM/strengthening per the instructions provided in preparation for discharge.   Goal #6  Current Status IN PROGRESS        Therapeutic Activity: 10 minutes       Physical Therapy Note signed by Avel Cruz PTA at 1/29/2024  1:50 PM  Version 1 of 1    Author: Avel Cruz PTA Service: Rehab Author Type: Physical Therapy Assistant    Filed: 1/29/2024  1:50 PM Date of Service: 1/29/2024  8:30 AM Status: Signed    : Avel Cruz PTA (Physical Therapy Assistant)       PHYSICAL THERAPY TREATMENT NOTE - INPATIENT     Room Number: 420/420-A       Presenting Problem: unwitnessed fall w/left hip pain and inability to ambulate s/p L hemiarthroplasty, WBAT, post hip precautions for 12 weeks    Problem List  Principal Problem:    Closed fracture of left hip, initial encounter ([facility])  Active Problems:    Hip fracture ([facility])      PHYSICAL THERAPY ASSESSMENT   Chart reviewed. DELPHINE Bloom approved participation in physical therapy. PPE worn by therapist: mask and gloves. Patient was not wearing a mask during session. Patient presented in bed with  unable to rate pain. Patient with fair  progress towards goals during this session. Education provided on Total Hip precautions, Physical therapy plan of care, and physiological benefits of out of bed mobility. Patient with poor carryover. Pt is received in the bed and was cleared for therapy session. Pt with cognitive deficits and is A&O x1 but was able to follow a few simple directions. Pt is max A with bed mobility and to transfer to the EOB. Pt required assist with his B LE's and his upper trunk to sit up. Pt sat EOB for about 30 minutes with close SBA. Pt sat and ate his breakfast. Pt was assisted. Pt declined to return to supine and called DELPHINE Bucio to stay with pt. Handed pt off to RN. Pt is on track to [facility] to [facility] once medically cleared.     Bed mobility: Max assist  Transfers:  NT  Gait Assistance: Not tested  Distance (ft): refused  Assistive Device:  (-)  Pattern: Shuffle;L Decreased stance time          . Patient was left in  EOB and handed off to DELPHINE Bucio  at end of session with all needs in reach. The patient's Approx Degree of Impairment: 68.66% has been calculated based on documentation in the [facility] '6 clicks' Inpatient Basic Mobility Short Form.  Research supports that patients with this level of impairment may benefit from Subacute Rehab. RN aware of patient status post session.    DISCHARGE RECOMMENDATIONS  PT Discharge Recommendations: Sub-acute rehabilitation     PLAN  PT Treatment Plan: Bed mobility;Body mechanics;Coordination;Endurance;Patient education;Gait training;Strengthening;Transfer training;Balance training    SUBJECTIVE  Pt was agreeable to therapy session.         OBJECTIVE  Precautions: Hip abduction pillow    WEIGHT BEARING RESTRICTION  Weight Bearing Restriction: L lower extremity           L Lower Extremity: Weight Bearing as Tolerated    PAIN ASSESSMENT   Rating: Unable to rate  Location: no complains  Management Techniques: Activity promotion;Body  mechanics;Relaxation;Repositioning    BALANCE                                                                                                                       Static Sitting: Fair  Dynamic Sitting: Fair           Static Standing: Not tested  Dynamic Standing: Not tested    ACTIVITY TOLERANCE                         O2 WALK  Oxygen Therapy  SPO2% on Room Air at Rest: 98  At rest oxygen flow (liters per minute): 2    AM-PAC '6-Clicks' INPATIENT SHORT FORM - BASIC MOBILITY  How much difficulty does the patient currently have...  Patient Difficulty: Turning over in bed (including adjusting bedclothes, sheets and blankets)?: A Lot   Patient Difficulty: Sitting down on and standing up from a chair with arms (e.g., wheelchair, bedside commode, etc.): A Lot   Patient Difficulty: Moving from lying on back to sitting on the side of the bed?: A Lot   How much help from another person does the patient currently need...   Help from Another: Moving to and from a bed to a chair (including a wheelchair)?: A Lot   Help from Another: Need to walk in hospital room?: A Lot   Help from Another: Climbing 3-5 steps with a railing?: A Lot     AM-PAC Score:  Raw Score: 12   Approx Degree of Impairment: 68.66%   Standardized Score (AM-PAC Scale): 35.33   CMS Modifier (G-Code): CL              Patient End of Session: In bed;Needs met;Call light within reach;RN aware of session/findings;With  staff;All patient questions and concerns addressed    CURRENT GOALS   Goals to be met by: 2/8/24  Patient Goal Patient's self-stated goal is: not stated   Goal #1 Patient is able to demonstrate supine - sit EOB @ level: minimal assistance   Goal #1   Current Status Max A    Goal #2 Patient is able to demonstrate transfers Sit to/from Stand at assistance level: minimal assistance     Goal #2  Current Status NT   Goal #3 Patient is able to ambulate 10 feet with assistive device at assistance level: moderate assistance   Goal #3   Current Status NT    Goal #4 Patient will negotiate bed to/from chair transfers with RW with minimal assistance   Goal #4   Current Status Pt declined   Goal #5 Patient verbalizes and/or demonstrates all precautions and safety concerns independently   Goal #5   Current Status IN PROGRESS   Goal #6 Patient independently performs home exercise program for ROM/strengthening per the instructions provided in preparation for discharge.   Goal #6  Current Status IN PROGRESS                 Therapeutic Activity: 30 minutes       Physical Therapy Note signed by Avel Cruz PTA at 1/28/2024 12:02 PM  Version 1 of 1    Author: Avel Cruz PTA Service: Rehab Author Type: Physical Therapy Assistant    Filed: 1/28/2024 12:02 PM Date of Service: 1/28/2024 12:00 PM Status: Signed    : Avel Cruz PTA (Physical Therapy Assistant)       Chart reviewed and attempted treatment and pt was very sleepy and declined to participate in the therapy session. Pt education provided. Will attempt as schedule permits and is appropriate. RN aware.                 Occupational Therapy Notes (last 72 hours)      Occupational Therapy Note signed by Karen Brooke OT at 1/30/2024  3:28 PM  Version 1 of 1    Author: Karen Brooke OT Service: Rehab Author Type: Occupational Therapist    Filed: 1/30/2024  3:28 PM Date of Service: 1/30/2024 11:15 AM Status: Signed    : Karen Brooke OT (Occupational Therapist)       OCCUPATIONAL THERAPY TREATMENT NOTE - INPATIENT        Room Number: 420/420-A     Presenting Problem: s/p LT JEAN-CLAUDE    Problem List  Principal Problem:    Closed fracture of left hip, initial encounter ([facility])  Active Problems:    Hip fracture ([facility])      OCCUPATIONAL THERAPY ASSESSMENT   Per RN, pt ok to tx. Co-treated with PT. No visitors present. Introduced self and role of OT to pt. Pt verbalized understanding and was agreeable to session. Pt received in bed but unable to rate pain; grimacing when rolling. Linens  were soiled and gown was doffed. Total A for hygiene, rolling, and linen and gown change. Total A for ADLs. Pt unable to state last name but does not resist movement. Pt left in bed and alarm activated. Call light and all needs in reach. Handoff given to RN.     The patient's Approx Degree of Impairment: 92.44% has been calculated based on documentation in the [facility] '6 clicks' Inpatient Daily Activity Short Form.  Research supports that patients with this level of impairment may benefit from LTAC.    DISCHARGE RECOMMENDATIONS  OT Discharge Recommendations: Sub-acute rehabilitation     PLAN  OT Treatment Plan: ADL training;Functional transfer training    SUBJECTIVE  OK    OBJECTIVE  Precautions: JEAN-CLAUDE - posterior;Hip abduction pillow;Bed/chair alarm    WEIGHT BEARING RESTRICTION  L Lower Extremity: Weight Bearing as Tolerated    PAIN ASSESSMENT  Rating: Non-verbal signs of pain/discomfort observed       ACTIVITIES OF DAILY LIVING ASSESSMENT  AM-PAC ‘6-Clicks’ Inpatient Daily Activity Short Form  How much help from another person does the patient currently need…  -   Putting on and taking off regular lower body clothing?: Total  -   Bathing (including washing, rinsing, drying)?: Total  -   Toileting, which includes using toilet, bedpan or urinal? : Total  -   Putting on and taking off regular upper body clothing?: Total  -   Taking care of personal grooming such as brushing teeth?: Total  -   Eating meals?: A Lot    AM-PAC Score:  Score: 7  Approx Degree of Impairment: 92.44%  Standardized Score (AM-PAC Scale): 20.13  CMS Modifier (G-Code): CM         BED MOBILITY  Rolling: Dependent          OT Goals:  Patient self-stated goal is: pt unable to state    Patient will perform functional xfers with CGA  Comment: NA   Pt will perform bed mobility with supervision for pressure relief and supine self care  Comment: NA   Pt will tolerate 3 minutes in supported standing with supervision for participation in self care  Comment:  NA     Comment:         Goals  on:24  Frequency:3x/week    OT Session Time: 15 minutes  Self-Care Home Management: 15 minutes      Karen Brooke OT  [facility]  Inpatient Rehabilitation  Occupational Therapy  [phone]               Video Swallow Study Notes    No notes of this type exist for this encounter.        SLP Note - SLP Notes      SLP Note signed by Di Andrade SLP at 2024 12:46 PM  Version 1 of 1    Author: Chambers, Di, SLP Service: Rehab Author Type: Speech and Language Pathologist    Filed: 2024 12:46 PM Date of Service: 2024 12:40 PM Status: Signed    : Di Andrade SLP (Speech and Language Pathologist)    Summary:soft bite sized/thin liquids             SPEECH DAILY NOTE - INPATIENT    ASSESSMENT & PLAN   ASSESSMENT  PPE REQUIRED. THIS THERAPIST WORE GLOVES AND MASK FOR DURATION OF EVALUATION. HANDS WASHED UPON ENTRANCE/EXIT.    SLP f/u for ongoing meal assessment per recommendations of soft bite sized/thin liquids per BSE. RN reports pt tolerates diet and medication well with no overt clinical s/s aspiration. Pt denies any swallowing challenges.     Pt positioned upright in bed, alert/cooperative. Pt afebrile, tolerating room air with oxygen status 94% prior to the start of oral trials. SLP reviewed aspiration precautions and safe swallowing compensatory strategies with the patient. Safe swallow guidelines remain written on the white board in purple. Patient v/u, no family present. Provided min assistance, pt tolerates soft bite sized consistency and thin liquids via cup with no overt clinical signs/symptoms of aspiration. Oxygen status remained stable t/o the entire session. Recommend remain on current diet with adherence to aspiration precautions and swallow strategies. No further swallow services warranted at this time. Please re consult if needed. RN alerted with results and recommendations.     MOST RECENT CXR   CONCLUSION:   Linear  atelectasis or scarring in the left midlung.  No other focal airspace disease or significant pleural effusion.   Hyperinflation/emphysema.         Diet Recommendations - Solids: Mechanical soft chopped/ Soft & Bite Sized  Diet Recommendations - Liquids: Thin Liquids    Compensatory Strategies Recommended: Slow rate;Small bites and sips  Aspiration Precautions: Upright position;Slow rate  Medication Administration Recommendations:  (as tolerated)                     Discharge Recommendations  Discharge Recommendations/Plan: Undetermined    Treatment Plan  Treatment Plan/Recommendations: No further inpatient SLP service warranted    Interdisciplinary Communication: Plan posted at bedside            GOALS  Goal #1 The patient will tolerate soft bite sized consistency and thin liquids without overt signs or symptoms of aspiration with 100 % accuracy over 1-2 session(s).  Goal Met 1/29   Goal #2 The patient/family/caregiver will demonstrate understanding and implementation of aspiration precautions and swallow strategies independently over 1-2 session(s).    Goal Met 1/29   Goal #3 The patient will utilize compensatory strategies as outlined by  BSSE (clinical evaluation) including Slow rate, Small bites, Small sips, Upright 90 degrees, Eliminate distractions with PRN assistance 100 % of the time across 1-2 sessions.  Goal Met 1/29     FOLLOW UP  Follow Up Needed (Documentation Required): No  SLP Follow-up Date: 01/29/24  Number of Visits to Meet Established Goals:  (1-2)    Session: 1    If you have any questions, please contact ИВАН Szymanski M.S. CCC-SLP  Speech Language Pathologist  Phone Number Ext. [phone]      Electronically signed by Di Andrade SLP on 1/29/2024 12:46 PM           Multidisciplinary Problems     Active Goals        Problem: Patient/Family Goals    Goal Priority Disciplines Outcome Interventions   Patient/Family Long Term Goal     Interdisciplinary Progressing    Description:  Patient's Long Term Goal: To be discharged    Interventions:  - get clearance   - See additional Care Plan goals for specific interventions   Patient/Family Short Term Goal     Interdisciplinary Progressing    Description: Patient's Short Term Goal: Manage pain    Interventions:   - monitor and assess pain   - See additional Care Plan goals for specific interventions               Discharge Treatment Preferences    Flowsheet Row Most Recent Value   Preferences    PASRR Level 1 Submitted Yes